# Patient Record
Sex: FEMALE | Race: OTHER | NOT HISPANIC OR LATINO | URBAN - METROPOLITAN AREA
[De-identification: names, ages, dates, MRNs, and addresses within clinical notes are randomized per-mention and may not be internally consistent; named-entity substitution may affect disease eponyms.]

---

## 2021-10-16 ENCOUNTER — EMERGENCY (EMERGENCY)
Facility: HOSPITAL | Age: 46
LOS: 1 days | Discharge: HOME | End: 2021-10-16
Attending: EMERGENCY MEDICINE
Payer: COMMERCIAL

## 2021-10-16 VITALS
DIASTOLIC BLOOD PRESSURE: 71 MMHG | OXYGEN SATURATION: 100 % | HEART RATE: 69 BPM | TEMPERATURE: 98 F | RESPIRATION RATE: 18 BRPM | SYSTOLIC BLOOD PRESSURE: 108 MMHG

## 2021-10-16 VITALS
HEART RATE: 68 BPM | SYSTOLIC BLOOD PRESSURE: 118 MMHG | HEIGHT: 67 IN | OXYGEN SATURATION: 100 % | TEMPERATURE: 98 F | WEIGHT: 145.95 LBS | DIASTOLIC BLOOD PRESSURE: 75 MMHG | RESPIRATION RATE: 16 BRPM

## 2021-10-16 DIAGNOSIS — Z98.89 OTHER SPECIFIED POSTPROCEDURAL STATES: Chronic | ICD-10-CM

## 2021-10-16 LAB
ALBUMIN SERPL ELPH-MCNC: 3.5 G/DL — SIGNIFICANT CHANGE UP (ref 3.5–5)
ALP SERPL-CCNC: 67 U/L — SIGNIFICANT CHANGE UP (ref 40–120)
ALT FLD-CCNC: 23 U/L DA — SIGNIFICANT CHANGE UP (ref 10–60)
ANION GAP SERPL CALC-SCNC: 6 MMOL/L — SIGNIFICANT CHANGE UP (ref 5–17)
AST SERPL-CCNC: 11 U/L — SIGNIFICANT CHANGE UP (ref 10–40)
BASOPHILS # BLD AUTO: 0.03 K/UL — SIGNIFICANT CHANGE UP (ref 0–0.2)
BASOPHILS NFR BLD AUTO: 0.4 % — SIGNIFICANT CHANGE UP (ref 0–2)
BILIRUB SERPL-MCNC: 0.3 MG/DL — SIGNIFICANT CHANGE UP (ref 0.2–1.2)
BUN SERPL-MCNC: 10 MG/DL — SIGNIFICANT CHANGE UP (ref 7–18)
CALCIUM SERPL-MCNC: 8.9 MG/DL — SIGNIFICANT CHANGE UP (ref 8.4–10.5)
CHLORIDE SERPL-SCNC: 108 MMOL/L — SIGNIFICANT CHANGE UP (ref 96–108)
CO2 SERPL-SCNC: 27 MMOL/L — SIGNIFICANT CHANGE UP (ref 22–31)
CREAT SERPL-MCNC: 0.66 MG/DL — SIGNIFICANT CHANGE UP (ref 0.5–1.3)
EOSINOPHIL # BLD AUTO: 0.17 K/UL — SIGNIFICANT CHANGE UP (ref 0–0.5)
EOSINOPHIL NFR BLD AUTO: 2.1 % — SIGNIFICANT CHANGE UP (ref 0–6)
GLUCOSE SERPL-MCNC: 91 MG/DL — SIGNIFICANT CHANGE UP (ref 70–99)
HCG UR QL: NEGATIVE — SIGNIFICANT CHANGE UP
HCT VFR BLD CALC: 37.5 % — SIGNIFICANT CHANGE UP (ref 34.5–45)
HGB BLD-MCNC: 11.6 G/DL — SIGNIFICANT CHANGE UP (ref 11.5–15.5)
IMM GRANULOCYTES NFR BLD AUTO: 0.2 % — SIGNIFICANT CHANGE UP (ref 0–1.5)
LYMPHOCYTES # BLD AUTO: 3.29 K/UL — SIGNIFICANT CHANGE UP (ref 1–3.3)
LYMPHOCYTES # BLD AUTO: 40.4 % — SIGNIFICANT CHANGE UP (ref 13–44)
MCHC RBC-ENTMCNC: 27.5 PG — SIGNIFICANT CHANGE UP (ref 27–34)
MCHC RBC-ENTMCNC: 30.9 GM/DL — LOW (ref 32–36)
MCV RBC AUTO: 88.9 FL — SIGNIFICANT CHANGE UP (ref 80–100)
MONOCYTES # BLD AUTO: 0.52 K/UL — SIGNIFICANT CHANGE UP (ref 0–0.9)
MONOCYTES NFR BLD AUTO: 6.4 % — SIGNIFICANT CHANGE UP (ref 2–14)
NEUTROPHILS # BLD AUTO: 4.11 K/UL — SIGNIFICANT CHANGE UP (ref 1.8–7.4)
NEUTROPHILS NFR BLD AUTO: 50.5 % — SIGNIFICANT CHANGE UP (ref 43–77)
NRBC # BLD: 0 /100 WBCS — SIGNIFICANT CHANGE UP (ref 0–0)
PLATELET # BLD AUTO: 225 K/UL — SIGNIFICANT CHANGE UP (ref 150–400)
POTASSIUM SERPL-MCNC: 4.1 MMOL/L — SIGNIFICANT CHANGE UP (ref 3.5–5.3)
POTASSIUM SERPL-SCNC: 4.1 MMOL/L — SIGNIFICANT CHANGE UP (ref 3.5–5.3)
PROT SERPL-MCNC: 7.2 G/DL — SIGNIFICANT CHANGE UP (ref 6–8.3)
RBC # BLD: 4.22 M/UL — SIGNIFICANT CHANGE UP (ref 3.8–5.2)
RBC # FLD: 12.1 % — SIGNIFICANT CHANGE UP (ref 10.3–14.5)
SODIUM SERPL-SCNC: 141 MMOL/L — SIGNIFICANT CHANGE UP (ref 135–145)
TROPONIN I SERPL-MCNC: <0.015 NG/ML — SIGNIFICANT CHANGE UP (ref 0–0.04)
WBC # BLD: 8.14 K/UL — SIGNIFICANT CHANGE UP (ref 3.8–10.5)
WBC # FLD AUTO: 8.14 K/UL — SIGNIFICANT CHANGE UP (ref 3.8–10.5)

## 2021-10-16 PROCEDURE — 93010 ELECTROCARDIOGRAM REPORT: CPT

## 2021-10-16 PROCEDURE — 36415 COLL VENOUS BLD VENIPUNCTURE: CPT

## 2021-10-16 PROCEDURE — 80053 COMPREHEN METABOLIC PANEL: CPT

## 2021-10-16 PROCEDURE — 99285 EMERGENCY DEPT VISIT HI MDM: CPT

## 2021-10-16 PROCEDURE — 70450 CT HEAD/BRAIN W/O DYE: CPT | Mod: 26,MA

## 2021-10-16 PROCEDURE — 85025 COMPLETE CBC W/AUTO DIFF WBC: CPT

## 2021-10-16 PROCEDURE — 70450 CT HEAD/BRAIN W/O DYE: CPT | Mod: MA

## 2021-10-16 PROCEDURE — 93005 ELECTROCARDIOGRAM TRACING: CPT

## 2021-10-16 PROCEDURE — 84484 ASSAY OF TROPONIN QUANT: CPT

## 2021-10-16 PROCEDURE — 81025 URINE PREGNANCY TEST: CPT

## 2021-10-16 PROCEDURE — 99284 EMERGENCY DEPT VISIT MOD MDM: CPT | Mod: 25

## 2021-10-16 RX ORDER — SODIUM CHLORIDE 9 MG/ML
1000 INJECTION INTRAMUSCULAR; INTRAVENOUS; SUBCUTANEOUS ONCE
Refills: 0 | Status: COMPLETED | OUTPATIENT
Start: 2021-10-16 | End: 2021-10-16

## 2021-10-16 RX ORDER — ACETAMINOPHEN 500 MG
650 TABLET ORAL ONCE
Refills: 0 | Status: COMPLETED | OUTPATIENT
Start: 2021-10-16 | End: 2021-10-16

## 2021-10-16 RX ADMIN — Medication 650 MILLIGRAM(S): at 12:31

## 2021-10-16 RX ADMIN — SODIUM CHLORIDE 1000 MILLILITER(S): 9 INJECTION INTRAMUSCULAR; INTRAVENOUS; SUBCUTANEOUS at 12:30

## 2021-10-16 NOTE — ED PROVIDER NOTE - OBJECTIVE STATEMENT
45 y/o woman, no PMH, c/o intermittent headache x 1 week, then nose bleed this morning.  She also had syncopal episode (denies head injury) last Friday.  Denies seizure/vomiting/fever/dizziness/weakness/numbness.

## 2021-10-16 NOTE — ED PROVIDER NOTE - CARE PLAN
Principal Discharge DX:	Chronic headaches  Secondary Diagnosis:	Epistaxis  Secondary Diagnosis:	Syncope   1

## 2021-10-16 NOTE — ED PROVIDER NOTE - PATIENT PORTAL LINK FT
You can access the FollowMyHealth Patient Portal offered by [facility] by registering at the following website: [email or web address]. By joining [employer]’s FollowMyHealth portal, you will also be able to view your health information using other applications (apps) compatible with our system.

## 2021-10-16 NOTE — ED PROVIDER NOTE - CLINICAL SUMMARY MEDICAL DECISION MAKING FREE TEXT BOX
47 y/o woman, no PMH, c/o intermittent headache x 1 week, then nose bleed this morning.  She also had syncopal episode (denies head injury) last Friday--CT head, labs, IVF, EKG, pregnancy test, symptomatic Tx, reassess.

## 2021-10-16 NOTE — ED PROVIDER NOTE - NSFOLLOWUPINSTRUCTIONS_ED_ALL_ED_FT
SYNCOPE - AfterCare(R) Instructions(ER/ED)           Syncope    WHAT YOU NEED TO KNOW:    Syncope is also called fainting or passing out. Syncope is a sudden, temporary loss of consciousness, followed by a fall from a standing or sitting position. Syncope ranges from not serious to a sign of a more serious condition that needs to be treated. You can control some health conditions that cause syncope. Your healthcare providers can help you create a plan to manage syncope and prevent episodes.    DISCHARGE INSTRUCTIONS:    Seek care immediately if:   •You are bleeding because you hit your head when you fainted.       •You suddenly have double vision, difficulty speaking, numbness, and cannot move your arms or legs.      •You have chest pain and trouble breathing.      •You vomit blood or material that looks like coffee grounds.      •You see blood in your bowel movement.      Contact your healthcare provider if:   •You have new or worsening symptoms.      •You have another syncope episode.      •You have a headache, fast heartbeat, or feel too dizzy to stand up.      •You have questions or concerns about your condition or care.      Medicines:   •Medicines may be needed to help your heart pump strongly and regularly. Your healthcare provider may also make changes to any medicines that are causing syncope.       •Take your medicine as directed. Contact your healthcare provider if you think your medicine is not helping or if you have side effects. Tell him or her if you are allergic to any medicine. Keep a list of the medicines, vitamins, and herbs you take. Include the amounts, and when and why you take them. Bring the list or the pill bottles to follow-up visits. Carry your medicine list with you in case of an emergency.      Follow up with your doctor as directed: Write down your questions so you remember to ask them during your visits.     Manage syncope:   •Keep a record of your syncope episodes. Include your symptoms and your activity before and after the episode. The record can help your healthcare provider find the cause of your syncope and help you manage episodes.      •Sit or lie down when needed. This includes when you feel dizzy, your throat is getting tight, and your vision changes. Raise your legs above the level of your heart.      •Take slow, deep breaths if you start to breathe faster with anxiety or fear. This can help decrease dizziness and the feeling that you might faint.       •Check your blood pressure often. This is important if you take medicine to lower your blood pressure. Check your blood pressure when you are lying down and when you are standing. Ask how often to check during the day. Keep a record of your blood pressure numbers. Your healthcare provider may use the record to help plan your treatment.  How to take a Blood Pressure           Prevent a syncope episode:   •Move slowly and let yourself get used to one position before you move to another position. This is very important when you change from a lying or sitting position to a standing position. Take some deep breaths before you stand up from a lying position. Stand up slowly. Sudden movements may cause a fainting spell. Sit on the side of the bed or couch for a few minutes before you stand up. If you are on bedrest, try to be upright for about 2 hours each day, or as directed. Do not lock your legs if you are standing for a long period of time. Move your legs and bend your knees to keep blood flowing.      •Follow your healthcare provider's recommendations. Your provider may recommend that you drink more liquids to prevent dehydration. You may also need to have more salt to keep your blood pressure from dropping too low and causing syncope. Your provider will tell you how much liquid and sodium to have each day. He or she will also tell you how much physical activity is safe for you. This will depend on what is causing your syncope.      •Watch for signs of low blood sugar. These include hunger, nervousness, sweating, and fast or fluttery heartbeats. Talk with your healthcare provider about ways to keep your blood sugar level steady.      •Do not strain if you are constipated. You may faint if you strain to have a bowel movement. Walking is the best way to get your bowels moving. Eat foods high in fiber to make it easier to have a bowel movement. Good examples are high-fiber cereals, beans, vegetables, and whole-grain breads. Prune juice may help make bowel movements softer.      •Be careful in hot weather. Heat can cause a syncope episode. Limit activity done outside on hot days. Physical activity in hot weather can lead to dehydration. This can cause an episode.         © Copyright [employer] 2021           back to top                          © Copyright [employer] 2021                                                                                                         Nosebleed, Adult      A nosebleed is when blood comes out of the nose. Nosebleeds are common. Usually, they are not a sign of a serious condition.  Nosebleeds can happen if a blood vessel in your nose starts to bleed or if the lining of your nose (mucous membrane) cracks. They are commonly caused by:  •Allergies.      •Colds.      •Picking your nose.      •Blowing your nose too hard.      •An injury from sticking an object into your nose or getting hit in the nose.      •Dry or cold air.    Less common causes of nosebleeds include:  •Toxic fumes.      •Something abnormal in the nose or in the air-filled spaces in the bones of the face (sinuses).      •Growths in the nose, such as polyps.      •Blood thinners or conditions that cause blood to clot slowly.      •Certain illnesses or procedures that irritate or dry out the nasal passages.        Follow these instructions at home:      When you have a nosebleed:      •Sit down and tilt your head slightly forward.      •Use a clean towel or tissue to pinch your nostrils under the bony part of your nose. After 5 minutes, let go of your nose and see if bleeding starts again. Do not release pressure before that time. If there is still bleeding, repeat the pinching and holding for 5 minutes or until the bleeding stops.      • Do not place tissues or gauze in the nose to stop the bleeding.      •Avoid lying down and avoid tilting your head backward. That may make blood collect in the throat and cause gagging or coughing.      •Use a nasal spray decongestant to help with a nosebleed as told by your health care provider.      After a nosebleed:     •Avoid blowing your nose or sniffing for a number of hours.      •Avoid straining, lifting, or bending at the waist for several days. You may go back to other normal activities as you are able.    •If you are taking aspirin or blood thinners and you have nosebleeds, talk to your health care provider. These medicines make bleeding more likely.  •Ask your health care provider if you should stop taking the medicines or if you should adjust the dose.      •Do not stop taking medicines that your health care provider has recommended unless he or she tells you to stop taking them.      •If your nosebleed was caused by dry mucous membranes, use over-the-counter saline nasal spray or gel and a humidifier as told by your health care provider. This will keep the mucous membranes moist and allow them to heal. If you need to use one of these products:  •Choose one that is water-soluble.      •Use only as much as you need and use it only as often as needed.      •Do not lie down right after you use it.      •If you get nosebleeds often, talk with your health care provider about medical treatments. Options may include:  •Nasal cautery. This treatment stops and prevents nosebleeds by using a chemical swab or electrical device to lightly burn tiny blood vessels inside the nose.      •Nasal packing. A gauze or other material is placed in the nose to keep constant pressure on the bleeding area.          Contact a health care provider if you:    •Have a fever.      •Get nosebleeds often or more often than usual.      •Bruise very easily.      •Have a nosebleed from having something stuck in your nose.      •Have bleeding in your mouth.      •Vomit or cough up brown material.      •Have a nosebleed after you start a new medicine.        Get help right away if:    •You have a nosebleed after a fall or a head injury.      •Your nosebleed does not go away after 20 minutes.      •You feel dizzy or weak.      •You have unusual bleeding from other parts of your body.      •You have unusual bruising on other parts of your body.      •You become sweaty.      •You vomit blood.        Summary    •A nosebleed is when blood comes out of the nose. Common causes include allergies, an injury to the nose, or cold or dry air.      •Initial treatment includes applying pressure for 5 minutes.       •Moisturizing the nose with saline nasal spray or gel after a nosebleed may help prevent future bleeding.      •Get help right away if your nosebleed does not go away after 20 minutes.      This information is not intended to replace advice given to you by your health care provider. Make sure you discuss any questions you have with your health care provider.      Document Revised: 10/15/2020 Document Reviewed: 10/15/2020    [employer] Patient Education © 2021 [employer]                            Log Out.      [employer]® CareNotes®     :  qcue  	                       GENERAL HEADACHE - AfterCare(R) Instructions(ER/ED)           General Headache    WHAT YOU NEED TO KNOW:    Headache pain may be mild or severe. Common causes include stress, medicines, and head injuries. Sleep problems, allergies, and hormone changes can also cause a headache. You may have frequent headaches that have no clear cause. Pain may start in another part of your body and move to your head. Headache pain can also move to other parts of your body. A headache can cause other symptoms, such as nausea and vomiting. A severe headache may be a sign of a stroke or other serious problem that needs immediate treatment.    DISCHARGE INSTRUCTIONS:    Have someone call your local emergency number (911 in the [place]) for any of the following:   •You have any of the following signs of a stroke: ?Numbness or drooping on one side of your face       ?Weakness in an arm or leg      ?Confusion or difficulty speaking      ?Dizziness, a severe headache, or vision loss    BE FAST SIGNS OF A STROKE             Return to the emergency department if:   •You have a headache with neck stiffness and a fever.      •You have a constant headache and are vomiting.      •You have severe pain that does not get better after you take pain medicine.      •You have a headache and the pain worsens when you look into light.      •You have a headache and vision changes, such as blurred vision.      •You have a headache and are forgetful or confused.      Call your doctor if:   •You have a headache each day that does not get better, even after treatment.      •You have changes in your headaches, or new symptoms that occur when you have a headache.      •Others you live or work with also have headaches.      •You have questions or concerns about your condition or care.      Medicines: You may need any of the following:   •Medicines may be given to prevent or treat headache pain. Do not wait until the pain is severe to take your medicine. Ask your healthcare provider how to take the medicine safely.      •NSAIDs, such as ibuprofen, help decrease swelling, pain, and fever. This medicine is available with or without a doctor's order. NSAIDs can cause stomach bleeding or kidney problems in certain people. If you take blood thinner medicine, always ask if NSAIDs are safe for you. Always read the medicine label and follow directions. Do not give these medicines to children under 6 months of age without direction from your child's healthcare provider.      •Acetaminophen decreases pain and fever. It is available without a doctor's order. Ask how much to take and how often to take it. Follow directions. Read the labels of all other medicines you are using to see if they also contain acetaminophen, or ask your doctor or pharmacist. Acetaminophen can cause liver damage if not taken correctly. Do not use more than 4 grams (4,000 milligrams) total of acetaminophen in one day.       •Antinausea medicine may be given to calm your stomach and help prevent vomiting.      •Take your medicine as directed. Contact your healthcare provider if you think your medicine is not helping or if you have side effects. Tell him of her if you are allergic to any medicine. Keep a list of the medicines, vitamins, and herbs you take. Include the amounts, and when and why you take them. Bring the list or the pill bottles to follow-up visits. Carry your medicine list with you in case of an emergency.      Manage your symptoms:   •Rest in a dark and quiet room. This may help decrease your pain.      •Apply heat or ice as directed. Heat or ice may help decrease pain or muscle spasms. Apply heat or ice on the area for 20 minutes every 2 hours for as many days as directed. Your healthcare provider may recommend that you alternate heat and ice.      •Relax your muscles to help relieve a headache. Lie down in a comfortable position and close your eyes. Relax your muscles slowly. Start at your toes and work your way up your body. A massage or warm bath may also help relax your muscles.      Keep a headache record: Record the dates and times that you get headaches, and what you were doing before the headache started. Also record what you ate and drank in the 24 hours before the headache started. This might help your healthcare provider find the cause of your headaches and make a treatment plan. The record can also help you avoid headache triggers or manage your symptoms.    Get enough sleep: You should get 8 to 10 hours of sleep each night. Create a sleep schedule. Go to bed and wake up at the same times each day. It may be helpful to do something relaxing before bed. Do not watch television right before bed.    Do not smoke: Nicotine and other chemicals in cigarettes and cigars can trigger a headache or make it worse. Ask your healthcare provider for information if you currently smoke and need help to quit. E-cigarettes or smokeless tobacco still contain nicotine. Talk to your healthcare provider before you use these products.    Drink liquids as directed: You may need to drink more liquid to prevent dehydration. Dehydration can cause a headache. Ask your healthcare provider how much liquid to drink each day and which liquids are best for you.    Limit caffeine and alcohol as directed: Your headaches may be triggered by caffeine or alcohol. You may also develop a headache if you drink caffeine regularly and suddenly stop.    Eat a variety of healthy foods: Do not skip meals. Too little food can trigger a headache. Include fruits, vegetables, whole-grain breads, low-fat dairy products, beans, lean meat, and fish. Do not have trigger foods, such as chocolate and red wine. Foods that contain gluten, nitrates, MSG, or artificial sweeteners may also trigger a headache.    Healthy Foods         Follow up with your doctor as directed: Write down your questions so you remember to ask them during your visits.        © Copyright [employer] 2021           back to top                          © Copyright [employer] 2021

## 2021-10-16 NOTE — ED PROVIDER NOTE - PROGRESS NOTE DETAILS
Pt improved, no epistaxis or syncope here.  ED w/u unremarkable.  No chest pain.  Advised strict return precautions and PMD f/u.

## 2021-10-16 NOTE — ED ADULT NURSE NOTE - PAIN: PRESENCE, MLM
Gastroesophageal Reflux Disease   AMBULATORY CARE:   Gastroesophageal reflux  reflux occurs when acid and food in the stomach back up into the esophagus  Gastroesophageal reflux disease (GERD) is reflux that occurs more than twice a week for a few weeks  It usually causes heartburn and other symptoms  GERD can cause other health problems over time if it is not treated  Common symptoms include:  Heartburn is the most common symptom of GERD  You may feel burning pain in your chest or below the breast bone  This usually occurs after meals and spreads to your neck, jaw, or shoulder  The pain gets better when you change positions  You may also have any of the following:  · Bitter or acid taste in your mouth    · Dry cough    · Trouble swallowing or pain with swallowing    · Hoarseness or sore throat    · Frequent burping or hiccups    · Feeling of fullness soon after you start eating  Seek care immediately if:  · You feel full and cannot burp or vomit  · You have severe chest pain and sudden trouble breathing  · Your bowel movements are black, bloody, or tarry-looking  · Your vomit looks like coffee grounds or has blood in it  Contact your healthcare provider if:   · You vomit large amounts, or you vomit often  · You have trouble breathing after you vomit  · You have trouble swallowing, or pain with swallowing  · You are losing weight without trying  · Your symptoms get worse or do not improve with treatment  · You have questions or concerns about your condition or care  Treatment for GERD:  Your healthcare provider may prescribe medicine to decrease stomach acid  He may also prescribe medicine that help your esophagus and stomach move food and liquid to your intestines  Surgery may be done if other treatments do not work  You may need surgery to wrap the upper part of the stomach around the esophageal sphincter  This will strengthen the sphincter and prevent reflux     Manage GERD: · Do not have foods or drinks that may increase heartburn  These include chocolate, peppermint, fried or fatty foods, drinks that contain caffeine, or carbonated drinks (soda)  Other foods include spicy foods, onions, tomatoes, and tomato-based foods  Do not have foods or drinks that can irritate your esophagus, such as citrus fruits, juices, and alcohol  · Do not eat large meals  When you eat a lot of food at one time, your stomach needs more acid to digest it  Eat 6 small meals each day instead of 3 large ones, and eat slowly  Do not eat meals 2 to 3 hours before bedtime  · Elevate the head of your bed  Place 6-inch blocks under the head of your bed frame  You may also use more than one pillow under your head and shoulders while you sleep  · Maintain a healthy weight  If you are overweight, weight loss may help relieve symptoms of GERD  · Do not smoke  Smoking weakens the lower esophageal sphincter and increases the risk of GERD  Ask your healthcare provider for information if you currently smoke and need help to quit  E-cigarettes or smokeless tobacco still contain nicotine  Talk to your healthcare provider before you use these products  · Do not wear clothing that is tight around your waist   Tight clothing can put pressure on your stomach and cause or worsen GERD symptoms  Follow up with your healthcare provider as directed:  Write down your questions so you remember to ask them during your visits  © 2017 [facility] Information is for End User's use only and may not be sold, redistributed or otherwise used for commercial purposes  All illustrations and images included in CareNotes® are the copyrighted property of [employer]  or [employer] Eduardo Reyes  The above information is an  only  It is not intended as medical advice for individual conditions or treatments   Talk to your doctor, nurse or pharmacist before following any medical regimen to see if it is safe and effective for you  High Fiber Diet   AMBULATORY CARE:   A high-fiber diet  includes foods that have a high amount of fiber  Fiber is the part of fruits, vegetables, and grains that is not broken down by your body  Fiber keeps your bowel movements regular  Fiber can also help lower your cholesterol level, control blood sugar in people with diabetes, and relieve constipation  Fiber can also help you control your weight because it helps you feel full faster  Most adults should eat 25 to 35 grams of fiber each day  Talk to your dietitian or healthcare provider about the amount of fiber you need  Good sources of fiber:   · Foods with at least 4 grams of fiber per serving:      ¨ ? to ½ cup of high-fiber cereal (check the nutrition label on the box)    ¨ ½ cup of blackberries or raspberries    ¨ 4 dried prunes    ¨ 1 cooked artichoke    ¨ ½ cup of cooked legumes, such as lentils, or red, kidney, and murry beans    · Foods with 1 to 3 grams of fiber per serving:      ¨ 1 slice of whole-wheat, pumpernickel, or rye bread    ¨ ½ cup of cooked brown rice    ¨ 4 whole-wheat crackers    ¨ 1 cup of oatmeal    ¨ ½ cup of cereal with 1 to 3 grams of fiber per serving (check the nutrition label on the box)    ¨ 1 small piece of fruit, such as an apple, banana, pear, kiwi, or orange    ¨ 3 dates    ¨ ½ cup of canned apricots, fruit cocktail, peaches, or pears    ¨ ½ cup of raw or cooked vegetables, such as carrots, cauliflower, cabbage, spinach, squash, or corn  Ways that you can increase fiber in your diet:   · Choose brown or wild rice instead of white rice  · Use whole wheat flour in recipes instead of white or all-purpose flour  · Add beans and peas to casseroles or soups  · Choose fresh fruit and vegetables with peels or skins on instead of juices  Other diet guidelines to follow:   · Add fiber to your diet slowly    You may have abdominal discomfort, bloating, and gas if you add fiber to your diet too quickly  · Drink plenty of liquids as you add fiber to your diet  You may have nausea or develop constipation if you do not drink enough water  Ask how much liquid to drink each day and which liquids are best for you  © 2017 [address] Information is for End User's use only and may not be sold, redistributed or otherwise used for commercial purposes  All illustrations and images included in CareNotes® are the copyrighted property of [employer]  or Luis Eduardo Reyes  The above information is an  only  It is not intended as medical advice for individual conditions or treatments  Talk to your doctor, nurse or pharmacist before following any medical regimen to see if it is safe and effective for you  complains of pain/discomfort

## 2021-11-09 ENCOUNTER — EMERGENCY (EMERGENCY)
Facility: HOSPITAL | Age: 46
LOS: 1 days | Discharge: HOME | End: 2021-11-09
Attending: EMERGENCY MEDICINE
Payer: COMMERCIAL

## 2021-11-09 VITALS
HEIGHT: 67 IN | HEART RATE: 75 BPM | RESPIRATION RATE: 20 BRPM | DIASTOLIC BLOOD PRESSURE: 73 MMHG | WEIGHT: 145.95 LBS | SYSTOLIC BLOOD PRESSURE: 111 MMHG | OXYGEN SATURATION: 98 % | TEMPERATURE: 98 F

## 2021-11-09 DIAGNOSIS — Z98.89 OTHER SPECIFIED POSTPROCEDURAL STATES: Chronic | ICD-10-CM

## 2021-11-09 PROCEDURE — 99284 EMERGENCY DEPT VISIT MOD MDM: CPT

## 2021-11-09 PROCEDURE — 99283 EMERGENCY DEPT VISIT LOW MDM: CPT

## 2021-11-09 RX ORDER — LIDOCAINE 4 G/100G
1 CREAM TOPICAL
Qty: 5 | Refills: 0
Start: 2021-11-09 | End: 2021-11-13

## 2021-11-09 RX ORDER — DIAZEPAM 5 MG
5 TABLET ORAL ONCE
Refills: 0 | Status: DISCONTINUED | OUTPATIENT
Start: 2021-11-09 | End: 2021-11-09

## 2021-11-09 RX ORDER — IBUPROFEN 200 MG
600 TABLET ORAL ONCE
Refills: 0 | Status: COMPLETED | OUTPATIENT
Start: 2021-11-09 | End: 2021-11-09

## 2021-11-09 RX ORDER — DIAZEPAM 5 MG
1 TABLET ORAL
Qty: 8 | Refills: 0
Start: 2021-11-09 | End: 2021-11-11

## 2021-11-09 RX ORDER — LIDOCAINE 4 G/100G
1 CREAM TOPICAL ONCE
Refills: 0 | Status: COMPLETED | OUTPATIENT
Start: 2021-11-09 | End: 2021-11-09

## 2021-11-09 RX ORDER — IBUPROFEN 200 MG
1 TABLET ORAL
Qty: 20 | Refills: 0
Start: 2021-11-09 | End: 2021-11-13

## 2021-11-09 RX ADMIN — Medication 600 MILLIGRAM(S): at 20:14

## 2021-11-09 RX ADMIN — Medication 5 MILLIGRAM(S): at 20:14

## 2021-11-09 RX ADMIN — LIDOCAINE 1 PATCH: 4 CREAM TOPICAL at 20:14

## 2021-11-09 NOTE — ED PROVIDER NOTE - CLINICAL SUMMARY MEDICAL DECISION MAKING FREE TEXT BOX
45 y/o F presents w/ left lower back injury today. No focal neuro deficit. Plan: meds, discharge w/ PMD follow up.

## 2021-11-09 NOTE — ED PROVIDER NOTE - ATTENDING CONTRIBUTION TO CARE
47 y/o F presents w/ left lower back injury today. No focal neuro deficit. Plan: meds, discharge w/ PMD follow up.

## 2021-11-09 NOTE — ED PROVIDER NOTE - CHPI ED SYMPTOMS NEG
no saddle anesthesia, no focal weakness/no bladder dysfunction/no bowel dysfunction/no numbness/no tingling

## 2021-11-09 NOTE — ED PROVIDER NOTE - PHYSICAL EXAMINATION
Back is symmetrical, scapulae are symmetric. Neck has full range of motion. L5S1 paraspinal tenderness left side, deformity or step-offs. All limbs equally strong 5+ bilaterally. Strong ankle dorsiflexion and plantar flexion against resistance bilaterally. Strong flexion/extension of big toe bilaterally. Pt is able to walk in straight line with steady gait. No recent weight loss or night sweats. No saddle anesthesia, no bowel/bladder incontinence or retention, no lower extremity weakness.

## 2021-11-09 NOTE — ED PROVIDER NOTE - OBJECTIVE STATEMENT
45 y/o F, w/ history of HLD, presents for evaluation of back pain. While at work, patient was turning a patient and the latter moved briskly. Patient abruptly leaned backwards and had a sudden onset of left lower back pain radiating to left lateral thigh. Pain is sharp, continuous, aggravated w/ certain positions. No alleviating factors. Patient denies any saddle anesthesia, numbness, tingling, focal weakness, urinary or bowel incontinence, or any other complaints. NKDA.

## 2021-11-09 NOTE — ED PROVIDER NOTE - PATIENT PORTAL LINK FT
You can access the FollowMyHealth Patient Portal offered by [facility] by registering at the following website: [email or web address]. By joining [employer]’s FollowMyHealth portal, you will also be able to view your health information using other applications (apps) compatible with our system.

## 2021-11-09 NOTE — ED PROVIDER NOTE - NSFOLLOWUPINSTRUCTIONS_ED_ALL_ED_FT
Follow up with the primary care doctor in 3-5 days.  Warm compress to help with the tension/spasms.  If you experience any new or worsening symptoms or if you are concerned you can always come back to the emergency for a re-evaluation.  If there were any prescriptions given to you during the visit today take them as prescribed. If you have any questions you can ask the pharmacist.

## 2021-11-09 NOTE — ED ADULT NURSE NOTE - OBJECTIVE STATEMENT
pt is a 45 y/o female w/ c/o back pain  . states today  she was turning  her pt and hurt  her back while turning the pt.

## 2022-06-22 NOTE — ED ADULT TRIAGE NOTE - ESI TRIAGE ACUITY LEVEL, MLM
Provider: DR. CAN KELLY  Caller: LAURA  Relationship to Patient: EDMUN[employer]ST[employer]  Phone Number: [phone]  Reason for Call: LAURA CALLED REQUESTING FOR 06/17/22 OFFICE NOTES AND WORK STATUS TO BE FAXED OVER. PLEASE ADVISE.    FAX#: [phone]   3

## 2023-02-22 PROBLEM — E78.5 HYPERLIPIDEMIA, UNSPECIFIED: Chronic | Status: ACTIVE | Noted: 2021-11-09

## 2023-02-26 ENCOUNTER — EMERGENCY (EMERGENCY)
Facility: HOSPITAL | Age: 48
LOS: 1 days | Discharge: HOME | End: 2023-02-26
Attending: STUDENT IN AN ORGANIZED HEALTH CARE EDUCATION/TRAINING PROGRAM
Payer: COMMERCIAL

## 2023-02-26 VITALS
RESPIRATION RATE: 20 BRPM | TEMPERATURE: 98 F | HEIGHT: 67 IN | HEART RATE: 108 BPM | OXYGEN SATURATION: 98 % | DIASTOLIC BLOOD PRESSURE: 80 MMHG | WEIGHT: 149.03 LBS | SYSTOLIC BLOOD PRESSURE: 117 MMHG

## 2023-02-26 DIAGNOSIS — Z98.89 OTHER SPECIFIED POSTPROCEDURAL STATES: Chronic | ICD-10-CM

## 2023-02-26 PROCEDURE — 99284 EMERGENCY DEPT VISIT MOD MDM: CPT

## 2023-02-26 PROCEDURE — 99283 EMERGENCY DEPT VISIT LOW MDM: CPT

## 2023-02-26 PROCEDURE — 96372 THER/PROPH/DIAG INJ SC/IM: CPT

## 2023-02-26 RX ORDER — IBUPROFEN 200 MG
1 TABLET ORAL
Qty: 20 | Refills: 0
Start: 2023-02-26 | End: 2023-03-02

## 2023-02-26 RX ORDER — METHOCARBAMOL 500 MG/1
1500 TABLET, FILM COATED ORAL ONCE
Refills: 0 | Status: COMPLETED | OUTPATIENT
Start: 2023-02-26 | End: 2023-02-26

## 2023-02-26 RX ORDER — METHOCARBAMOL 500 MG/1
2 TABLET, FILM COATED ORAL
Qty: 18 | Refills: 0
Start: 2023-02-26 | End: 2023-02-28

## 2023-02-26 RX ORDER — LIDOCAINE 4 G/100G
1 CREAM TOPICAL ONCE
Refills: 0 | Status: COMPLETED | OUTPATIENT
Start: 2023-02-26 | End: 2023-02-26

## 2023-02-26 RX ORDER — KETOROLAC TROMETHAMINE 30 MG/ML
30 SYRINGE (ML) INJECTION ONCE
Refills: 0 | Status: DISCONTINUED | OUTPATIENT
Start: 2023-02-26 | End: 2023-02-26

## 2023-02-26 RX ORDER — LIDOCAINE 4 G/100G
1 CREAM TOPICAL
Qty: 7 | Refills: 0
Start: 2023-02-26 | End: 2023-03-04

## 2023-02-26 RX ADMIN — Medication 30 MILLIGRAM(S): at 12:24

## 2023-02-26 RX ADMIN — Medication 30 MILLIGRAM(S): at 12:06

## 2023-02-26 NOTE — ED ADULT TRIAGE NOTE - CHIEF COMPLAINT QUOTE
" I have a severe back pain since yesterday " ,pt is c/o left sided back pain radiating to her leg , denies any recent fall or injuries

## 2023-02-26 NOTE — ED PROVIDER NOTE - PATIENT PORTAL LINK FT
You can access the FollowMyHealth Patient Portal offered by [facility] by registering at the following website: [email or web address]. By joining [employer]’s FollowMyHealth portal, you will also be able to view your health information using other applications (apps) compatible with our system.

## 2023-02-26 NOTE — ED PROVIDER NOTE - OBJECTIVE STATEMENT
47 year old female with history of some kind of back problem in 2001 presents to ED complaining of severe back pain since yesterday. She relates that she tried to get up to go to the doctor for a check-up, but could not stand. She complains of sharp, worsening pain that radiates to the legs. Patient states that at first the pain was in her general lower back, but is now on the lower left. She denies abdominal pain, LE numbness, tingling, weakness, saddle anesthesia, fever, IVDU, hx of cancer, bowel or bladder incontinence or any other concerning features. Endorses chills. Denies any heavy lifting. She states that she took ibuprofen 800 mg and a muscle relaxer, with no relief.  Allergies: penicillin (pruritis, rash)

## 2023-02-26 NOTE — ED PROVIDER NOTE - NSFOLLOWUPINSTRUCTIONS_ED_ALL_ED_FT
Follow up with your primary care doctor within 4 days.     Pain medication sent to the pharmacy for your symptoms.     If you experience any new or worsening symptoms or if you are concerned you can always come back to the emergency for a re-evaluation.     Back Pain    Back pain can be scary. But even when the pain is severe, it usually goes away on its own within a few weeks. The cases that require urgent care or surgery are rare. Do not assume the worst. Almost everyone gets back pain at some point.     See your doctor or nurse if you have back pain and you:    -Recently had a fall or an injury to your back    -Have numbness or weakness in your legs    -Have problems with bladder or bowel control    -Have unexplained weight loss    -Have a fever or feel sick in other ways    -Take steroid medicine, such as prednisone, on a regular basis    -Have diabetes or a medical problem that weakens your immune system    -Have a history of cancer or osteoporosis    You should also see a doctor if:    -Your back pain is so severe that you cannot perform simple tasks    -Your back pain does not start to improve within 4 weeks    Many different things can cause low back pain. Most of the time, doctors do not know the exact cause.    Back pain can happen if you strain a muscle. This is often what has happened when a person "throws out" their back. This refers to pain that starts suddenly after physical activity, like lifting something heavy or bending the back.    Back pain can also happen if you have:    -Damaged, bulging, or torn discs    -Arthritis affecting the joints of the spine    -Bony growths on the vertebrae that crowd nearby nerves    -A vertebra out of place    -Narrowing in the spinal canal    -A tumor or infection (but this is very rare)    Most people with an episode of low back pain do not have a serious medical problem, and can try simple treatments such as:    -Staying active – The best thing you can do is to stay as active as possible. People with low back pain recover faster if they stay active. If your pain is severe, you might need to rest for a day or 2. But it's important to get back to walking and moving as soon as possible. While you should avoid heavy lifting and sports while your back hurts, try to keep doing your normal daily activities.    -Heat – Some people find that it helps to use a heating pad or heated wrap. Be careful to avoid high heat settings to prevent skin burns.    -Medicines – First, you can try pain medicines that you can get without a prescription. In many cases, doctors suggest first trying a nonsteroidal antiinflammatory drug, or "NSAID." NSAIDs include ibuprofen (sample brand names: Advil, Motrin) and naproxen (sample brand name: Aleve). These might work better than acetaminophen (Tylenol) for back pain.    -Treatments to help with symptoms – Some treatments might help you feel better for a little while. They include:    -Spinal manipulation – This is when a chiropractor, physical therapist, or other professional moves or "adjusts" the joints of your back. If you want to try this, talk to your doctor or nurse first.    -Acupuncture – This is when someone who knows traditional Chinese medicine inserts tiny needles into your body to block pain signals.    -Massage    While back pain usually goes away within a few weeks, some people do continue to have pain for longer. In this case, additional treatments might include:    -Self care – This involves being aware of your pain. While you should rest when you need to, it's important to stay active as much as you can. Things like applying heat and doing gentle stretches can help you feel better, too.    -Physical therapy – A physical therapist is an exercise expert who can teach you stretches and movements to help strengthen your muscles. The goal is to relieve pain but also help you get back to your normal activities. Exercises you can try include walking, swimming, or using an exercise bike. Some people also find that Bertram Chi or yoga can help with their back pain. Finding activities you enjoy can help you stay active.    -Reducing stress – Some people find that it helps to try something called "mindfulness-based stress reduction." This involves going to a group program to practice relaxation and meditation. If your back pain is making you feel anxious or depressed, talk to your doctor or nurse. There are other treatments that can help with these problems.

## 2023-02-26 NOTE — ED PROVIDER NOTE - PHYSICAL EXAMINATION
Back is symmetrical, scapulae are symmetric. Neck has full range of motion. No spinal tenderness, deformity or step-offs. All limbs equally strong 5+ bilaterally. Strong ankle dorsiflexion and plantar flexion against resistance bilaterally. Strong flexion/extension of big toe bilaterally. Pt is able to walk in straight line with steady gait. No recent weight loss or night sweats. No saddle anesthesia, no bowel/bladder incontinence or retention, no lower extremity weakness.   Positive left lower paraspinal tenderness.

## 2023-02-26 NOTE — ED PROVIDER NOTE - CLINICAL SUMMARY MEDICAL DECISION MAKING FREE TEXT BOX
47 year old female with left lower back pain, likely consistent with musculoskeletal vs sciatica. Will give pain meds and reassess.

## 2023-02-27 PROBLEM — Z00.00 ENCOUNTER FOR PREVENTIVE HEALTH EXAMINATION: Status: ACTIVE | Noted: 2023-02-27

## 2023-02-28 ENCOUNTER — APPOINTMENT (OUTPATIENT)
Dept: ORTHOPEDIC SURGERY | Facility: CLINIC | Age: 48
End: 2023-02-28
Payer: OTHER MISCELLANEOUS

## 2023-02-28 DIAGNOSIS — M54.50 LOW BACK PAIN, UNSPECIFIED: ICD-10-CM

## 2023-02-28 DIAGNOSIS — Z86.39 PERSONAL HISTORY OF OTHER ENDOCRINE, NUTRITIONAL AND METABOLIC DISEASE: ICD-10-CM

## 2023-02-28 DIAGNOSIS — M54.16 RADICULOPATHY, LUMBAR REGION: ICD-10-CM

## 2023-02-28 DIAGNOSIS — Z78.9 OTHER SPECIFIED HEALTH STATUS: ICD-10-CM

## 2023-02-28 PROCEDURE — 99072 ADDL SUPL MATRL&STAF TM PHE: CPT

## 2023-02-28 PROCEDURE — 99205 OFFICE O/P NEW HI 60 MIN: CPT

## 2023-02-28 PROCEDURE — 72100 X-RAY EXAM L-S SPINE 2/3 VWS: CPT

## 2023-02-28 RX ORDER — SIMVASTATIN 80 MG/1
TABLET, FILM COATED ORAL
Refills: 0 | Status: ACTIVE | COMMUNITY

## 2023-03-28 ENCOUNTER — APPOINTMENT (OUTPATIENT)
Dept: ORTHOPEDIC SURGERY | Facility: CLINIC | Age: 48
End: 2023-03-28
Payer: OTHER MISCELLANEOUS

## 2023-03-28 VITALS — WEIGHT: 152 LBS | HEIGHT: 67 IN | BODY MASS INDEX: 23.86 KG/M2

## 2023-03-28 DIAGNOSIS — M48.07 SPINAL STENOSIS, LUMBOSACRAL REGION: ICD-10-CM

## 2023-03-28 PROCEDURE — 99214 OFFICE O/P EST MOD 30 MIN: CPT

## 2023-03-28 NOTE — REASON FOR VISIT
[Initial Visit] : an initial visit for [Workers' Comp: Date of Injury: _______] : This visit is related to worker's compensation. Date of Injury: [unfilled] [Back Pain] : back pain [Other: ____] : [unfilled]

## 2023-05-09 ENCOUNTER — TRANSCRIPTION ENCOUNTER (OUTPATIENT)
Age: 48
End: 2023-05-09

## 2023-05-09 ENCOUNTER — OUTPATIENT (OUTPATIENT)
Dept: OUTPATIENT SERVICES | Facility: HOSPITAL | Age: 48
LOS: 1 days | End: 2023-05-09
Payer: COMMERCIAL

## 2023-05-09 VITALS
OXYGEN SATURATION: 98 % | HEART RATE: 91 BPM | DIASTOLIC BLOOD PRESSURE: 82 MMHG | HEIGHT: 67 IN | SYSTOLIC BLOOD PRESSURE: 124 MMHG | TEMPERATURE: 97 F | WEIGHT: 149.91 LBS | RESPIRATION RATE: 16 BRPM

## 2023-05-09 DIAGNOSIS — Z98.89 OTHER SPECIFIED POSTPROCEDURAL STATES: Chronic | ICD-10-CM

## 2023-05-09 DIAGNOSIS — M51.36 OTHER INTERVERTEBRAL DISC DEGENERATION, LUMBAR REGION: ICD-10-CM

## 2023-05-09 DIAGNOSIS — M48.07 SPINAL STENOSIS, LUMBOSACRAL REGION: ICD-10-CM

## 2023-05-09 DIAGNOSIS — Z01.818 ENCOUNTER FOR OTHER PREPROCEDURAL EXAMINATION: ICD-10-CM

## 2023-05-09 DIAGNOSIS — Z29.9 ENCOUNTER FOR PROPHYLACTIC MEASURES, UNSPECIFIED: ICD-10-CM

## 2023-05-09 DIAGNOSIS — M40.209 UNSPECIFIED KYPHOSIS, SITE UNSPECIFIED: ICD-10-CM

## 2023-05-09 LAB
ANION GAP SERPL CALC-SCNC: 16 MMOL/L — SIGNIFICANT CHANGE UP (ref 5–17)
BLD GP AB SCN SERPL QL: POSITIVE — SIGNIFICANT CHANGE UP
BUN SERPL-MCNC: 14 MG/DL — SIGNIFICANT CHANGE UP (ref 7–23)
CALCIUM SERPL-MCNC: 10.8 MG/DL — HIGH (ref 8.4–10.5)
CHLORIDE SERPL-SCNC: 99 MMOL/L — SIGNIFICANT CHANGE UP (ref 96–108)
CO2 SERPL-SCNC: 25 MMOL/L — SIGNIFICANT CHANGE UP (ref 22–31)
CREAT SERPL-MCNC: 0.63 MG/DL — SIGNIFICANT CHANGE UP (ref 0.5–1.3)
DAT C3-SP REAG RBC QL: POSITIVE — SIGNIFICANT CHANGE UP
EGFR: 109 ML/MIN/1.73M2 — SIGNIFICANT CHANGE UP
ELUATE ANTIBODY 1: SIGNIFICANT CHANGE UP
GLUCOSE SERPL-MCNC: 138 MG/DL — HIGH (ref 70–99)
HCT VFR BLD CALC: 40.9 % — SIGNIFICANT CHANGE UP (ref 34.5–45)
HGB BLD-MCNC: 13.1 G/DL — SIGNIFICANT CHANGE UP (ref 11.5–15.5)
MCHC RBC-ENTMCNC: 27.7 PG — SIGNIFICANT CHANGE UP (ref 27–34)
MCHC RBC-ENTMCNC: 32 GM/DL — SIGNIFICANT CHANGE UP (ref 32–36)
MCV RBC AUTO: 86.5 FL — SIGNIFICANT CHANGE UP (ref 80–100)
NRBC # BLD: 0 /100 WBCS — SIGNIFICANT CHANGE UP (ref 0–0)
PLATELET # BLD AUTO: 284 K/UL — SIGNIFICANT CHANGE UP (ref 150–400)
POTASSIUM SERPL-MCNC: 4.1 MMOL/L — SIGNIFICANT CHANGE UP (ref 3.5–5.3)
POTASSIUM SERPL-SCNC: 4.1 MMOL/L — SIGNIFICANT CHANGE UP (ref 3.5–5.3)
RBC # BLD: 4.73 M/UL — SIGNIFICANT CHANGE UP (ref 3.8–5.2)
RBC # FLD: 12.5 % — SIGNIFICANT CHANGE UP (ref 10.3–14.5)
RH IG SCN BLD-IMP: POSITIVE — SIGNIFICANT CHANGE UP
SODIUM SERPL-SCNC: 140 MMOL/L — SIGNIFICANT CHANGE UP (ref 135–145)
WBC # BLD: 8.87 K/UL — SIGNIFICANT CHANGE UP (ref 3.8–10.5)
WBC # FLD AUTO: 8.87 K/UL — SIGNIFICANT CHANGE UP (ref 3.8–10.5)

## 2023-05-09 PROCEDURE — 86880 COOMBS TEST DIRECT: CPT

## 2023-05-09 PROCEDURE — 86860 RBC ANTIBODY ELUTION: CPT

## 2023-05-09 PROCEDURE — G0463: CPT

## 2023-05-09 PROCEDURE — 86905 BLOOD TYPING RBC ANTIGENS: CPT

## 2023-05-09 PROCEDURE — 86900 BLOOD TYPING SEROLOGIC ABO: CPT

## 2023-05-09 PROCEDURE — 86870 RBC ANTIBODY IDENTIFICATION: CPT

## 2023-05-09 PROCEDURE — 86922 COMPATIBILITY TEST ANTIGLOB: CPT

## 2023-05-09 PROCEDURE — 86850 RBC ANTIBODY SCREEN: CPT

## 2023-05-09 PROCEDURE — 86901 BLOOD TYPING SEROLOGIC RH(D): CPT

## 2023-05-09 RX ORDER — VANCOMYCIN HCL 1 G
1000 VIAL (EA) INTRAVENOUS ONCE
Refills: 0 | Status: DISCONTINUED | OUTPATIENT
Start: 2023-05-10 | End: 2023-05-20

## 2023-05-09 RX ORDER — CHLORHEXIDINE GLUCONATE 213 G/1000ML
1 SOLUTION TOPICAL ONCE
Refills: 0 | Status: DISCONTINUED | OUTPATIENT
Start: 2023-05-10 | End: 2023-05-20

## 2023-05-09 NOTE — H&P PST ADULT - PROBLEM SELECTOR PLAN 1
Pt is scheduled for a Stage I anterior L4-S1 Interbody fusion with Dr. Pena on 5/10/23  CBC, BMP, T/S and MRSA/MSSA ordered and obtained at [facility]  ABO, Urine HCG on admit

## 2023-05-09 NOTE — H&P PST ADULT - ATTENDING COMMENTS
48 yo female with L45, L5S1 DDD with broad based disc herniation with modic endplate changes, failed PT, NSAIDS, and MARY, progressive weakness(3-4/5 TA/EHL/Peroneals L>R), patient is a surgical candidate, discussed surgical intervention with ALIF/PSFL4-S1.     I reviewed all major risks, benefits, and complications associated with surgical intervention including but not limited to bleeding, infection, neurological injury, CSF leak, need for further surgical intervention, implant failure, implant migration, pseudarthrosis,  adjacent segment degeneration, pedicle screw breech, hematoma, chronic pain, worsening of pain, risk associated with BMP, off label use of it, retrograde ejaculation (if male patient), anesthetic risk, chronic pain and disability, medical complications (GI, , DVT, PE, cardiac, pulmonary, etc) and risk of mortality.     I reviewed all major risks, benefits, and complications associated with surgical intervention including but not limited to bleeding, infection, neurological injury, CSF leak, implant failure, implant migration, pedicle screw breech, pseudarthrosis, adjacent segment degeneration, hematoma, anesthetic risk, chronic pain and disability , medical complications (GI, , DVT, PE, cardiac, pulmonary, etc) and risk of mortality.

## 2023-05-09 NOTE — H&P PST ADULT - HISTORY OF PRESENT ILLNESS
47 year old female with PMH Former Smoker (1.5 PPD x 20 years; Quit 4 years ago - currently vaping daily), HLD reports that she recently had a back injury at work in 11/2021. She reports c/o back pain since the injury and had relief of her symptoms through accupuncture and physical therapy and returned back to work. In January 2023, she reports that the pain returned and was worse. She describes the pain as intermittent and sharp with pressure.  She has numbness and tingling that radiates to the LLE. Prolonged sitting or walking makes the pain worse. Laying flat makes the pain better. Her last epidural injection was in 1/2023 with no relief of symptoms. Her last trigger point injection with pain specialist Dr. Leavitt was on 5/1/23 with pain relief for only a few hours. Pt now presents to PST for scheduled stage I L4-S1 interbody fusion with Dr. Pena on 5/10/23. 47 year old female with PMH Former Smoker (1.5 PPD x 20 years; Quit 4 years ago - currently vaping daily) and HLD reports that she recently had a back injury at work in 11/2021. She reports c/o back pain since the injury and had relief of her symptoms through acupuncture and physical therapy and returned back to work. In January 2023, she reports that the pain returned and was worse. She describes the pain as intermittent and sharp with pressure.  At rest, she rates her pain 4/10 and at its worse she rates her pain 9/10. She has numbness and tingling that radiates to the LLE. Prolonged sitting or walking makes the pain worse. Laying flat makes the pain better. Her last epidural injection was in 1/2023 with no relief of symptoms. Her last trigger point injection with pain specialist Dr. Leavitt was on 5/1/23 with pain relief for only a few hours. Pt now presents to PST for scheduled stage I L4-S1 interbody fusion with Dr. Pena on 5/10/23.    **Of note, patient's top left (#9, #10) teeth are loose. Pt was supposed to have an extraction to prepare for partial dentures yesterday but cancelled her appointment. Dr. Duenas aware. Pt encouraged to try and have dental extraction today prior to surgery tomorrow with teach back understanding.**

## 2023-05-09 NOTE — H&P PST ADULT - NSICDXPASTMEDICALHX_GEN_ALL_CORE_FT
PAST MEDICAL HISTORY:  Current smoker     HLD (hyperlipidemia)     Spinal stenosis, lumbosacral region

## 2023-05-09 NOTE — H&P PST ADULT - PRO TOBACCO TYPE
(1.5 PPD x 20 years; Quit 4 years ago - currently vaping daily) 1.5 PPD x 20 years; Quit 4 years ago - currently vaping daily

## 2023-05-09 NOTE — H&P PST ADULT - ASSESSMENT
CAPRINI VTE 2.0 SCORE [CLOT updated 2019]    AGE RELATED RISK FACTORS                                                       MOBILITY RELATED FACTORS  [ ] Age 41-60 years                                            (1 Point)                    [ ] Bed rest                                                        (1 Point)  [ ] Age: 61-74 years                                           (2 Points)                  [ ] Plaster cast                                                   (2 Points)  [ ] Age= 75 years                                              (3 Points)                    [ ] Bed bound for more than 72 hours                 (2 Points)    DISEASE RELATED RISK FACTORS                                               GENDER SPECIFIC FACTORS  [ ] Edema in the lower extremities                       (1 Point)              [ ] Pregnancy                                                     (1 Point)  [ ] Varicose veins                                               (1 Point)                     [ ] Post-partum < 6 weeks                                   (1 Point)             [ ] BMI > 25 Kg/m2                                            (1 Point)                     [ ] Hormonal therapy  or oral contraception          (1 Point)                 [ ] Sepsis (in the previous month)                        (1 Point)               [ ] History of pregnancy complications                 (1 point)  [ ] Pneumonia or serious lung disease                                               [ ] Unexplained or recurrent                     (1 Point)           (in the previous month)                               (1 Point)  [ ] Abnormal pulmonary function test                     (1 Point)                 SURGERY RELATED RISK FACTORS  [ ] Acute myocardial infarction                              (1 Point)               [ ]  Section                                             (1 Point)  [ ] Congestive heart failure (in the previous month)  (1 Point)      [ ] Minor surgery                                                  (1 Point)   [ ] Inflammatory bowel disease                             (1 Point)               [ ] Arthroscopic surgery                                        (2 Points)  [ ] Central venous access                                      (2 Points)                [ ] General surgery lasting more than 45 minutes (2 points)  [ ] Malignancy- Present or previous                   (2 Points)                [ ] Elective arthroplasty                                         (5 points)    [ ] Stroke (in the previous month)                          (5 Points)                                                                                                                                                           HEMATOLOGY RELATED FACTORS                                                 TRAUMA RELATED RISK FACTORS  [ ] Prior episodes of VTE                                     (3 Points)                [ ] Fracture of the hip, pelvis, or leg                       (5 Points)  [ ] Positive family history for VTE                         (3 Points)             [ ] Acute spinal cord injury (in the previous month)  (5 Points)  [ ] Prothrombin 11718 A                                     (3 Points)               [ ] Paralysis  (less than 1 month)                             (5 Points)  [ ] Factor V Leiden                                             (3 Points)                  [ ] Multiple Trauma within 1 month                        (5 Points)  [ ] Lupus anticoagulants                                     (3 Points)                                                           [ ] Anticardiolipin antibodies                               (3 Points)                                                       [ ] High homocysteine in the blood                      (3 Points)                                             [ ] Other congenital or acquired thrombophilia      (3 Points)                                                [ ] Heparin induced thrombocytopenia                  (3 Points)                                     Total Score [          ]    DASI score: 5.72 - restricted due to low back pain  DASI activity: Able to walk 2-3 blocks, ADLs, Grocery Shopping, 1 Flight of Stairs   Loose teeth or denture: lower partial denture, loose tooth top left CAPRINI VTE 2.0 SCORE [CLOT updated 2019]    AGE RELATED RISK FACTORS                                                       MOBILITY RELATED FACTORS  [X ] Age 41-60 years                                            (1 Point)                    [ ] Bed rest                                                        (1 Point)  [ ] Age: 61-74 years                                           (2 Points)                  [ ] Plaster cast                                                   (2 Points)  [ ] Age= 75 years                                              (3 Points)                    [ ] Bed bound for more than 72 hours                 (2 Points)    DISEASE RELATED RISK FACTORS                                               GENDER SPECIFIC FACTORS  [ ] Edema in the lower extremities                       (1 Point)              [ ] Pregnancy                                                     (1 Point)  [ ] Varicose veins                                               (1 Point)                     [ ] Post-partum < 6 weeks                                   (1 Point)             [ ] BMI > 25 Kg/m2                                            (1 Point)                     [ ] Hormonal therapy  or oral contraception          (1 Point)                 [ ] Sepsis (in the previous month)                        (1 Point)               [ ] History of pregnancy complications                 (1 point)  [ ] Pneumonia or serious lung disease                                               [ ] Unexplained or recurrent                     (1 Point)           (in the previous month)                               (1 Point)  [ ] Abnormal pulmonary function test                     (1 Point)                 SURGERY RELATED RISK FACTORS  [ ] Acute myocardial infarction                              (1 Point)               [ ]  Section                                             (1 Point)  [ ] Congestive heart failure (in the previous month)  (1 Point)      [ ] Minor surgery                                                  (1 Point)   [ ] Inflammatory bowel disease                             (1 Point)               [ ] Arthroscopic surgery                                        (2 Points)  [ ] Central venous access                                      (2 Points)                [X ] General surgery lasting more than 45 minutes (2 points)  [ ] Malignancy- Present or previous                   (2 Points)                [ ] Elective arthroplasty                                         (5 points)    [ ] Stroke (in the previous month)                          (5 Points)                                                                                                                                                           HEMATOLOGY RELATED FACTORS                                                 TRAUMA RELATED RISK FACTORS  [ ] Prior episodes of VTE                                     (3 Points)                [ ] Fracture of the hip, pelvis, or leg                       (5 Points)  [ ] Positive family history for VTE                         (3 Points)             [ ] Acute spinal cord injury (in the previous month)  (5 Points)  [ ] Prothrombin 63659 A                                     (3 Points)               [ ] Paralysis  (less than 1 month)                             (5 Points)  [ ] Factor V Leiden                                             (3 Points)                  [ ] Multiple Trauma within 1 month                        (5 Points)  [ ] Lupus anticoagulants                                     (3 Points)                                                           [ ] Anticardiolipin antibodies                               (3 Points)                                                       [ ] High homocysteine in the blood                      (3 Points)                                             [ ] Other congenital or acquired thrombophilia      (3 Points)                                                [ ] Heparin induced thrombocytopenia                  (3 Points)                                     Total Score [      3    ]    DASI score: 5.72 - restricted due to low back pain  DASI activity: Able to walk 2-3 blocks, ADLs, Grocery Shopping, 1 Flight of Stairs   Loose teeth or denture: lower partial denture, loose teeth top left (#9, #10) - Dr. Duenas aware - Pt is going to try and have a dental extraction today

## 2023-05-10 ENCOUNTER — TRANSCRIPTION ENCOUNTER (OUTPATIENT)
Age: 48
End: 2023-05-10

## 2023-05-10 ENCOUNTER — APPOINTMENT (OUTPATIENT)
Dept: ORTHOPEDIC SURGERY | Facility: HOSPITAL | Age: 48
End: 2023-05-10

## 2023-05-10 ENCOUNTER — RESULT REVIEW (OUTPATIENT)
Age: 48
End: 2023-05-10

## 2023-05-10 ENCOUNTER — INPATIENT (INPATIENT)
Facility: HOSPITAL | Age: 48
LOS: 9 days | Discharge: HOME | DRG: 455 | End: 2023-05-20
Attending: ORTHOPAEDIC SURGERY | Admitting: ORTHOPAEDIC SURGERY
Payer: COMMERCIAL

## 2023-05-10 VITALS
TEMPERATURE: 98 F | RESPIRATION RATE: 17 BRPM | OXYGEN SATURATION: 100 % | HEIGHT: 67 IN | DIASTOLIC BLOOD PRESSURE: 80 MMHG | HEART RATE: 99 BPM | SYSTOLIC BLOOD PRESSURE: 128 MMHG | WEIGHT: 149.91 LBS

## 2023-05-10 DIAGNOSIS — M40.209 UNSPECIFIED KYPHOSIS, SITE UNSPECIFIED: ICD-10-CM

## 2023-05-10 DIAGNOSIS — M51.36 OTHER INTERVERTEBRAL DISC DEGENERATION, LUMBAR REGION: ICD-10-CM

## 2023-05-10 DIAGNOSIS — Z98.89 OTHER SPECIFIED POSTPROCEDURAL STATES: Chronic | ICD-10-CM

## 2023-05-10 LAB
ANION GAP SERPL CALC-SCNC: 16 MMOL/L — SIGNIFICANT CHANGE UP (ref 5–17)
BASOPHILS # BLD AUTO: 0.03 K/UL — SIGNIFICANT CHANGE UP (ref 0–0.2)
BASOPHILS NFR BLD AUTO: 0.2 % — SIGNIFICANT CHANGE UP (ref 0–2)
BUN SERPL-MCNC: 11 MG/DL — SIGNIFICANT CHANGE UP (ref 7–23)
CALCIUM SERPL-MCNC: 9.2 MG/DL — SIGNIFICANT CHANGE UP (ref 8.4–10.5)
CHLORIDE SERPL-SCNC: 106 MMOL/L — SIGNIFICANT CHANGE UP (ref 96–108)
CO2 SERPL-SCNC: 21 MMOL/L — LOW (ref 22–31)
CREAT SERPL-MCNC: 0.58 MG/DL — SIGNIFICANT CHANGE UP (ref 0.5–1.3)
EGFR: 112 ML/MIN/1.73M2 — SIGNIFICANT CHANGE UP
EOSINOPHIL # BLD AUTO: 0 K/UL — SIGNIFICANT CHANGE UP (ref 0–0.5)
EOSINOPHIL NFR BLD AUTO: 0 % — SIGNIFICANT CHANGE UP (ref 0–6)
GAS PNL BLDA: SIGNIFICANT CHANGE UP
GLUCOSE SERPL-MCNC: 152 MG/DL — HIGH (ref 70–99)
HCG UR QL: NEGATIVE — SIGNIFICANT CHANGE UP
HCT VFR BLD CALC: 36 % — SIGNIFICANT CHANGE UP (ref 34.5–45)
HGB BLD-MCNC: 11.6 G/DL — SIGNIFICANT CHANGE UP (ref 11.5–15.5)
IMM GRANULOCYTES NFR BLD AUTO: 1 % — HIGH (ref 0–0.9)
LYMPHOCYTES # BLD AUTO: 1.42 K/UL — SIGNIFICANT CHANGE UP (ref 1–3.3)
LYMPHOCYTES # BLD AUTO: 8.7 % — LOW (ref 13–44)
MCHC RBC-ENTMCNC: 28 PG — SIGNIFICANT CHANGE UP (ref 27–34)
MCHC RBC-ENTMCNC: 32.2 GM/DL — SIGNIFICANT CHANGE UP (ref 32–36)
MCV RBC AUTO: 87 FL — SIGNIFICANT CHANGE UP (ref 80–100)
MONOCYTES # BLD AUTO: 0.6 K/UL — SIGNIFICANT CHANGE UP (ref 0–0.9)
MONOCYTES NFR BLD AUTO: 3.7 % — SIGNIFICANT CHANGE UP (ref 2–14)
MRSA PCR RESULT.: SIGNIFICANT CHANGE UP
NEUTROPHILS # BLD AUTO: 14.01 K/UL — HIGH (ref 1.8–7.4)
NEUTROPHILS NFR BLD AUTO: 86.4 % — HIGH (ref 43–77)
NRBC # BLD: 0 /100 WBCS — SIGNIFICANT CHANGE UP (ref 0–0)
PLATELET # BLD AUTO: 258 K/UL — SIGNIFICANT CHANGE UP (ref 150–400)
POTASSIUM SERPL-MCNC: 4.3 MMOL/L — SIGNIFICANT CHANGE UP (ref 3.5–5.3)
POTASSIUM SERPL-SCNC: 4.3 MMOL/L — SIGNIFICANT CHANGE UP (ref 3.5–5.3)
RBC # BLD: 4.14 M/UL — SIGNIFICANT CHANGE UP (ref 3.8–5.2)
RBC # FLD: 12.6 % — SIGNIFICANT CHANGE UP (ref 10.3–14.5)
S AUREUS DNA NOSE QL NAA+PROBE: DETECTED
SODIUM SERPL-SCNC: 143 MMOL/L — SIGNIFICANT CHANGE UP (ref 135–145)
WBC # BLD: 16.23 K/UL — HIGH (ref 3.8–10.5)
WBC # FLD AUTO: 16.23 K/UL — HIGH (ref 3.8–10.5)

## 2023-05-10 PROCEDURE — 22558 ARTHRD ANT NTRBD MIN DSC LUM: CPT | Mod: GC,62

## 2023-05-10 PROCEDURE — 86077 PHYS BLOOD BANK SERV XMATCH: CPT

## 2023-05-10 PROCEDURE — 22853 INSJ BIOMECHANICAL DEVICE: CPT

## 2023-05-10 PROCEDURE — 22845 INSERT SPINE FIXATION DEVICE: CPT | Mod: 59

## 2023-05-10 PROCEDURE — 22585 ARTHRD ANT NTRBD MIN DSC EA: CPT | Mod: 62

## 2023-05-10 PROCEDURE — 88304 TISSUE EXAM BY PATHOLOGIST: CPT | Mod: 26

## 2023-05-10 PROCEDURE — 74018 RADEX ABDOMEN 1 VIEW: CPT | Mod: 26

## 2023-05-10 PROCEDURE — 22585 ARTHRD ANT NTRBD MIN DSC EA: CPT | Mod: GC,62

## 2023-05-10 PROCEDURE — 74177 CT ABD & PELVIS W/CONTRAST: CPT | Mod: 26

## 2023-05-10 PROCEDURE — 22558 ARTHRD ANT NTRBD MIN DSC LUM: CPT | Mod: 62

## 2023-05-10 DEVICE — IMPLANTABLE DEVICE: Type: IMPLANTABLE DEVICE | Site: ABDOMINAL | Status: FUNCTIONAL

## 2023-05-10 DEVICE — CLIP APPLIER COVIDIEN ENDOCLIP III 5MM: Type: IMPLANTABLE DEVICE | Site: ABDOMINAL | Status: FUNCTIONAL

## 2023-05-10 DEVICE — GRAFT BONE INFUSE KIT LG II: Type: IMPLANTABLE DEVICE | Site: ABDOMINAL | Status: FUNCTIONAL

## 2023-05-10 DEVICE — LIGATING CLIPS WECK HORIZON MEDIUM (BLUE) 24: Type: IMPLANTABLE DEVICE | Site: ABDOMINAL | Status: FUNCTIONAL

## 2023-05-10 DEVICE — SCREW ANCHOR L 5X25MM: Type: IMPLANTABLE DEVICE | Site: ABDOMINAL | Status: FUNCTIONAL

## 2023-05-10 DEVICE — KIT A-LINE 1LUM 20G X 12CM SAFE KIT: Type: IMPLANTABLE DEVICE | Site: ABDOMINAL | Status: FUNCTIONAL

## 2023-05-10 DEVICE — GRAFT VITOSIS BIMODAL 10CC: Type: IMPLANTABLE DEVICE | Site: ABDOMINAL | Status: FUNCTIONAL

## 2023-05-10 DEVICE — SURGIFLO MATRIX WITH THROMBIN KIT: Type: IMPLANTABLE DEVICE | Site: ABDOMINAL | Status: FUNCTIONAL

## 2023-05-10 DEVICE — PLATE ANCHOR L LOKG: Type: IMPLANTABLE DEVICE | Site: ABDOMINAL | Status: FUNCTIONAL

## 2023-05-10 DEVICE — CLIP APPLIER COVIDIEN ENDOCLIP II 10MM MED/LG: Type: IMPLANTABLE DEVICE | Site: ABDOMINAL | Status: FUNCTIONAL

## 2023-05-10 RX ORDER — SENNA PLUS 8.6 MG/1
2 TABLET ORAL AT BEDTIME
Refills: 0 | Status: DISCONTINUED | OUTPATIENT
Start: 2023-05-10 | End: 2023-05-16

## 2023-05-10 RX ORDER — HYDROMORPHONE HYDROCHLORIDE 2 MG/ML
0.5 INJECTION INTRAMUSCULAR; INTRAVENOUS; SUBCUTANEOUS
Refills: 0 | Status: DISCONTINUED | OUTPATIENT
Start: 2023-05-10 | End: 2023-05-11

## 2023-05-10 RX ORDER — HYDROMORPHONE HYDROCHLORIDE 2 MG/ML
30 INJECTION INTRAMUSCULAR; INTRAVENOUS; SUBCUTANEOUS
Refills: 0 | Status: DISCONTINUED | OUTPATIENT
Start: 2023-05-10 | End: 2023-05-12

## 2023-05-10 RX ORDER — SIMVASTATIN 20 MG/1
1 TABLET, FILM COATED ORAL
Refills: 0 | DISCHARGE

## 2023-05-10 RX ORDER — POLYETHYLENE GLYCOL 3350 17 G/17G
17 POWDER, FOR SOLUTION ORAL AT BEDTIME
Refills: 0 | Status: DISCONTINUED | OUTPATIENT
Start: 2023-05-10 | End: 2023-05-15

## 2023-05-10 RX ORDER — SODIUM CHLORIDE 9 MG/ML
3 INJECTION INTRAMUSCULAR; INTRAVENOUS; SUBCUTANEOUS EVERY 8 HOURS
Refills: 0 | Status: DISCONTINUED | OUTPATIENT
Start: 2023-05-10 | End: 2023-05-10

## 2023-05-10 RX ORDER — ONDANSETRON 8 MG/1
4 TABLET, FILM COATED ORAL EVERY 6 HOURS
Refills: 0 | Status: DISCONTINUED | OUTPATIENT
Start: 2023-05-10 | End: 2023-05-16

## 2023-05-10 RX ORDER — DEXAMETHASONE 0.5 MG/5ML
1 ELIXIR ORAL EVERY 6 HOURS
Refills: 0 | Status: DISCONTINUED | OUTPATIENT
Start: 2023-05-12 | End: 2023-05-11

## 2023-05-10 RX ORDER — LIDOCAINE HCL 20 MG/ML
0.2 VIAL (ML) INJECTION ONCE
Refills: 0 | Status: DISCONTINUED | OUTPATIENT
Start: 2023-05-10 | End: 2023-05-10

## 2023-05-10 RX ORDER — ONDANSETRON 8 MG/1
4 TABLET, FILM COATED ORAL ONCE
Refills: 0 | Status: DISCONTINUED | OUTPATIENT
Start: 2023-05-10 | End: 2023-05-11

## 2023-05-10 RX ORDER — ASPIRIN/CALCIUM CARB/MAGNESIUM 324 MG
81 TABLET ORAL DAILY
Refills: 0 | Status: DISCONTINUED | OUTPATIENT
Start: 2023-05-10 | End: 2023-05-16

## 2023-05-10 RX ORDER — CEFAZOLIN SODIUM 1 G
2000 VIAL (EA) INJECTION EVERY 8 HOURS
Refills: 0 | Status: COMPLETED | OUTPATIENT
Start: 2023-05-10 | End: 2023-05-11

## 2023-05-10 RX ORDER — NALOXONE HYDROCHLORIDE 4 MG/.1ML
0.1 SPRAY NASAL
Refills: 0 | Status: DISCONTINUED | OUTPATIENT
Start: 2023-05-10 | End: 2023-05-12

## 2023-05-10 RX ORDER — DEXAMETHASONE 0.5 MG/5ML
5 ELIXIR ORAL EVERY 6 HOURS
Refills: 0 | Status: COMPLETED | OUTPATIENT
Start: 2023-05-10 | End: 2023-05-11

## 2023-05-10 RX ORDER — MAGNESIUM HYDROXIDE 400 MG/1
30 TABLET, CHEWABLE ORAL EVERY 12 HOURS
Refills: 0 | Status: DISCONTINUED | OUTPATIENT
Start: 2023-05-10 | End: 2023-05-16

## 2023-05-10 RX ORDER — HYDROMORPHONE HYDROCHLORIDE 2 MG/ML
0.5 INJECTION INTRAMUSCULAR; INTRAVENOUS; SUBCUTANEOUS
Refills: 0 | Status: DISCONTINUED | OUTPATIENT
Start: 2023-05-10 | End: 2023-05-12

## 2023-05-10 RX ORDER — ONDANSETRON 8 MG/1
4 TABLET, FILM COATED ORAL EVERY 6 HOURS
Refills: 0 | Status: DISCONTINUED | OUTPATIENT
Start: 2023-05-10 | End: 2023-05-10

## 2023-05-10 RX ORDER — DEXAMETHASONE 0.5 MG/5ML
3 ELIXIR ORAL EVERY 6 HOURS
Refills: 0 | Status: DISCONTINUED | OUTPATIENT
Start: 2023-05-11 | End: 2023-05-11

## 2023-05-10 RX ORDER — APREPITANT 80 MG/1
40 CAPSULE ORAL ONCE
Refills: 0 | Status: COMPLETED | OUTPATIENT
Start: 2023-05-10 | End: 2023-05-10

## 2023-05-10 RX ORDER — DEXAMETHASONE 0.5 MG/5ML
5 ELIXIR ORAL EVERY 6 HOURS
Refills: 0 | Status: DISCONTINUED | OUTPATIENT
Start: 2023-05-10 | End: 2023-05-10

## 2023-05-10 RX ORDER — SIMVASTATIN 20 MG/1
20 TABLET, FILM COATED ORAL AT BEDTIME
Refills: 0 | Status: DISCONTINUED | OUTPATIENT
Start: 2023-05-10 | End: 2023-05-16

## 2023-05-10 RX ADMIN — SIMVASTATIN 20 MILLIGRAM(S): 20 TABLET, FILM COATED ORAL at 23:01

## 2023-05-10 RX ADMIN — APREPITANT 40 MILLIGRAM(S): 80 CAPSULE ORAL at 11:27

## 2023-05-10 RX ADMIN — HYDROMORPHONE HYDROCHLORIDE 30 MILLILITER(S): 2 INJECTION INTRAMUSCULAR; INTRAVENOUS; SUBCUTANEOUS at 21:10

## 2023-05-10 RX ADMIN — HYDROMORPHONE HYDROCHLORIDE 30 MILLILITER(S): 2 INJECTION INTRAMUSCULAR; INTRAVENOUS; SUBCUTANEOUS at 23:32

## 2023-05-10 NOTE — PATIENT PROFILE ADULT - FUNCTIONAL ASSESSMENT - BASIC MOBILITY 3.
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 Has mild nausea    Allergies: ALLERGIES:  Ceftriaxone     Inpatient Medications:    Medications Discontinued During This Encounter   Medication Reason   • polyethylene glycol (MIRALAX) 17 g packet Stopped Prior to Admission   • prochlorperazine (COMPAZINE) 10 MG tablet Stopped Prior to Admission   • traMADol (ULTRAM) 50 MG tablet Stopped Prior to Admission   • lidocaine (XYLOCAINE) 5 % ointment Stopped Prior to Admission   • enoxaparin (LOVENOX) injection 40 mg      • dextrose 5 % / sodium chloride 0.45% infusion 42 mL/hr at 03/02/21 1112      polyethylene glycol, sodium chloride, sodium chloride, heparin, sodium chloride, sodium chloride, EPINEPHrine, diphenhydrAMINE, famotidine, methylPREDNISolone, albuterol, albuterol, ondansetron, zolpidem, ketorolac (TORADOL) injection       Review of Systems:  Constitutional: Negative for fever, chills, change in appetite or fatigue.  Skin: Negative for rash or wounds.  HEENT: Negative for eye drainage, rhinorrhea, ear pain, sore throat or neck pain.  Respiratory: Negative cough, wheezing or shortness of breath.  Cardiovascular: Negative for chest pain, chest pressure, palpitations or diaphoresis.  Gastrointestinal: Negative for nausea, vomiting, diarrhea, abdominal pain, black or tarry stools.  Genitourinary: Negative for dysuria, urgency, frequency, hematuria or flank pain.  Extremities:  Negative for joint swelling or joint pain.  Neurologic:  Negative for change in sensory or motor function.  Negative for headache, change in gait, vertigo, vision or speech.  Endocrine: Negative for heat or cold intolerance, weight loss or gain.  Hematological: Negative for bleeding, bruising or  lymphadenopathy.  Psychiatric: Negative for change in affect, anxiety, depression, mentation or sleep disturbance.     Objective:   VITALS:  Vitals with min/max:  Vital Last Value 24 Hour Range   Temperature 98.5 °F (36.9 °C) (03/04/21 1011) Temp  Min: 97.5 °F (36.4 °C)  Max: 98.8 °F (37.1 °C)   Pulse 78 (03/04/21 1011) Pulse  Min: 75  Max: 88   Respiratory 18 (03/04/21 1011) Resp  Min: 16  Max: 18   Non-Invasive  Blood Pressure (!) 132/91 (03/04/21 1011) BP  Min: 125/79  Max: 165/95   Pulse Oximetry 100 % (03/04/21 1011) SpO2  Min: 100 %  Max: 100 %   Arterial   Blood Pressure   No data recorded        Intake/Output Summary (Last 24 hours) at 3/4/2021 1015  Last data filed at 3/4/2021 0700  Gross per 24 hour   Intake 1424 ml   Output 1300 ml   Net 124 ml        Performance Status:Physical Exam:  Physical Exam   Constitutional:       Appearance: Normal appearance.   HENT:      Head: Normocephalic and atraumatic.      Nose: Nose normal.   Eyes:      Pupils: Pupils are equal, round, and reactive to light.   Neck:      Musculoskeletal: Neck supple.   Cardiovascular:      Rate and Rhythm: Normal rate and regular rhythm.      Pulses: Normal pulses.      Heart sounds: Normal heart sounds. No murmur. No gallop.    Pulmonary:      Effort: Pulmonary effort is normal. No respiratory distress.      Breath sounds: Normal breath sounds. No stridor. No wheezing or rales.   Abdominal:  General: Bowel sounds are normal. There is no distension.      Palpations: Abdomen is soft. There is no mass.      Tenderness: There is no abdominal tenderness. There is no guarding or rebound.   Skin:     General: Skin is warm.   Neurological:      General: No focal deficit present.      Mental Status: He is alert and oriented to person, place, and time.   Data Review:    Labs: No results found for this or any previous visit (from the past 24 hour(s)).    Imaging: No results found.   FINDINGS:  Head and Neck:  Multiple bilateral hypermetabolic  cervical lymphadenopathy with an index right cervical level II demonstrates SUV max of 6.8 (image 37).     Chest:  Multiple bilateral hypermetabolic axillary lymph nodes with an index right axillary lymph node demonstrating SUV max of 3.3 (image 98).     No abnormal focal FDG uptake in pulmonary parenchyma.  No suspicious pulmonary nodule.  No pleural or pericardial effusion.  No pneumothorax.     Abdomen and Pelvis:  Multiple hypermetabolic bilateral external iliac and inguinal lymphadenopathy with an index right external iliac lymph node demonstrates SUV max of 5.8 (image 222).     The liver and bilateral adrenal glands do not demonstrate any abnormal focal FDG uptake.  Physiological heterogenous tracer uptake in the GI and  tract.     Skeletal:  No abnormal focal FDG uptake in the axial or proximal visualized appendicular skeleton.     Reference SUV Values:  Mediastinal SUV Mean:  1.5  Liver SUV Mean: 2.4     IMPRESSION:     1.  The hypermetabolic lymphadenopathy involving the bilateral neck, axillae and pelvis as described above is consistent with the given history of lymphoma.       2.  No other abnormal focal FDG uptake noted anywhere else.           Radiation Dosimetry:  The radiopharmaceutical used for this exam delivers approximately 0.7 mSv/mCi (70 mRem/mCi)  Source:  ICRP Publication 106     .   Fs-anal ulcer, biopsy:  -Invasive keratinizing squamous cell carcinoma.     B.   Anal ulcer, biopsy:  -Invasive keratinizing squamous cell carcinoma, moderately differentiated (estimated extent is 0.6 cm), see comment.   Electronically signed by Sharmila Vázquez MD on 12/24/2020 at 1105   Comment     B. Immunostain for p16 diffusely highlights the tumor cells.     Case subjected to intradepartmental  review.    Clinical Information     Order / Surgery Diagnosis:  Anal lesion [K62.9]   The immunohistochemical studies are performed (CD30, CD20, CD15, MUM1, CD3 and CD79a)  with appropriate controls.  These markers show variable positive immunostaining; supporting the above diagnosis       Assessment/Plan:   Reccurent Mixed cellularity Hodgkins lymphoma ---Hold chemo tiil  Anal cancer is taken care of    Newly diagnosed Squamous cell cancer anal canal ---Willcontinue  Chemo and Rt    HIV        Patient Active Problem List   Diagnosis   • Anal warts   • Dermatitis, eczematoid   • Fatigue   • HIV (human immunodeficiency virus infection) (CMS/HCC)   • H/O syphilis   • Vitamin D deficiency   • Annual physical exam   • Enlarged tonsils   • Snoring   • Hodgkin lymphoma (CMS/HCC)   • Anal cancer (CMS/HCC)       Code Status:    Code Status Information     Code Status    Full Resuscitation           Thank you for allowing me to participate in the care of this patient.  If you have any questions or concerns, please do not hesitate to call    I have personally reviewed all pertinent patient data.   Discussed with pt, Family, PCP, RN and staff    By:  Kristi Wilkins MD 3/4/2021, 10:15 AM    Attending Provider:  Omar Fisher MD                                  Primary Care Physician:  Rk Kramer DO      4 = No assist / stand by assistance

## 2023-05-10 NOTE — PRE-ANESTHESIA EVALUATION ADULT - NSANTHPMHFT_GEN_ALL_CORE
former smoker, current vaping, L>R paresthesias in LE, back pain refractory to conservative measures.

## 2023-05-11 ENCOUNTER — TRANSCRIPTION ENCOUNTER (OUTPATIENT)
Age: 48
End: 2023-05-11

## 2023-05-11 LAB
ANION GAP SERPL CALC-SCNC: 15 MMOL/L — SIGNIFICANT CHANGE UP (ref 5–17)
BASOPHILS # BLD AUTO: 0.01 K/UL — SIGNIFICANT CHANGE UP (ref 0–0.2)
BASOPHILS NFR BLD AUTO: 0.1 % — SIGNIFICANT CHANGE UP (ref 0–2)
BUN SERPL-MCNC: 9 MG/DL — SIGNIFICANT CHANGE UP (ref 7–23)
CALCIUM SERPL-MCNC: 8.9 MG/DL — SIGNIFICANT CHANGE UP (ref 8.4–10.5)
CHLORIDE SERPL-SCNC: 101 MMOL/L — SIGNIFICANT CHANGE UP (ref 96–108)
CO2 SERPL-SCNC: 25 MMOL/L — SIGNIFICANT CHANGE UP (ref 22–31)
CREAT SERPL-MCNC: 0.58 MG/DL — SIGNIFICANT CHANGE UP (ref 0.5–1.3)
EGFR: 112 ML/MIN/1.73M2 — SIGNIFICANT CHANGE UP
EOSINOPHIL # BLD AUTO: 0.01 K/UL — SIGNIFICANT CHANGE UP (ref 0–0.5)
EOSINOPHIL NFR BLD AUTO: 0.1 % — SIGNIFICANT CHANGE UP (ref 0–6)
GLUCOSE SERPL-MCNC: 135 MG/DL — HIGH (ref 70–99)
HCT VFR BLD CALC: 35.2 % — SIGNIFICANT CHANGE UP (ref 34.5–45)
HGB BLD-MCNC: 11 G/DL — LOW (ref 11.5–15.5)
IMM GRANULOCYTES NFR BLD AUTO: 0.6 % — SIGNIFICANT CHANGE UP (ref 0–0.9)
LYMPHOCYTES # BLD AUTO: 1.53 K/UL — SIGNIFICANT CHANGE UP (ref 1–3.3)
LYMPHOCYTES # BLD AUTO: 10.9 % — LOW (ref 13–44)
MCHC RBC-ENTMCNC: 27.7 PG — SIGNIFICANT CHANGE UP (ref 27–34)
MCHC RBC-ENTMCNC: 31.3 GM/DL — LOW (ref 32–36)
MCV RBC AUTO: 88.7 FL — SIGNIFICANT CHANGE UP (ref 80–100)
MONOCYTES # BLD AUTO: 1.04 K/UL — HIGH (ref 0–0.9)
MONOCYTES NFR BLD AUTO: 7.4 % — SIGNIFICANT CHANGE UP (ref 2–14)
NEUTROPHILS # BLD AUTO: 11.3 K/UL — HIGH (ref 1.8–7.4)
NEUTROPHILS NFR BLD AUTO: 80.9 % — HIGH (ref 43–77)
NRBC # BLD: 0 /100 WBCS — SIGNIFICANT CHANGE UP (ref 0–0)
PLATELET # BLD AUTO: 248 K/UL — SIGNIFICANT CHANGE UP (ref 150–400)
POTASSIUM SERPL-MCNC: 4.1 MMOL/L — SIGNIFICANT CHANGE UP (ref 3.5–5.3)
POTASSIUM SERPL-SCNC: 4.1 MMOL/L — SIGNIFICANT CHANGE UP (ref 3.5–5.3)
RBC # BLD: 3.97 M/UL — SIGNIFICANT CHANGE UP (ref 3.8–5.2)
RBC # FLD: 12.6 % — SIGNIFICANT CHANGE UP (ref 10.3–14.5)
SODIUM SERPL-SCNC: 141 MMOL/L — SIGNIFICANT CHANGE UP (ref 135–145)
WBC # BLD: 13.98 K/UL — HIGH (ref 3.8–10.5)
WBC # FLD AUTO: 13.98 K/UL — HIGH (ref 3.8–10.5)

## 2023-05-11 PROCEDURE — 72131 CT LUMBAR SPINE W/O DYE: CPT | Mod: 26

## 2023-05-11 RX ORDER — SIMETHICONE 80 MG/1
80 TABLET, CHEWABLE ORAL DAILY
Refills: 0 | Status: DISCONTINUED | OUTPATIENT
Start: 2023-05-11 | End: 2023-05-16

## 2023-05-11 RX ORDER — PANTOPRAZOLE SODIUM 20 MG/1
40 TABLET, DELAYED RELEASE ORAL
Refills: 0 | Status: DISCONTINUED | OUTPATIENT
Start: 2023-05-11 | End: 2023-05-16

## 2023-05-11 RX ORDER — CYCLOBENZAPRINE HYDROCHLORIDE 10 MG/1
10 TABLET, FILM COATED ORAL THREE TIMES A DAY
Refills: 0 | Status: DISCONTINUED | OUTPATIENT
Start: 2023-05-11 | End: 2023-05-16

## 2023-05-11 RX ADMIN — POLYETHYLENE GLYCOL 3350 17 GRAM(S): 17 POWDER, FOR SOLUTION ORAL at 21:59

## 2023-05-11 RX ADMIN — Medication 81 MILLIGRAM(S): at 12:24

## 2023-05-11 RX ADMIN — Medication 5 MILLIGRAM(S): at 01:22

## 2023-05-11 RX ADMIN — Medication 100 MILLIGRAM(S): at 01:20

## 2023-05-11 RX ADMIN — HYDROMORPHONE HYDROCHLORIDE 30 MILLILITER(S): 2 INJECTION INTRAMUSCULAR; INTRAVENOUS; SUBCUTANEOUS at 07:22

## 2023-05-11 RX ADMIN — Medication 100 MILLIGRAM(S): at 09:44

## 2023-05-11 RX ADMIN — HYDROMORPHONE HYDROCHLORIDE 30 MILLILITER(S): 2 INJECTION INTRAMUSCULAR; INTRAVENOUS; SUBCUTANEOUS at 19:11

## 2023-05-11 RX ADMIN — SIMETHICONE 80 MILLIGRAM(S): 80 TABLET, CHEWABLE ORAL at 20:32

## 2023-05-11 RX ADMIN — Medication 5 MILLIGRAM(S): at 17:20

## 2023-05-11 RX ADMIN — SENNA PLUS 2 TABLET(S): 8.6 TABLET ORAL at 21:59

## 2023-05-11 RX ADMIN — HYDROMORPHONE HYDROCHLORIDE 30 MILLILITER(S): 2 INJECTION INTRAMUSCULAR; INTRAVENOUS; SUBCUTANEOUS at 00:01

## 2023-05-11 RX ADMIN — SIMVASTATIN 20 MILLIGRAM(S): 20 TABLET, FILM COATED ORAL at 21:59

## 2023-05-11 RX ADMIN — Medication 5 MILLIGRAM(S): at 12:24

## 2023-05-11 RX ADMIN — Medication 5 MILLIGRAM(S): at 06:42

## 2023-05-11 NOTE — DISCHARGE NOTE PROVIDER - CARE PROVIDER_API CALL
Jacob Pena)  Orthopaedic Surgery  [address], [address]  [place] [address]  Phone: [phone]  Fax: [phone]  Established Patient  Follow Up Time: 1 week

## 2023-05-11 NOTE — DISCHARGE NOTE PROVIDER - NSDCMRMEDTOKEN_GEN_ALL_CORE_FT
aspirin 81 mg oral capsule: 1 orally  simvastatin 20 mg oral tablet: 1 tab(s) orally once a day (at bedtime)   aspirin 81 mg oral capsule: 1 orally  cyclobenzaprine 10 mg oral tablet: 1 tab(s) orally once a day  simvastatin 20 mg oral tablet: 1 tab(s) orally once a day (at bedtime)   acetaminophen 325 mg oral tablet: 3 tab(s) orally every 8 hours  cyclobenzaprine 10 mg oral tablet: 1 tab(s) orally once a day  gabapentin 100 mg oral capsule: 1 cap(s) orally 3 times a day  Multiple Vitamins oral tablet: 1 tab(s) orally once a day  oxyCODONE 5 mg oral tablet: 1 tab(s) orally every 4 hours As needed Moderate Pain (4 - 6)  pantoprazole 40 mg oral delayed release tablet: 1 tab(s) orally once a day (before a meal)  senna leaf extract oral tablet: 2 tab(s) orally once a day (at bedtime)  simvastatin 20 mg oral tablet: 1 tab(s) orally once a day (at bedtime)  traMADol 50 mg oral tablet: 1 tab(s) orally every 6 hours As needed Mild Pain (1 - 3)   acetaminophen 325 mg oral tablet: 3 tab(s) orally every 8 hours  cyclobenzaprine 10 mg oral tablet: 1 tab(s) orally once a day  gabapentin 100 mg oral capsule: 1 cap(s) orally 3 times a day  Multiple Vitamins oral tablet: 1 tab(s) orally once a day  oxyCODONE 5 mg oral tablet: 1 tab(s) orally every 4 hours As needed Moderate Pain (4 - 6)  pantoprazole 40 mg oral delayed release tablet: 1 tab(s) orally once a day (before a meal)  rolling walker: s/p L4-S1 PSF/ALIF. ZANE = 99m  senna leaf extract oral tablet: 2 tab(s) orally once a day (at bedtime)  simvastatin 20 mg oral tablet: 1 tab(s) orally once a day (at bedtime)  traMADol 50 mg oral tablet: 1 tab(s) orally every 6 hours As needed Mild Pain (1 - 3)   acetaminophen 325 mg oral tablet: 3 tab(s) orally every 8 hours x 1 week, then as needed for mild pain  cyclobenzaprine 5 mg oral tablet: 1 tab(s) orally 3 times a day as needed for Muscle Spasm MDD: 003  gabapentin 100 mg oral capsule: 1 cap(s) orally 3 times a day  gabapentin 100 mg oral capsule: 1 cap(s) orally 3 times a day  Multiple Vitamins oral tablet: 1 tab(s) orally once a day  Narcan 4 mg/0.1 mL nasal spray: 4 milligram(s) intranasally every 2 to 3 minutes alternating nostrils as needed for overdose  oxyCODONE 5 mg oral tablet: 1 tab(s) orally every 4 hours as needed for  severe pain MDD: 006  oxyCODONE 5 mg oral tablet: 1 tab(s) orally every 4 hours As needed Moderate Pain (4 - 6)  pantoprazole 40 mg oral delayed release tablet: 1 tab(s) orally once a day (before a meal)  predniSONE 10 mg oral tablet: 1 tab(s) orally once a day Take taper as follows:  4 tabs orally once daily on 5/20  3 tabs orally once daily on 5/21; 5/22; 5/23  2 tabs orally once daily on 5/24, 5/25, 5/26  1 tab orally once daily on 5/27; 5/28; 5/29. Then stop  senna leaf extract oral tablet: 2 tab(s) orally once a day (at bedtime) as needed for  constipation  simvastatin 20 mg oral tablet: 1 tab(s) orally once a day (at bedtime)  traMADol 50 mg oral tablet: 1 tab(s) orally every 6 hours As needed Mild Pain (1 - 3)  traMADol 50 mg oral tablet: 1 tab(s) orally every 6 hours as needed for  moderate pain MDD: 004   4mm pen needles: to be used with insulin pen as directed  acetaminophen 325 mg oral tablet: 3 tab(s) orally every 8 hours x 1 week, then as needed for mild pain  Alcohol Swabs with Benzocaine topical pad: Apply topically to affected area once a day  cyclobenzaprine 5 mg oral tablet: 1 tab(s) orally 3 times a day as needed for Muscle Spasm MDD: 003  gabapentin 100 mg oral capsule: 1 cap(s) orally 3 times a day  glimepiride 1 mg oral tablet: 1 tab(s) orally once a day 3 tabs orally once daily (5/21-23); 2 tabs orally once daily (5/24-26); 1 tab orally once daily (5/27-30), then stop. Take with prednisone dosing  glucometer: use as directed  lancets: use as directed for insulin management  Lantus Solostar Pen 100 units/mL subcutaneous solution: 10 unit(s) subcutaneous once a day (at bedtime) (5/21-23); 8 units subcutaneous once a day at bedtime (5/24-26); 6 units subcutaneous once daily at bedtime (5/27-5/30). Then stop.  metFORMIN 1000 mg oral tablet: 1 tab(s) orally 2 times a day start this dosing once completed with prednisone taper (on 5/30/23)  metFORMIN 500 mg oral tablet: 1 tab(s) orally 2 times a day while on prednisone - last day 5/29/23  Multiple Vitamins oral tablet: 1 tab(s) orally once a day  Narcan 4 mg/0.1 mL nasal spray: 4 milligram(s) intranasally every 2 to 3 minutes alternating nostrils as needed for overdose  oxyCODONE 5 mg oral tablet: 1 tab(s) orally every 4 hours as needed for  severe pain MDD: 006  pantoprazole 40 mg oral delayed release tablet: 1 tab(s) orally once a day (before a meal)  predniSONE 10 mg oral tablet: 1 tab(s) orally once a day Take taper as follows:  4 tabs orally once daily on 5/20  3 tabs orally once daily on 5/21; 5/22; 5/23  2 tabs orally once daily on 5/24, 5/25, 5/26  1 tab orally once daily on 5/27; 5/28; 5/29. Then stop  senna leaf extract oral tablet: 2 tab(s) orally once a day (at bedtime) as needed for  constipation  simvastatin 20 mg oral tablet: 1 tab(s) orally once a day (at bedtime)  test strips: use as directed with gluometer  traMADol 50 mg oral tablet: 1 tab(s) orally every 6 hours as needed for  moderate pain MDD: 004   4mm pen needles: to be used with insulin pen as directed  acetaminophen 325 mg oral tablet: 3 tab(s) orally every 8 hours x 1 week, then as needed for mild pain  Alcohol Swabs with Benzocaine topical pad: Apply topically to affected area once a day  cyclobenzaprine 5 mg oral tablet: 1 tab(s) orally 3 times a day as needed for Muscle Spasm MDD: 003  gabapentin 100 mg oral capsule: 1 cap(s) orally 3 times a day  glimepiride 1 mg oral tablet: 1 tab(s) orally once a day 3 tabs orally once daily (5/21-23); 2 tabs orally once daily (5/24-26); 1 tab orally once daily (5/27-30), then stop. Take with prednisone dosing  glucometer: use as directed  lancets: use as directed for insulin management  Lantus Solostar Pen 100 units/mL subcutaneous solution: 12 unit(s) subcutaneous once a day (at bedtime) 5/20; 10 units subcutaneous once a day at bedtime (5/21-23); 8 units subcutaneous once a day at bedtime (5/24-26); 6 units subcutaneous once daily at bedtime (5/27-5/30). Then stop.  metFORMIN 1000 mg oral tablet: 1 tab(s) orally 2 times a day start this dosing once completed with prednisone taper (on 5/30/23)  metFORMIN 500 mg oral tablet: 1 tab(s) orally 2 times a day while on prednisone - last day 5/29/23  Multiple Vitamins oral tablet: 1 tab(s) orally once a day  Narcan 4 mg/0.1 mL nasal spray: 4 milligram(s) intranasally every 2 to 3 minutes alternating nostrils as needed for overdose  oxyCODONE 5 mg oral tablet: 1 tab(s) orally every 4 hours as needed for  severe pain MDD: 006  pantoprazole 40 mg oral delayed release tablet: 1 tab(s) orally once a day (before a meal)  predniSONE 10 mg oral tablet: 1 tab(s) orally once a day Take taper as follows:  4 tabs orally once daily on 5/20  3 tabs orally once daily on 5/21; 5/22; 5/23  2 tabs orally once daily on 5/24, 5/25, 5/26  1 tab orally once daily on 5/27; 5/28; 5/29. Then stop  senna leaf extract oral tablet: 2 tab(s) orally once a day (at bedtime) as needed for  constipation  simvastatin 20 mg oral tablet: 1 tab(s) orally once a day (at bedtime)  test strips: use as directed with gluometer  traMADol 50 mg oral tablet: 1 tab(s) orally every 6 hours as needed for  moderate pain MDD: 004

## 2023-05-11 NOTE — PROGRESS NOTE ADULT - ASSESSMENT
47y F s/p L4-S1 ALIF, recovering uneventfully    Plan  -Aquacell down POD 5 (5/15)  -PT/OT   -Pain Control: PCA   -DVT ppx: SCDs  -Incentive Spirometry  -Care per ortho    Vascular Surgery   [id number]. 47y F s/p L4-S1 ALIF, recovering uneventfully    Plan  -Aquacell down POD 5 (5/15)  -OK for clears, advance as tolerated  -PT/OT   -Pain Control: PCA   -DVT ppx: SCDs  -Incentive Spirometry  -Care per ortho    Vascular Surgery   [id number].

## 2023-05-11 NOTE — DISCHARGE NOTE PROVIDER - NSDCCPTREATMENT_GEN_ALL_CORE_FT
PRINCIPAL PROCEDURE  Procedure: Fusion, spine, lumbar, ALIF, 2 or more levels  Findings and Treatment: L4-S1

## 2023-05-11 NOTE — DISCHARGE NOTE NURSING/CASE MANAGEMENT/SOCIAL WORK - PATIENT PORTAL LINK FT
You can access the FollowMyHealth Patient Portal offered by [facility] by registering at the following website: [email or web address]. By joining [facility]’s FollowMyHealth portal, you will also be able to view your health information using other applications (apps) compatible with our system.

## 2023-05-11 NOTE — DISCHARGE NOTE NURSING/CASE MANAGEMENT/SOCIAL WORK - NSDCPEFALRISK_GEN_ALL_CORE
For information on Fall & Injury Prevention, visit: [email or web address] OR  [email or web address] OR  https://www.cdc.gov/steadi/patient.html

## 2023-05-11 NOTE — DISCHARGE NOTE PROVIDER - NSDCFUSCHEDAPPT_GEN_ALL_CORE_FT
Jacob Pena  [facility] Physician [facility]  ORTHOSURG [address]  Scheduled Appointment: 05/16/2023

## 2023-05-11 NOTE — DISCHARGE NOTE PROVIDER - HOSPITAL COURSE
History of Present Illness:   47 year old female with PMH Former Smoker (1.5 PPD x 20 years; Quit 4 years ago - currently vaping daily) and HLD reports that she recently had a back injury at work in 2021. She reports c/o back pain since the injury and had relief of her symptoms through acupuncture and physical therapy and returned back to work. In 2023, she reports that the pain returned and was worse. She describes the pain as intermittent and sharp with pressure.  At rest, she rates her pain 4/10 and at its worse she rates her pain 9/10. She has numbness and tingling that radiates to the LLE. Prolonged sitting or walking makes the pain worse. Laying flat makes the pain better. Her last epidural injection was in 2023 with no relief of symptoms. Her last trigger point injection with pain specialist Dr. Leavitt was on 23 with pain relief for only a few hours. Pt now presents for scheduled stage I L4-S1 interbody fusion with Dr. Pena on 5/10/23.     Goals of Care: "to get better"    PAST MEDICAL HISTORY:  Current smoker   HLD (hyperlipidemia)   Spinal stenosis, lumbosacral region.     PAST SURGICAL HISTORY:  S/P .    Hospital Course:  After admission on 5/10 and receiving pre-operative parenteral prophylactic antibiotics, the patient underwent a stage I L4-L5 ALIF on 5/10 with Dr. Pena and Dr. Bojorquez for Vascular exposure. Patient was then taken on  for Stage II L5-S1 Laminectomy/Posterior Spinal Fusion. Patient tolerated the procedure well and was transferred to the recovery room in stable condition, with a stable neuro/vascular exam of the operated extremity.    Patient was placed on a Prednisone taper and Protonix 40 mg for GI protection.   Patient was made weight bearing as tolerated to the bilateral lower extremities.      Typical Physical & occupational therapy modalities post surgery were performed by physical and occupational therapies, including ambulation training, range of motion, ADL's, abd transfers.  After progression of mobility guided by the PT/ OT staff,  the patient was felt to benefit from further rehabilitative care for restoration to level of function. This was felt to best be accomplished at home with outpatient physical therapy.   Discharge and Orthopedic Care instructions were delineated in the Discharge Plan and reviewed with the patient. All medications were delineated in the medication reconciliation tool and key points were reviewed with the patient. They were deemed stable from an Orthopedic & medical standpoint for discharge ***   History of Present Illness:   47 year old female with PMH Former Smoker (1.5 PPD x 20 years; Quit 4 years ago - currently vaping daily) and HLD reports that she recently had a back injury at work in 2021. She reports c/o back pain since the injury and had relief of her symptoms through acupuncture and physical therapy and returned back to work. In 2023, she reports that the pain returned and was worse. She describes the pain as intermittent and sharp with pressure.  At rest, she rates her pain 4/10 and at its worse she rates her pain 9/10. She has numbness and tingling that radiates to the LLE. Prolonged sitting or walking makes the pain worse. Laying flat makes the pain better. Her last epidural injection was in 2023 with no relief of symptoms. Her last trigger point injection with pain specialist Dr. Leavitt was on 23 with pain relief for only a few hours. Pt now presents for scheduled stage I L4-S1 interbody fusion with Dr. Pena on 5/10/23.     Goals of Care: "to get better"    PAST MEDICAL HISTORY:  Current smoker   HLD (hyperlipidemia)   Spinal stenosis, lumbosacral region.     PAST SURGICAL HISTORY:  S/P .    Hospital Course:  After admission on 5/10 and receiving pre-operative parenteral prophylactic antibiotics, the patient underwent a stage I L4-L5 ALIF on 5/10 with Dr. Pena and Dr. Bojorquez for Vascular exposure.   CT and MRI of the L/Spine performed post operatively for surgical planning:  CT revealed: Status post L4-5 and L5-S1 anterior lumbar interbody fusion postoperative changes are identified. Hardware is in good position.  MRI revealed: XXXX  Patient was then taken on XX/XX for Stage II L5-S1 Laminectomy/Posterior Spinal Fusion. Patient tolerated the procedure well and was transferred to the recovery room in stable condition, with a stable neuro/vascular exam of the operated extremity.    Patient was placed on a Prednisone taper and Protonix 40 mg for GI protection.   Patient was made weight bearing as tolerated to the bilateral lower extremities.      Typical Physical & occupational therapy modalities post surgery were performed by physical and occupational therapies, including ambulation training, range of motion, ADL's, abd transfers.  After progression of mobility guided by the PT/ OT staff,  the patient was felt to benefit from further rehabilitative care for restoration to level of function. This was felt to best be accomplished at home with outpatient physical therapy.   Discharge and Orthopedic Care instructions were delineated in the Discharge Plan and reviewed with the patient. All medications were delineated in the medication reconciliation tool and key points were reviewed with the patient. They were deemed stable from an Orthopedic & medical standpoint for discharge ***   History of Present Illness:   47 year old female with PMH Former Smoker (1.5 PPD x 20 years; Quit 4 years ago - currently vaping daily) and HLD reports that she recently had a back injury at work in 2021. She reports c/o back pain since the injury and had relief of her symptoms through acupuncture and physical therapy and returned back to work. In 2023, she reports that the pain returned and was worse. She describes the pain as intermittent and sharp with pressure.  At rest, she rates her pain 4/10 and at its worse she rates her pain 9/10. She has numbness and tingling that radiates to the LLE. Prolonged sitting or walking makes the pain worse. Laying flat makes the pain better. Her last epidural injection was in 2023 with no relief of symptoms. Her last trigger point injection with pain specialist Dr. Leavitt was on 23 with pain relief for only a few hours. Pt now presents for scheduled stage I L4-S1 interbody fusion with Dr. Pena on 5/10/23.     Goals of Care: "to get better"    PAST MEDICAL HISTORY:  Current smoker   HLD (hyperlipidemia)   Spinal stenosis, lumbosacral region.     PAST SURGICAL HISTORY:  S/P .    Hospital Course:  After admission on 5/10 and receiving pre-operative parenteral prophylactic antibiotics, the patient underwent a stage I L4-L5 ALIF on 5/10 with Dr. Pena and Dr. Bojorquez for Vascular exposure.   CT and MRI of the L/Spine performed post operatively for surgical planning:  CT revealed: Status post L4-5 and L5-S1 anterior lumbar interbody fusion postoperative changes are identified. Hardware is in good position.  MRI revealed: Status post L4-L5 and L5-S1 anterior lumbar interbody fusion. Prevertebral and presacral soft tissue edema including incomplete   characterization of a peripherally enhancing postoperative presacral collection.  Degenerative disc disease at L4-L5 and L5-S1, as described above.    Patient was then taken on 23 for Stage II L5-S1 Laminectomy/Posterior Spinal Fusion. Patient tolerated the procedure well and was transferred to the recovery room in stable condition, with a stable neuro/vascular exam.     Patient was placed on a Prednisone taper and Protonix 40 mg for GI protection.   Patient was made weight bearing as tolerated to the bilateral lower extremities.      Typical Physical & occupational therapy modalities post surgery were performed by physical and occupational therapies, including ambulation training, range of motion, ADL's, abd transfers.  After progression of mobility guided by the PT/ OT staff,  the patient was felt to benefit from further rehabilitative care for restoration to level of function. This was felt to best be accomplished at home with outpatient physical therapy.   Discharge and Orthopedic Care instructions were delineated in the Discharge Plan and reviewed with the patient. All medications were delineated in the medication reconciliation tool and key points were reviewed with the patient. They were deemed stable from an Orthopedic & medical standpoint for discharge.    History of Present Illness:   47 year old female with PMH Former Smoker (1.5 PPD x 20 years; Quit 4 years ago - currently vaping daily) and HLD reports that she recently had a back injury at work in 2021. She reports c/o back pain since the injury and had relief of her symptoms through acupuncture and physical therapy and returned back to work. In 2023, she reports that the pain returned and was worse. She describes the pain as intermittent and sharp with pressure.  At rest, she rates her pain 4/10 and at its worse she rates her pain 9/10. She has numbness and tingling that radiates to the LLE. Prolonged sitting or walking makes the pain worse. Laying flat makes the pain better. Her last epidural injection was in 2023 with no relief of symptoms. Her last trigger point injection with pain specialist Dr. Leavitt was on 23 with pain relief for only a few hours. Pt now presents for scheduled stage I L4-S1 interbody fusion with Dr. Pena on 5/10/23.     Goals of Care: "to get better"    PAST MEDICAL HISTORY:  Current smoker   HLD (hyperlipidemia)   Spinal stenosis, lumbosacral region.     PAST SURGICAL HISTORY:  S/P .    Hospital Course:  After admission on 5/10 and receiving pre-operative parenteral prophylactic antibiotics, the patient underwent a stage I L4-L5 ALIF on 5/10 with Dr. Pena and Dr. Bojorquez for Vascular exposure.   CT and MRI of the L/Spine performed post operatively for surgical planning:  CT revealed: Status post L4-5 and L5-S1 anterior lumbar interbody fusion postoperative changes are identified. Hardware is in good position.  MRI revealed: Status post L4-L5 and L5-S1 anterior lumbar interbody fusion. Prevertebral and presacral soft tissue edema including incomplete   characterization of a peripherally enhancing postoperative presacral collection.  Degenerative disc disease at L4-L5 and L5-S1, as described above.    Patient was then taken on 23 for Stage II L5-S1 Laminectomy/Posterior Spinal Fusion. Patient tolerated the procedure well and was transferred to the recovery room in stable condition, with a stable neuro/vascular exam.     Patient was placed on a Prednisone taper and Protonix 40 mg for GI protection.   Patient was made weight bearing as tolerated to the bilateral lower extremities.      Typical Physical & occupational therapy modalities post surgery were performed by physical and occupational therapies, including ambulation training, range of motion, ADL's, abd transfers.  After progression of mobility guided by the PT/ OT staff,  the patient was felt to benefit from further rehabilitative care for restoration to level of function. This was felt to best be accomplished at home with outpatient physical therapy.     Discharge and Orthopedic Care instructions were delineated in the Discharge Plan and reviewed with the patient. All medications were delineated in the medication reconciliation tool and key points were reviewed with the patient. They were deemed stable from an Orthopedic & medical standpoint for discharge.    History of Present Illness:   47 year old female with PMH Former Smoker (1.5 PPD x 20 years; Quit 4 years ago - currently vaping daily) and HLD reports that she recently had a back injury at work in 2021. She reports c/o back pain since the injury and had relief of her symptoms through acupuncture and physical therapy and returned back to work. In 2023, she reports that the pain returned and was worse. She describes the pain as intermittent and sharp with pressure.  At rest, she rates her pain 4/10 and at its worse she rates her pain 9/10. She has numbness and tingling that radiates to the LLE. Prolonged sitting or walking makes the pain worse. Laying flat makes the pain better. Her last epidural injection was in 2023 with no relief of symptoms. Her last trigger point injection with pain specialist Dr. Leavitt was on 23 with pain relief for only a few hours. Pt now presents for scheduled stage I L4-S1 interbody fusion with Dr. Pena on 5/10/23.     Goals of Care: "to get better"    PAST MEDICAL HISTORY:  Current smoker   HLD (hyperlipidemia)   Spinal stenosis, lumbosacral region.     PAST SURGICAL HISTORY:  S/P .    Hospital Course:  After admission on 5/10 and receiving pre-operative parenteral prophylactic antibiotics, the patient underwent a stage I L4-L5 ALIF on 5/10 with Dr. Pena and Dr. Bojorquez for Vascular exposure.   CT and MRI of the L/Spine performed post operatively for surgical planning:  CT revealed: Status post L4-5 and L5-S1 anterior lumbar interbody fusion postoperative changes are identified. Hardware is in good position.  MRI revealed: Status post L4-L5 and L5-S1 anterior lumbar interbody fusion. Prevertebral and presacral soft tissue edema including incomplete   characterization of a peripherally enhancing postoperative presacral collection.  Degenerative disc disease at L4-L5 and L5-S1, as described above.    Patient was then taken on 23 for Stage II L5-S1 Laminectomy/Posterior Spinal Fusion. Patient tolerated the procedure well and was transferred to the recovery room in stable condition, with a stable neuro/vascular exam.     Patient was placed on a Prednisone taper and Protonix 40 mg for GI protection.   Patient was made weight bearing as tolerated to the bilateral lower extremities.      Typical Physical & occupational therapy modalities post surgery were performed by physical and occupational therapies, including ambulation training, range of motion, ADL's, abd transfers.  After progression of mobility guided by the PT/ OT staff,  the patient was felt to benefit from further rehabilitative care for restoration to level of function. This was felt to best be accomplished at home with outpatient physical therapy.     Endocrinology team consulted on  due to steroid induced hyperglycemia. Recommendations followed.   Final recommendations: XXXXXX    Discharge and Orthopedic Care instructions were delineated in the Discharge Plan and reviewed with the patient. All medications were delineated in the medication reconciliation tool and key points were reviewed with the patient. They were deemed stable from an Orthopedic & medical standpoint for discharge.    History of Present Illness:   47 year old female with PMH Former Smoker (1.5 PPD x 20 years; Quit 4 years ago - currently vaping daily) and HLD reports that she recently had a back injury at work in 2021. She reports c/o back pain since the injury and had relief of her symptoms through acupuncture and physical therapy and returned back to work. In 2023, she reports that the pain returned and was worse. She describes the pain as intermittent and sharp with pressure.  At rest, she rates her pain 4/10 and at its worse she rates her pain 9/10. She has numbness and tingling that radiates to the LLE. Prolonged sitting or walking makes the pain worse. Laying flat makes the pain better. Her last epidural injection was in 2023 with no relief of symptoms. Her last trigger point injection with pain specialist Dr. Leavitt was on 23 with pain relief for only a few hours. Pt now presents for scheduled stage I L4-S1 interbody fusion with Dr. Pena on 5/10/23.     Goals of Care: "to get better"    PAST MEDICAL HISTORY:  Current smoker   HLD (hyperlipidemia)   Spinal stenosis, lumbosacral region.     PAST SURGICAL HISTORY:  S/P .    Hospital Course:  After admission on 5/10 and receiving pre-operative parenteral prophylactic antibiotics, the patient underwent a stage I L4-L5 ALIF on 5/10 with Dr. Pena and Dr. Bojorquez for Vascular exposure.   CT and MRI of the L/Spine performed post operatively for surgical planning:  CT revealed: Status post L4-5 and L5-S1 anterior lumbar interbody fusion postoperative changes are identified. Hardware is in good position.  MRI revealed: Status post L4-L5 and L5-S1 anterior lumbar interbody fusion. Prevertebral and presacral soft tissue edema including incomplete   characterization of a peripherally enhancing postoperative presacral collection.  Degenerative disc disease at L4-L5 and L5-S1, as described above.    Patient was then taken on 23 for Stage II L5-S1 Laminectomy/Posterior Spinal Fusion. Patient tolerated the procedure well and was transferred to the recovery room in stable condition, with a stable neuro/vascular exam.     Patient was placed on a Prednisone taper and Protonix 40 mg for GI protection.   Patient was made weight bearing as tolerated to the bilateral lower extremities.      Typical Physical & occupational therapy modalities post surgery were performed by physical and occupational therapies, including ambulation training, range of motion, ADL's, abd transfers.  After progression of mobility guided by the PT/ OT staff,  the patient was felt to benefit from further rehabilitative care for restoration to level of function. This was felt to best be accomplished at home with outpatient physical therapy.     Endocrinology team consulted on  due to steroid induced hyperglycemia. Recommendations followed.     Final recommendations:   Today is the last day of prednisone 40mg: While in-patient Monitor on Lantus 12 units qhs and Admelog 5 units with meals. For discharge, Lantus 12 units qhs, metformin 500mg BID to be titrated as tolerated to 1000mg BID and glimepiride 4mg daily with morning prednisone dose    Prednisone 30mg: Lantus 10 units qhs, metformin titrating to 1000mg BID and glimepiride 3mg daily with morning prednisone dose.    Prednisone 20mg: Lantus 8 units qhs, metformin titrating to 1000mg BID and glimepiride 2mg daily with morning prednisone dose.    Prednisone 10mg: Lantus 6 units qhs, metformin 1000mg BID and glimepiride 1mg daily with morning prednisone dose    Once off prednisone: Metformin 1000mg BID in addition to dietary and lifestyle modifications.    All appropriate supplies sent to patient pharmacy.     Discharge and Orthopedic Care instructions were delineated in the Discharge Plan and reviewed with the patient. All medications were delineated in the medication reconciliation tool and key points were reviewed with the patient. They were deemed stable from an Orthopedic & medical standpoint for discharge.    History of Present Illness:   47 year old female with PMH Former Smoker (1.5 PPD x 20 years; Quit 4 years ago - currently vaping daily) and HLD reports that she recently had a back injury at work in 2021. She reports c/o back pain since the injury and had relief of her symptoms through acupuncture and physical therapy and returned back to work. In 2023, she reports that the pain returned and was worse. She describes the pain as intermittent and sharp with pressure.  At rest, she rates her pain 4/10 and at its worse she rates her pain 9/10. She has numbness and tingling that radiates to the LLE. Prolonged sitting or walking makes the pain worse. Laying flat makes the pain better. Her last epidural injection was in 2023 with no relief of symptoms. Her last trigger point injection with pain specialist Dr. Leavitt was on 23 with pain relief for only a few hours. Pt now presents for scheduled stage I L4-S1 interbody fusion with Dr. Pena on 5/10/23.     Goals of Care: "to get better"    PAST MEDICAL HISTORY:  Current smoker   HLD (hyperlipidemia)   Spinal stenosis, lumbosacral region.     PAST SURGICAL HISTORY:  S/P .    Hospital Course:  After admission on 5/10 and receiving pre-operative parenteral prophylactic antibiotics, the patient underwent a stage I L4-L5 ALIF on 5/10 with Dr. Pena and Dr. Bojorquez for Vascular exposure.   CT and MRI of the L/Spine performed post operatively for surgical planning:  CT revealed: Status post L4-5 and L5-S1 anterior lumbar interbody fusion postoperative changes are identified. Hardware is in good position.  MRI revealed: Status post L4-L5 and L5-S1 anterior lumbar interbody fusion. Prevertebral and presacral soft tissue edema including incomplete   characterization of a peripherally enhancing postoperative presacral collection.  Degenerative disc disease at L4-L5 and L5-S1, as described above.    Patient was then taken on 23 for Stage II L5-S1 Laminectomy/Posterior Spinal Fusion. Patient tolerated the procedure well and was transferred to the recovery room in stable condition, with a stable neuro/vascular exam.     Patient was placed on a Prednisone taper and Protonix 40 mg for GI protection.   Patient was made weight bearing as tolerated to the bilateral lower extremities.      Typical Physical & occupational therapy modalities post surgery were performed by physical and occupational therapies, including ambulation training, range of motion, ADL's, abd transfers.  After progression of mobility guided by the PT/ OT staff,  the patient was felt to benefit from further rehabilitative care for restoration to level of function. This was felt to best be accomplished at home with outpatient physical therapy.     Endocrinology team consulted on  due to steroid induced hyperglycemia. Recommendations followed.     Final recommendations:   Today is the last day of prednisone 40mg: While in-patient Monitor on Lantus 12 units qhs and Admelog 5 units with meals.   For discharge, Lantus 12 units qhs, metformin 500mg BID to be titrated as tolerated to 1000mg BID and glimepiride 4mg daily with morning prednisone dose    Prednisone 30mg: Lantus 10 units qhs, metformin titrating to 1000mg BID and glimepiride 3mg daily with morning prednisone dose.    Prednisone 20mg: Lantus 8 units qhs, metformin titrating to 1000mg BID and glimepiride 2mg daily with morning prednisone dose.    Prednisone 10mg: Lantus 6 units qhs, metformin 1000mg BID and glimepiride 1mg daily with morning prednisone dose    Once off prednisone: Metformin 1000mg BID in addition to dietary and lifestyle modifications.    All appropriate supplies sent to patient pharmacy.     Discharge and Orthopedic Care instructions were delineated in the Discharge Plan and reviewed with the patient. All medications were delineated in the medication reconciliation tool and key points were reviewed with the patient. They were deemed stable from an Orthopedic & medical standpoint for discharge.

## 2023-05-11 NOTE — DISCHARGE NOTE PROVIDER - PROVIDER TOKENS
A high-fiber diet certainly is a reasonable suggestion.  May be helpful for bowel function   PROVIDER:[TOKEN:[[id number]],FOLLOWUP:[1 week],ESTABLISHEDPATIENT:[[id number]]]

## 2023-05-11 NOTE — PROGRESS NOTE ADULT - ASSESSMENT
A/P: 46 y/o F POD#1 s/p Stage 1 L4-L5 ALIF    DVT ppx- ambulation as tolerated, IPC while in bed  WBAT w LSO   Taper  PCA for pain management  Alonzo to gravity  PT  OT  Gi ppx  D/W attending    HECTOR Munoz  Orthopedic Surgery  [id number]

## 2023-05-11 NOTE — DISCHARGE NOTE PROVIDER - NSDCFUADDINST_GEN_ALL_CORE_FT
Bathing and incision care:   1. do not apply creams/ointments or lotion to the incision site.   2. do not shower or get incision wet. (sponge bathe until 1st post operative appointment)  3. Dressing changes: every other day with a dry dressing (Gauze and medical tape)  Driving:   -do not drive until cleared by surgeon.   Activity:   -no bending, lifting and twisting.   Medications/Pain control:  1. ice packs to the surgical site in 20 min intervals, 3 x day.   2. do not take NSAIDs(antiinflammatory medications) or Blood thinners until cleared by your surgeon to begin.   3. Pain medications as prescribed to the pharmacy.    Bathing and incision care:   1. do not apply creams/ointments or lotion to the incision site.   2. do not shower or get incision wet. (sponge bathe until 1st post operative appointment)  3. Dressing changes: every other day with a dry dressing (Gauze and medical tape)  Driving:   -do not drive until cleared by surgeon.   Activity:   -no bending, lifting and twisting.   Medications/Pain control:  1. ice packs to the surgical site in 20 min intervals, 3 x day.   2. do not take NSAIDs(antiinflammatory medications) or Blood thinners until cleared by your surgeon to begin.   3. Pain medications as prescribed to the pharmacy.   ENDOCRINOLOGY RECOMMENDATIONS:   Prednisone 30mg: Lantus 10 units qhs, metformin titrating to 1000mg BID and glimepiride 3mg daily with morning prednisone dose.  Prednisone 20mg: Lantus 8 units qhs, metformin titrating to 1000mg BID and glimepiride 2mg daily with morning prednisone dose.  Prednisone 10mg: Lantus 6 units qhs, metformin 1000mg BID and glimepiride 1mg daily with morning prednisone dose  Once off prednisone: Metformin 1000mg BID in addition to dietary and lifestyle modifications.   Bathing and incision care:   1. do not apply creams/ointments or lotion to the incision site.   2. do not shower or get incision wet. (sponge bathe until 1st post operative appointment)  3. Dressing changes: every other day with a dry dressing (Gauze and medical tape)  Driving:   -do not drive until cleared by surgeon.   Activity:   -no bending, lifting and twisting.   Medications/Pain control:  1. ice packs to the surgical site in 20 min intervals, 3 x day.   2. do not take NSAIDs(antiinflammatory medications) or Blood thinners until cleared by your surgeon to begin.   3. Pain medications as prescribed to the pharmacy.   ENDOCRINOLOGY RECOMMENDATIONS:   While on Prednisone 40mg (5/20), Lantus 12 units at bedtime.   Prednisone 30mg: Lantus 10 units qhs, metformin titrating to 1000mg BID and glimepiride 3mg daily with morning prednisone dose.  Prednisone 20mg: Lantus 8 units qhs, metformin titrating to 1000mg BID and glimepiride 2mg daily with morning prednisone dose.  Prednisone 10mg: Lantus 6 units qhs, metformin 1000mg BID and glimepiride 1mg daily with morning prednisone dose  Once off prednisone: Metformin 1000mg BID in addition to dietary and lifestyle modifications.

## 2023-05-11 NOTE — DISCHARGE NOTE PROVIDER - CARE PROVIDERS DIRECT ADDRESSES
[email or web address]

## 2023-05-11 NOTE — DISCHARGE NOTE PROVIDER - NSDCCPCAREPLAN_GEN_ALL_CORE_FT
PRINCIPAL DISCHARGE DIAGNOSIS  Diagnosis: Lumbosacral spinal stenosis  Assessment and Plan of Treatment:

## 2023-05-11 NOTE — DISCHARGE NOTE PROVIDER - NSDCFUADDAPPT_GEN_ALL_CORE_FT
Follow up in the office with Dr. Pena within 7-10 days post operatively. (Call office at [phone] to confirm appointment)

## 2023-05-12 LAB
ANION GAP SERPL CALC-SCNC: 12 MMOL/L — SIGNIFICANT CHANGE UP (ref 5–17)
BASOPHILS # BLD AUTO: 0.02 K/UL — SIGNIFICANT CHANGE UP (ref 0–0.2)
BASOPHILS NFR BLD AUTO: 0.1 % — SIGNIFICANT CHANGE UP (ref 0–2)
BUN SERPL-MCNC: 18 MG/DL — SIGNIFICANT CHANGE UP (ref 7–23)
CALCIUM SERPL-MCNC: 9.6 MG/DL — SIGNIFICANT CHANGE UP (ref 8.4–10.5)
CHLORIDE SERPL-SCNC: 101 MMOL/L — SIGNIFICANT CHANGE UP (ref 96–108)
CO2 SERPL-SCNC: 27 MMOL/L — SIGNIFICANT CHANGE UP (ref 22–31)
CREAT SERPL-MCNC: 0.61 MG/DL — SIGNIFICANT CHANGE UP (ref 0.5–1.3)
EGFR: 110 ML/MIN/1.73M2 — SIGNIFICANT CHANGE UP
EOSINOPHIL # BLD AUTO: 0 K/UL — SIGNIFICANT CHANGE UP (ref 0–0.5)
EOSINOPHIL NFR BLD AUTO: 0 % — SIGNIFICANT CHANGE UP (ref 0–6)
GLUCOSE SERPL-MCNC: 163 MG/DL — HIGH (ref 70–99)
HCT VFR BLD CALC: 34.3 % — LOW (ref 34.5–45)
HGB BLD-MCNC: 10.6 G/DL — LOW (ref 11.5–15.5)
IMM GRANULOCYTES NFR BLD AUTO: 1.6 % — HIGH (ref 0–0.9)
LYMPHOCYTES # BLD AUTO: 15.3 % — SIGNIFICANT CHANGE UP (ref 13–44)
LYMPHOCYTES # BLD AUTO: 2.48 K/UL — SIGNIFICANT CHANGE UP (ref 1–3.3)
MCHC RBC-ENTMCNC: 27.3 PG — SIGNIFICANT CHANGE UP (ref 27–34)
MCHC RBC-ENTMCNC: 30.9 GM/DL — LOW (ref 32–36)
MCV RBC AUTO: 88.4 FL — SIGNIFICANT CHANGE UP (ref 80–100)
MONOCYTES # BLD AUTO: 1.28 K/UL — HIGH (ref 0–0.9)
MONOCYTES NFR BLD AUTO: 7.9 % — SIGNIFICANT CHANGE UP (ref 2–14)
NEUTROPHILS # BLD AUTO: 12.19 K/UL — HIGH (ref 1.8–7.4)
NEUTROPHILS NFR BLD AUTO: 75.1 % — SIGNIFICANT CHANGE UP (ref 43–77)
NRBC # BLD: 0 /100 WBCS — SIGNIFICANT CHANGE UP (ref 0–0)
PLATELET # BLD AUTO: 265 K/UL — SIGNIFICANT CHANGE UP (ref 150–400)
POTASSIUM SERPL-MCNC: 4.1 MMOL/L — SIGNIFICANT CHANGE UP (ref 3.5–5.3)
POTASSIUM SERPL-SCNC: 4.1 MMOL/L — SIGNIFICANT CHANGE UP (ref 3.5–5.3)
RBC # BLD: 3.88 M/UL — SIGNIFICANT CHANGE UP (ref 3.8–5.2)
RBC # FLD: 12.9 % — SIGNIFICANT CHANGE UP (ref 10.3–14.5)
SODIUM SERPL-SCNC: 140 MMOL/L — SIGNIFICANT CHANGE UP (ref 135–145)
WBC # BLD: 16.23 K/UL — HIGH (ref 3.8–10.5)
WBC # FLD AUTO: 16.23 K/UL — HIGH (ref 3.8–10.5)

## 2023-05-12 PROCEDURE — 71275 CT ANGIOGRAPHY CHEST: CPT | Mod: 26

## 2023-05-12 RX ORDER — OXYCODONE HYDROCHLORIDE 5 MG/1
5 TABLET ORAL
Refills: 0 | Status: DISCONTINUED | OUTPATIENT
Start: 2023-05-12 | End: 2023-05-16

## 2023-05-12 RX ADMIN — OXYCODONE HYDROCHLORIDE 5 MILLIGRAM(S): 5 TABLET ORAL at 15:50

## 2023-05-12 RX ADMIN — HYDROMORPHONE HYDROCHLORIDE 30 MILLILITER(S): 2 INJECTION INTRAMUSCULAR; INTRAVENOUS; SUBCUTANEOUS at 07:12

## 2023-05-12 RX ADMIN — SENNA PLUS 2 TABLET(S): 8.6 TABLET ORAL at 22:15

## 2023-05-12 RX ADMIN — OXYCODONE HYDROCHLORIDE 5 MILLIGRAM(S): 5 TABLET ORAL at 14:40

## 2023-05-12 RX ADMIN — Medication 40 MILLIGRAM(S): at 05:28

## 2023-05-12 RX ADMIN — Medication 81 MILLIGRAM(S): at 13:20

## 2023-05-12 RX ADMIN — CYCLOBENZAPRINE HYDROCHLORIDE 10 MILLIGRAM(S): 10 TABLET, FILM COATED ORAL at 21:02

## 2023-05-12 RX ADMIN — Medication 5 MILLIGRAM(S): at 22:32

## 2023-05-12 RX ADMIN — SIMVASTATIN 20 MILLIGRAM(S): 20 TABLET, FILM COATED ORAL at 20:22

## 2023-05-12 RX ADMIN — OXYCODONE HYDROCHLORIDE 5 MILLIGRAM(S): 5 TABLET ORAL at 17:46

## 2023-05-12 RX ADMIN — OXYCODONE HYDROCHLORIDE 5 MILLIGRAM(S): 5 TABLET ORAL at 21:58

## 2023-05-12 RX ADMIN — CYCLOBENZAPRINE HYDROCHLORIDE 10 MILLIGRAM(S): 10 TABLET, FILM COATED ORAL at 16:08

## 2023-05-12 RX ADMIN — OXYCODONE HYDROCHLORIDE 5 MILLIGRAM(S): 5 TABLET ORAL at 22:58

## 2023-05-12 RX ADMIN — POLYETHYLENE GLYCOL 3350 17 GRAM(S): 17 POWDER, FOR SOLUTION ORAL at 22:15

## 2023-05-12 RX ADMIN — PANTOPRAZOLE SODIUM 40 MILLIGRAM(S): 20 TABLET, DELAYED RELEASE ORAL at 05:28

## 2023-05-12 RX ADMIN — Medication 30 MILLILITER(S): at 22:31

## 2023-05-12 RX ADMIN — SIMETHICONE 80 MILLIGRAM(S): 80 TABLET, CHEWABLE ORAL at 17:59

## 2023-05-12 NOTE — PROGRESS NOTE ADULT - ASSESSMENT
47y F s/p L4-S1 ALIF on 5/10, recovering uneventfully    Plan  -Aquacell down POD 5 (5/15)  -OK for clears, advance as tolerated  -PT/OT   -Pain Control: PCA   -DVT ppx: SCDs  -Incentive Spirometry  -Care per ortho    Vascular Surgery   [id number].

## 2023-05-12 NOTE — CONSULT NOTE ADULT - ASSESSMENT
A 43 yo female with S/p stage I L4-S1 interbody fusion.    Ortho spine f/up noted.  PO Prednisone  Pain control with Oxy    SOB:  CTPA: No PE  O2 2L PRN

## 2023-05-13 LAB
ANION GAP SERPL CALC-SCNC: 11 MMOL/L — SIGNIFICANT CHANGE UP (ref 5–17)
BASOPHILS # BLD AUTO: 0 K/UL — SIGNIFICANT CHANGE UP (ref 0–0.2)
BASOPHILS NFR BLD AUTO: 0 % — SIGNIFICANT CHANGE UP (ref 0–2)
BUN SERPL-MCNC: 20 MG/DL — SIGNIFICANT CHANGE UP (ref 7–23)
CALCIUM SERPL-MCNC: 9.6 MG/DL — SIGNIFICANT CHANGE UP (ref 8.4–10.5)
CHLORIDE SERPL-SCNC: 99 MMOL/L — SIGNIFICANT CHANGE UP (ref 96–108)
CO2 SERPL-SCNC: 31 MMOL/L — SIGNIFICANT CHANGE UP (ref 22–31)
CREAT SERPL-MCNC: 0.78 MG/DL — SIGNIFICANT CHANGE UP (ref 0.5–1.3)
EGFR: 94 ML/MIN/1.73M2 — SIGNIFICANT CHANGE UP
EOSINOPHIL # BLD AUTO: 0 K/UL — SIGNIFICANT CHANGE UP (ref 0–0.5)
EOSINOPHIL NFR BLD AUTO: 0 % — SIGNIFICANT CHANGE UP (ref 0–6)
GLUCOSE SERPL-MCNC: 120 MG/DL — HIGH (ref 70–99)
HCT VFR BLD CALC: 33.8 % — LOW (ref 34.5–45)
HGB BLD-MCNC: 10.4 G/DL — LOW (ref 11.5–15.5)
LYMPHOCYTES # BLD AUTO: 31.6 % — SIGNIFICANT CHANGE UP (ref 13–44)
LYMPHOCYTES # BLD AUTO: 5.08 K/UL — HIGH (ref 1–3.3)
MCHC RBC-ENTMCNC: 27.8 PG — SIGNIFICANT CHANGE UP (ref 27–34)
MCHC RBC-ENTMCNC: 30.8 GM/DL — LOW (ref 32–36)
MCV RBC AUTO: 90.4 FL — SIGNIFICANT CHANGE UP (ref 80–100)
MONOCYTES # BLD AUTO: 0.85 K/UL — SIGNIFICANT CHANGE UP (ref 0–0.9)
MONOCYTES NFR BLD AUTO: 5.3 % — SIGNIFICANT CHANGE UP (ref 2–14)
NEUTROPHILS # BLD AUTO: 9.87 K/UL — HIGH (ref 1.8–7.4)
NEUTROPHILS NFR BLD AUTO: 61.4 % — SIGNIFICANT CHANGE UP (ref 43–77)
PLATELET # BLD AUTO: 263 K/UL — SIGNIFICANT CHANGE UP (ref 150–400)
POTASSIUM SERPL-MCNC: 3.9 MMOL/L — SIGNIFICANT CHANGE UP (ref 3.5–5.3)
POTASSIUM SERPL-SCNC: 3.9 MMOL/L — SIGNIFICANT CHANGE UP (ref 3.5–5.3)
RBC # BLD: 3.74 M/UL — LOW (ref 3.8–5.2)
RBC # FLD: 13 % — SIGNIFICANT CHANGE UP (ref 10.3–14.5)
SODIUM SERPL-SCNC: 141 MMOL/L — SIGNIFICANT CHANGE UP (ref 135–145)
WBC # BLD: 16.08 K/UL — HIGH (ref 3.8–10.5)
WBC # FLD AUTO: 16.08 K/UL — HIGH (ref 3.8–10.5)

## 2023-05-13 PROCEDURE — 72158 MRI LUMBAR SPINE W/O & W/DYE: CPT | Mod: 26

## 2023-05-13 RX ADMIN — OXYCODONE HYDROCHLORIDE 5 MILLIGRAM(S): 5 TABLET ORAL at 13:13

## 2023-05-13 RX ADMIN — PANTOPRAZOLE SODIUM 40 MILLIGRAM(S): 20 TABLET, DELAYED RELEASE ORAL at 04:59

## 2023-05-13 RX ADMIN — CYCLOBENZAPRINE HYDROCHLORIDE 10 MILLIGRAM(S): 10 TABLET, FILM COATED ORAL at 20:45

## 2023-05-13 RX ADMIN — MAGNESIUM HYDROXIDE 30 MILLILITER(S): 400 TABLET, CHEWABLE ORAL at 04:59

## 2023-05-13 RX ADMIN — SENNA PLUS 2 TABLET(S): 8.6 TABLET ORAL at 22:14

## 2023-05-13 RX ADMIN — OXYCODONE HYDROCHLORIDE 5 MILLIGRAM(S): 5 TABLET ORAL at 16:15

## 2023-05-13 RX ADMIN — OXYCODONE HYDROCHLORIDE 5 MILLIGRAM(S): 5 TABLET ORAL at 05:59

## 2023-05-13 RX ADMIN — OXYCODONE HYDROCHLORIDE 5 MILLIGRAM(S): 5 TABLET ORAL at 17:10

## 2023-05-13 RX ADMIN — OXYCODONE HYDROCHLORIDE 5 MILLIGRAM(S): 5 TABLET ORAL at 14:00

## 2023-05-13 RX ADMIN — Medication 40 MILLIGRAM(S): at 04:59

## 2023-05-13 RX ADMIN — OXYCODONE HYDROCHLORIDE 5 MILLIGRAM(S): 5 TABLET ORAL at 22:43

## 2023-05-13 RX ADMIN — POLYETHYLENE GLYCOL 3350 17 GRAM(S): 17 POWDER, FOR SOLUTION ORAL at 22:14

## 2023-05-13 RX ADMIN — OXYCODONE HYDROCHLORIDE 5 MILLIGRAM(S): 5 TABLET ORAL at 22:13

## 2023-05-13 RX ADMIN — SIMVASTATIN 20 MILLIGRAM(S): 20 TABLET, FILM COATED ORAL at 22:14

## 2023-05-13 RX ADMIN — Medication 81 MILLIGRAM(S): at 11:10

## 2023-05-13 RX ADMIN — OXYCODONE HYDROCHLORIDE 5 MILLIGRAM(S): 5 TABLET ORAL at 09:37

## 2023-05-13 RX ADMIN — OXYCODONE HYDROCHLORIDE 5 MILLIGRAM(S): 5 TABLET ORAL at 04:59

## 2023-05-13 RX ADMIN — OXYCODONE HYDROCHLORIDE 5 MILLIGRAM(S): 5 TABLET ORAL at 10:37

## 2023-05-13 NOTE — PROGRESS NOTE ADULT - ASSESSMENT
47y F s/p L4-S1 ALIF on 5/10, recovering uneventfully    Plan  -Aquacell down POD 5 (5/15)  -Advance as tolerated  -PT/OT   -Pain Control: PCA   -DVT ppx: SCDs  -Incentive Spirometry  -Care per ortho    Vascular Surgery   [id number].

## 2023-05-13 NOTE — PROGRESS NOTE ADULT - ASSESSMENT
A/P :  48 y/o F POD#3 s/p Stage 1 L4-L5 ALIF. VSS. NAD.   -    Pain control  -    IS bedside  -    Taper  -    DVT ppx: SCDs       -    GI ppx: Protonix 40 mg QD  -    Physical Therapy  -    Weight bearing status: WBAT  -    pend MRI L spine (ord)  -    Dispo: pt recommended for Home, pend return to OR on 5/16 for stage 2 L5-S1 Lami/Fusion.     Tyrese Bates PA-C  Orthopedic Surgery   Team Pager: [phone]

## 2023-05-14 LAB
ANION GAP SERPL CALC-SCNC: 13 MMOL/L — SIGNIFICANT CHANGE UP (ref 5–17)
BASOPHILS # BLD AUTO: 0.04 K/UL — SIGNIFICANT CHANGE UP (ref 0–0.2)
BASOPHILS NFR BLD AUTO: 0.3 % — SIGNIFICANT CHANGE UP (ref 0–2)
BUN SERPL-MCNC: 22 MG/DL — SIGNIFICANT CHANGE UP (ref 7–23)
CALCIUM SERPL-MCNC: 9.8 MG/DL — SIGNIFICANT CHANGE UP (ref 8.4–10.5)
CHLORIDE SERPL-SCNC: 96 MMOL/L — SIGNIFICANT CHANGE UP (ref 96–108)
CO2 SERPL-SCNC: 30 MMOL/L — SIGNIFICANT CHANGE UP (ref 22–31)
CREAT SERPL-MCNC: 0.68 MG/DL — SIGNIFICANT CHANGE UP (ref 0.5–1.3)
EGFR: 107 ML/MIN/1.73M2 — SIGNIFICANT CHANGE UP
EOSINOPHIL # BLD AUTO: 0.13 K/UL — SIGNIFICANT CHANGE UP (ref 0–0.5)
EOSINOPHIL NFR BLD AUTO: 0.9 % — SIGNIFICANT CHANGE UP (ref 0–6)
GLUCOSE SERPL-MCNC: 132 MG/DL — HIGH (ref 70–99)
HCT VFR BLD CALC: 35.7 % — SIGNIFICANT CHANGE UP (ref 34.5–45)
HGB BLD-MCNC: 11.5 G/DL — SIGNIFICANT CHANGE UP (ref 11.5–15.5)
IMM GRANULOCYTES NFR BLD AUTO: 1.5 % — HIGH (ref 0–0.9)
LYMPHOCYTES # BLD AUTO: 41.1 % — SIGNIFICANT CHANGE UP (ref 13–44)
LYMPHOCYTES # BLD AUTO: 6.04 K/UL — HIGH (ref 1–3.3)
MCHC RBC-ENTMCNC: 28 PG — SIGNIFICANT CHANGE UP (ref 27–34)
MCHC RBC-ENTMCNC: 32.2 GM/DL — SIGNIFICANT CHANGE UP (ref 32–36)
MCV RBC AUTO: 87.1 FL — SIGNIFICANT CHANGE UP (ref 80–100)
MONOCYTES # BLD AUTO: 0.9 K/UL — SIGNIFICANT CHANGE UP (ref 0–0.9)
MONOCYTES NFR BLD AUTO: 6.1 % — SIGNIFICANT CHANGE UP (ref 2–14)
NEUTROPHILS # BLD AUTO: 7.38 K/UL — SIGNIFICANT CHANGE UP (ref 1.8–7.4)
NEUTROPHILS NFR BLD AUTO: 50.1 % — SIGNIFICANT CHANGE UP (ref 43–77)
NRBC # BLD: 0 /100 WBCS — SIGNIFICANT CHANGE UP (ref 0–0)
PLATELET # BLD AUTO: 283 K/UL — SIGNIFICANT CHANGE UP (ref 150–400)
POTASSIUM SERPL-MCNC: 3.8 MMOL/L — SIGNIFICANT CHANGE UP (ref 3.5–5.3)
POTASSIUM SERPL-SCNC: 3.8 MMOL/L — SIGNIFICANT CHANGE UP (ref 3.5–5.3)
RBC # BLD: 4.1 M/UL — SIGNIFICANT CHANGE UP (ref 3.8–5.2)
RBC # FLD: 12.7 % — SIGNIFICANT CHANGE UP (ref 10.3–14.5)
SODIUM SERPL-SCNC: 139 MMOL/L — SIGNIFICANT CHANGE UP (ref 135–145)
WBC # BLD: 14.71 K/UL — HIGH (ref 3.8–10.5)
WBC # FLD AUTO: 14.71 K/UL — HIGH (ref 3.8–10.5)

## 2023-05-14 RX ADMIN — Medication 40 MILLIGRAM(S): at 06:03

## 2023-05-14 RX ADMIN — OXYCODONE HYDROCHLORIDE 5 MILLIGRAM(S): 5 TABLET ORAL at 01:57

## 2023-05-14 RX ADMIN — OXYCODONE HYDROCHLORIDE 5 MILLIGRAM(S): 5 TABLET ORAL at 11:20

## 2023-05-14 RX ADMIN — Medication 10 MILLIGRAM(S): at 12:16

## 2023-05-14 RX ADMIN — OXYCODONE HYDROCHLORIDE 5 MILLIGRAM(S): 5 TABLET ORAL at 19:22

## 2023-05-14 RX ADMIN — Medication 81 MILLIGRAM(S): at 12:14

## 2023-05-14 RX ADMIN — OXYCODONE HYDROCHLORIDE 5 MILLIGRAM(S): 5 TABLET ORAL at 06:34

## 2023-05-14 RX ADMIN — OXYCODONE HYDROCHLORIDE 5 MILLIGRAM(S): 5 TABLET ORAL at 02:27

## 2023-05-14 RX ADMIN — SIMVASTATIN 20 MILLIGRAM(S): 20 TABLET, FILM COATED ORAL at 20:39

## 2023-05-14 RX ADMIN — OXYCODONE HYDROCHLORIDE 5 MILLIGRAM(S): 5 TABLET ORAL at 06:04

## 2023-05-14 RX ADMIN — OXYCODONE HYDROCHLORIDE 5 MILLIGRAM(S): 5 TABLET ORAL at 10:22

## 2023-05-14 RX ADMIN — CYCLOBENZAPRINE HYDROCHLORIDE 10 MILLIGRAM(S): 10 TABLET, FILM COATED ORAL at 12:14

## 2023-05-14 RX ADMIN — POLYETHYLENE GLYCOL 3350 17 GRAM(S): 17 POWDER, FOR SOLUTION ORAL at 20:39

## 2023-05-14 RX ADMIN — OXYCODONE HYDROCHLORIDE 5 MILLIGRAM(S): 5 TABLET ORAL at 18:57

## 2023-05-14 RX ADMIN — PANTOPRAZOLE SODIUM 40 MILLIGRAM(S): 20 TABLET, DELAYED RELEASE ORAL at 06:04

## 2023-05-14 RX ADMIN — CYCLOBENZAPRINE HYDROCHLORIDE 10 MILLIGRAM(S): 10 TABLET, FILM COATED ORAL at 20:16

## 2023-05-14 RX ADMIN — SENNA PLUS 2 TABLET(S): 8.6 TABLET ORAL at 20:39

## 2023-05-14 NOTE — PROGRESS NOTE ADULT - ASSESSMENT
A/P :  47y Female POD#4 s/p Stage 1 L4-L5 ALIF; VSS, NAD   -    Plan to take back to OR on 5/16/23 with Dr. Pena for Stage 2  -    Appreciate Vascular Sx and Medicine team following  -    FU medical clearance   -    Pain control  -    Taper  -    DVT ppx: SCDs       -    Physical Therapy  -    Weight bearing status: WBAT with LSO brace  -    Dispo plan recommendation for home    Mannie Shirley PA-C  Orthopedic Surgery Team  Team Pager: [phone]

## 2023-05-15 ENCOUNTER — TRANSCRIPTION ENCOUNTER (OUTPATIENT)
Age: 48
End: 2023-05-15

## 2023-05-15 LAB
ANION GAP SERPL CALC-SCNC: 14 MMOL/L — SIGNIFICANT CHANGE UP (ref 5–17)
BASOPHILS # BLD AUTO: 0 K/UL — SIGNIFICANT CHANGE UP (ref 0–0.2)
BASOPHILS NFR BLD AUTO: 0 % — SIGNIFICANT CHANGE UP (ref 0–2)
BUN SERPL-MCNC: 28 MG/DL — HIGH (ref 7–23)
CALCIUM SERPL-MCNC: 9.8 MG/DL — SIGNIFICANT CHANGE UP (ref 8.4–10.5)
CHLORIDE SERPL-SCNC: 96 MMOL/L — SIGNIFICANT CHANGE UP (ref 96–108)
CO2 SERPL-SCNC: 29 MMOL/L — SIGNIFICANT CHANGE UP (ref 22–31)
CREAT SERPL-MCNC: 0.74 MG/DL — SIGNIFICANT CHANGE UP (ref 0.5–1.3)
EGFR: 100 ML/MIN/1.73M2 — SIGNIFICANT CHANGE UP
EOSINOPHIL # BLD AUTO: 0 K/UL — SIGNIFICANT CHANGE UP (ref 0–0.5)
EOSINOPHIL NFR BLD AUTO: 0 % — SIGNIFICANT CHANGE UP (ref 0–6)
GLUCOSE SERPL-MCNC: 147 MG/DL — HIGH (ref 70–99)
HCT VFR BLD CALC: 35.6 % — SIGNIFICANT CHANGE UP (ref 34.5–45)
HGB BLD-MCNC: 11.1 G/DL — LOW (ref 11.5–15.5)
LYMPHOCYTES # BLD AUTO: 33.3 % — SIGNIFICANT CHANGE UP (ref 13–44)
LYMPHOCYTES # BLD AUTO: 5.05 K/UL — HIGH (ref 1–3.3)
MCHC RBC-ENTMCNC: 27.5 PG — SIGNIFICANT CHANGE UP (ref 27–34)
MCHC RBC-ENTMCNC: 31.2 GM/DL — LOW (ref 32–36)
MCV RBC AUTO: 88.3 FL — SIGNIFICANT CHANGE UP (ref 80–100)
MONOCYTES # BLD AUTO: 0.93 K/UL — HIGH (ref 0–0.9)
MONOCYTES NFR BLD AUTO: 6.1 % — SIGNIFICANT CHANGE UP (ref 2–14)
NEUTROPHILS # BLD AUTO: 9.06 K/UL — HIGH (ref 1.8–7.4)
NEUTROPHILS NFR BLD AUTO: 59.7 % — SIGNIFICANT CHANGE UP (ref 43–77)
PLATELET # BLD AUTO: 321 K/UL — SIGNIFICANT CHANGE UP (ref 150–400)
POTASSIUM SERPL-MCNC: 3.5 MMOL/L — SIGNIFICANT CHANGE UP (ref 3.5–5.3)
POTASSIUM SERPL-SCNC: 3.5 MMOL/L — SIGNIFICANT CHANGE UP (ref 3.5–5.3)
RBC # BLD: 4.03 M/UL — SIGNIFICANT CHANGE UP (ref 3.8–5.2)
RBC # FLD: 12.5 % — SIGNIFICANT CHANGE UP (ref 10.3–14.5)
SODIUM SERPL-SCNC: 139 MMOL/L — SIGNIFICANT CHANGE UP (ref 135–145)
WBC # BLD: 15.17 K/UL — HIGH (ref 3.8–10.5)
WBC # FLD AUTO: 15.17 K/UL — HIGH (ref 3.8–10.5)

## 2023-05-15 PROCEDURE — 71045 X-RAY EXAM CHEST 1 VIEW: CPT | Mod: 26

## 2023-05-15 RX ORDER — POLYETHYLENE GLYCOL 3350 17 G/17G
17 POWDER, FOR SOLUTION ORAL
Refills: 0 | Status: DISCONTINUED | OUTPATIENT
Start: 2023-05-15 | End: 2023-05-16

## 2023-05-15 RX ORDER — HYDROMORPHONE HYDROCHLORIDE 2 MG/ML
0.5 INJECTION INTRAMUSCULAR; INTRAVENOUS; SUBCUTANEOUS ONCE
Refills: 0 | Status: DISCONTINUED | OUTPATIENT
Start: 2023-05-15 | End: 2023-05-15

## 2023-05-15 RX ORDER — SODIUM CHLORIDE 9 MG/ML
1000 INJECTION, SOLUTION INTRAVENOUS
Refills: 0 | Status: DISCONTINUED | OUTPATIENT
Start: 2023-05-16 | End: 2023-05-16

## 2023-05-15 RX ADMIN — OXYCODONE HYDROCHLORIDE 5 MILLIGRAM(S): 5 TABLET ORAL at 18:10

## 2023-05-15 RX ADMIN — OXYCODONE HYDROCHLORIDE 5 MILLIGRAM(S): 5 TABLET ORAL at 05:35

## 2023-05-15 RX ADMIN — Medication 10 MILLIGRAM(S): at 12:05

## 2023-05-15 RX ADMIN — OXYCODONE HYDROCHLORIDE 5 MILLIGRAM(S): 5 TABLET ORAL at 00:46

## 2023-05-15 RX ADMIN — OXYCODONE HYDROCHLORIDE 5 MILLIGRAM(S): 5 TABLET ORAL at 21:55

## 2023-05-15 RX ADMIN — Medication 30 MILLIGRAM(S): at 05:07

## 2023-05-15 RX ADMIN — OXYCODONE HYDROCHLORIDE 5 MILLIGRAM(S): 5 TABLET ORAL at 17:07

## 2023-05-15 RX ADMIN — OXYCODONE HYDROCHLORIDE 5 MILLIGRAM(S): 5 TABLET ORAL at 00:16

## 2023-05-15 RX ADMIN — CYCLOBENZAPRINE HYDROCHLORIDE 10 MILLIGRAM(S): 10 TABLET, FILM COATED ORAL at 21:55

## 2023-05-15 RX ADMIN — PANTOPRAZOLE SODIUM 40 MILLIGRAM(S): 20 TABLET, DELAYED RELEASE ORAL at 05:53

## 2023-05-15 RX ADMIN — HYDROMORPHONE HYDROCHLORIDE 0.5 MILLIGRAM(S): 2 INJECTION INTRAMUSCULAR; INTRAVENOUS; SUBCUTANEOUS at 07:04

## 2023-05-15 RX ADMIN — OXYCODONE HYDROCHLORIDE 5 MILLIGRAM(S): 5 TABLET ORAL at 17:10

## 2023-05-15 RX ADMIN — OXYCODONE HYDROCHLORIDE 5 MILLIGRAM(S): 5 TABLET ORAL at 22:25

## 2023-05-15 RX ADMIN — HYDROMORPHONE HYDROCHLORIDE 0.5 MILLIGRAM(S): 2 INJECTION INTRAMUSCULAR; INTRAVENOUS; SUBCUTANEOUS at 07:20

## 2023-05-15 RX ADMIN — SIMVASTATIN 20 MILLIGRAM(S): 20 TABLET, FILM COATED ORAL at 21:55

## 2023-05-15 RX ADMIN — OXYCODONE HYDROCHLORIDE 5 MILLIGRAM(S): 5 TABLET ORAL at 05:05

## 2023-05-15 RX ADMIN — Medication 81 MILLIGRAM(S): at 12:03

## 2023-05-15 NOTE — PROGRESS NOTE ADULT - ASSESSMENT
A/P :  47y Female POD#4 s/p Stage 1 L4-L5 ALIF; VSS, NAD   -    Plan to take back to OR on 5/16/23 with Dr. Pena for Stage 2  -    Appreciate Vascular Sx and Medicine team following  -    FU medical clearance   -    Pain control  -    Taper  -    DVT ppx: SCDs       -    Physical Therapy  -    Weight bearing status: WBAT with LSO brace  -    Dispo plan recommendation for home    Mannie Shirley PA-C  Orthopedic Surgery Team  Team Pager: [phone] A/P :  47y Female POD#4 s/p Stage 1 L4-L5 ALIF; VSS, NAD   -    Plan to take back to OR on 5/16/23 with Dr. Pena for Stage 2  -    Appreciate Vascular Sx and Medicine team following  -    FU medical clearance   -    Pain control  -    Taper  -    DVT ppx: SCDs       -    Physical Therapy  -    Weight bearing status: WBAT with LSO brace  -    Dispo plan recommendation for home

## 2023-05-15 NOTE — PROGRESS NOTE ADULT - ASSESSMENT
47y F s/p L4-S1 ALIF on 5/10, recovering uneventfully. Plan for Stage 2 5/16    Plan  -Aquacell removed  -Advance as tolerated  -PT/OT   -PO pain control  -DVT ppx: SCDs  -Incentive Spirometry  -Care per ortho    Vascular Surgery   [id number]

## 2023-05-15 NOTE — PROGRESS NOTE ADULT - ASSESSMENT
A 43 yo female with S/p stage I L4-S1 interbody fusion.    Ortho spine f/up noted.  PO Prednisone  Pain control with Oxy    HLD:    Cw Simvastatin    Considering pt's medical condition and nature of sx, pt is at intermediate risk for the surgery.

## 2023-05-16 ENCOUNTER — APPOINTMENT (OUTPATIENT)
Dept: ORTHOPEDIC SURGERY | Facility: HOSPITAL | Age: 48
End: 2023-05-16

## 2023-05-16 LAB
ANION GAP SERPL CALC-SCNC: 12 MMOL/L — SIGNIFICANT CHANGE UP (ref 5–17)
ANION GAP SERPL CALC-SCNC: 13 MMOL/L — SIGNIFICANT CHANGE UP (ref 5–17)
ANION GAP SERPL CALC-SCNC: 15 MMOL/L — SIGNIFICANT CHANGE UP (ref 5–17)
APTT BLD: 29.4 SEC — SIGNIFICANT CHANGE UP (ref 27.5–35.5)
BASOPHILS # BLD AUTO: 0 K/UL — SIGNIFICANT CHANGE UP (ref 0–0.2)
BASOPHILS # BLD AUTO: 0.03 K/UL — SIGNIFICANT CHANGE UP (ref 0–0.2)
BASOPHILS NFR BLD AUTO: 0 % — SIGNIFICANT CHANGE UP (ref 0–2)
BASOPHILS NFR BLD AUTO: 0.2 % — SIGNIFICANT CHANGE UP (ref 0–2)
BUN SERPL-MCNC: 17 MG/DL — SIGNIFICANT CHANGE UP (ref 7–23)
BUN SERPL-MCNC: 19 MG/DL — SIGNIFICANT CHANGE UP (ref 7–23)
BUN SERPL-MCNC: 19 MG/DL — SIGNIFICANT CHANGE UP (ref 7–23)
CALCIUM SERPL-MCNC: 9 MG/DL — SIGNIFICANT CHANGE UP (ref 8.4–10.5)
CALCIUM SERPL-MCNC: 9.2 MG/DL — SIGNIFICANT CHANGE UP (ref 8.4–10.5)
CALCIUM SERPL-MCNC: 9.5 MG/DL — SIGNIFICANT CHANGE UP (ref 8.4–10.5)
CHLORIDE SERPL-SCNC: 98 MMOL/L — SIGNIFICANT CHANGE UP (ref 96–108)
CHLORIDE SERPL-SCNC: 98 MMOL/L — SIGNIFICANT CHANGE UP (ref 96–108)
CHLORIDE SERPL-SCNC: 99 MMOL/L — SIGNIFICANT CHANGE UP (ref 96–108)
CO2 SERPL-SCNC: 23 MMOL/L — SIGNIFICANT CHANGE UP (ref 22–31)
CO2 SERPL-SCNC: 28 MMOL/L — SIGNIFICANT CHANGE UP (ref 22–31)
CO2 SERPL-SCNC: 28 MMOL/L — SIGNIFICANT CHANGE UP (ref 22–31)
CREAT SERPL-MCNC: 0.54 MG/DL — SIGNIFICANT CHANGE UP (ref 0.5–1.3)
CREAT SERPL-MCNC: 0.62 MG/DL — SIGNIFICANT CHANGE UP (ref 0.5–1.3)
CREAT SERPL-MCNC: 0.64 MG/DL — SIGNIFICANT CHANGE UP (ref 0.5–1.3)
EGFR: 109 ML/MIN/1.73M2 — SIGNIFICANT CHANGE UP
EGFR: 110 ML/MIN/1.73M2 — SIGNIFICANT CHANGE UP
EGFR: 114 ML/MIN/1.73M2 — SIGNIFICANT CHANGE UP
EOSINOPHIL # BLD AUTO: 0 K/UL — SIGNIFICANT CHANGE UP (ref 0–0.5)
EOSINOPHIL # BLD AUTO: 0.23 K/UL — SIGNIFICANT CHANGE UP (ref 0–0.5)
EOSINOPHIL NFR BLD AUTO: 0 % — SIGNIFICANT CHANGE UP (ref 0–6)
EOSINOPHIL NFR BLD AUTO: 1.5 % — SIGNIFICANT CHANGE UP (ref 0–6)
GLUCOSE SERPL-MCNC: 132 MG/DL — HIGH (ref 70–99)
GLUCOSE SERPL-MCNC: 135 MG/DL — HIGH (ref 70–99)
GLUCOSE SERPL-MCNC: 190 MG/DL — HIGH (ref 70–99)
HCG SERPL-ACNC: <2 MIU/ML — SIGNIFICANT CHANGE UP
HCG UR QL: NEGATIVE — SIGNIFICANT CHANGE UP
HCT VFR BLD CALC: 30.9 % — LOW (ref 34.5–45)
HCT VFR BLD CALC: 33 % — LOW (ref 34.5–45)
HCT VFR BLD CALC: 33.7 % — LOW (ref 34.5–45)
HGB BLD-MCNC: 10.5 G/DL — LOW (ref 11.5–15.5)
HGB BLD-MCNC: 10.6 G/DL — LOW (ref 11.5–15.5)
HGB BLD-MCNC: 9.8 G/DL — LOW (ref 11.5–15.5)
IMM GRANULOCYTES NFR BLD AUTO: 1.4 % — HIGH (ref 0–0.9)
INR BLD: 1 RATIO — SIGNIFICANT CHANGE UP (ref 0.88–1.16)
LYMPHOCYTES # BLD AUTO: 0.84 K/UL — LOW (ref 1–3.3)
LYMPHOCYTES # BLD AUTO: 36.3 % — SIGNIFICANT CHANGE UP (ref 13–44)
LYMPHOCYTES # BLD AUTO: 4.4 % — LOW (ref 13–44)
LYMPHOCYTES # BLD AUTO: 5.52 K/UL — HIGH (ref 1–3.3)
MANUAL SMEAR VERIFICATION: SIGNIFICANT CHANGE UP
MCHC RBC-ENTMCNC: 26.9 PG — LOW (ref 27–34)
MCHC RBC-ENTMCNC: 27.4 PG — SIGNIFICANT CHANGE UP (ref 27–34)
MCHC RBC-ENTMCNC: 27.7 PG — SIGNIFICANT CHANGE UP (ref 27–34)
MCHC RBC-ENTMCNC: 31.2 GM/DL — LOW (ref 32–36)
MCHC RBC-ENTMCNC: 31.7 GM/DL — LOW (ref 32–36)
MCHC RBC-ENTMCNC: 32.1 GM/DL — SIGNIFICANT CHANGE UP (ref 32–36)
MCV RBC AUTO: 86.2 FL — SIGNIFICANT CHANGE UP (ref 80–100)
MCV RBC AUTO: 86.3 FL — SIGNIFICANT CHANGE UP (ref 80–100)
MCV RBC AUTO: 86.4 FL — SIGNIFICANT CHANGE UP (ref 80–100)
METAMYELOCYTES # FLD: 0.9 % — HIGH (ref 0–0)
MONOCYTES # BLD AUTO: 0.84 K/UL — SIGNIFICANT CHANGE UP (ref 0–0.9)
MONOCYTES # BLD AUTO: 1.05 K/UL — HIGH (ref 0–0.9)
MONOCYTES NFR BLD AUTO: 4.4 % — SIGNIFICANT CHANGE UP (ref 2–14)
MONOCYTES NFR BLD AUTO: 6.9 % — SIGNIFICANT CHANGE UP (ref 2–14)
NEUTROPHILS # BLD AUTO: 17.14 K/UL — HIGH (ref 1.8–7.4)
NEUTROPHILS # BLD AUTO: 8.15 K/UL — HIGH (ref 1.8–7.4)
NEUTROPHILS NFR BLD AUTO: 53.7 % — SIGNIFICANT CHANGE UP (ref 43–77)
NEUTROPHILS NFR BLD AUTO: 89.4 % — HIGH (ref 43–77)
NEUTS BAND # BLD: 0.9 % — SIGNIFICANT CHANGE UP (ref 0–8)
NRBC # BLD: 0 /100 WBCS — SIGNIFICANT CHANGE UP (ref 0–0)
NRBC # BLD: 0 /100 WBCS — SIGNIFICANT CHANGE UP (ref 0–0)
PLAT MORPH BLD: NORMAL — SIGNIFICANT CHANGE UP
PLATELET # BLD AUTO: 301 K/UL — SIGNIFICANT CHANGE UP (ref 150–400)
PLATELET # BLD AUTO: 310 K/UL — SIGNIFICANT CHANGE UP (ref 150–400)
PLATELET # BLD AUTO: 311 K/UL — SIGNIFICANT CHANGE UP (ref 150–400)
POTASSIUM SERPL-MCNC: 3.9 MMOL/L — SIGNIFICANT CHANGE UP (ref 3.5–5.3)
POTASSIUM SERPL-MCNC: 4 MMOL/L — SIGNIFICANT CHANGE UP (ref 3.5–5.3)
POTASSIUM SERPL-MCNC: 4.4 MMOL/L — SIGNIFICANT CHANGE UP (ref 3.5–5.3)
POTASSIUM SERPL-SCNC: 3.9 MMOL/L — SIGNIFICANT CHANGE UP (ref 3.5–5.3)
POTASSIUM SERPL-SCNC: 4 MMOL/L — SIGNIFICANT CHANGE UP (ref 3.5–5.3)
POTASSIUM SERPL-SCNC: 4.4 MMOL/L — SIGNIFICANT CHANGE UP (ref 3.5–5.3)
PROTHROM AB SERPL-ACNC: 11.6 SEC — SIGNIFICANT CHANGE UP (ref 10.5–13.4)
RBC # BLD: 3.58 M/UL — LOW (ref 3.8–5.2)
RBC # BLD: 3.83 M/UL — SIGNIFICANT CHANGE UP (ref 3.8–5.2)
RBC # BLD: 3.9 M/UL — SIGNIFICANT CHANGE UP (ref 3.8–5.2)
RBC # FLD: 12.4 % — SIGNIFICANT CHANGE UP (ref 10.3–14.5)
RBC # FLD: 12.5 % — SIGNIFICANT CHANGE UP (ref 10.3–14.5)
RBC # FLD: 12.5 % — SIGNIFICANT CHANGE UP (ref 10.3–14.5)
RBC BLD AUTO: SIGNIFICANT CHANGE UP
SODIUM SERPL-SCNC: 136 MMOL/L — SIGNIFICANT CHANGE UP (ref 135–145)
SODIUM SERPL-SCNC: 139 MMOL/L — SIGNIFICANT CHANGE UP (ref 135–145)
SODIUM SERPL-SCNC: 139 MMOL/L — SIGNIFICANT CHANGE UP (ref 135–145)
WBC # BLD: 14.94 K/UL — HIGH (ref 3.8–10.5)
WBC # BLD: 15.2 K/UL — HIGH (ref 3.8–10.5)
WBC # BLD: 18.98 K/UL — HIGH (ref 3.8–10.5)
WBC # FLD AUTO: 14.94 K/UL — HIGH (ref 3.8–10.5)
WBC # FLD AUTO: 15.2 K/UL — HIGH (ref 3.8–10.5)
WBC # FLD AUTO: 18.98 K/UL — HIGH (ref 3.8–10.5)

## 2023-05-16 PROCEDURE — 22842 INSERT SPINE FIXATION DEVICE: CPT | Mod: 58

## 2023-05-16 PROCEDURE — 22216 INCIS ADDL SPINE SEGMENT: CPT | Mod: 58

## 2023-05-16 PROCEDURE — 22614 ARTHRD PST TQ 1NTRSPC EA ADD: CPT | Mod: 58

## 2023-05-16 PROCEDURE — 22214 INCIS 1 VERTEBRAL SEG LUMBAR: CPT | Mod: 58

## 2023-05-16 PROCEDURE — 61783 SCAN PROC SPINAL: CPT | Mod: 58

## 2023-05-16 PROCEDURE — 20937 SP BONE AGRFT MORSEL ADD-ON: CPT | Mod: 58

## 2023-05-16 PROCEDURE — 22612 ARTHRD PST TQ 1NTRSPC LUMBAR: CPT | Mod: 58

## 2023-05-16 DEVICE — SCREW VOYAGER MULTIAXIAL 5.5X40MM: Type: IMPLANTABLE DEVICE | Status: FUNCTIONAL

## 2023-05-16 DEVICE — SCREW VOYAGER MULTIAXIAL 6.5X50MM: Type: IMPLANTABLE DEVICE | Status: FUNCTIONAL

## 2023-05-16 DEVICE — PIN FIX PERQ 150MM: Type: IMPLANTABLE DEVICE | Status: FUNCTIONAL

## 2023-05-16 DEVICE — SURGIFOAM PAD 8CM X 12.5CM X 10MM (100): Type: IMPLANTABLE DEVICE | Status: FUNCTIONAL

## 2023-05-16 DEVICE — SET SCREW SOLERA VOYAGER 5.5/6MM: Type: IMPLANTABLE DEVICE | Status: FUNCTIONAL

## 2023-05-16 RX ORDER — TRAMADOL HYDROCHLORIDE 50 MG/1
50 TABLET ORAL EVERY 6 HOURS
Refills: 0 | Status: DISCONTINUED | OUTPATIENT
Start: 2023-05-16 | End: 2023-05-20

## 2023-05-16 RX ORDER — SODIUM CHLORIDE 9 MG/ML
500 INJECTION, SOLUTION INTRAVENOUS ONCE
Refills: 0 | Status: COMPLETED | OUTPATIENT
Start: 2023-05-17 | End: 2023-05-17

## 2023-05-16 RX ORDER — CEFAZOLIN SODIUM 1 G
2000 VIAL (EA) INJECTION EVERY 8 HOURS
Refills: 0 | Status: COMPLETED | OUTPATIENT
Start: 2023-05-17 | End: 2023-05-17

## 2023-05-16 RX ORDER — HYDROMORPHONE HYDROCHLORIDE 2 MG/ML
0.5 INJECTION INTRAMUSCULAR; INTRAVENOUS; SUBCUTANEOUS
Refills: 0 | Status: DISCONTINUED | OUTPATIENT
Start: 2023-05-16 | End: 2023-05-17

## 2023-05-16 RX ORDER — OXYCODONE HYDROCHLORIDE 5 MG/1
10 TABLET ORAL EVERY 4 HOURS
Refills: 0 | Status: DISCONTINUED | OUTPATIENT
Start: 2023-05-16 | End: 2023-05-20

## 2023-05-16 RX ORDER — HYDROMORPHONE HYDROCHLORIDE 2 MG/ML
0.5 INJECTION INTRAMUSCULAR; INTRAVENOUS; SUBCUTANEOUS ONCE
Refills: 0 | Status: DISCONTINUED | OUTPATIENT
Start: 2023-05-16 | End: 2023-05-17

## 2023-05-16 RX ORDER — CYCLOBENZAPRINE HYDROCHLORIDE 10 MG/1
5 TABLET, FILM COATED ORAL THREE TIMES A DAY
Refills: 0 | Status: DISCONTINUED | OUTPATIENT
Start: 2023-05-16 | End: 2023-05-20

## 2023-05-16 RX ORDER — DEXAMETHASONE 0.5 MG/5ML
5 ELIXIR ORAL EVERY 6 HOURS
Refills: 0 | Status: COMPLETED | OUTPATIENT
Start: 2023-05-16 | End: 2023-05-17

## 2023-05-16 RX ORDER — OXYCODONE HYDROCHLORIDE 5 MG/1
5 TABLET ORAL EVERY 4 HOURS
Refills: 0 | Status: DISCONTINUED | OUTPATIENT
Start: 2023-05-16 | End: 2023-05-20

## 2023-05-16 RX ORDER — POLYETHYLENE GLYCOL 3350 17 G/17G
17 POWDER, FOR SOLUTION ORAL DAILY
Refills: 0 | Status: DISCONTINUED | OUTPATIENT
Start: 2023-05-16 | End: 2023-05-20

## 2023-05-16 RX ORDER — GABAPENTIN 400 MG/1
100 CAPSULE ORAL THREE TIMES A DAY
Refills: 0 | Status: DISCONTINUED | OUTPATIENT
Start: 2023-05-17 | End: 2023-05-20

## 2023-05-16 RX ORDER — PANTOPRAZOLE SODIUM 20 MG/1
40 TABLET, DELAYED RELEASE ORAL
Refills: 0 | Status: DISCONTINUED | OUTPATIENT
Start: 2023-05-17 | End: 2023-05-20

## 2023-05-16 RX ORDER — TRAMADOL HYDROCHLORIDE 50 MG/1
50 TABLET ORAL EVERY 8 HOURS
Refills: 0 | Status: DISCONTINUED | OUTPATIENT
Start: 2023-05-16 | End: 2023-05-16

## 2023-05-16 RX ORDER — SENNA PLUS 8.6 MG/1
2 TABLET ORAL AT BEDTIME
Refills: 0 | Status: DISCONTINUED | OUTPATIENT
Start: 2023-05-16 | End: 2023-05-20

## 2023-05-16 RX ORDER — ONDANSETRON 8 MG/1
4 TABLET, FILM COATED ORAL EVERY 6 HOURS
Refills: 0 | Status: DISCONTINUED | OUTPATIENT
Start: 2023-05-16 | End: 2023-05-20

## 2023-05-16 RX ORDER — SODIUM CHLORIDE 9 MG/ML
500 INJECTION, SOLUTION INTRAVENOUS ONCE
Refills: 0 | Status: COMPLETED | OUTPATIENT
Start: 2023-05-17 | End: 2023-05-16

## 2023-05-16 RX ORDER — ACETAMINOPHEN 500 MG
975 TABLET ORAL EVERY 8 HOURS
Refills: 0 | Status: DISCONTINUED | OUTPATIENT
Start: 2023-05-16 | End: 2023-05-20

## 2023-05-16 RX ORDER — ONDANSETRON 8 MG/1
4 TABLET, FILM COATED ORAL ONCE
Refills: 0 | Status: DISCONTINUED | OUTPATIENT
Start: 2023-05-16 | End: 2023-05-17

## 2023-05-16 RX ORDER — SODIUM CHLORIDE 9 MG/ML
1000 INJECTION, SOLUTION INTRAVENOUS
Refills: 0 | Status: DISCONTINUED | OUTPATIENT
Start: 2023-05-16 | End: 2023-05-20

## 2023-05-16 RX ADMIN — Medication 30 MILLIGRAM(S): at 05:16

## 2023-05-16 RX ADMIN — SODIUM CHLORIDE 500 MILLILITER(S): 9 INJECTION, SOLUTION INTRAVENOUS at 21:23

## 2023-05-16 RX ADMIN — Medication 5 MILLIGRAM(S): at 21:42

## 2023-05-16 RX ADMIN — HYDROMORPHONE HYDROCHLORIDE 0.5 MILLIGRAM(S): 2 INJECTION INTRAMUSCULAR; INTRAVENOUS; SUBCUTANEOUS at 21:15

## 2023-05-16 RX ADMIN — HYDROMORPHONE HYDROCHLORIDE 0.5 MILLIGRAM(S): 2 INJECTION INTRAMUSCULAR; INTRAVENOUS; SUBCUTANEOUS at 21:38

## 2023-05-16 RX ADMIN — PANTOPRAZOLE SODIUM 40 MILLIGRAM(S): 20 TABLET, DELAYED RELEASE ORAL at 09:48

## 2023-05-16 RX ADMIN — POLYETHYLENE GLYCOL 3350 17 GRAM(S): 17 POWDER, FOR SOLUTION ORAL at 05:15

## 2023-05-16 RX ADMIN — SODIUM CHLORIDE 75 MILLILITER(S): 9 INJECTION, SOLUTION INTRAVENOUS at 21:23

## 2023-05-16 RX ADMIN — Medication 81 MILLIGRAM(S): at 12:15

## 2023-05-16 NOTE — PROGRESS NOTE ADULT - ASSESSMENT
47y Female s/p Stage 1 L4-L5 ALIF on 5/10.     -    Plan to take back to OR on 5/16/23 with Dr. Pena for Stage 2  -    Appreciate Vascular Sx and Medicine team following  -    Pain control  -    Taper  -    DVT ppx: SCDs       -    Physical Therapy  -    Weight bearing status: WBAT with LSO brace  -    Dispo plan recommendation for home    Orthopaedic Surgery  [facility] [id number]  [facility]        [id number]  [facility]  [phone]/ [phone]

## 2023-05-16 NOTE — BRIEF OPERATIVE NOTE - OPERATION/FINDINGS
L4-S1 canal and neuroforaminal stenosis
L4-S1 exposure, iliolumbar vein and middle sacral vessels divided.
Stage 2 unilateral L4-S1  posterior spinal fusion with [employer] robotic system
family/patient

## 2023-05-16 NOTE — BRIEF OPERATIVE NOTE - NSICDXBRIEFPROCEDURE_GEN_ALL_CORE_FT
PROCEDURES:  Fusion, spine, lumbar, ALIF, 2 or more levels 10-May-2023 14:18:36 L4-S1 Ricki Pearson  
PROCEDURES:  Fusion of 6 or less spinal segments by posterior approach 16-May-2023 20:52:14  Ravi Sy  
PROCEDURES:  Fusion, spine, lumbar, ALIF, 2 or more levels 10-May-2023 14:18:36 L4-S1 Ricki Pearson

## 2023-05-16 NOTE — BRIEF OPERATIVE NOTE - NSICDXBRIEFPOSTOP_GEN_ALL_CORE_FT
POST-OP DIAGNOSIS:  Lumbar radiculopathy 10-May-2023 14:18:56  Ricki Pearson  

## 2023-05-16 NOTE — BRIEF OPERATIVE NOTE - ASSISTANT(S)
Dulce, fellow  Sofie, PGY1
Dulce (Gen Surg Fellow), Sofie (Gen Surg PGY1), Lili (Ortho PGY1)
Yossi (PGY1)

## 2023-05-16 NOTE — PROGRESS NOTE ADULT - ATTENDING COMMENTS
I reviewed all major risks, benefits, and complications associated with surgical intervention including but not limited to bleeding, infection, neurological injury, CSF leak, need for further surgical intervention, implant failure, implant migration, pseudarthrosis,  adjacent segment degeneration, pedicle screw breech, hematoma, chronic pain, worsening of pain, risk associated with BMP, off label use of it, retrograde ejaculation (if male patient), anesthetic risk, chronic pain and disability, medical complications (GI, , DVT, PE, cardiac, pulmonary, etc) and risk of mortality.

## 2023-05-16 NOTE — BRIEF OPERATIVE NOTE - NSICDXBRIEFPREOP_GEN_ALL_CORE_FT
PRE-OP DIAGNOSIS:  Lumbar radiculopathy 10-May-2023 14:18:52  Ricki Pearson  

## 2023-05-16 NOTE — PRE-OP CHECKLIST - ALLERGY BAND ON
Bed: 10  Expected date:   Expected time:   Means of arrival: Self  Comments:  
Per Ashleigh boles, patient seen on camera walking out of ED lobby. Writer had not yet been at bedside to assess patient due to being with another patient. Per Billy HDZ, he had not yet assessed patient either. Melissa ED Charge RN aware.  
Pt seen walking out of department through lobby doors on camera.   RN and MD aware.  
done
done

## 2023-05-17 LAB
ANION GAP SERPL CALC-SCNC: 14 MMOL/L — SIGNIFICANT CHANGE UP (ref 5–17)
ANION GAP SERPL CALC-SCNC: 18 MMOL/L — HIGH (ref 5–17)
BASOPHILS # BLD AUTO: 0 K/UL — SIGNIFICANT CHANGE UP (ref 0–0.2)
BASOPHILS NFR BLD AUTO: 0 % — SIGNIFICANT CHANGE UP (ref 0–2)
BUN SERPL-MCNC: 10 MG/DL — SIGNIFICANT CHANGE UP (ref 7–23)
BUN SERPL-MCNC: 15 MG/DL — SIGNIFICANT CHANGE UP (ref 7–23)
CALCIUM SERPL-MCNC: 7.2 MG/DL — LOW (ref 8.4–10.5)
CALCIUM SERPL-MCNC: 8.9 MG/DL — SIGNIFICANT CHANGE UP (ref 8.4–10.5)
CHLORIDE SERPL-SCNC: 97 MMOL/L — SIGNIFICANT CHANGE UP (ref 96–108)
CHLORIDE SERPL-SCNC: 99 MMOL/L — SIGNIFICANT CHANGE UP (ref 96–108)
CO2 SERPL-SCNC: 16 MMOL/L — LOW (ref 22–31)
CO2 SERPL-SCNC: 26 MMOL/L — SIGNIFICANT CHANGE UP (ref 22–31)
CREAT SERPL-MCNC: 0.34 MG/DL — LOW (ref 0.5–1.3)
CREAT SERPL-MCNC: 0.63 MG/DL — SIGNIFICANT CHANGE UP (ref 0.5–1.3)
EGFR: 109 ML/MIN/1.73M2 — SIGNIFICANT CHANGE UP
EGFR: 127 ML/MIN/1.73M2 — SIGNIFICANT CHANGE UP
EOSINOPHIL # BLD AUTO: 0.01 K/UL — SIGNIFICANT CHANGE UP (ref 0–0.5)
EOSINOPHIL NFR BLD AUTO: 0.1 % — SIGNIFICANT CHANGE UP (ref 0–6)
GLUCOSE SERPL-MCNC: 128 MG/DL — HIGH (ref 70–99)
GLUCOSE SERPL-MCNC: 298 MG/DL — HIGH (ref 70–99)
HCT VFR BLD CALC: 19 % — CRITICAL LOW (ref 34.5–45)
HCT VFR BLD CALC: 28.4 % — LOW (ref 34.5–45)
HGB BLD-MCNC: 6 G/DL — CRITICAL LOW (ref 11.5–15.5)
HGB BLD-MCNC: 9 G/DL — LOW (ref 11.5–15.5)
IMM GRANULOCYTES NFR BLD AUTO: 2.3 % — HIGH (ref 0–0.9)
LYMPHOCYTES # BLD AUTO: 1.79 K/UL — SIGNIFICANT CHANGE UP (ref 1–3.3)
LYMPHOCYTES # BLD AUTO: 17.7 % — SIGNIFICANT CHANGE UP (ref 13–44)
MCHC RBC-ENTMCNC: 27.3 PG — SIGNIFICANT CHANGE UP (ref 27–34)
MCHC RBC-ENTMCNC: 28.3 PG — SIGNIFICANT CHANGE UP (ref 27–34)
MCHC RBC-ENTMCNC: 31.6 GM/DL — LOW (ref 32–36)
MCHC RBC-ENTMCNC: 31.7 GM/DL — LOW (ref 32–36)
MCV RBC AUTO: 86.1 FL — SIGNIFICANT CHANGE UP (ref 80–100)
MCV RBC AUTO: 89.6 FL — SIGNIFICANT CHANGE UP (ref 80–100)
MONOCYTES # BLD AUTO: 0.83 K/UL — SIGNIFICANT CHANGE UP (ref 0–0.9)
MONOCYTES NFR BLD AUTO: 8.2 % — SIGNIFICANT CHANGE UP (ref 2–14)
NEUTROPHILS # BLD AUTO: 7.25 K/UL — SIGNIFICANT CHANGE UP (ref 1.8–7.4)
NEUTROPHILS NFR BLD AUTO: 71.7 % — SIGNIFICANT CHANGE UP (ref 43–77)
NRBC # BLD: 0 /100 WBCS — SIGNIFICANT CHANGE UP (ref 0–0)
NRBC # BLD: 0 /100 WBCS — SIGNIFICANT CHANGE UP (ref 0–0)
PLATELET # BLD AUTO: 214 K/UL — SIGNIFICANT CHANGE UP (ref 150–400)
PLATELET # BLD AUTO: 339 K/UL — SIGNIFICANT CHANGE UP (ref 150–400)
POTASSIUM SERPL-MCNC: 3.8 MMOL/L — SIGNIFICANT CHANGE UP (ref 3.5–5.3)
POTASSIUM SERPL-MCNC: 4.1 MMOL/L — SIGNIFICANT CHANGE UP (ref 3.5–5.3)
POTASSIUM SERPL-SCNC: 3.8 MMOL/L — SIGNIFICANT CHANGE UP (ref 3.5–5.3)
POTASSIUM SERPL-SCNC: 4.1 MMOL/L — SIGNIFICANT CHANGE UP (ref 3.5–5.3)
RBC # BLD: 2.12 M/UL — LOW (ref 3.8–5.2)
RBC # BLD: 3.3 M/UL — LOW (ref 3.8–5.2)
RBC # FLD: 12.5 % — SIGNIFICANT CHANGE UP (ref 10.3–14.5)
RBC # FLD: 12.5 % — SIGNIFICANT CHANGE UP (ref 10.3–14.5)
SODIUM SERPL-SCNC: 133 MMOL/L — LOW (ref 135–145)
SODIUM SERPL-SCNC: 137 MMOL/L — SIGNIFICANT CHANGE UP (ref 135–145)
WBC # BLD: 10.11 K/UL — SIGNIFICANT CHANGE UP (ref 3.8–10.5)
WBC # BLD: 14.42 K/UL — HIGH (ref 3.8–10.5)
WBC # FLD AUTO: 10.11 K/UL — SIGNIFICANT CHANGE UP (ref 3.8–10.5)
WBC # FLD AUTO: 14.42 K/UL — HIGH (ref 3.8–10.5)

## 2023-05-17 RX ADMIN — Medication 975 MILLIGRAM(S): at 14:03

## 2023-05-17 RX ADMIN — Medication 5 MILLIGRAM(S): at 11:04

## 2023-05-17 RX ADMIN — OXYCODONE HYDROCHLORIDE 10 MILLIGRAM(S): 5 TABLET ORAL at 15:03

## 2023-05-17 RX ADMIN — CYCLOBENZAPRINE HYDROCHLORIDE 5 MILLIGRAM(S): 10 TABLET, FILM COATED ORAL at 22:01

## 2023-05-17 RX ADMIN — GABAPENTIN 100 MILLIGRAM(S): 400 CAPSULE ORAL at 05:38

## 2023-05-17 RX ADMIN — HYDROMORPHONE HYDROCHLORIDE 0.5 MILLIGRAM(S): 2 INJECTION INTRAMUSCULAR; INTRAVENOUS; SUBCUTANEOUS at 01:17

## 2023-05-17 RX ADMIN — Medication 975 MILLIGRAM(S): at 05:38

## 2023-05-17 RX ADMIN — Medication 5 MILLIGRAM(S): at 17:34

## 2023-05-17 RX ADMIN — OXYCODONE HYDROCHLORIDE 10 MILLIGRAM(S): 5 TABLET ORAL at 14:03

## 2023-05-17 RX ADMIN — GABAPENTIN 100 MILLIGRAM(S): 400 CAPSULE ORAL at 14:05

## 2023-05-17 RX ADMIN — HYDROMORPHONE HYDROCHLORIDE 0.5 MILLIGRAM(S): 2 INJECTION INTRAMUSCULAR; INTRAVENOUS; SUBCUTANEOUS at 00:47

## 2023-05-17 RX ADMIN — SODIUM CHLORIDE 500 MILLILITER(S): 9 INJECTION, SOLUTION INTRAVENOUS at 05:37

## 2023-05-17 RX ADMIN — OXYCODONE HYDROCHLORIDE 10 MILLIGRAM(S): 5 TABLET ORAL at 22:02

## 2023-05-17 RX ADMIN — OXYCODONE HYDROCHLORIDE 10 MILLIGRAM(S): 5 TABLET ORAL at 06:08

## 2023-05-17 RX ADMIN — Medication 100 MILLIGRAM(S): at 09:24

## 2023-05-17 RX ADMIN — SENNA PLUS 2 TABLET(S): 8.6 TABLET ORAL at 22:00

## 2023-05-17 RX ADMIN — Medication 975 MILLIGRAM(S): at 06:08

## 2023-05-17 RX ADMIN — Medication 975 MILLIGRAM(S): at 22:01

## 2023-05-17 RX ADMIN — Medication 975 MILLIGRAM(S): at 23:01

## 2023-05-17 RX ADMIN — Medication 1 TABLET(S): at 11:05

## 2023-05-17 RX ADMIN — OXYCODONE HYDROCHLORIDE 10 MILLIGRAM(S): 5 TABLET ORAL at 05:38

## 2023-05-17 RX ADMIN — Medication 100 MILLIGRAM(S): at 00:47

## 2023-05-17 RX ADMIN — OXYCODONE HYDROCHLORIDE 10 MILLIGRAM(S): 5 TABLET ORAL at 23:02

## 2023-05-17 RX ADMIN — Medication 975 MILLIGRAM(S): at 15:03

## 2023-05-17 RX ADMIN — Medication 5 MILLIGRAM(S): at 05:37

## 2023-05-17 RX ADMIN — GABAPENTIN 100 MILLIGRAM(S): 400 CAPSULE ORAL at 22:01

## 2023-05-17 NOTE — PROGRESS NOTE ADULT - ASSESSMENT
A/P :  47y Female s/p Stage 1 L4-S1 ALIF (5/10), Stage 2 L4-S1 unilateral PSF w/ [employer] robotic system (5/16), POD1. VSS. NAD.     -    Pain control  -    IS bedside  -    Taper  -    DVT ppx: SCDs       -    GI ppx: Protonix 40 mg QD  -    Physical Therapy  -    Weight bearing status: WBAT, LSO brace for comfort   -    f/u AM labs   -    Dispo planning

## 2023-05-17 NOTE — PHYSICAL THERAPY INITIAL EVALUATION ADULT - TRANSFER TRAINING, PT EVAL
GOAL: Pt will perform ALL transfers independently, w/use of appropriate assistive device as needed, in 4 weeks.
GOAL: Pt will perform sit<>stand transfer independently in 2 weeks

## 2023-05-17 NOTE — PHYSICAL THERAPY INITIAL EVALUATION ADULT - DIAGNOSIS, PT EVAL
decreased functional mobility
Pt p/w post-op pain and discomfort; impaired strength and balance resulting in mobility deficits.

## 2023-05-17 NOTE — PHYSICAL THERAPY INITIAL EVALUATION ADULT - ACTIVE RANGE OF MOTION EXAMINATION, REHAB EVAL
bilateral upper extremity Active ROM was WFL (within functional limits)/bilateral  lower extremity Active ROM was WFL (within functional limits)
bilateral lower extremity Active ROM was WNL (within normal limits)/bilateral upper extremity Active ROM was WFL (within functional limits)

## 2023-05-17 NOTE — PHYSICAL THERAPY INITIAL EVALUATION ADULT - ADDITIONAL COMMENTS
Pt reports she lives with spouse and children in private home with 4 stairs to entrance with bilateral HR, then flight of stairs inside with bilateral HR however will stay on first floor. Prior to admission pt independent with all functional mobility including ambulation without AD, however  assists as needed.
Pt reports she lives with spouse and children in private home with 4 stairs to entrance with bilateral HR, then flight of stairs inside with bilateral HR however will stay on first floor. Prior to admission pt independent with all functional mobility including ambulation without AD, however  assists as needed.

## 2023-05-17 NOTE — PHYSICAL THERAPY INITIAL EVALUATION ADULT - GAIT TRAINING, PT EVAL
GOAL: Pt will ambulate 200' /c RW independently in 2 weeks
GOAL: Pt will ambulate 300 feet w/independently, w/use of appropriate assistive device in 4 weeks.

## 2023-05-17 NOTE — PHYSICAL THERAPY INITIAL EVALUATION ADULT - BALANCE TRAINING, PT EVAL
GOAL: Pt will improve balance scores by 1/2 grade to facilitate safety during ambulation
GOAL: Pt will demonstrate improved static/dynamic standing balance by 1/2 balance grade, in order to improve stability, decrease fall risk and increase independence with transfers and ambulation within 4 weeks.

## 2023-05-17 NOTE — PHYSICAL THERAPY INITIAL EVALUATION ADULT - CRITERIA FOR SKILLED THERAPEUTIC INTERVENTIONS
impairments found/functional limitations in following categories/rehab potential/anticipated equipment needs at discharge/anticipated discharge recommendation
impairments found

## 2023-05-17 NOTE — PHYSICAL THERAPY INITIAL EVALUATION ADULT - BED MOBILITY TRAINING, PT EVAL
GOAL: Pt will perform supine>sit via log roll independently in 2 weeks
GOAL: Pt will perform all bed mobility independently, in 4 weeks.

## 2023-05-17 NOTE — PHYSICAL THERAPY INITIAL EVALUATION ADULT - GENERAL OBSERVATIONS, REHAB EVAL
Pt received seated in chair, A&Ox4, +IVPCA, +FC
Pt was received supine in bed, NAD, +IV. LSO at bedside. Pt c/o changes in bladder habits, HECTOR [place] made aware and present at bedside. Hgb 9.0.

## 2023-05-17 NOTE — PHYSICAL THERAPY INITIAL EVALUATION ADULT - GAIT DEVIATIONS NOTED, PT EVAL
decreased page/decreased step length/decreased stride length
decreased page/decreased stride length/decreased weight-shifting ability

## 2023-05-17 NOTE — PHYSICAL THERAPY INITIAL EVALUATION ADULT - PERTINENT HX OF CURRENT PROBLEM, REHAB EVAL
47 year old female with PMHx Former Smoker (1.5 PPD x 20 years; Quit 4 years ago - currently vaping daily) and HLD reports that she had a back injury at work in 11/2021. Pt c/o back pain since the injury and had relief of her symptoms through acupuncture and physical therapy and returned back to work. In January 2023, she reports that the pain returned and was worse. She describes the pain as intermittent and sharp with pressure.  At rest, she rates her pain 4/10 and at its worse she rates her pain 9/10. She has numbness and tingling that radiates to the LLE. Prolonged sitting or walking makes the pain worse. Laying flat makes the pain better. Her last epidural injection was in 1/2023 with no relief of symptoms. Pt's last trigger point injection with pain specialist Dr. Leavitt was on 5/1/23 with pain relief for only a few hours. Pt now presents for scheduled stage I L4-S1 interbody fusion with Dr. Pena on 5/10/23.
47 year old female with PMHx Former Smoker (1.5 PPD x 20 years; Quit 4 years ago - currently vaping daily) and HLD reports that she had a back injury at work in 11/2021. Pt c/o back pain since the injury and had relief of her symptoms through acupuncture and physical therapy and returned back to work. In January 2023, she reports that the pain returned and was worse. She describes the pain as intermittent and sharp with pressure.  At rest, she rates her pain 4/10 and at its worse she rates her pain 9/10. She has numbness and tingling that radiates to the LLE. Prolonged sitting or walking makes the pain worse. Laying flat makes the pain better. Her last epidural injection was in 1/2023 with no relief of symptoms. Pt's last trigger point injection with pain specialist Dr. Leavitt was on 5/1/23 with pain relief for only a few hours. Pt now presents for scheduled stage I L4-S1 interbody fusion with Dr. Pena on 5/10/23. s/p Stage 2 unilateral L4-S1  posterior spinal fusion with [employer] robotic system (5/16).

## 2023-05-17 NOTE — PHYSICAL THERAPY INITIAL EVALUATION ADULT - TRANSFER SAFETY CONCERNS NOTED: SIT/STAND, REHAB EVAL
decreased balance during turns/decreased step length/decreased weight-shifting ability
decreased step length/decreased weight-shifting ability

## 2023-05-17 NOTE — PHYSICAL THERAPY INITIAL EVALUATION ADULT - MANUAL MUSCLE TESTING RESULTS, REHAB EVAL
BUE & BLE 3+/5/grossly assessed due to
BUE/BLE grossly at least 4/5 throughout/grossly assessed due to

## 2023-05-17 NOTE — PHYSICAL THERAPY INITIAL EVALUATION ADULT - PLANNED THERAPY INTERVENTIONS, PT EVAL
balance training/bed mobility training/gait training/strengthening/transfer training
Goal: Patient will negotiate up and down 13 steps with unilateral rail independently, within 2 weeks./balance training/bed mobility training/gait training/transfer training

## 2023-05-18 DIAGNOSIS — R73.9 HYPERGLYCEMIA, UNSPECIFIED: ICD-10-CM

## 2023-05-18 DIAGNOSIS — E11.65 TYPE 2 DIABETES MELLITUS WITH HYPERGLYCEMIA: ICD-10-CM

## 2023-05-18 DIAGNOSIS — E78.5 HYPERLIPIDEMIA, UNSPECIFIED: ICD-10-CM

## 2023-05-18 LAB
A1C WITH ESTIMATED AVERAGE GLUCOSE RESULT: 6.8 % — HIGH (ref 4–5.6)
ANION GAP SERPL CALC-SCNC: 13 MMOL/L — SIGNIFICANT CHANGE UP (ref 5–17)
BUN SERPL-MCNC: 20 MG/DL — SIGNIFICANT CHANGE UP (ref 7–23)
CALCIUM SERPL-MCNC: 9.2 MG/DL — SIGNIFICANT CHANGE UP (ref 8.4–10.5)
CHLORIDE SERPL-SCNC: 96 MMOL/L — SIGNIFICANT CHANGE UP (ref 96–108)
CO2 SERPL-SCNC: 25 MMOL/L — SIGNIFICANT CHANGE UP (ref 22–31)
CREAT SERPL-MCNC: 0.66 MG/DL — SIGNIFICANT CHANGE UP (ref 0.5–1.3)
EGFR: 108 ML/MIN/1.73M2 — SIGNIFICANT CHANGE UP
ESTIMATED AVERAGE GLUCOSE: 148 MG/DL — HIGH (ref 68–114)
GLUCOSE BLDC GLUCOMTR-MCNC: 245 MG/DL — HIGH (ref 70–99)
GLUCOSE BLDC GLUCOMTR-MCNC: 262 MG/DL — HIGH (ref 70–99)
GLUCOSE BLDC GLUCOMTR-MCNC: 432 MG/DL — HIGH (ref 70–99)
GLUCOSE SERPL-MCNC: 417 MG/DL — HIGH (ref 70–99)
HCT VFR BLD CALC: 30 % — LOW (ref 34.5–45)
HGB BLD-MCNC: 9.2 G/DL — LOW (ref 11.5–15.5)
MCHC RBC-ENTMCNC: 27.2 PG — SIGNIFICANT CHANGE UP (ref 27–34)
MCHC RBC-ENTMCNC: 30.7 GM/DL — LOW (ref 32–36)
MCV RBC AUTO: 88.8 FL — SIGNIFICANT CHANGE UP (ref 80–100)
NRBC # BLD: 0 /100 WBCS — SIGNIFICANT CHANGE UP (ref 0–0)
PLATELET # BLD AUTO: 371 K/UL — SIGNIFICANT CHANGE UP (ref 150–400)
POTASSIUM SERPL-MCNC: 4.3 MMOL/L — SIGNIFICANT CHANGE UP (ref 3.5–5.3)
POTASSIUM SERPL-SCNC: 4.3 MMOL/L — SIGNIFICANT CHANGE UP (ref 3.5–5.3)
RBC # BLD: 3.38 M/UL — LOW (ref 3.8–5.2)
RBC # FLD: 12.7 % — SIGNIFICANT CHANGE UP (ref 10.3–14.5)
SODIUM SERPL-SCNC: 134 MMOL/L — LOW (ref 135–145)
WBC # BLD: 14.37 K/UL — HIGH (ref 3.8–10.5)
WBC # FLD AUTO: 14.37 K/UL — HIGH (ref 3.8–10.5)

## 2023-05-18 PROCEDURE — 99223 1ST HOSP IP/OBS HIGH 75: CPT | Mod: GC

## 2023-05-18 RX ORDER — OXYCODONE HYDROCHLORIDE 5 MG/1
1 TABLET ORAL
Qty: 0 | Refills: 0 | DISCHARGE
Start: 2023-05-18

## 2023-05-18 RX ORDER — INSULIN LISPRO 100/ML
VIAL (ML) SUBCUTANEOUS AT BEDTIME
Refills: 0 | Status: DISCONTINUED | OUTPATIENT
Start: 2023-05-18 | End: 2023-05-19

## 2023-05-18 RX ORDER — INSULIN LISPRO 100/ML
VIAL (ML) SUBCUTANEOUS
Refills: 0 | Status: DISCONTINUED | OUTPATIENT
Start: 2023-05-18 | End: 2023-05-19

## 2023-05-18 RX ORDER — DEXTROSE 50 % IN WATER 50 %
15 SYRINGE (ML) INTRAVENOUS ONCE
Refills: 0 | Status: DISCONTINUED | OUTPATIENT
Start: 2023-05-18 | End: 2023-05-20

## 2023-05-18 RX ORDER — GLUCAGON INJECTION, SOLUTION 0.5 MG/.1ML
1 INJECTION, SOLUTION SUBCUTANEOUS ONCE
Refills: 0 | Status: DISCONTINUED | OUTPATIENT
Start: 2023-05-18 | End: 2023-05-20

## 2023-05-18 RX ORDER — TRAMADOL HYDROCHLORIDE 50 MG/1
1 TABLET ORAL
Qty: 0 | Refills: 0 | DISCHARGE
Start: 2023-05-18

## 2023-05-18 RX ORDER — SENNA PLUS 8.6 MG/1
2 TABLET ORAL
Qty: 0 | Refills: 0 | DISCHARGE
Start: 2023-05-18

## 2023-05-18 RX ORDER — ASPIRIN/CALCIUM CARB/MAGNESIUM 324 MG
1 TABLET ORAL
Refills: 0 | DISCHARGE

## 2023-05-18 RX ORDER — PANTOPRAZOLE SODIUM 20 MG/1
1 TABLET, DELAYED RELEASE ORAL
Qty: 0 | Refills: 0 | DISCHARGE
Start: 2023-05-18

## 2023-05-18 RX ORDER — DEXTROSE 50 % IN WATER 50 %
12.5 SYRINGE (ML) INTRAVENOUS ONCE
Refills: 0 | Status: DISCONTINUED | OUTPATIENT
Start: 2023-05-18 | End: 2023-05-20

## 2023-05-18 RX ORDER — DEXTROSE 50 % IN WATER 50 %
25 SYRINGE (ML) INTRAVENOUS ONCE
Refills: 0 | Status: DISCONTINUED | OUTPATIENT
Start: 2023-05-18 | End: 2023-05-20

## 2023-05-18 RX ORDER — ACETAMINOPHEN 500 MG
3 TABLET ORAL
Qty: 0 | Refills: 0 | DISCHARGE
Start: 2023-05-18

## 2023-05-18 RX ORDER — GABAPENTIN 400 MG/1
1 CAPSULE ORAL
Qty: 0 | Refills: 0 | DISCHARGE
Start: 2023-05-18

## 2023-05-18 RX ADMIN — Medication 1 TABLET(S): at 11:23

## 2023-05-18 RX ADMIN — Medication 975 MILLIGRAM(S): at 11:23

## 2023-05-18 RX ADMIN — POLYETHYLENE GLYCOL 3350 17 GRAM(S): 17 POWDER, FOR SOLUTION ORAL at 11:24

## 2023-05-18 RX ADMIN — Medication 975 MILLIGRAM(S): at 19:47

## 2023-05-18 RX ADMIN — GABAPENTIN 100 MILLIGRAM(S): 400 CAPSULE ORAL at 21:30

## 2023-05-18 RX ADMIN — GABAPENTIN 100 MILLIGRAM(S): 400 CAPSULE ORAL at 05:09

## 2023-05-18 RX ADMIN — Medication 975 MILLIGRAM(S): at 05:54

## 2023-05-18 RX ADMIN — PANTOPRAZOLE SODIUM 40 MILLIGRAM(S): 20 TABLET, DELAYED RELEASE ORAL at 05:08

## 2023-05-18 RX ADMIN — Medication 975 MILLIGRAM(S): at 04:54

## 2023-05-18 RX ADMIN — OXYCODONE HYDROCHLORIDE 10 MILLIGRAM(S): 5 TABLET ORAL at 20:48

## 2023-05-18 RX ADMIN — Medication 40 MILLIGRAM(S): at 05:08

## 2023-05-18 RX ADMIN — Medication 6: at 18:45

## 2023-05-18 RX ADMIN — Medication 975 MILLIGRAM(S): at 12:23

## 2023-05-18 RX ADMIN — Medication 975 MILLIGRAM(S): at 20:47

## 2023-05-18 RX ADMIN — OXYCODONE HYDROCHLORIDE 10 MILLIGRAM(S): 5 TABLET ORAL at 19:48

## 2023-05-18 RX ADMIN — GABAPENTIN 100 MILLIGRAM(S): 400 CAPSULE ORAL at 14:09

## 2023-05-18 NOTE — CONSULT NOTE ADULT - SUBJECTIVE AND OBJECTIVE BOX
HPI:  47 year old female with PMH Former Smoker (1.5 PPD x 20 years; Quit 4 years ago - currently vaping daily) and HLD reports that she recently had a back injury at work in 2021. She reports c/o back pain since the injury and had relief of her symptoms through acupuncture and physical therapy and returned back to work. In 2023, she reports that the pain returned and was worse. She describes the pain as intermittent and sharp with pressure.  At rest, she rates her pain 4/10 and at its worse she rates her pain 9/10. She has numbness and tingling that radiates to the LLE. Prolonged sitting or walking makes the pain worse. Laying flat makes the pain better. Her last epidural injection was in 2023 with no relief of symptoms. Her last trigger point injection with pain specialist Dr. Leavitt was on 23 with pain relief for only a few hours. Pt now S/P scheduled stage I L4-S1 interbody fusion with Dr. Pena.    Endocrine consulted for steroid-induced hyperglycemia.    Patient never before given the diagnosis of DM, though she reports her recent A1c 2 months ago was 5.7%, which is in the pre-diabetes range. Not on DM meds. Never saw an Endocrinologist. Not engaging in SMBG, though was checking BG regularly in the past when getting intraarticular steroid injections. States BG was normal then. Not having symptoms of hyperglycemia. At home tries to eat well and have balanced meals. Cooks for herself, does not eat out much. Finishing only 60% of each meal in the hospital, not eating much outside food.     On ROS reports back pain and numbness in hands.    Was on dexamethasone 5mg q6h until last night. Now is on prednisone taper starting with 40mg daily today.      PAST MEDICAL & SURGICAL HISTORY:  HLD (hyperlipidemia)      Spinal stenosis, lumbosacral region      Current smoker      S/P           FAMILY HISTORY: Mom with DM.      Social History: Former smoker. No etoh use.    Outpatient Medications: Home Medications:  acetaminophen 325 mg oral tablet: 3 tab(s) orally every 8 hours (18 May 2023 11:48)  cyclobenzaprine 10 mg oral tablet: 1 tab(s) orally once a day (11 May 2023 22:56)  gabapentin 100 mg oral capsule: 1 cap(s) orally 3 times a day (18 May 2023 11:48)  Multiple Vitamins oral tablet: 1 tab(s) orally once a day (18 May 2023 11:48)  oxyCODONE 5 mg oral tablet: 1 tab(s) orally every 4 hours As needed Moderate Pain (4 - 6) (18 May 2023 11:48)  pantoprazole 40 mg oral delayed release tablet: 1 tab(s) orally once a day (before a meal) (18 May 2023 11:48)  senna leaf extract oral tablet: 2 tab(s) orally once a day (at bedtime) (18 May 2023 11:48)  simvastatin 20 mg oral tablet: 1 tab(s) orally once a day (at bedtime) (10 May 2023 09:29)  traMADol 50 mg oral tablet: 1 tab(s) orally every 6 hours As needed Mild Pain (1 - 3) (18 May 2023 11:48)      MEDICATIONS  (STANDING):  acetaminophen     Tablet .. 975 milliGRAM(s) Oral every 8 hours  chlorhexidine 2% Cloths 1 Application(s) Topical once  gabapentin 100 milliGRAM(s) Oral three times a day  lactated ringers. 1000 milliLiter(s) (75 mL/Hr) IV Continuous <Continuous>  multivitamin 1 Tablet(s) Oral daily  pantoprazole    Tablet 40 milliGRAM(s) Oral before breakfast  polyethylene glycol 3350 17 Gram(s) Oral daily  predniSONE   Tablet   Oral   predniSONE   Tablet 40 milliGRAM(s) Oral daily  senna 2 Tablet(s) Oral at bedtime  vancomycin  IVPB 1000 milliGRAM(s) IV Intermittent once    MEDICATIONS  (PRN):  cyclobenzaprine 5 milliGRAM(s) Oral three times a day PRN Muscle Spasm  ondansetron Injectable 4 milliGRAM(s) IV Push every 6 hours PRN Nausea and/or Vomiting  oxyCODONE    IR 5 milliGRAM(s) Oral every 4 hours PRN Moderate Pain (4 - 6)  oxyCODONE    IR 10 milliGRAM(s) Oral every 4 hours PRN Severe Pain (7 - 10)  traMADol 50 milliGRAM(s) Oral every 6 hours PRN Mild Pain (1 - 3)      Allergies    penicillin (Pruritus; Rash)    Intolerances      Review of Systems:  Constitutional:  No fever, No chills.  Eye:  No eye pain, No blurring.   Ear/Nose/Mouth/Throat:  No nasal congestion, No sore throat.   Respiratory:  No shortness of breath, No cough.   Cardiovascular:  No chest pain, No palpitations.   Gastrointestinal:  No nausea, No vomiting, No diarrhea.   Genitourinary:  No dysuria, No hematuria.   Endocrine:  No excessive thirst, No polyuria.   Musculoskeletal:  + back pain.   Integumentary:  No rash, No skin lesion.   Neurologic:  Alert and oriented X4, No confusion, + numbness.   Additional ROS reviewed and negative except as indicated in HPI.        PHYSICAL EXAM:  VITALS: T(C): 37.1 (23 @ 08:36)  T(F): 98.7 (23 @ 08:36), Max: 98.7 (23 @ 08:36)  HR: 98 (23 @ 08:36) (80 - 98)  BP: 112/75 (23 @ 08:36) (106/69 - 129/82)  RR:  (18 - 18)  SpO2:  (96% - 99%)  Wt(kg): --  General: Well-developed female, No acute distress, Speaking full sentences.   Eye:  Extraocular movements are intact, No proptosis or lid lag.   HENT:  Normocephalic.   Neck:  Supple, Non-tender.   Respiratory:  Respirations are non-labored, Symmetric chest wall expansion, Breath sounds are equal.   Cardiovascular:  Normal rate, Regular rhythm, No edema.  Gastrointestinal:  Soft, Non-tender, Non-distended.    Integumentary:  Warm, dry.  Mental Status Exam:  Orientedx4, Speech clear and coherent.   Neurologic:  Alert, Orientedx4, Normal motor function, No focal deficits, Cranial Nerves II-XII are grossly intact bilaterally.   Psychiatric:  Cooperative, Appropriate mood & affect.    POCT Blood Glucose.: 432 mg/dL (23 @ 12:14)                            9.2    14.37 )-----------( 371      ( 18 May 2023 11:22 )             30.0           134<L>  |  96  |  20  ----------------------------<  417<H>  4.3   |  25  |  0.66    eGFR: 108    Ca    9.2              Thyroid Function Tests:              Radiology:             
Patient is a 47y old  Female who presents with a chief complaint of multi-stage lumbar surgery  (11 May 2023 22:35)      HPI:  47 year old female with PMH Former Smoker (1.5 PPD x 20 years; Quit 4 years ago - currently vaping daily) and HLD reports that she recently had a back injury at work in 2021. She reports c/o back pain since the injury and had relief of her symptoms through acupuncture and physical therapy and returned back to work. In 2023, she reports that the pain returned and was worse. She describes the pain as intermittent and sharp with pressure.  At rest, she rates her pain 4/10 and at its worse she rates her pain 9/10. She has numbness and tingling that radiates to the LLE. Prolonged sitting or walking makes the pain worse. Laying flat makes the pain better. Her last epidural injection was in 2023 with no relief of symptoms. Her last trigger point injection with pain specialist Dr. Leavitt was on 23 with pain relief for only a few hours. S/p stage I L4-S1 interbody fusion with Dr. Pena on 5/10/23.    SOB this morning.      PAST MEDICAL & SURGICAL HISTORY:  HLD (hyperlipidemia)      Spinal stenosis, lumbosacral region      Current smoker      S/P           Review of Systems:   CONSTITUTIONAL: No fever,  or fatigue  NECK: No pain or stiffness  RESPIRATORY: No cough,  No shortness of breath  CARDIOVASCULAR: No chest pain, palpitations, dizziness, or leg swelling  GASTROINTESTINAL: No abdominal  pain. No nausea, vomiting.  NEUROLOGICAL: No headaches,           Allergies    penicillin (Pruritus; Rash)    Intolerances        Social History:     FAMILY HISTORY:      MEDICATIONS  (STANDING):  aspirin enteric coated 81 milliGRAM(s) Oral daily  chlorhexidine 2% Cloths 1 Application(s) Topical once  pantoprazole    Tablet 40 milliGRAM(s) Oral before breakfast  polyethylene glycol 3350 17 Gram(s) Oral at bedtime  predniSONE   Tablet 40 milliGRAM(s) Oral daily  predniSONE   Tablet   Oral   senna 2 Tablet(s) Oral at bedtime  simvastatin 20 milliGRAM(s) Oral at bedtime  vancomycin  IVPB 1000 milliGRAM(s) IV Intermittent once    MEDICATIONS  (PRN):  aluminum hydroxide/magnesium hydroxide/simethicone Suspension 30 milliLiter(s) Oral every 12 hours PRN Indigestion  bisacodyl 5 milliGRAM(s) Oral every 12 hours PRN Constipation  cyclobenzaprine 10 milliGRAM(s) Oral three times a day PRN Muscle Spasm  magnesium hydroxide Suspension 30 milliLiter(s) Oral every 12 hours PRN Constipation  ondansetron Injectable 4 milliGRAM(s) IV Push every 6 hours PRN Nausea and/or Vomiting  oxyCODONE    IR 5 milliGRAM(s) Oral every 3 hours PRN Moderate Pain (4 - 6)  simethicone 80 milliGRAM(s) Chew daily PRN Gas      CAPILLARY BLOOD GLUCOSE        I&O's Summary    11 May 2023 07:01  -  12 May 2023 07:00  --------------------------------------------------------  IN: 710 mL / OUT: 1750 mL / NET: -1040 mL        T(C): 36.7 (23 @ 17:43), Max: 36.9 (23 @ 20:33)  HR: 95 (23 @ 17:43) (69 - 95)  BP: 115/79 (23 @ 17:43) (100/65 - 115/79)  RR: 18 (23 @ 17:43) (18 - 18)  SpO2: 98% (23 @ 17:43) (96% - 99%)    PHYSICAL EXAM:    GENERAL: NAD  NECK: Supple, No JVD  CHEST/LUNG: Clear to auscultation bilaterally; No wheezing.  HEART: Regular rate and rhythm; No murmurs, rubs, or gallops  ABDOMEN: Soft, Nontender, Nondistended; Bowel sounds present  EXTREMITIES:  2+ Peripheral Pulses, No edema  NEUROLOGY: AAOx 3      LABS:                        10.6   16.23 )-----------( 265      ( 12 May 2023 06:59 )             34.3         140  |  101  |  18  ----------------------------<  163<H>  4.1   |  27  |  0.61    Ca    9.6      12 May 2023 06:58              CAPILLARY BLOOD GLUCOSE            RADIOLOGY & ADDITIONAL TESTS:    Imaging Personally Reviewed:    Consultant(s) Notes Reviewed:      Care Discussed with Consultants/Other Providers:    Thanks for consult. Will follow.

## 2023-05-18 NOTE — PROGRESS NOTE ADULT - ASSESSMENT
47y Female s/p Stage 1 L4-S1 ALIF (5/10), Stage 2 L4-S1 unilateral PSF w/ Mazor robotic system (5/16),    Ortho spine f/up noted.  Po Prednisone  PT f/up   Pain control with Oxy/Tramadol    Steroid induced hyperglycemia:    FSSS  Endo eval appreciated

## 2023-05-18 NOTE — CONSULT NOTE ADULT - ATTENDING COMMENTS
Agree with assessment and plan as above by Dr. Guzman. Reviewed all pertinent labs, glucose values, and imaging studies. Modifications made as indicated above. Pt. with severe hyperglycemia in the setting of newly diagnosed Type 2 DM and steroid induced hyperglycemia s/p IV decadron now on prednisone. Monitor insulin requirements on moderate correction scale with likely need for standing prandial insulin at least. Will assess need for basal insulin based on fasting glucose tomorrow as glucose fasting this morning was at goal. Plan to d/c on metformin vs. dietary and lifestyle modifications.    Clifford Coats D.O  [phone]

## 2023-05-18 NOTE — DISCUSSION/SUMMARY
[de-identified] : 46 yo female with L45, L5S1 DDD with broad based disc herniation with modic endplate changes, failed PT, NSAIDS, and MARY, progressive weakness, patient is a surgical candidate,discussed surgical intervention with ALIF/PSFL4-S1. \par \par I reviewed all major risks, benefits, and complications associated with surgical intervention including but not limited to bleeding, infection, neurological injury, CSF leak, need for further surgical intervention, implant failure, implant migration, pseudarthrosis,  adjacent segment degeneration, pedicle screw breech, hematoma, chronic pain, worsening of pain, risk associated with BMP, off label use of it, retrograde ejaculation (if male patient), anesthetic risk, chronic pain and disability, medical complications (GI, , DVT, PE, cardiac, pulmonary, etc) and risk of mortality. \par \par \par Anterior Lumbar Interbody Fusion L4-S1\par Pena/Kissen\par ([id number], [id number])\par [id number]\par Anand Flattop\par [employer]\par [place]\par \par \par I reviewed all major risks, benefits, and complications associated with surgical intervention including but not limited to bleeding, infection, neurological injury, CSF leak, implant failure, implant migration, pedicle screw breech, pseudarthrosis, adjacent segment degeneration, hematoma, anesthetic risk, chronic pain and disability , medical complications (GI, , DVT, PE, cardiac, pulmonary, etc) and risk of mortality.\par \par Posterior Lumbar Laminectomy and instrumented Spinal Fusion L5S1\par ([id number], [id number], [id number], [id number])\par Mazor\par [employer]\par [employer]\par [employer]\par [id number]\par [employer]\par [employer], [employer]\par \par \par \par \par Diagnosis, prognosis, natural history and treatment was discussed with patient. Patient was advised if the following symptoms develop: chills, fever, \par loss of bladder control, bowel incontinence or urinary retention, numbness/tingling or weakness is present in upper or lower extremities, to go to the nearest emergency room. This may be a new clinical condition not present at the time of the patient visit  that may lead to paralysis and/or death, Patient advised if the above symptoms developed to also call the office immediately to inform us and to go to the nearest emergency room.\par

## 2023-05-18 NOTE — PROGRESS NOTE ADULT - ASSESSMENT
47y Female s/p Stage 1 L4-S1 ALIF (5/10), Stage 2 L4-S1 unilateral PSF w/ Mazor robotic system (5/16). Recovering well    -    Pain control  -    IS bedside  -    Taper  -    DVT ppx: SCDs       -    GI ppx: Protonix 40 mg QD  -    Physical Therapy  -    Weight bearing status: WBAT, LSO brace for comfort   -    f/u AM labs   -    Dispo planning    Orthopaedic Surgery  [facility] [place] [id number]  [facility]        [id number]  [facility]  [id number]/ [phone]

## 2023-05-18 NOTE — CONSULT NOTE ADULT - ASSESSMENT
47 year old female with PMH Former Smoker (1.5 PPD x 20 years; Quit 4 years ago - currently vaping daily) and HLD reports that she recently had a back injury at work in 11/2021. Pt now S/P scheduled stage I L4-S1 interbody fusion with Dr. Pena. Endocrine consulted for steroid-induced hyperglycemia.    Steroid-induced hyperglycemia  Possible pre-DM  Patient never before given the diagnosis of DM, though she reports her recent A1c 2 months ago was 5.7%, which is in the pre-diabetes range. Not on DM meds. Never saw an Endocrinologist. Not engaging in SMBG, though was checking BG regularly in the past when getting intraarticular steroid injections. States BG was normal then. Was on dexamethasone 5mg q6h until last night. Now is on prednisone taper starting with 40mg daily today.  Poorly controlled T2DM with hyperglycemia  -A1c pending  -Use moderate dose Admelog correction scale pre-meal  -Use moderate dose Admelog correction scale at bedtime  -Fingerstick BG before meals and bedtime  -Goal -180  -May add basal insulin with NPH or Lantus depending on BG trend  Discharge plan:  -May need short-term insulin or oral treatment for hyperglycemia while on steroid taper. Final regimen pending clinical course.  -Recommend routine outpatient PCP f/u with surveillance/repeat testing as outpatient    HLD  -On simvastatin 20mg daily. Continue this if no contraindications.    Gordo Guzman DO, Endocrinology Fellow  For follow-up questions, discharge recommendations, or new consults please call answering service at [phone] (weekdays), [phone] (nights/weekends). For nonurgent matters, please email [email or web address] or [email or web address]. 47 year old female with PMH Former Smoker (1.5 PPD x 20 years; Quit 4 years ago - currently vaping daily) and HLD reports that she recently had a back injury at work in 11/2021. Pt now S/P scheduled stage I L4-S1 interbody fusion with Dr. Pena. Endocrine consulted for newly diagnosed diabetes [id number] of 6.8% and steroid-induced hyperglycemia (high risk patient with severe hyperglycemia with glucose values > 400 at high risk of CAD and CVA with high level decision-making).     Steroid-induced hyperglycemia  Possible pre-DM  Patient never before given the diagnosis of DM, though she reports her recent A1c 2 months ago was 5.7%, which is in the pre-diabetes range. Not on DM meds. Never saw an Endocrinologist. Not engaging in SMBG, though was checking BG regularly in the past when getting intraarticular steroid injections. States BG was normal then. Was on dexamethasone 5mg q6h until last night. Now is on prednisone taper starting with 40mg daily today.  Poorly controlled T2DM with hyperglycemia  -A1c pending  -Use moderate dose Admelog correction scale pre-meal  -Use moderate dose Admelog correction scale at bedtime  -Fingerstick BG before meals and bedtime  -Goal -180  -May add basal insulin with NPH or Lantus depending on BG trend  Discharge plan:  -May need short-term insulin or oral treatment for hyperglycemia while on steroid taper. Final regimen pending clinical course.  -Recommend routine outpatient PCP f/u with surveillance/repeat testing as outpatient    HLD  -On simvastatin 20mg daily. Continue this if no contraindications.    Gordo Guzman DO, Endocrinology Fellow  For follow-up questions, discharge recommendations, or new consults please call answering service at [phone] (weekdays), [phone] (nights/weekends). For nonurgent matters, please email [email or web address] or [email or web address].

## 2023-05-18 NOTE — PHYSICAL EXAM
[Antalgic] : antalgic [UE Motor Strength NL] : Motor strength of the bilateral upper extremities is normal [0] : left ankle jerk 0 [ALL] : dorsalis pedis, posterior tibial, femoral, popliteal, and radial 2+ and symmetric bilaterally [Normal] : Oriented to person, place, and time, insight and judgement were intact and the affect was normal [] : Sensory: [L5-LT] : L5 [Motor Strength Lower Extremities] : left (5/5) [Argueta's Sign] : negative Argueta's sign [Pronator Drift] : negative pronator drift [SLR] : negative straight leg raise [de-identified] : Lumbar ROM: limited, painful\par TTP L spine and left paraspinals L region. \par Skin intact L spine. No rashes, ulcers, blisters. \par No lymphedema. \par Rapid alternating movements- Intact. \par Full and non-painful ROM RUE, LUE, Limited due ot pain RLE, and LLE. Skin intact RUE, LUE, RLE, and LLE. No rashes, blisters, ulcers. NTTP RUE, LUE, RLE, and LLE. No evidence of dislocation or subluxation B/L.\par  [de-identified] : MRI lumbar spine [facility] radiology 3/20/2023\par \par EXAM:  MRI LUMBAR SPINE WITHOUT CONTRAST\par \par HISTORY: Work-related injury. Low back pain.\par \par TECHNIQUE: Multiplanar, multi-sequential MRI of the lumbar spine was obtained on a 1.5T scanner using a standard protocol.\par \par COMPARISON:  Prior MRI lumbar spine 11/18/2021.\par \par FINDINGS:\par \par For purposes of this dictation, the last well-formed disc space will be labeled L5-S1.\par \par OSSEOUS STRUCTURES: No compression fractures. No marrow edema or destructive marrow process. \par \par ALIGNMENT: Interval development of slight, 2 mm, grade 1 retrolisthesis L4-5 (sagittal T2 FSE image 8, series 102). No spondylolysis.\par Possible mild grade 1 retrolisthesis L5-S1 and/or posterior osteophytic spurring without significant change.\par \par SPINAL CORD AND CONUS MEDULLARIS: Conus medullaris is at T12-L1. Distal thoracic spinal cord and conus medullaris are unremarkable. No intraspinal masses.\par \par PARASPINAL AND INTRA-ABDOMINAL SOFT TISSUES: Unremarkable.\par \par INCLUDED THORACIC SPINE AND SACRUM: Unremarkable.\par \par DISCS: Moderate severe L5-S1 spondylosis with disc space narrowing, disc desiccation, osteophytic spurring and mild degenerative endplate marrow change. Stable.\par Mild L4-5 disc desiccation without significant loss of disc height. Stable finding.\par \par The following axial levels are imaged and detailed below:\par \par L1-L2: No disc bulging or herniation. No spinal canal or foraminal stenosis. Stable.\par \par L2-L3: No disc bulging or herniation. No spinal canal or foraminal stenosis. Stable.\par \par L3-L4: Minimal disc bulging with left far lateral annulus fissure (axial T2 FSE image 11, series 106). No disc herniation, spinal stenosis or foraminal narrowing. Stable findings.\par \par L4-L5: Uncovering of the disc by spondylolisthesis. Circumferential disc bulging with superimposed broad-based central disc herniation, interval resolved, superimposed annulus fissure, mild inferior migration, lateral recess spinal stenosis and impingement on both descending left greater than right L5 nerve root sheaths. Mild bilateral foraminal narrowing.\par \par L5-S1: Prominent posterior disc osteophyte, lateral recess spinal stenosis abutting both descending S1 nerve root sheaths without posterior displacement. No central spinal stenosis. Moderate severe left and moderate right-sided foraminal narrowing redemonstrated.\par \par IMPRESSION:\par 1. Interval development of slight 2 mm, degenerative grade 1 retrolisthesis L4-5.\par 2. Stable moderate to severe L5-S1 spondylosis, prominent posterior L5-S1 disc osteophyte, lateral recess spinal stenosis abutting both descending S1 nerve root sheaths, moderate to severe left and moderate right-sided foraminal narrowing.\par 3. Circumferential L4-5 disc bulge, superimposed broad-based central disc herniation, interval resolved superimposed annulus fissure, lateral recess spinal stenosis impinging upon the descending left greater than right L5 nerve root sheaths. Stable findings.\par \par \par MRI lumbar spine [facility] radiology 11/18/21\par EXAM:  MRI LUMBAR SPINE WITHOUT CONTRAST\par \par HISTORY:  Status post work injury 11/9/2021\par \par TECHNIQUE:  Multiplanar MRI was performed through the lumbar spine without the use of intravenous contrast.\par \par COMPARISON:  X-rays 11/12/2021.\par \par FINDINGS:\par \par There is a slight left convex lumbar scoliosis.\par \par The lumbar vertebral bodies are essentially normal in size and shape without evidence of fracture or suspicious intrinsic lesion. \par \par There is no significant spondylolisthesis.\par \par There is moderate decreased height of the L5-S1 disc with endplate changes and spondylosis. There is mild disc desiccation at the L4-5 level.\par \par There is a posterior disc bulge at the L5-S1 level. This is encroaching upon the ventral aspect of the thecal sac with bilateral lateral recess stenosis and encroachment upon the neural foramina bilaterally with the left side greater than the right. There is encroachment upon the left L5 nerve root in the neural foramen and some encroachment upon the S1 nerve roots by their exits from the thecal sac bilaterally with the left side greater than the right.\par \par There is a central posterior annular tear and broad-based disc herniation at the L4-5 level superimposed upon a posterior disc bulge. There is effacement of the ventral aspect of the thecal sac with encroachment upon the lateral recesses and inferior aspects of the neural foramina bilaterally.\par \par There are posterior disc bulges at the L2-3 and L3-4 levels each encroaching upon the ventral aspect of the thecal sac and to some extent the lateral recesses and inferior aspects of the neural foramina bilaterally.\par \par There is a posterior disc bulge at the L1-2 level encroaching upon the ventral aspect of the thecal sac and to some extent the lateral recesses bilaterally.\par \par No other epidural impression upon the thecal sac is observed throughout the lumbar spine. There is no significant central spinal stenosis.\par \par The conus medullaris has a normal configuration.\par \par IMPRESSION: \par \par 1. Slight left convex lumbar scoliosis.\par 2. Posterior disc bulge at the L5-S1 level with bilateral lateral recess stenosis and encroachment upon the left L5 nerve root in the neural foramen and upon the S1 nerve roots with the left side greater than the right.\par 3. Central posterior annular tear and broad-based disc herniation at the L4-5 level superimposed upon a posterior disc bulge.\par 4. Posterior disc bulges at the L1-2 through L3-4 levels.

## 2023-05-19 LAB
GLUCOSE BLDC GLUCOMTR-MCNC: 174 MG/DL — HIGH (ref 70–99)
GLUCOSE BLDC GLUCOMTR-MCNC: 181 MG/DL — HIGH (ref 70–99)
GLUCOSE BLDC GLUCOMTR-MCNC: 239 MG/DL — HIGH (ref 70–99)
GLUCOSE BLDC GLUCOMTR-MCNC: 298 MG/DL — HIGH (ref 70–99)
GLUCOSE BLDC GLUCOMTR-MCNC: 306 MG/DL — HIGH (ref 70–99)

## 2023-05-19 PROCEDURE — 99232 SBSQ HOSP IP/OBS MODERATE 35: CPT

## 2023-05-19 RX ORDER — NALOXONE HYDROCHLORIDE 4 MG/.1ML
4 SPRAY NASAL
Qty: 1 | Refills: 0
Start: 2023-05-19

## 2023-05-19 RX ORDER — SENNA PLUS 8.6 MG/1
2 TABLET ORAL
Qty: 14 | Refills: 0
Start: 2023-05-19 | End: 2023-05-26

## 2023-05-19 RX ORDER — OXYCODONE HYDROCHLORIDE 5 MG/1
1 TABLET ORAL
Qty: 42 | Refills: 0
Start: 2023-05-19 | End: 2023-05-26

## 2023-05-19 RX ORDER — INSULIN GLARGINE 100 [IU]/ML
12 INJECTION, SOLUTION SUBCUTANEOUS AT BEDTIME
Refills: 0 | Status: DISCONTINUED | OUTPATIENT
Start: 2023-05-19 | End: 2023-05-20

## 2023-05-19 RX ORDER — OXYCODONE HYDROCHLORIDE 5 MG/1
1 TABLET ORAL
Qty: 42 | Refills: 0
Start: 2023-05-19 | End: 2023-05-25

## 2023-05-19 RX ORDER — INSULIN LISPRO 100/ML
VIAL (ML) SUBCUTANEOUS AT BEDTIME
Refills: 0 | Status: DISCONTINUED | OUTPATIENT
Start: 2023-05-19 | End: 2023-05-20

## 2023-05-19 RX ORDER — CYCLOBENZAPRINE HYDROCHLORIDE 10 MG/1
1 TABLET, FILM COATED ORAL
Qty: 21 | Refills: 0
Start: 2023-05-19 | End: 2023-05-26

## 2023-05-19 RX ORDER — PANTOPRAZOLE SODIUM 20 MG/1
1 TABLET, DELAYED RELEASE ORAL
Qty: 30 | Refills: 0
Start: 2023-05-19 | End: 2023-06-18

## 2023-05-19 RX ORDER — ACETAMINOPHEN 500 MG
3 TABLET ORAL
Qty: 126 | Refills: 0
Start: 2023-05-19 | End: 2023-06-01

## 2023-05-19 RX ORDER — INSULIN LISPRO 100/ML
5 VIAL (ML) SUBCUTANEOUS
Refills: 0 | Status: DISCONTINUED | OUTPATIENT
Start: 2023-05-19 | End: 2023-05-20

## 2023-05-19 RX ORDER — TRAMADOL HYDROCHLORIDE 50 MG/1
1 TABLET ORAL
Qty: 28 | Refills: 0
Start: 2023-05-19 | End: 2023-05-26

## 2023-05-19 RX ORDER — CYCLOBENZAPRINE HYDROCHLORIDE 10 MG/1
1 TABLET, FILM COATED ORAL
Qty: 21 | Refills: 0
Start: 2023-05-19 | End: 2023-05-25

## 2023-05-19 RX ORDER — PANTOPRAZOLE SODIUM 20 MG/1
1 TABLET, DELAYED RELEASE ORAL
Qty: 30 | Refills: 0
Start: 2023-05-19 | End: 2023-06-17

## 2023-05-19 RX ORDER — GABAPENTIN 400 MG/1
1 CAPSULE ORAL
Qty: 42 | Refills: 0
Start: 2023-05-19 | End: 2023-06-02

## 2023-05-19 RX ORDER — INSULIN LISPRO 100/ML
VIAL (ML) SUBCUTANEOUS
Refills: 0 | Status: DISCONTINUED | OUTPATIENT
Start: 2023-05-19 | End: 2023-05-20

## 2023-05-19 RX ORDER — ACETAMINOPHEN 500 MG
3 TABLET ORAL
Qty: 126 | Refills: 0
Start: 2023-05-19 | End: 2023-06-02

## 2023-05-19 RX ORDER — CYCLOBENZAPRINE HYDROCHLORIDE 10 MG/1
1 TABLET, FILM COATED ORAL
Refills: 0 | DISCHARGE

## 2023-05-19 RX ORDER — TRAMADOL HYDROCHLORIDE 50 MG/1
1 TABLET ORAL
Qty: 28 | Refills: 0
Start: 2023-05-19 | End: 2023-05-25

## 2023-05-19 RX ORDER — GABAPENTIN 400 MG/1
1 CAPSULE ORAL
Qty: 42 | Refills: 0
Start: 2023-05-19 | End: 2023-06-01

## 2023-05-19 RX ORDER — SENNA PLUS 8.6 MG/1
2 TABLET ORAL
Qty: 14 | Refills: 0
Start: 2023-05-19 | End: 2023-05-25

## 2023-05-19 RX ADMIN — OXYCODONE HYDROCHLORIDE 10 MILLIGRAM(S): 5 TABLET ORAL at 20:05

## 2023-05-19 RX ADMIN — Medication 975 MILLIGRAM(S): at 11:53

## 2023-05-19 RX ADMIN — GABAPENTIN 100 MILLIGRAM(S): 400 CAPSULE ORAL at 05:37

## 2023-05-19 RX ADMIN — Medication 4: at 13:56

## 2023-05-19 RX ADMIN — Medication 1 TABLET(S): at 11:53

## 2023-05-19 RX ADMIN — OXYCODONE HYDROCHLORIDE 10 MILLIGRAM(S): 5 TABLET ORAL at 21:00

## 2023-05-19 RX ADMIN — Medication 4: at 08:51

## 2023-05-19 RX ADMIN — OXYCODONE HYDROCHLORIDE 10 MILLIGRAM(S): 5 TABLET ORAL at 06:56

## 2023-05-19 RX ADMIN — Medication 5 UNIT(S): at 13:55

## 2023-05-19 RX ADMIN — Medication 975 MILLIGRAM(S): at 12:35

## 2023-05-19 RX ADMIN — Medication 40 MILLIGRAM(S): at 05:38

## 2023-05-19 RX ADMIN — OXYCODONE HYDROCHLORIDE 10 MILLIGRAM(S): 5 TABLET ORAL at 07:45

## 2023-05-19 RX ADMIN — Medication 5 UNIT(S): at 16:49

## 2023-05-19 RX ADMIN — Medication 1: at 16:49

## 2023-05-19 RX ADMIN — GABAPENTIN 100 MILLIGRAM(S): 400 CAPSULE ORAL at 13:55

## 2023-05-19 RX ADMIN — GABAPENTIN 100 MILLIGRAM(S): 400 CAPSULE ORAL at 21:51

## 2023-05-19 RX ADMIN — PANTOPRAZOLE SODIUM 40 MILLIGRAM(S): 20 TABLET, DELAYED RELEASE ORAL at 06:57

## 2023-05-19 RX ADMIN — Medication 975 MILLIGRAM(S): at 20:29

## 2023-05-19 RX ADMIN — Medication 975 MILLIGRAM(S): at 21:15

## 2023-05-19 NOTE — PROGRESS NOTE ADULT - ASSESSMENT
47y Female s/p Stage 1 L4-S1 ALIF (5/10), Stage 2 L4-S1 unilateral PSF w/ Mazor robotic system (5/16). Recovering well    -    Pain control  -    IS bedside  -    Taper  -    DVT ppx: SCDs       -    GI ppx: Protonix 40 mg QD  -    Physical Therapy  -    Weight bearing status: WBAT, LSO brace for comfort   -    f/u AM labs   -    Dispo planning, home today    Orthopaedic Surgery  [facility] [id number]  [facility]        [id number]  [facility]  [id number]/ [phone]

## 2023-05-19 NOTE — PROGRESS NOTE ADULT - ASSESSMENT
47 year old female with PMH Former Smoker (1.5 PPD x 20 years; Quit 4 years ago - currently vaping daily) and HLD reports that she recently had a back injury at work in 11/2021. Pt now S/P scheduled stage I L4-S1 interbody fusion with Dr. Pena. Endocrine consulted for newly diagnosed diabetes [id number] of 6.8% and steroid-induced hyperglycemia    Steroid-induced hyperglycemia    Patient never before given the diagnosis of DM, though she reports her recent A1c 2 months ago was 5.7%, which is in the pre-diabetes range. Not on DM meds. Never saw an Endocrinologist. Not engaging in SMBG, though was checking BG regularly in the past when getting intraarticular steroid injections. States BG was normal then. Was on dexamethasone 5mg q6h until last night. Now is on prednisone taper starting with 40mg daily today.  Poorly controlled T2DM with hyperglycemia  -Start Lantus 12 units qhs and Admelog 5 units with meals  -Use moderate dose Admelog correction scale pre-meal  -Use moderate dose Admelog correction scale at bedtime  -Fingerstick BG before meals and bedtime  -Goal -180  -May add basal insulin with NPH or Lantus depending on BG trend  Discharge plan:  -May need short-term insulin or oral treatment for hyperglycemia while on steroid taper. Final regimen pending clinical course.  -Recommend routine outpatient PCP f/u with surveillance/repeat testing as outpatient    HLD  -On simvastatin 20mg daily. Continue this if no contraindications.          Clifford Coats D.O  [phone]

## 2023-05-19 NOTE — PROGRESS NOTE ADULT - ASSESSMENT
47y Female s/p Stage 1 L4-S1 ALIF (5/10), Stage 2 L4-S1 unilateral PSF w/ Mazor robotic system (5/16),    Ortho spine f/up noted.  Po Prednisone  PT f/up   Pain control with Oxy/Tramadol    Steroid induced hyperglycemia:    FSSS  Endo eval appreciated           47y Female s/p Stage 1 L4-S1 ALIF (5/10), Stage 2 L4-S1 unilateral PSF w/ Mazor robotic system (5/16),    Ortho spine f/up noted.  Po Prednisone  PT f/up   Pain control with Oxy/Tramadol    Steroid induced hyperglycemia:    FSSS  Endo eval appreciated  On Lantus

## 2023-05-19 NOTE — PROVIDER CONTACT NOTE (OTHER) - ASSESSMENT
No chest pain, no SOB. Soon after pt stated she felt "faint", she felt better. BS taken 298. Vitals taken /84, HR 74, Sat 96, RR 18.

## 2023-05-20 VITALS
HEART RATE: 91 BPM | OXYGEN SATURATION: 98 % | RESPIRATION RATE: 18 BRPM | SYSTOLIC BLOOD PRESSURE: 121 MMHG | DIASTOLIC BLOOD PRESSURE: 72 MMHG | TEMPERATURE: 98 F

## 2023-05-20 LAB
ANION GAP SERPL CALC-SCNC: 10 MMOL/L — SIGNIFICANT CHANGE UP (ref 5–17)
BUN SERPL-MCNC: 20 MG/DL — SIGNIFICANT CHANGE UP (ref 7–23)
CALCIUM SERPL-MCNC: 9.6 MG/DL — SIGNIFICANT CHANGE UP (ref 8.4–10.5)
CHLORIDE SERPL-SCNC: 96 MMOL/L — SIGNIFICANT CHANGE UP (ref 96–108)
CO2 SERPL-SCNC: 31 MMOL/L — SIGNIFICANT CHANGE UP (ref 22–31)
CREAT SERPL-MCNC: 0.52 MG/DL — SIGNIFICANT CHANGE UP (ref 0.5–1.3)
EGFR: 115 ML/MIN/1.73M2 — SIGNIFICANT CHANGE UP
GLUCOSE BLDC GLUCOMTR-MCNC: 141 MG/DL — HIGH (ref 70–99)
GLUCOSE BLDC GLUCOMTR-MCNC: 217 MG/DL — HIGH (ref 70–99)
GLUCOSE BLDC GLUCOMTR-MCNC: 226 MG/DL — HIGH (ref 70–99)
GLUCOSE SERPL-MCNC: 133 MG/DL — HIGH (ref 70–99)
POTASSIUM SERPL-MCNC: 3.7 MMOL/L — SIGNIFICANT CHANGE UP (ref 3.5–5.3)
POTASSIUM SERPL-SCNC: 3.7 MMOL/L — SIGNIFICANT CHANGE UP (ref 3.5–5.3)
SODIUM SERPL-SCNC: 137 MMOL/L — SIGNIFICANT CHANGE UP (ref 135–145)

## 2023-05-20 RX ORDER — GABAPENTIN 400 MG/1
1 CAPSULE ORAL
Qty: 42 | Refills: 0
Start: 2023-05-20 | End: 2023-06-02

## 2023-05-20 RX ORDER — METFORMIN HYDROCHLORIDE 850 MG/1
1 TABLET ORAL
Qty: 60 | Refills: 0
Start: 2023-05-20 | End: 2023-06-18

## 2023-05-20 RX ORDER — INSULIN GLARGINE 100 [IU]/ML
10 INJECTION, SOLUTION SUBCUTANEOUS
Qty: 1 | Refills: 0
Start: 2023-05-20 | End: 2023-05-28

## 2023-05-20 RX ORDER — GLIMEPIRIDE 1 MG
1 TABLET ORAL
Qty: 18 | Refills: 0
Start: 2023-05-20

## 2023-05-20 RX ORDER — INSULIN GLARGINE 100 [IU]/ML
12 INJECTION, SOLUTION SUBCUTANEOUS
Qty: 1 | Refills: 0
Start: 2023-05-20 | End: 2023-05-29

## 2023-05-20 RX ORDER — ISOPROPYL ALCOHOL, BENZOCAINE .7; .06 ML/ML; ML/ML
1 SWAB TOPICAL
Qty: 30 | Refills: 0
Start: 2023-05-20 | End: 2023-06-18

## 2023-05-20 RX ORDER — TRAMADOL HYDROCHLORIDE 50 MG/1
1 TABLET ORAL
Qty: 28 | Refills: 0
Start: 2023-05-20 | End: 2023-05-26

## 2023-05-20 RX ORDER — METFORMIN HYDROCHLORIDE 850 MG/1
1 TABLET ORAL
Qty: 18 | Refills: 0
Start: 2023-05-20 | End: 2023-05-28

## 2023-05-20 RX ORDER — ACETAMINOPHEN 500 MG
3 TABLET ORAL
Qty: 126 | Refills: 0
Start: 2023-05-20 | End: 2023-06-02

## 2023-05-20 RX ORDER — NALOXONE HYDROCHLORIDE 4 MG/.1ML
4 SPRAY NASAL
Qty: 1 | Refills: 0
Start: 2023-05-20

## 2023-05-20 RX ORDER — OXYCODONE HYDROCHLORIDE 5 MG/1
1 TABLET ORAL
Qty: 42 | Refills: 0
Start: 2023-05-20 | End: 2023-05-26

## 2023-05-20 RX ORDER — ISOPROPYL ALCOHOL, BENZOCAINE .7; .06 ML/ML; ML/ML
1 SWAB TOPICAL
Qty: 1 | Refills: 0
Start: 2023-05-20 | End: 2023-06-18

## 2023-05-20 RX ORDER — PANTOPRAZOLE SODIUM 20 MG/1
1 TABLET, DELAYED RELEASE ORAL
Qty: 30 | Refills: 0
Start: 2023-05-20 | End: 2023-06-18

## 2023-05-20 RX ORDER — SENNA PLUS 8.6 MG/1
2 TABLET ORAL
Qty: 14 | Refills: 0
Start: 2023-05-20 | End: 2023-05-26

## 2023-05-20 RX ORDER — GLIMEPIRIDE 1 MG
1 TABLET ORAL
Qty: 9 | Refills: 0
Start: 2023-05-20 | End: 2023-05-28

## 2023-05-20 RX ADMIN — GABAPENTIN 100 MILLIGRAM(S): 400 CAPSULE ORAL at 14:06

## 2023-05-20 RX ADMIN — Medication 975 MILLIGRAM(S): at 06:30

## 2023-05-20 RX ADMIN — INSULIN GLARGINE 12 UNIT(S): 100 INJECTION, SOLUTION SUBCUTANEOUS at 00:11

## 2023-05-20 RX ADMIN — Medication 1 TABLET(S): at 11:25

## 2023-05-20 RX ADMIN — Medication 5 UNIT(S): at 09:00

## 2023-05-20 RX ADMIN — GABAPENTIN 100 MILLIGRAM(S): 400 CAPSULE ORAL at 05:49

## 2023-05-20 RX ADMIN — Medication 40 MILLIGRAM(S): at 05:50

## 2023-05-20 RX ADMIN — Medication 5 UNIT(S): at 14:00

## 2023-05-20 RX ADMIN — Medication 975 MILLIGRAM(S): at 05:49

## 2023-05-20 RX ADMIN — OXYCODONE HYDROCHLORIDE 10 MILLIGRAM(S): 5 TABLET ORAL at 14:30

## 2023-05-20 RX ADMIN — Medication 2: at 09:00

## 2023-05-20 RX ADMIN — Medication 2: at 14:00

## 2023-05-20 RX ADMIN — PANTOPRAZOLE SODIUM 40 MILLIGRAM(S): 20 TABLET, DELAYED RELEASE ORAL at 05:50

## 2023-05-20 RX ADMIN — OXYCODONE HYDROCHLORIDE 10 MILLIGRAM(S): 5 TABLET ORAL at 13:43

## 2023-05-20 NOTE — PROGRESS NOTE ADULT - PROVIDER SPECIALTY LIST ADULT
Anesthesia
Internal Medicine
Orthopedics
Vascular Surgery
Vascular Surgery
Internal Medicine
Internal Medicine
Orthopedics
Vascular Surgery
Internal Medicine
Orthopedics
Vascular Surgery
Orthopedics
Endocrinology

## 2023-05-20 NOTE — PROGRESS NOTE ADULT - REASON FOR ADMISSION
Lumbar radiculopathy
S/P Stage 1 L4-L5 ALIF
s/p ALIF/PSF L4-S1
Stage 1 L4-L5 ALIF
S/P Stage 1 L4-L5 ALIF
multi-stage lumbar surgery
S/P Stage 1 L4-L5 ALIF

## 2023-05-20 NOTE — CHART NOTE - NSCHARTNOTESELECT_GEN_ALL_CORE
Endocrinology/Event Note
Event Note
Incontinence/Event Note
Caprini Score/Event Note
Post Operative Check/Event Note
Postoperative Check/Event Note

## 2023-05-20 NOTE — PROGRESS NOTE ADULT - SUBJECTIVE AND OBJECTIVE BOX
Patient comfortable. Pain controlled with PCA currently on.  No complaints.    T(C): 36.7 (05-11-23 @ 05:26), Max: 36.9 (05-10-23 @ 09:20)  HR: 72 (05-11-23 @ 05:26) (67 - 100)  BP: 107/69 (05-11-23 @ 05:26) (99/62 - 141/123)  RR: 18 (05-11-23 @ 05:26) (14 - 18)  SpO2: 96% (05-11-23 @ 05:26) (96% - 100%)      PHYSICAL EXAM:  NAD, Alert and oriented X3  Abd: LLQ Aquacel Dressing C/D/I, soft, mild incisional tenderness  Ext: Dull sensation grossly intact to light touch; (+) Distal Pulses; No Calf tenderness B/L, PAS        [x] Upper extremity                    Bi          Tri        Delt                                                    R         5/5        5/5        5/5                                               L          5/5        5/5        5/5             [x] Lower extremity                    PF          DF         EHL       FHL                                                                                            R        5/5        5/5        5/5       5/5                                                        L         5/5        5/5        5/5       5/5      : +Alonzo to gravity    LABS:                        11.6   16.23 )-----------( 258      ( 10 May 2023 19:59 )             36.0     05-10    143  |  106  |  11  ----------------------------<  152<H>  4.3   |  21<L>  |  0.58    Ca    9.2      10 May 2023 19:59              
Patient comfortable. Pain controlled.  No complaints.    Vital Signs Last 24 Hrs  T(C): 36.8 (12 May 2023 04:48), Max: 36.9 (11 May 2023 20:33)  T(F): 98.2 (12 May 2023 04:48), Max: 98.4 (11 May 2023 20:33)  HR: 69 (12 May 2023 04:48) (68 - 82)  BP: 105/65 (12 May 2023 04:48) (100/65 - 132/79)  BP(mean): --  RR: 18 (12 May 2023 04:48) (18 - 18)  SpO2: 97% (12 May 2023 04:48) (96% - 98%)    Parameters below as of 12 May 2023 04:48  Patient On (Oxygen Delivery Method): room air          PHYSICAL EXAM:  NAD, Alert and oriented X3  Abd: LLQ Aquacel Dressing C/D/I, soft, mild incisional tenderness  Ext: Dull sensation grossly intact to light touch; (+) Distal Pulses; No Calf tenderness B/L, PAS        [x] Upper extremity                    Bi          Tri        Delt                                                    R         5/5        5/5        5/5                                               L          5/5        5/5        5/5             [x] Lower extremity                    PF          DF         EHL       FHL                                                                                            R        5/5        5/5        5/5       5/5                                                        L         5/5        5/5        5/5       5/5        A/P: 48 y/o F POD#2 s/p Stage 1 L4-L5 ALIF      plan for stage 2 early next week  please document medical optimization   FU preop MRI L spine  FU bowel function   bowel regimen   DVT ppx- ambulation as tolerated, IPC while in bed  WBAT w LSO   Taper  PCA for pain management  Alonzo dcd  PT  OT  Gi ppx  D/W attending    
Patient evaluated  measured and custom fitted to patient  model rigid LSO spinal orthosis for post op use contoured to  lumbar sacral spine to protect lumbar sacral spine  in all planes reduce load on L4-S1 level for OBB use  to reduce pain aid in healing and recovery post surgery  ordered by Orthopedics delivered by [facility]   [phone]
Patient is a 47y old  Female who presents with a chief complaint of Lumbar fusion (18 May 2023 16:25)      SUBJECTIVE / OVERNIGHT EVENTS:    Events noted.  CONSTITUTIONAL: No fever,  or fatigue  RESPIRATORY: No cough, wheezing,  No shortness of breath  CARDIOVASCULAR: No chest pain, palpitations, dizziness, or leg swelling  GASTROINTESTINAL: No abdominal or epigastric pain.      MEDICATIONS  (STANDING):  acetaminophen     Tablet .. 975 milliGRAM(s) Oral every 8 hours  chlorhexidine 2% Cloths 1 Application(s) Topical once  dextrose 50% Injectable 25 Gram(s) IV Push once  dextrose 50% Injectable 12.5 Gram(s) IV Push once  dextrose 50% Injectable 25 Gram(s) IV Push once  dextrose Oral Gel 15 Gram(s) Oral once  gabapentin 100 milliGRAM(s) Oral three times a day  glucagon  Injectable 1 milliGRAM(s) IntraMuscular once  insulin lispro (ADMELOG) corrective regimen sliding scale   SubCutaneous at bedtime  insulin lispro (ADMELOG) corrective regimen sliding scale   SubCutaneous three times a day before meals  lactated ringers. 1000 milliLiter(s) (75 mL/Hr) IV Continuous <Continuous>  multivitamin 1 Tablet(s) Oral daily  pantoprazole    Tablet 40 milliGRAM(s) Oral before breakfast  polyethylene glycol 3350 17 Gram(s) Oral daily  predniSONE   Tablet   Oral   predniSONE   Tablet 40 milliGRAM(s) Oral daily  senna 2 Tablet(s) Oral at bedtime  vancomycin  IVPB 1000 milliGRAM(s) IV Intermittent once    MEDICATIONS  (PRN):  cyclobenzaprine 5 milliGRAM(s) Oral three times a day PRN Muscle Spasm  ondansetron Injectable 4 milliGRAM(s) IV Push every 6 hours PRN Nausea and/or Vomiting  oxyCODONE    IR 10 milliGRAM(s) Oral every 4 hours PRN Severe Pain (7 - 10)  oxyCODONE    IR 5 milliGRAM(s) Oral every 4 hours PRN Moderate Pain (4 - 6)  traMADol 50 milliGRAM(s) Oral every 6 hours PRN Mild Pain (1 - 3)        CAPILLARY BLOOD GLUCOSE      POCT Blood Glucose.: 245 mg/dL (18 May 2023 21:22)  POCT Blood Glucose.: 262 mg/dL (18 May 2023 18:40)  POCT Blood Glucose.: 432 mg/dL (18 May 2023 12:14)    I&O's Summary    17 May 2023 07:01  -  18 May 2023 07:00  --------------------------------------------------------  IN: 480 mL / OUT: 0 mL / NET: 480 mL    18 May 2023 07:01  -  18 May 2023 23:17  --------------------------------------------------------  IN: 240 mL / OUT: 550 mL / NET: -310 mL        T(C): 36.8 (05-18-23 @ 21:15), Max: 37.1 (05-18-23 @ 08:36)  HR: 83 (05-18-23 @ 21:15) (80 - 98)  BP: 108/67 (05-18-23 @ 21:15) (106/69 - 125/83)  RR: 18 (05-18-23 @ 21:15) (18 - 18)  SpO2: 100% (05-18-23 @ 21:15) (96% - 100%)    PHYSICAL EXAM:  GENERAL: NAD  NECK: Supple, No JVD  CHEST/LUNG: Clear to auscultation bilaterally; No wheezing.  HEART: Regular rate and rhythm; No murmurs, rubs, or gallops  ABDOMEN: Soft, Nontender, Nondistended; Bowel sounds present  EXTREMITIES:   No edema  NEUROLOGY: AAO X 3      LABS:                        9.2    14.37 )-----------( 371      ( 18 May 2023 11:22 )             30.0     05-18    134<L>  |  96  |  20  ----------------------------<  417<H>  4.3   |  25  |  0.66    Ca    9.2      18 May 2023 11:22              CAPILLARY BLOOD GLUCOSE      POCT Blood Glucose.: 245 mg/dL (18 May 2023 21:22)  POCT Blood Glucose.: 262 mg/dL (18 May 2023 18:40)  POCT Blood Glucose.: 432 mg/dL (18 May 2023 12:14)        RADIOLOGY & ADDITIONAL TESTS:    Imaging Personally Reviewed:    Consultant(s) Notes Reviewed:      Care Discussed with Consultants/Other Providers:    Kenan Vincent MD, CMD, FACP    [address] [place] [address]  Office Tel: [phone]  Cell: [phone]  
Day 1 of Anesthesia Pain Management Service    SUBJECTIVE: I'm doing great    Pain Scale Score:	[X] Refer to charted pain scores    THERAPY:    [ ] IV PCA Morphine		[ ] 5 mg/mL	[ ] 1 mg/mL  [X] IV PCA Hydromorphone	[ ] 5 mg/mL	[X] 1 mg/mL  [ ] IV PCA Fentanyl		[ ] 50 micrograms/mL    Demand dose: 0.2 mg     Lockout: 6 minutes   Continuous Rate: 0 mg/hr  4 Hour Limit: 4 mg    MEDICATIONS  (STANDING):  aspirin enteric coated 81 milliGRAM(s) Oral daily  ceFAZolin   IVPB 2000 milliGRAM(s) IV Intermittent every 8 hours  chlorhexidine 2% Cloths 1 Application(s) Topical once  dexAMETHasone  Injectable 5 milliGRAM(s) IV Push every 6 hours  dexAMETHasone  Injectable 3 milliGRAM(s) IV Push every 6 hours  dexAMETHasone  Injectable 1 milliGRAM(s) IV Push every 6 hours  HYDROmorphone PCA (1 mG/mL) 30 milliLiter(s) PCA Continuous PCA Continuous  polyethylene glycol 3350 17 Gram(s) Oral at bedtime  senna 2 Tablet(s) Oral at bedtime  simvastatin 20 milliGRAM(s) Oral at bedtime  vancomycin  IVPB 1000 milliGRAM(s) IV Intermittent once    MEDICATIONS  (PRN):  aluminum hydroxide/magnesium hydroxide/simethicone Suspension 30 milliLiter(s) Oral every 12 hours PRN Indigestion  bisacodyl 5 milliGRAM(s) Oral every 12 hours PRN Constipation  HYDROmorphone PCA (1 mG/mL) Rescue Clinician Bolus 0.5 milliGRAM(s) IV Push every 15 minutes PRN for Pain Scale GREATER THAN 6  magnesium hydroxide Suspension 30 milliLiter(s) Oral every 12 hours PRN Constipation  naloxone Injectable 0.1 milliGRAM(s) IV Push every 3 minutes PRN For ANY of the following changes in patient status:  A. RR LESS THAN 10 breaths per minute, B. Oxygen saturation LESS THAN 90%, C. Sedation score of 6  ondansetron Injectable 4 milliGRAM(s) IV Push every 6 hours PRN Nausea and/or Vomiting      OBJECTIVE:    Sedation Score:	[ X] Alert 	[ ] Drowsy 	[ ] Arousable	[ ] Asleep	[ ] Unresponsive    Side Effects:	[X ] None	[ ] Nausea	[ ] Vomiting	[ ] Pruritus  		[ ] Other:    Vital Signs Last 24 Hrs  T(C): 36.7 (11 May 2023 08:14), Max: 36.9 (10 May 2023 09:20)  T(F): 98 (11 May 2023 08:14), Max: 98.5 (11 May 2023 00:45)  HR: 80 (11 May 2023 08:14) (67 - 100)  BP: 108/65 (11 May 2023 08:14) (99/62 - 141/123)  BP(mean): 81 (10 May 2023 23:00) (81 - 129)  RR: 18 (11 May 2023 08:14) (14 - 18)  SpO2: 96% (11 May 2023 08:14) (96% - 100%)    Parameters below as of 11 May 2023 08:14  Patient On (Oxygen Delivery Method): room air        ASSESSMENT/ PLAN    Therapy to  be:               [X] Continued   [ ] Discontinued   [ ] Changed to PRN Analgesics    Documentation and Verification of current medications:   [X] Done	[ ] Not done, not eligible    Comments: Feels good, endorsing good analgesia with PCA. Total PCA use 4.4mg / 10.5hours.  Stage 2 planned for next week.  Transition to po analgesia later today or tomorrow.
Day 2 of Anesthesia Pain Management Service    SUBJECTIVE: Doing well    Pain Scale Score:	[X] Refer to charted pain scores    THERAPY:    [ ] IV PCA Morphine		        [ ] 5 mg/mL	[ ] 1 mg/mL  [X] IV PCA Hydromorphone	[ ] 5 mg/mL	[X] 1 mg/mL  [ ] IV PCA Fentanyl		        [ ] 50 micrograms/mL    Demand dose: 0.2 mg     Lockout: 6 minutes   Continuous Rate: 0 mg/hr  4 Hour Limit: 4 mg    MEDICATIONS  (STANDING):  aspirin enteric coated 81 milliGRAM(s) Oral daily  chlorhexidine 2% Cloths 1 Application(s) Topical once  HYDROmorphone PCA (1 mG/mL) 30 milliLiter(s) PCA Continuous PCA Continuous  pantoprazole    Tablet 40 milliGRAM(s) Oral before breakfast  polyethylene glycol 3350 17 Gram(s) Oral at bedtime  predniSONE   Tablet 40 milliGRAM(s) Oral daily  predniSONE   Tablet   Oral   senna 2 Tablet(s) Oral at bedtime  simvastatin 20 milliGRAM(s) Oral at bedtime  vancomycin  IVPB 1000 milliGRAM(s) IV Intermittent once    MEDICATIONS  (PRN):  aluminum hydroxide/magnesium hydroxide/simethicone Suspension 30 milliLiter(s) Oral every 12 hours PRN Indigestion  bisacodyl 5 milliGRAM(s) Oral every 12 hours PRN Constipation  cyclobenzaprine 10 milliGRAM(s) Oral three times a day PRN Muscle Spasm  HYDROmorphone PCA (1 mG/mL) Rescue Clinician Bolus 0.5 milliGRAM(s) IV Push every 15 minutes PRN for Pain Scale GREATER THAN 6  magnesium hydroxide Suspension 30 milliLiter(s) Oral every 12 hours PRN Constipation  naloxone Injectable 0.1 milliGRAM(s) IV Push every 3 minutes PRN For ANY of the following changes in patient status:  A. RR LESS THAN 10 breaths per minute, B. Oxygen saturation LESS THAN 90%, C. Sedation score of 6  ondansetron Injectable 4 milliGRAM(s) IV Push every 6 hours PRN Nausea and/or Vomiting  simethicone 80 milliGRAM(s) Chew daily PRN Gas      OBJECTIVE:    Sedation Score:	[ X] Alert	 [ ] Drowsy 	[ ] Arousable	[ ] Asleep	[ ] Unresponsive    Side Effects:	[X ] None	[ ] Nausea	[ ] Vomiting	[ ] Pruritus  		[ ] Other:    Vital Signs Last 24 Hrs  T(C): 36.9 (12 May 2023 08:32), Max: 36.9 (11 May 2023 20:33)  T(F): 98.5 (12 May 2023 08:32), Max: 98.5 (12 May 2023 08:32)  HR: 71 (12 May 2023 08:32) (68 - 82)  BP: 107/70 (12 May 2023 08:32) (100/65 - 132/79)  BP(mean): --  RR: 18 (12 May 2023 08:32) (18 - 18)  SpO2: 99% (12 May 2023 08:32) (96% - 99%)    Parameters below as of 12 May 2023 08:32  Patient On (Oxygen Delivery Method): room air        ASSESSMENT/ PLAN    Therapy to  be:               [  ] Continued   [X ] Discontinued   [ X] Changed to PRN Analgesics    Documentation and Verification of current medications:   [X] Done	[ ] Not done, not eligible    Comments: OOB in chair. D\C PCA and transition to prn analgesics. Planning for Stage 2 next week
Orthopaedic Surgery Progress Note     Subjective     Patient seen and examined at bedside. Patient resting comfortably on morning rounds. Pt doing generally well.    Pain well controlled with medication.       Gen: NAD  Resp: Non labored breathing  Spine:  Dressing clean/dry/intact  negative Argueta, babinski, clonus      Motor:              C5(Del/Bi)         C6(EF/WE)           C7 (EE/WF)           C8 (FDS)           T1 (FAbd)  R            5/5                    5/5                        5/5                           5/5                   5/5  L             5/5                    5/5                        5/5                            5/5                  5/5                L2 (IP)            L3 (Quad)            L4 (TA)               L5 (EHL)            S1(Gas)        S2(FHL)  R           5/5                       5/5                 5/5                       5/5                    5/5                5/5  L           5/5                       5/5                 5/5                       5/5                    5/5                5/5    Sensory:            C5         C6         C7      C8       T1        (0=absent, 1=impaired, 2=normal, NT=not testable)  R         2            2           2        2         2  L          2            2           2        2         2               L2          L3         L4      L5       S1         (0=absent, 1=impaired, 2=normal, NT=not testable)  R         2            2            2        2        2  L          2            2           2        2         2    Extremities warm and well perfused bilaterally           
Orthopaedic Surgery Progress Note     Subjective     Patient seen and examined at bedside. Patient resting comfortably on morning rounds. Pt doing generally well.    Pain well controlled with medication. Happy that her left leg is stronger since the first surgery.       Physical Exam:  Vital Signs Last 24 Hrs  T(C): 36.8 (18 May 2023 04:52), Max: 36.8 (17 May 2023 08:40)  T(F): 98.2 (18 May 2023 04:52), Max: 98.3 (17 May 2023 08:40)  HR: 80 (18 May 2023 04:52) (80 - 94)  BP: 106/69 (18 May 2023 04:52) (106/69 - 129/82)  BP(mean): --  RR: 18 (18 May 2023 04:52) (18 - 18)  SpO2: 99% (18 May 2023 04:52) (95% - 99%)    Parameters below as of 18 May 2023 04:52  Patient On (Oxygen Delivery Method): room air        Gen: NAD  Resp: Non labored breathing  Spine:  Dressing clean/dry/intact      Motor:              C5(Del/Bi)         C6(EF/WE)           C7 (EE/WF)           C8 (FDS)           T1 (FAbd)  R            5/5                    5/5                        5/5                           5/5                   5/5  L             5/5                    5/5                        5/5                            5/5                  5/5                L2 (IP)            L3 (Quad)            L4 (TA)               L5 (EHL)            S1(Gas)        S2(FHL)  R           5/5                       5/5                 5/5                       5/5                    5/5                5/5  L           5/5                       5/5                 5/5                       5/5                    5/5                5/5    Sensory:            C5         C6         C7      C8       T1        (0=absent, 1=impaired, 2=normal, NT=not testable)  R         2            2           2        2         2  L          2            2           2        2         2               L2          L3         L4      L5       S1         (0=absent, 1=impaired, 2=normal, NT=not testable)  R         2            2            2        2        2  L          2            2           2        2         2    Extremities warm and well perfused bilaterally     LABS:                        9.0    14.42 )-----------( 339      ( 17 May 2023 10:27 )             28.4     05-17    137  |  97  |  15  ----------------------------<  298<H>  3.8   |  26  |  0.63    Ca    8.9      17 May 2023 10:27              
POST OPERATIVE DAY #:  [4]     Patient Resting without complaints /events overnight.  T(C): 37 (05-14-23 @ 04:22), Max: 37.1 (05-13-23 @ 20:55)  HR: 87 (05-14-23 @ 04:22) (80 - 87)  BP: 108/73 (05-14-23 @ 04:22) (102/68 - 122/79)  RR: 18 (05-14-23 @ 04:22) (18 - 18)  SpO2: 97% (05-14-23 @ 04:22) (94% - 99%)    Exam:   Alert/Oriented, No Acute Distress             Dressing: Aquacel on Abdomen c/d/i                   Sensation: intact to light touch to BLE          Motor exam:                            Lower extremity           PF          DF         EHL       FHL                                                                               R        5/5        5/5        5/5       5/5                                           L         5/5        5/5        5/5       5/5                                                                  Calves Soft/Non-tender bilaterally          +DP pulses             LABS:                        11.5   14.71 )-----------( 283      ( 14 May 2023 06:24 )             35.7     05-14    139  |  96  |  22  ----------------------------<  132<H>  3.8   |  30  |  0.68    Ca    9.8      14 May 2023 06:23        
POST OPERATIVE DAY #:  [4]     Patient complaining of some abdominal pain. States she is eating/drinking fairly. Had a small BM yesterday.   VSurg resident at bedside    States sensation has improved since surgery. Some intermittent numbness and left anterior thigh      Vital Signs Last 24 Hrs  T(C): 37 (15 May 2023 05:06), Max: 37.1 (14 May 2023 16:17)  T(F): 98.6 (15 May 2023 05:06), Max: 98.7 (14 May 2023 16:17)  HR: 99 (15 May 2023 05:06) (82 - 99)  BP: 97/64 (15 May 2023 05:06) (97/64 - 113/76)  BP(mean): --  RR: 18 (15 May 2023 05:06) (18 - 18)  SpO2: 97% (15 May 2023 05:06) (96% - 98%)    Parameters below as of 15 May 2023 05:06  Patient On (Oxygen Delivery Method): room air        Exam:   Alert/Oriented, No Acute Distress    Abdomen:         Tender, soft, non distended         Dressing: Aquacel on Abdomen c/d/i                  Sensation: intact to light touch to BLE          Motor exam:                            Lower extremity           PF          DF         EHL       FHL                                                                               R        5/5        5/5        5/5       5/5                                           L         5/5        5/5        5/5       5/5                                                                  Calves Soft/Non-tender bilaterally          +DP pulses             LABS:                        11.5   14.71 )-----------( 283      ( 14 May 2023 06:24 )             35.7     05-14    139  |  96  |  22  ----------------------------<  132<H>  3.8   |  30  |  0.68    Ca    9.8      14 May 2023 06:23        
Pain Management Attending Addendum    SUBJECTIVE: Patient doing well with IV PCA    Therapy:    [X] IV PCA         [ ] PRN Analgesics    OBJECTIVE:   [X] Pain appropriately controlled    [ ] Other:    Side Effects:  [X] None	             [ ] Nausea              [ ] Pruritis                	[ ] Other:    ASSESSMENT/PLAN: Continue current therapy    Comments:
Patient is a 47y old  Female who presents with a chief complaint of S/P Stage 1 L4-L5 ALIF (15 May 2023 06:33)      SUBJECTIVE / OVERNIGHT EVENTS:    Events noted.  CONSTITUTIONAL: No fever,  or fatigue  RESPIRATORY: No cough, wheezing,  No shortness of breath  CARDIOVASCULAR: No chest pain, palpitations, dizziness, or leg swelling  GASTROINTESTINAL: No abdominal or epigastric pain. No nausea, vomiting.  NEUROLOGICAL: No headache    MEDICATIONS  (STANDING):  aspirin enteric coated 81 milliGRAM(s) Oral daily  chlorhexidine 2% Cloths 1 Application(s) Topical once  pantoprazole    Tablet 40 milliGRAM(s) Oral before breakfast  polyethylene glycol 3350 17 Gram(s) Oral two times a day  predniSONE   Tablet 30 milliGRAM(s) Oral daily  predniSONE   Tablet   Oral   senna 2 Tablet(s) Oral at bedtime  simvastatin 20 milliGRAM(s) Oral at bedtime  vancomycin  IVPB 1000 milliGRAM(s) IV Intermittent once    MEDICATIONS  (PRN):  bisacodyl Suppository 10 milliGRAM(s) Rectal at bedtime PRN Constipation  cyclobenzaprine 10 milliGRAM(s) Oral three times a day PRN Muscle Spasm  magnesium hydroxide Suspension 30 milliLiter(s) Oral every 12 hours PRN Constipation  ondansetron Injectable 4 milliGRAM(s) IV Push every 6 hours PRN Nausea and/or Vomiting  oxyCODONE    IR 5 milliGRAM(s) Oral every 3 hours PRN Moderate Pain (4 - 6)  simethicone 80 milliGRAM(s) Chew daily PRN Gas        CAPILLARY BLOOD GLUCOSE        I&O's Summary    14 May 2023 07:01  -  15 May 2023 07:00  --------------------------------------------------------  IN: 980 mL / OUT: 0 mL / NET: 980 mL        T(C): 36.4 (05-15-23 @ 08:02), Max: 37.1 (05-14-23 @ 16:17)  HR: 83 (05-15-23 @ 08:02) (83 - 99)  BP: 107/71 (05-15-23 @ 08:02) (97/64 - 113/76)  RR: 18 (05-15-23 @ 08:02) (18 - 18)  SpO2: 97% (05-15-23 @ 08:02) (97% - 98%)    PHYSICAL EXAM:  GENERAL: NAD  NECK: Supple, No JVD  CHEST/LUNG: Clear to auscultation bilaterally; No wheezing.  HEART: Regular rate and rhythm; No murmurs, rubs, or gallops  ABDOMEN: Soft, Nontender, Nondistended; Bowel sounds present  EXTREMITIES:   No edema  NEUROLOGY: AAO X 3      LABS:                        11.1   15.17 )-----------( 321      ( 15 May 2023 07:11 )             35.6     05-15    139  |  96  |  28<H>  ----------------------------<  147<H>  3.5   |  29  |  0.74    Ca    9.8      15 May 2023 07:10              CAPILLARY BLOOD GLUCOSE            RADIOLOGY & ADDITIONAL TESTS:    Imaging Personally Reviewed:    Consultant(s) Notes Reviewed:      Care Discussed with Consultants/Other Providers:    Kenan Vincent MD, CMD, FACP    [address] [place] [address]  Office Tel: [phone]  Cell: [phone]  
Patient is a 47y old  Female who presents with a chief complaint of S/P Stage 1 L4-L5 ALIF (16 May 2023 06:28)      SUBJECTIVE / OVERNIGHT EVENTS:    Events noted.  CONSTITUTIONAL: No fever,  or fatigue  RESPIRATORY: No cough, wheezing,  No shortness of breath  CARDIOVASCULAR: No chest pain, palpitations, dizziness, or leg swelling  GASTROINTESTINAL: No abdominal or epigastric pain. No nausea, vomiting.      MEDICATIONS  (STANDING):  acetaminophen     Tablet .. 975 milliGRAM(s) Oral every 8 hours  chlorhexidine 2% Cloths 1 Application(s) Topical once  gabapentin 100 milliGRAM(s) Oral three times a day  lactated ringers. 1000 milliLiter(s) (75 mL/Hr) IV Continuous <Continuous>  multivitamin 1 Tablet(s) Oral daily  pantoprazole    Tablet 40 milliGRAM(s) Oral before breakfast  polyethylene glycol 3350 17 Gram(s) Oral daily  senna 2 Tablet(s) Oral at bedtime  vancomycin  IVPB 1000 milliGRAM(s) IV Intermittent once    MEDICATIONS  (PRN):  cyclobenzaprine 5 milliGRAM(s) Oral three times a day PRN Muscle Spasm  ondansetron Injectable 4 milliGRAM(s) IV Push every 6 hours PRN Nausea and/or Vomiting  oxyCODONE    IR 10 milliGRAM(s) Oral every 4 hours PRN Severe Pain (7 - 10)  oxyCODONE    IR 5 milliGRAM(s) Oral every 4 hours PRN Moderate Pain (4 - 6)  traMADol 50 milliGRAM(s) Oral every 6 hours PRN Mild Pain (1 - 3)        CAPILLARY BLOOD GLUCOSE        I&O's Summary    16 May 2023 07:01  -  17 May 2023 07:00  --------------------------------------------------------  IN: 1040 mL / OUT: 400 mL / NET: 640 mL    17 May 2023 07:01  -  17 May 2023 22:50  --------------------------------------------------------  IN: 480 mL / OUT: 0 mL / NET: 480 mL        T(C): 36.7 (05-17-23 @ 20:54), Max: 36.8 (05-17-23 @ 08:40)  HR: 94 (05-17-23 @ 20:54) (75 - 98)  BP: 129/82 (05-17-23 @ 20:54) (107/68 - 142/80)  RR: 18 (05-17-23 @ 20:54) (16 - 18)  SpO2: 97% (05-17-23 @ 20:54) (95% - 99%)    PHYSICAL EXAM:    NECK: Supple, No JVD  CHEST/LUNG: Clear to auscultation bilaterally; No wheezing.  HEART: Regular rate and rhythm; No murmurs, rubs, or gallops  ABDOMEN: Soft, Nontender, Nondistended; Bowel sounds present  EXTREMITIES:   No edema  NEUROLOGY: AAO X 3      LABS:                        9.0    14.42 )-----------( 339      ( 17 May 2023 10:27 )             28.4     05-17    137  |  97  |  15  ----------------------------<  298<H>  3.8   |  26  |  0.63    Ca    8.9      17 May 2023 10:27      PT/INR - ( 16 May 2023 05:21 )   PT: 11.6 sec;   INR: 1.00 ratio         PTT - ( 16 May 2023 05:21 )  PTT:29.4 sec        CAPILLARY BLOOD GLUCOSE            RADIOLOGY & ADDITIONAL TESTS:    Imaging Personally Reviewed:    Consultant(s) Notes Reviewed:      Care Discussed with Consultants/Other Providers:    Kenan Vincent MD, CMD, FACP    [address] [place] [address]  Office Tel: [phone]  Cell: [phone]  
TEAM VASCULAR Surgery Daily Progress Note  =====================================================    SUBJECTIVE: Patient seen and examined at bedside on AM rounds. Patient reports not feeling great and having a lot of surgical site pain and chills. +/-    --------------------------------------------------------------------------------------  OBJECTIVE:    VITAL SIGNS:  Vital Signs Last 24 Hrs  T(C): 36.4 (13 May 2023 04:46), Max: 37 (13 May 2023 01:07)  T(F): 97.5 (13 May 2023 04:46), Max: 98.6 (13 May 2023 01:07)  HR: 83 (13 May 2023 04:46) (71 - 95)  BP: 109/75 (13 May 2023 04:46) (101/66 - 115/79)  BP(mean): --  RR: 18 (13 May 2023 04:46) (18 - 18)  SpO2: 96% (13 May 2023 04:46) (95% - 99%)    Parameters below as of 13 May 2023 04:46  Patient On (Oxygen Delivery Method): room air      --------------------------------------------------------------------------------------    EXAM:  Gen: NAD  Resp: nonlabored on RA  Abd: surgical site hemostatic with aquacel, no strikethrough, abdomen soft, mildly distended and tender only around incision  Neuro: A&Ox3  Vascular: Palp pulses b/l    --------------------------------------------------------------------------------------    LABS:                        10.6   16.23 )-----------( 265      ( 12 May 2023 06:59 )             34.3     05-12    140  |  101  |  18  ----------------------------<  163<H>  4.1   |  27  |  0.61    Ca    9.6      12 May 2023 06:58      --------------------------------------------------------------------------------------    INS AND OUTS:    05-12-23 @ 07:01  -  05-13-23 @ 07:00  --------------------------------------------------------  IN: 220 mL / OUT: 0 mL / NET: 220 mL      --------------------------------------------------------------------------------------    MEDICATIONS:  MEDICATIONS  (STANDING):  aspirin enteric coated 81 milliGRAM(s) Oral daily  chlorhexidine 2% Cloths 1 Application(s) Topical once  pantoprazole    Tablet 40 milliGRAM(s) Oral before breakfast  polyethylene glycol 3350 17 Gram(s) Oral at bedtime  predniSONE   Tablet   Oral   predniSONE   Tablet 40 milliGRAM(s) Oral daily  senna 2 Tablet(s) Oral at bedtime  simvastatin 20 milliGRAM(s) Oral at bedtime  vancomycin  IVPB 1000 milliGRAM(s) IV Intermittent once    MEDICATIONS  (PRN):  aluminum hydroxide/magnesium hydroxide/simethicone Suspension 30 milliLiter(s) Oral every 12 hours PRN Indigestion  bisacodyl 5 milliGRAM(s) Oral every 12 hours PRN Constipation  cyclobenzaprine 10 milliGRAM(s) Oral three times a day PRN Muscle Spasm  magnesium hydroxide Suspension 30 milliLiter(s) Oral every 12 hours PRN Constipation  ondansetron Injectable 4 milliGRAM(s) IV Push every 6 hours PRN Nausea and/or Vomiting  oxyCODONE    IR 5 milliGRAM(s) Oral every 3 hours PRN Moderate Pain (4 - 6)  simethicone 80 milliGRAM(s) Chew daily PRN Gas    --------------------------------------------------------------------------------------  
ORTHO SPINE PROGRESS NOTE    Patient Resting without complaints /events overnight.  Patient is status post ALIF 5/10, PSF 5/16 L4-S1  Patient remains in house due to stress hyperglycemia, being managed by endocrinology. BS improving on current regimen.     T(C): 36.8 (05-20-23 @ 03:23), Max: 37.7 (05-19-23 @ 09:08)  HR: 91 (05-20-23 @ 03:23) (89 - 99)  BP: 108/72 (05-20-23 @ 03:23) (106/70 - 113/74)  RR: 18 (05-20-23 @ 03:23) (18 - 18)  SpO2: 99% (05-20-23 @ 03:23) (98% - 99%)  Wt(kg): --  Exam:   Alert/Oriented, No Acute Distress           Dressing: clean/dry/intact          Drains: n/a         Sensation:  intact to light touch           Motor exam:          Upper extremity                          Bi          Tri        Delt                                                    R         5/5        5/5        5/5                                               L          5/5        5/5        5/5                 Lower extremity                          PF          DF         EHL       FHL                                                                                            R        5/5        5/5        5/5       5/5                                                        L         5/5        5/5        5/5       5/5                                                                  Calves Soft/Non-tender bilaterally          +DP pulses             LABS:                        9.2    14.37 )-----------( 371      ( 18 May 2023 11:22 )             30.0     05-20    137  |  96  |  20  ----------------------------<  133<H>  3.7   |  31  |  0.52    Ca    9.6      20 May 2023 06:17        A/P :  47y Female s/p ALIF/PSF L4-S1  -    Pain control  -    Taper  -    DVT ppx: SCDs       -    Physical Therapy  -    Weight bearing status: WBAT  -    Continue monitoring BS - fu endocrinology for final dispo recs.   -    Dispo to home pending endo clearance    Roxana Mandujano PA-C  Orthopedic Surgery  Team Pager: #[phone]
ORTHOPAEDICS DAILY PROGRESS NOTE:       SUBJECTIVE/ROS: POD 1. Seen and examined. Pain well controlled. Needs to work w PT and get OOB. Denies CP/SOB/N/V. Denies numbness         MEDICATIONS  (STANDING):  acetaminophen     Tablet .. 975 milliGRAM(s) Oral every 8 hours  ceFAZolin   IVPB 2000 milliGRAM(s) IV Intermittent every 8 hours  chlorhexidine 2% Cloths 1 Application(s) Topical once  dexAMETHasone  Injectable 5 milliGRAM(s) IV Push every 6 hours  gabapentin 100 milliGRAM(s) Oral three times a day  lactated ringers. 1000 milliLiter(s) (75 mL/Hr) IV Continuous <Continuous>  multivitamin 1 Tablet(s) Oral daily  pantoprazole    Tablet 40 milliGRAM(s) Oral before breakfast  polyethylene glycol 3350 17 Gram(s) Oral daily  senna 2 Tablet(s) Oral at bedtime  vancomycin  IVPB 1000 milliGRAM(s) IV Intermittent once    MEDICATIONS  (PRN):  cyclobenzaprine 5 milliGRAM(s) Oral three times a day PRN Muscle Spasm  ondansetron Injectable 4 milliGRAM(s) IV Push every 6 hours PRN Nausea and/or Vomiting  oxyCODONE    IR 10 milliGRAM(s) Oral every 4 hours PRN Severe Pain (7 - 10)  oxyCODONE    IR 5 milliGRAM(s) Oral every 4 hours PRN Moderate Pain (4 - 6)  traMADol 50 milliGRAM(s) Oral every 6 hours PRN Mild Pain (1 - 3)      OBJECTIVE:    Vital Signs Last 24 Hrs  T(C): 36.7 (17 May 2023 04:49), Max: 36.9 (16 May 2023 09:37)  T(F): 98 (17 May 2023 04:49), Max: 98.5 (16 May 2023 09:37)  HR: 88 (17 May 2023 04:49) (75 - 100)  BP: 110/74 (17 May 2023 04:49) (102/67 - 149/85)  BP(mean): 102 (16 May 2023 23:00) (96 - 108)  RR: 18 (17 May 2023 04:49) (16 - 18)  SpO2: 99% (17 May 2023 04:49) (96% - 100%)    Parameters below as of 17 May 2023 04:49  Patient On (Oxygen Delivery Method): room air        I&O's Detail    15 May 2023 07:01  -  16 May 2023 07:00  --------------------------------------------------------  IN:    Oral Fluid: 640 mL  Total IN: 640 mL    OUT:  Total OUT: 0 mL    Total NET: 640 mL      16 May 2023 07:01  -  17 May 2023 06:27  --------------------------------------------------------  IN:    Lactated Ringers: 300 mL    Lactated Ringers Bolus: 500 mL    Oral Fluid: 240 mL  Total IN: 1040 mL    OUT:    Voided (mL): 400 mL  Total OUT: 400 mL    Total NET: 640 mL          Daily Height in cm: 170.2 (16 May 2023 14:28)    Daily     LABS:                        9.8    18.98 )-----------( 311      ( 16 May 2023 21:34 )             30.9     05-16    136  |  98  |  17  ----------------------------<  190<H>  4.4   |  23  |  0.54    Ca    9.0      16 May 2023 21:34      PT/INR - ( 16 May 2023 05:21 )   PT: 11.6 sec;   INR: 1.00 ratio         PTT - ( 16 May 2023 05:21 )  PTT:29.4 sec              PHYSICAL EXAM:  nad  nonlabored resp  Spine PE:    Motor:                   C5                C6              C7               C8           T1   R            5/5                5/5            5/5             5/5          5/5  L             5/5               5/5             5/5             5/5          5/5                L2             L3             L4               L5            S1  R         5/5           5/5          5/5             5/5           5/5  L          5/5          5/5           5/5             5/5           5/5    Sensory:            C5         C6         C7      C8       T1        (0=absent, 1=impaired, 2=normal, NT=not testable)  R         2            2           2        2         2  L          2            2           2        2         2               L2          L3         L4      L5       S1         (0=absent, 1=impaired, 2=normal, NT=not testable)  R         2            2            2        2        2  L          2            2           2        2         2  
Patient is a 47y old  Female who presents with a chief complaint of Lumbar fusion (18 May 2023 16:25)      SUBJECTIVE / OVERNIGHT EVENTS:    Events noted.  CONSTITUTIONAL: No fever,  or fatigue  RESPIRATORY: No cough, wheezing,  No shortness of breath  CARDIOVASCULAR: No chest pain, palpitations, dizziness, or leg swelling  GASTROINTESTINAL: No abdominal or epigastric pain.      MEDICATIONS  (STANDING):  acetaminophen     Tablet .. 975 milliGRAM(s) Oral every 8 hours  chlorhexidine 2% Cloths 1 Application(s) Topical once  dextrose 50% Injectable 25 Gram(s) IV Push once  dextrose 50% Injectable 12.5 Gram(s) IV Push once  dextrose 50% Injectable 25 Gram(s) IV Push once  dextrose Oral Gel 15 Gram(s) Oral once  gabapentin 100 milliGRAM(s) Oral three times a day  glucagon  Injectable 1 milliGRAM(s) IntraMuscular once  insulin lispro (ADMELOG) corrective regimen sliding scale   SubCutaneous at bedtime  insulin lispro (ADMELOG) corrective regimen sliding scale   SubCutaneous three times a day before meals  lactated ringers. 1000 milliLiter(s) (75 mL/Hr) IV Continuous <Continuous>  multivitamin 1 Tablet(s) Oral daily  pantoprazole    Tablet 40 milliGRAM(s) Oral before breakfast  polyethylene glycol 3350 17 Gram(s) Oral daily  predniSONE   Tablet   Oral   predniSONE   Tablet 40 milliGRAM(s) Oral daily  senna 2 Tablet(s) Oral at bedtime  vancomycin  IVPB 1000 milliGRAM(s) IV Intermittent once    MEDICATIONS  (PRN):  cyclobenzaprine 5 milliGRAM(s) Oral three times a day PRN Muscle Spasm  ondansetron Injectable 4 milliGRAM(s) IV Push every 6 hours PRN Nausea and/or Vomiting  oxyCODONE    IR 10 milliGRAM(s) Oral every 4 hours PRN Severe Pain (7 - 10)  oxyCODONE    IR 5 milliGRAM(s) Oral every 4 hours PRN Moderate Pain (4 - 6)  traMADol 50 milliGRAM(s) Oral every 6 hours PRN Mild Pain (1 - 3)        CAPILLARY BLOOD GLUCOSE      POCT Blood Glucose.: 245 mg/dL (18 May 2023 21:22)  POCT Blood Glucose.: 262 mg/dL (18 May 2023 18:40)  POCT Blood Glucose.: 432 mg/dL (18 May 2023 12:14)    I&O's Summary    17 May 2023 07:01  -  18 May 2023 07:00  --------------------------------------------------------  IN: 480 mL / OUT: 0 mL / NET: 480 mL    18 May 2023 07:01  -  18 May 2023 23:17  --------------------------------------------------------  IN: 240 mL / OUT: 550 mL / NET: -310 mL        T(C): 36.8 (05-18-23 @ 21:15), Max: 37.1 (05-18-23 @ 08:36)  HR: 83 (05-18-23 @ 21:15) (80 - 98)  BP: 108/67 (05-18-23 @ 21:15) (106/69 - 125/83)  RR: 18 (05-18-23 @ 21:15) (18 - 18)  SpO2: 100% (05-18-23 @ 21:15) (96% - 100%)    PHYSICAL EXAM:  GENERAL: NAD  NECK: Supple, No JVD  CHEST/LUNG: Clear to auscultation bilaterally; No wheezing.  HEART: Regular rate and rhythm; No murmurs, rubs, or gallops  ABDOMEN: Soft, Nontender, Nondistended; Bowel sounds present  EXTREMITIES:   No edema  NEUROLOGY: AAO X 3      LABS:                        9.2    14.37 )-----------( 371      ( 18 May 2023 11:22 )             30.0     05-18    134<L>  |  96  |  20  ----------------------------<  417<H>  4.3   |  25  |  0.66    Ca    9.2      18 May 2023 11:22              CAPILLARY BLOOD GLUCOSE      POCT Blood Glucose.: 245 mg/dL (18 May 2023 21:22)  POCT Blood Glucose.: 262 mg/dL (18 May 2023 18:40)  POCT Blood Glucose.: 432 mg/dL (18 May 2023 12:14)        RADIOLOGY & ADDITIONAL TESTS:    Imaging Personally Reviewed:    Consultant(s) Notes Reviewed:      Care Discussed with Consultants/Other Providers:    Kenan Vincent MD, CMD, FACP    [address] [place] [address]  Office Tel: [phone]  Cell: [phone]  
Postop Check    Patient tolerated the procedure well. Patient seen and examined at bedside. Endorses left antecubital fossa pain. No other acute complaints at this time. Denies new weakness, numbness or tingling. Denies chest pain, shortness of breath, nausea or vomiting.     PE:  General: NAD, resting comforatbly in bed  Dressing C/D/I  2+ DP Pulses  Negative babinski, clonus, briseno    Motor:                   C5                C6              C7               C8           T1   R             5/5                5/5            5/5              5/5          5/5  L             5/5                5/5            5/5              5/5          5/5                    L2                  L3             L4              L5            S1  R            4/5*                5/5             5/5            5/5          5/5  L             4/5*                5/5            5/5            5/5          5/5    *likely 2/2 postop pain     Sensory:            C5         C6         C7      C8       T1        (0=absent, 1=impaired, 2=normal, NT=not testable)  R         2            2           2        2         2  L          2            2           2        2         2               L2          L3         L4      L5       S1         (0=absent, 1=impaired, 2=normal, NT=not testable)  R         2            2            2        2        2  L          2            1           1        1         2                A/P:  47y F s/p L4-S1 ALIF POD 0  -PT/OT   -WBAT  -LSO for comfort  -Pain Control: PCA followed by oxy+flexeril once DC'd  -decadron taper  -remove Alonzo on POD 1 after ambulating with PT  -DVT ppx with SCDs given recent spine surgery  -Continue perioperative abx x 24 hours  -FU AM Labs  -Incentive Spirometry  -Medical comanagement appreciated, Dr. Vincent consulted  -dispo pending PT eval
TEAM VASCULAR Surgery Daily Progress Note  =====================================================    SUBJECTIVE: Patient seen and examined at bedside on AM rounds. NAD. Pain well controlled with PCA    --------------------------------------------------------------------------------------  OBJECTIVE:    VITAL SIGNS:  Vital Signs Last 24 Hrs  T(C): 36.7 (11 May 2023 05:26), Max: 36.9 (10 May 2023 09:20)  T(F): 98.1 (11 May 2023 05:26), Max: 98.5 (11 May 2023 00:45)  HR: 72 (11 May 2023 05:26) (67 - 100)  BP: 107/69 (11 May 2023 05:26) (99/62 - 141/123)  BP(mean): 81 (10 May 2023 23:00) (81 - 129)  RR: 18 (11 May 2023 05:26) (14 - 18)  SpO2: 96% (11 May 2023 05:26) (96% - 100%)    Parameters below as of 11 May 2023 05:26  Patient On (Oxygen Delivery Method): room air      --------------------------------------------------------------------------------------    EXAM:  Gen: NAD  Resp: nonlabored on RA  Abd: surgical site hemostatic with aquacel, no strikethrough, abdomen soft, mildly distended and tender only around incision  Neuro: A&Ox3    --------------------------------------------------------------------------------------    LABS:                        11.0   13.98 )-----------( 248      ( 11 May 2023 07:14 )             35.2     05-11    141  |  101  |  9   ----------------------------<  135<H>  4.1   |  25  |  0.58    Ca    8.9      11 May 2023 07:15      --------------------------------------------------------------------------------------    INS AND OUTS:    05-10-23 @ 07:01  -  05-11-23 @ 07:00  --------------------------------------------------------  IN: 560 mL / OUT: 1500 mL / NET: -940 mL      --------------------------------------------------------------------------------------    MEDICATIONS:  MEDICATIONS  (STANDING):  aspirin enteric coated 81 milliGRAM(s) Oral daily  ceFAZolin   IVPB 2000 milliGRAM(s) IV Intermittent every 8 hours  chlorhexidine 2% Cloths 1 Application(s) Topical once  dexAMETHasone  Injectable 5 milliGRAM(s) IV Push every 6 hours  dexAMETHasone  Injectable 3 milliGRAM(s) IV Push every 6 hours  dexAMETHasone  Injectable 1 milliGRAM(s) IV Push every 6 hours  HYDROmorphone PCA (1 mG/mL) 30 milliLiter(s) PCA Continuous PCA Continuous  polyethylene glycol 3350 17 Gram(s) Oral at bedtime  senna 2 Tablet(s) Oral at bedtime  simvastatin 20 milliGRAM(s) Oral at bedtime  vancomycin  IVPB 1000 milliGRAM(s) IV Intermittent once    MEDICATIONS  (PRN):  aluminum hydroxide/magnesium hydroxide/simethicone Suspension 30 milliLiter(s) Oral every 12 hours PRN Indigestion  bisacodyl 5 milliGRAM(s) Oral every 12 hours PRN Constipation  HYDROmorphone PCA (1 mG/mL) Rescue Clinician Bolus 0.5 milliGRAM(s) IV Push every 15 minutes PRN for Pain Scale GREATER THAN 6  magnesium hydroxide Suspension 30 milliLiter(s) Oral every 12 hours PRN Constipation  naloxone Injectable 0.1 milliGRAM(s) IV Push every 3 minutes PRN For ANY of the following changes in patient status:  A. RR LESS THAN 10 breaths per minute, B. Oxygen saturation LESS THAN 90%, C. Sedation score of 6  ondansetron Injectable 4 milliGRAM(s) IV Push every 6 hours PRN Nausea and/or Vomiting    --------------------------------------------------------------------------------------  
TEAM VASCULAR Surgery Daily Progress Note  =====================================================    SUBJECTIVE: Patient seen and examined at bedside on AM rounds. Patient nauseous this AM. +/-    --------------------------------------------------------------------------------------  OBJECTIVE:    VITAL SIGNS:  Vital Signs Last 24 Hrs  T(C): 37 (15 May 2023 05:06), Max: 37.1 (14 May 2023 16:17)  T(F): 98.6 (15 May 2023 05:06), Max: 98.7 (14 May 2023 16:17)  HR: 94 (15 May 2023 07:00) (82 - 99)  BP: 110/75 (15 May 2023 07:00) (97/64 - 113/76)  BP(mean): --  RR: 18 (15 May 2023 07:00) (18 - 18)  SpO2: 97% (15 May 2023 07:00) (96% - 98%)    Parameters below as of 15 May 2023 07:00  Patient On (Oxygen Delivery Method): room air      --------------------------------------------------------------------------------------    EXAM:  Gen: NAD  Resp: nonlabored on RA  Abd: surgical site hemostatic, Aquacell replaced with gauze and tape, abdomen soft, mildly distended and tender around incision  Neuro: A&Ox3  Vascular: Palp pulses b/l    --------------------------------------------------------------------------------------    LABS:                        11.1   15.17 )-----------( 321      ( 15 May 2023 07:11 )             35.6     05-14    139  |  96  |  22  ----------------------------<  132<H>  3.8   |  30  |  0.68    Ca    9.8      14 May 2023 06:23      --------------------------------------------------------------------------------------    INS AND OUTS:    05-14-23 @ 07:01  -  05-15-23 @ 07:00  --------------------------------------------------------  IN: 980 mL / OUT: 0 mL / NET: 980 mL      --------------------------------------------------------------------------------------    MEDICATIONS:  MEDICATIONS  (STANDING):  aspirin enteric coated 81 milliGRAM(s) Oral daily  chlorhexidine 2% Cloths 1 Application(s) Topical once  pantoprazole    Tablet 40 milliGRAM(s) Oral before breakfast  polyethylene glycol 3350 17 Gram(s) Oral two times a day  predniSONE   Tablet   Oral   predniSONE   Tablet 30 milliGRAM(s) Oral daily  senna 2 Tablet(s) Oral at bedtime  simvastatin 20 milliGRAM(s) Oral at bedtime  vancomycin  IVPB 1000 milliGRAM(s) IV Intermittent once    MEDICATIONS  (PRN):  bisacodyl Suppository 10 milliGRAM(s) Rectal at bedtime PRN Constipation  cyclobenzaprine 10 milliGRAM(s) Oral three times a day PRN Muscle Spasm  magnesium hydroxide Suspension 30 milliLiter(s) Oral every 12 hours PRN Constipation  ondansetron Injectable 4 milliGRAM(s) IV Push every 6 hours PRN Nausea and/or Vomiting  oxyCODONE    IR 5 milliGRAM(s) Oral every 3 hours PRN Moderate Pain (4 - 6)  simethicone 80 milliGRAM(s) Chew daily PRN Gas    --------------------------------------------------------------------------------------  
VASCULAR SURGERY DAILY PROGRESS NOTE:    Interval/Subjective  No acute events overnight. Patient seen and examined. Passing flatus, no bowel movement yet. Pain well controlled.     Vital Signs Last 24 Hrs  T(C): 36.9 (12 May 2023 08:32), Max: 36.9 (11 May 2023 20:33)  T(F): 98.5 (12 May 2023 08:32), Max: 98.5 (12 May 2023 08:32)  HR: 71 (12 May 2023 08:32) (68 - 82)  BP: 107/70 (12 May 2023 08:32) (100/65 - 132/79)  BP(mean): --  RR: 18 (12 May 2023 08:32) (18 - 18)  SpO2: 99% (12 May 2023 08:32) (96% - 99%)    Parameters below as of 12 May 2023 08:32  Patient On (Oxygen Delivery Method): room air        EXAM:  Gen: NAD  Resp: nonlabored on RA  Abd: surgical site hemostatic with aquacel, no strikethrough, abdomen soft, mildly distended and tender only around incision  Neuro: A&Ox3    I&O's Detail    11 May 2023 07:01  -  12 May 2023 07:00  --------------------------------------------------------  IN:    Oral Fluid: 710 mL  Total IN: 710 mL    OUT:    Indwelling Catheter - Urethral (mL): 1450 mL    Voided (mL): 300 mL  Total OUT: 1750 mL    Total NET: -1040 mL          Daily     Daily     MEDICATIONS  (STANDING):  aspirin enteric coated 81 milliGRAM(s) Oral daily  chlorhexidine 2% Cloths 1 Application(s) Topical once  pantoprazole    Tablet 40 milliGRAM(s) Oral before breakfast  polyethylene glycol 3350 17 Gram(s) Oral at bedtime  predniSONE   Tablet 40 milliGRAM(s) Oral daily  predniSONE   Tablet   Oral   senna 2 Tablet(s) Oral at bedtime  simvastatin 20 milliGRAM(s) Oral at bedtime  vancomycin  IVPB 1000 milliGRAM(s) IV Intermittent once    MEDICATIONS  (PRN):  aluminum hydroxide/magnesium hydroxide/simethicone Suspension 30 milliLiter(s) Oral every 12 hours PRN Indigestion  bisacodyl 5 milliGRAM(s) Oral every 12 hours PRN Constipation  cyclobenzaprine 10 milliGRAM(s) Oral three times a day PRN Muscle Spasm  magnesium hydroxide Suspension 30 milliLiter(s) Oral every 12 hours PRN Constipation  ondansetron Injectable 4 milliGRAM(s) IV Push every 6 hours PRN Nausea and/or Vomiting  oxyCODONE    IR 5 milliGRAM(s) Oral every 3 hours PRN Moderate Pain (4 - 6)  simethicone 80 milliGRAM(s) Chew daily PRN Gas      LABS:                        10.6   16.23 )-----------( 265      ( 12 May 2023 06:59 )             34.3     05-12    140  |  101  |  18  ----------------------------<  163<H>  4.1   |  27  |  0.61    Ca    9.6      12 May 2023 06:58              
Orthopaedic Surgery Progress Note    States that her abdominal pain has improved. Had 1 bowel movement yesterday. Overall still uncomfortable but doing better today.       Vital Signs Last 24 Hrs  T(C): 36.8 (16 May 2023 06:07), Max: 37.1 (15 May 2023 20:36)  T(F): 98.3 (16 May 2023 06:07), Max: 98.7 (15 May 2023 20:36)  HR: 89 (16 May 2023 06:07) (83 - 100)  BP: 109/68 (16 May 2023 06:07) (105/66 - 117/78)  BP(mean): --  RR: 18 (16 May 2023 06:07) (18 - 18)  SpO2: 99% (16 May 2023 06:07) (97% - 99%)    Parameters below as of 16 May 2023 06:07  Patient On (Oxygen Delivery Method): room air            Exam:   Alert/Oriented, No Acute Distress    Abdomen:         Tender, soft, non distended         Dressing: gauze + tape                  Sensation: intact to light touch to BLE          Motor exam:                            Lower extremity           PF          DF         EHL       FHL                                                                               R        5/5        5/5        5/5       5/5                                           L         5/5        5/5        5/5       5/5                                                                  Calves Soft/Non-tender bilaterally          +DP pulses             LABS:                                      10.6   15.20 )-----------( 301      ( 16 May 2023 05:21 )             33.0     05-16    139  |  99  |  19  ----------------------------<  132<H>  4.0   |  28  |  0.62    Ca    9.2      16 May 2023 05:21          
POST OPERATIVE DAY #:  3    Patient seen and evaluated at bedside. No acute events overnight. Patient c/o "shivering" since last night without improvement with medications. Patient also mentioning not having bowel movement since surgery. No CP/SOB. No N/V/D/HA.     T(C): 36.6 (05-13-23 @ 08:46), Max: 37 (05-13-23 @ 01:07)  HR: 83 (05-13-23 @ 08:46) (80 - 95)  BP: 102/68 (05-13-23 @ 08:46) (101/66 - 115/79)  RR: 18 (05-13-23 @ 08:46) (18 - 18)  SpO2: 94% (05-13-23 @ 08:46) (94% - 98%)  Wt(kg): --  Exam:   Alert/Oriented, No Acute Distress  Resp: no respiratory distress, CTAB   Spine:         Dressing: anterior abdomen LLQ Aquacel clean/dry/intact            Drains: no drains          Sensation: intact to light touch           Motor exam:          [ ] Lower extremity                      PF          DF         EHL       FHL                                                                                            R        5/5        5/5        5/5       5/5                                                        L         5/5        5/5        5/5       5/5                                                  Calves Soft/Non-tender bilaterally          +DP pulses          BLE warm and well-perfused, cap refill < 3 sec.            LABS:                        10.6   16.23 )-----------( 265      ( 12 May 2023 06:59 )             34.3     05-13    141  |  99  |  20  ----------------------------<  120<H>  3.9   |  31  |  0.78    Ca    9.6      13 May 2023 07:55        
    Chief Complaint: Evaluating this 46 y/o F for uncontrolled Type 2 DM w/ hyperglycemia exacerbated by steroids.       Interval History: Remains on prednisone 40mg daily with plans to taper. Glucose remains above goal. + po intake.     MEDICATIONS  (STANDING):  acetaminophen     Tablet .. 975 milliGRAM(s) Oral every 8 hours  chlorhexidine 2% Cloths 1 Application(s) Topical once  dextrose 50% Injectable 25 Gram(s) IV Push once  dextrose 50% Injectable 12.5 Gram(s) IV Push once  dextrose 50% Injectable 25 Gram(s) IV Push once  dextrose Oral Gel 15 Gram(s) Oral once  gabapentin 100 milliGRAM(s) Oral three times a day  glucagon  Injectable 1 milliGRAM(s) IntraMuscular once  insulin glargine Injectable (LANTUS) 12 Unit(s) SubCutaneous at bedtime  insulin lispro (ADMELOG) corrective regimen sliding scale   SubCutaneous three times a day before meals  insulin lispro (ADMELOG) corrective regimen sliding scale   SubCutaneous at bedtime  insulin lispro Injectable (ADMELOG) 5 Unit(s) SubCutaneous three times a day before meals  lactated ringers. 1000 milliLiter(s) (75 mL/Hr) IV Continuous <Continuous>  multivitamin 1 Tablet(s) Oral daily  pantoprazole    Tablet 40 milliGRAM(s) Oral before breakfast  polyethylene glycol 3350 17 Gram(s) Oral daily  predniSONE   Tablet   Oral   predniSONE   Tablet 40 milliGRAM(s) Oral daily  senna 2 Tablet(s) Oral at bedtime  vancomycin  IVPB 1000 milliGRAM(s) IV Intermittent once    MEDICATIONS  (PRN):  cyclobenzaprine 5 milliGRAM(s) Oral three times a day PRN Muscle Spasm  ondansetron Injectable 4 milliGRAM(s) IV Push every 6 hours PRN Nausea and/or Vomiting  oxyCODONE    IR 5 milliGRAM(s) Oral every 4 hours PRN Moderate Pain (4 - 6)  oxyCODONE    IR 10 milliGRAM(s) Oral every 4 hours PRN Severe Pain (7 - 10)  traMADol 50 milliGRAM(s) Oral every 6 hours PRN Mild Pain (1 - 3)      Allergies    penicillin (Pruritus; Rash)    Intolerances      Review of Systems:  Constitutional: No fever  Eyes: No blurry vision  Cardiovascular: No chest pain  Respiratory: No SOB  GI: No abdominal pain, No nausea, No vomiting  Endocrine: as noted in HPI    All other negative      PHYSICAL EXAM:  VITALS: T(C): 36.7 (05-19-23 @ 13:57)  T(F): 98.1 (05-19-23 @ 13:57), Max: 99.9 (05-19-23 @ 09:08)  HR: 96 (05-19-23 @ 13:57) (74 - 99)  BP: 113/74 (05-19-23 @ 13:57) (108/67 - 114/84)  RR:  (18 - 18)  SpO2:  (96% - 100%)  Wt(kg): --  GENERAL: NAD at this time  EYES: EOMI, No proptosis  HEENT:  Atraumatic, Normocephalic,   RESPIRATORY: Clear to auscultation bilaterally, full excursion, non labored  CARDIOVASCULAR: Regular rhythm; normal S1/S2, no peripheral edema  GI: Soft, nontender, non distended, normal bowel sounds  SKIN: Warm and dry  PSYCH: normal affect, normal mood      POCT Blood Glucose.: 306 mg/dL (05-19-23 @ 13:39)  POCT Blood Glucose.: 239 mg/dL (05-19-23 @ 08:42)  POCT Blood Glucose.: 298 mg/dL (05-18-23 @ 23:57)  POCT Blood Glucose.: 245 mg/dL (05-18-23 @ 21:22)  POCT Blood Glucose.: 262 mg/dL (05-18-23 @ 18:40)  POCT Blood Glucose.: 432 mg/dL (05-18-23 @ 12:14)        05-18    134<L>  |  96  |  20  ----------------------------<  417<H>  4.3   |  25  |  0.66    eGFR: 108    Ca    9.2      05-18          Thyroid Function Tests:

## 2023-05-20 NOTE — CHART NOTE - NSCHARTNOTEFT_GEN_A_CORE
CAPRINI VTE 2.0 SCORE [CLOT updated 2019]    AGE RELATED RISK FACTORS                                                       MOBILITY RELATED FACTORS  [X ] Age 41-60 years                                            (1 Point)                    [ ] Bed rest                                                        (1 Point)  [ ] Age: 61-74 years                                           (2 Points)                  [ ] Plaster cast                                                   (2 Points)  [ ] Age= 75 years                                              (3 Points)                    [ ] Bed bound for more than 72 hours                 (2 Points)    DISEASE RELATED RISK FACTORS                                               GENDER SPECIFIC FACTORS  [ ] Edema in the lower extremities                       (1 Point)              [ ] Pregnancy                                                     (1 Point)  [ ] Varicose veins                                               (1 Point)                     [ ] Post-partum < 6 weeks                                   (1 Point)             [ ] BMI > 25 Kg/m2                                            (1 Point)                     [ ] Hormonal therapy  or oral contraception          (1 Point)                 [ ] Sepsis (in the previous month)                        (1 Point)               [ ] History of pregnancy complications                 (1 point)  [ ] Pneumonia or serious lung disease                                               [ ] Unexplained or recurrent                     (1 Point)           (in the previous month)                               (1 Point)  [ ] Abnormal pulmonary function test                     (1 Point)                 SURGERY RELATED RISK FACTORS  [ ] Acute myocardial infarction                              (1 Point)               [ ]  Section                                             (1 Point)  [ ] Congestive heart failure (in the previous month)  (1 Point)      [ ] Minor surgery                                                  (1 Point)   [ ] Inflammatory bowel disease                             (1 Point)               [ ] Arthroscopic surgery                                        (2 Points)  [ ] Central venous access                                      (2 Points)                [X ] General surgery lasting more than 45 minutes (2 points)  [ ] Malignancy- Present or previous                   (2 Points)                [ ] Elective arthroplasty                                         (5 points)    [ ] Stroke (in the previous month)                          (5 Points)                                                                                                                                                           HEMATOLOGY RELATED FACTORS                                                 TRAUMA RELATED RISK FACTORS  [ ] Prior episodes of VTE                                     (3 Points)                [ ] Fracture of the hip, pelvis, or leg                       (5 Points)  [ ] Positive family history for VTE                         (3 Points)             [ ] Acute spinal cord injury (in the previous month)  (5 Points)  [ ] Prothrombin 43895 A                                     (3 Points)               [ ] Paralysis  (less than 1 month)                             (5 Points)  [ ] Factor V Leiden                                             (3 Points)                  [ ] Multiple Trauma within 1 month                        (5 Points)  [ ] Lupus anticoagulants                                     (3 Points)                                                           [ ] Anticardiolipin antibodies                               (3 Points)                                                       [ ] High homocysteine in the blood                      (3 Points)                                             [ ] Other congenital or acquired thrombophilia      (3 Points)                                                [ ] Heparin induced thrombocytopenia                  (3 Points)                                     Total Score [      3    ]    Tyrese Bates PA-C  Orthopedic Surgery  Pager #[phone]
Patient seen and evaluated in the recovery unit. Patient Resting without complaints.  No Chest Pain, SOB, N/V.  Pre op s/sx : L spine radiculopathy ; Post op, patient reports: improvement of symptoms, 5/5 motor SILT.     T(C): 36.2 (05-16-23 @ 20:42), Max: 36.9 (05-16-23 @ 09:37)  HR: 88 (05-16-23 @ 22:00) (80 - 100)  BP: 129/78 (05-16-23 @ 21:30) (102/67 - 136/81)  RR: 16 (05-16-23 @ 22:00) (16 - 18)  SpO2: 100% (05-16-23 @ 22:00) (96% - 100%)  Wt(kg): --    Exam:   Alert/Oriented, No Acute Distress  Cards: +S1/S2, RRR  Pulm: CTAB  Spine: Lumbar spine          Dressings: Aquacel (x2), Guaze and tegaderm (x1) clean/dry/intact          Drains: no drains          Sensation: intact to light touch          Motor exam:          [ ] Lower extremity                     PF          DF         EHL       FHL                                                                                            R        5/5        5/5        5/5       5/5                                                        L         5/5        5/5        5/5       5/5                                       Calves Soft/Non-tender bilaterally         warm well perfused; capillary refill <3 seconds            LABS:                        9.8    18.98 )-----------( 311      ( 16 May 2023 21:34 )             30.9     05-16    136  |  98  |  17  ----------------------------<  190<H>  4.4   |  23  |  0.54    Ca    9.0      16 May 2023 21:34    A/P :  47y Female s/p Stage 1 L4-S1 ALIF (5/10), Stage 2 L4-S1 unilateral PSF w/ Mazor robotic system (5/16), POD0. VSS. NAD.   -    Pain control  -    IS bedside  -    Taper  -    DVT ppx: SCDs       -    GI ppx: Protonix 40 mg QD  -    Physical Therapy  -    Weight bearing status: WBAT, LSO brace for comfort   -    f/u AM labs   -    Cheri removed post-op, DTV @1330.  - Dispo: PACU to floor, pending further PT/OT eval, currently recommended for Home.     Tyrese Bates PA-C  Orthopedic Surgery  Pager # [phone]
Vascular Surgery POC    HPI / Interval Events: Patient seen and examined several hours s/p ALIF. RUBY since OR. AVSS. Pain controlled. Tolerating diet without N/V. Denies chest pain, shortness of breath    Vital Signs Last 24 Hrs  T(C): 36.3 (10 May 2023 23:45), Max: 36.9 (10 May 2023 09:20)  T(F): 97.4 (10 May 2023 23:45), Max: 98.4 (10 May 2023 09:20)  HR: 78 (10 May 2023 23:45) (71 - 100)  BP: 107/70 (10 May 2023 23:45) (107/70 - 141/123)  BP(mean): 81 (10 May 2023 23:00) (81 - 129)  RR: 18 (10 May 2023 23:45) (14 - 18)  SpO2: 96% (10 May 2023 23:45) (96% - 100%)    Parameters below as of 10 May 2023 23:45  Patient On (Oxygen Delivery Method): room air    PE  Gen: NAD  Resp: nonlabored on RA  Abd: surgical site hemostatic with aquacel, no strikethrough, abdomen soft, nondistended and appropriately tender  Ext: WWP  Neuro: A&Ox3                          11.6   16.23 )-----------( 258      ( 10 May 2023 19:59 )             36.0     05-10    143  |  106  |  11  ----------------------------<  152<H>  4.3   |  21<L>  |  0.58    Ca    9.2      10 May 2023 19:59    Assessment  47y F s/p L4-S1 ALIF, recovering uneventfully    Plan  -PT/OT   -Pain Control: PCA   -DVT ppx: SCDs  -FU AM Labs  -Incentive Spirometry  -Care per ortho    Vascular Surgery   [id number]
Endocrine following for newly diagnosed Type 2 DM w/ steroid induced hyperglycemia. Pt. to be seen later today if still in-patient. Discharge recs as of now based on insulin requirements and steroid taper plan:    Today is the last day of prednisone 40mg: While in-patient Monitor on Lantus 12 units qhs and Admelog 5 units with meals. For discharge, Lantus 12 units qhs, metformin 500mg BID to be titrated as tolerated to 1000mg BID and glimepiride 4mg daily with morning prednisone dose    Prednisone 30mg: Lantus 10 units qhs, metformin titrating to 1000mg BID and glimepiride 3mg daily with morning prednisone dose.    Prednisone 20mg: Lantus 8 units qhs, metformin titrating to 1000mg BID and glimepiride 2mg daily with morning prednisone dose.    Prednisone 10mg: Lantus 6 units qhs, metformin 1000mg BID and glimepiride 1mg daily with morning prednisone dose    Once off prednisone: Metformin 1000mg BID in addition to dietary and lifestyle modifications.    While waiting for discharge possibly later today would initiate insulin pen teaching, glucometer teaching/review and send all prescriptions to her pharmacy including basal insulin pen, 4mm pen needles, alcohol swabs, glucometer, test strips, lancets, metformin, and glimepiride.        Clifford Coats D.O  [phone]
Ortho PA Note    Informed by nurse that while ambulating with PT, patient had urinary incontinence episode.  Patient reports "since surgery" has no control of urination and has had several episodes of   urinary incontinence.  Dr. Pena informed of above and instructed to obtain bladder scan and place carmona now.      HECTOR Munoz  Orthopedic Surgery  [phone]
Pt seen and examined at bedside with Dr Pena. Pt expresses difficulty and hesitancy starting and stopping her stream of urine, but states she can feel the urge to urinate and has not lost control of her bladder or bowels. Reassured this is likely post anesthesia or medication related.

## 2023-05-22 ENCOUNTER — NON-APPOINTMENT (OUTPATIENT)
Age: 48
End: 2023-05-22

## 2023-05-22 ENCOUNTER — APPOINTMENT (OUTPATIENT)
Dept: ORTHOPEDIC SURGERY | Facility: CLINIC | Age: 48
End: 2023-05-22
Payer: OTHER MISCELLANEOUS

## 2023-05-22 PROBLEM — M48.07 SPINAL STENOSIS, LUMBOSACRAL REGION: Chronic | Status: ACTIVE | Noted: 2023-05-09

## 2023-05-22 PROBLEM — F17.200 NICOTINE DEPENDENCE, UNSPECIFIED, UNCOMPLICATED: Chronic | Status: ACTIVE | Noted: 2023-05-09

## 2023-05-22 PROCEDURE — 99024 POSTOP FOLLOW-UP VISIT: CPT

## 2023-05-26 ENCOUNTER — APPOINTMENT (OUTPATIENT)
Dept: ORTHOPEDIC SURGERY | Facility: CLINIC | Age: 48
End: 2023-05-26
Payer: OTHER MISCELLANEOUS

## 2023-05-26 VITALS — HEIGHT: 67 IN | BODY MASS INDEX: 23.86 KG/M2 | WEIGHT: 152 LBS

## 2023-05-26 PROCEDURE — 99024 POSTOP FOLLOW-UP VISIT: CPT

## 2023-05-26 PROCEDURE — 72100 X-RAY EXAM L-S SPINE 2/3 VWS: CPT

## 2023-05-26 RX ORDER — BLOOD-GLUCOSE METER
EACH MISCELLANEOUS
Qty: 100 | Refills: 0 | Status: ACTIVE | COMMUNITY
Start: 2023-05-20

## 2023-05-26 RX ORDER — METFORMIN HYDROCHLORIDE 1000 MG/1
1000 TABLET, COATED ORAL
Qty: 60 | Refills: 0 | Status: ACTIVE | COMMUNITY
Start: 2023-05-20

## 2023-05-26 RX ORDER — BLOOD-GLUCOSE METER
32G X 4 MM EACH MISCELLANEOUS
Qty: 100 | Refills: 0 | Status: ACTIVE | COMMUNITY
Start: 2023-05-20

## 2023-05-26 RX ORDER — INSULIN GLARGINE 100 [IU]/ML
100 INJECTION, SOLUTION SUBCUTANEOUS
Qty: 3 | Refills: 0 | Status: ACTIVE | COMMUNITY
Start: 2023-05-20

## 2023-05-26 RX ORDER — GLIMEPIRIDE 1 MG/1
1 TABLET ORAL
Qty: 18 | Refills: 0 | Status: ACTIVE | COMMUNITY
Start: 2023-05-20

## 2023-05-26 RX ORDER — METFORMIN HYDROCHLORIDE 500 MG/1
500 TABLET, COATED ORAL
Qty: 18 | Refills: 0 | Status: ACTIVE | COMMUNITY
Start: 2023-05-20

## 2023-05-26 RX ORDER — INSULIN GLARGINE-YFGN 100 [IU]/ML
100 INJECTION, SOLUTION SUBCUTANEOUS
Qty: 3 | Refills: 0 | Status: ACTIVE | COMMUNITY
Start: 2023-05-20

## 2023-05-26 RX ORDER — BLOOD-GLUCOSE METER
W/DEVICE KIT MISCELLANEOUS
Qty: 1 | Refills: 0 | Status: ACTIVE | COMMUNITY
Start: 2023-05-20

## 2023-05-26 RX ORDER — SENNOSIDES 8.6 MG TABLETS 8.6 MG/1
8.6 TABLET ORAL
Qty: 14 | Refills: 0 | Status: ACTIVE | COMMUNITY
Start: 2023-05-20

## 2023-05-26 RX ORDER — GABAPENTIN 100 MG/1
100 CAPSULE ORAL
Qty: 42 | Refills: 0 | Status: ACTIVE | COMMUNITY
Start: 2023-05-20

## 2023-05-26 RX ORDER — PREDNISONE 10 MG/1
10 TABLET ORAL
Qty: 22 | Refills: 0 | Status: ACTIVE | COMMUNITY
Start: 2023-05-20

## 2023-05-26 RX ORDER — PANTOPRAZOLE 40 MG/1
40 TABLET, DELAYED RELEASE ORAL
Qty: 30 | Refills: 0 | Status: ACTIVE | COMMUNITY
Start: 2023-05-20

## 2023-05-26 RX ORDER — BLOOD SUGAR DIAGNOSTIC
STRIP MISCELLANEOUS
Qty: 100 | Refills: 0 | Status: ACTIVE | COMMUNITY
Start: 2023-05-20

## 2023-05-26 RX ORDER — NALOXONE HYDROCHLORIDE 4 MG/.1ML
4 SPRAY NASAL
Qty: 2 | Refills: 0 | Status: ACTIVE | COMMUNITY
Start: 2023-05-20

## 2023-05-26 RX ORDER — ISOPROPYL ALCOHOL 0.75 G/1
SWAB TOPICAL
Qty: 100 | Refills: 0 | Status: ACTIVE | COMMUNITY
Start: 2023-05-20

## 2023-05-31 LAB — SURGICAL PATHOLOGY STUDY: SIGNIFICANT CHANGE UP

## 2023-06-01 ENCOUNTER — NON-APPOINTMENT (OUTPATIENT)
Age: 48
End: 2023-06-01

## 2023-06-04 NOTE — H&P PST ADULT - MUSCULOSKELETAL
on the discharge service for the patient. I have reviewed and made amendments to the documentation where necessary. low back pain/decreased ROM due to pain details…

## 2023-06-10 ENCOUNTER — EMERGENCY (EMERGENCY)
Facility: HOSPITAL | Age: 48
LOS: 1 days | Discharge: HOME | End: 2023-06-10
Attending: STUDENT IN AN ORGANIZED HEALTH CARE EDUCATION/TRAINING PROGRAM
Payer: COMMERCIAL

## 2023-06-10 VITALS
TEMPERATURE: 98 F | OXYGEN SATURATION: 100 % | SYSTOLIC BLOOD PRESSURE: 105 MMHG | HEIGHT: 67 IN | DIASTOLIC BLOOD PRESSURE: 73 MMHG | WEIGHT: 151.9 LBS | RESPIRATION RATE: 16 BRPM | HEART RATE: 106 BPM

## 2023-06-10 DIAGNOSIS — Z98.89 OTHER SPECIFIED POSTPROCEDURAL STATES: Chronic | ICD-10-CM

## 2023-06-10 LAB
ALBUMIN SERPL ELPH-MCNC: 4.4 G/DL — SIGNIFICANT CHANGE UP (ref 3.3–5)
ALP SERPL-CCNC: 108 U/L — SIGNIFICANT CHANGE UP (ref 40–120)
ALT FLD-CCNC: 28 U/L — SIGNIFICANT CHANGE UP (ref 10–45)
ANION GAP SERPL CALC-SCNC: 16 MMOL/L — SIGNIFICANT CHANGE UP (ref 5–17)
AST SERPL-CCNC: 18 U/L — SIGNIFICANT CHANGE UP (ref 10–40)
BASOPHILS # BLD AUTO: 0.04 K/UL — SIGNIFICANT CHANGE UP (ref 0–0.2)
BASOPHILS NFR BLD AUTO: 0.5 % — SIGNIFICANT CHANGE UP (ref 0–2)
BILIRUB SERPL-MCNC: 0.2 MG/DL — SIGNIFICANT CHANGE UP (ref 0.2–1.2)
BUN SERPL-MCNC: 12 MG/DL — SIGNIFICANT CHANGE UP (ref 7–23)
CALCIUM SERPL-MCNC: 9.6 MG/DL — SIGNIFICANT CHANGE UP (ref 8.4–10.5)
CHLORIDE SERPL-SCNC: 102 MMOL/L — SIGNIFICANT CHANGE UP (ref 96–108)
CO2 SERPL-SCNC: 25 MMOL/L — SIGNIFICANT CHANGE UP (ref 22–31)
CREAT SERPL-MCNC: 0.67 MG/DL — SIGNIFICANT CHANGE UP (ref 0.5–1.3)
EGFR: 108 ML/MIN/1.73M2 — SIGNIFICANT CHANGE UP
EOSINOPHIL # BLD AUTO: 0.15 K/UL — SIGNIFICANT CHANGE UP (ref 0–0.5)
EOSINOPHIL NFR BLD AUTO: 2 % — SIGNIFICANT CHANGE UP (ref 0–6)
GLUCOSE SERPL-MCNC: 154 MG/DL — HIGH (ref 70–99)
HCG SERPL-ACNC: <2 MIU/ML — SIGNIFICANT CHANGE UP
HCT VFR BLD CALC: 34.4 % — LOW (ref 34.5–45)
HGB BLD-MCNC: 10.4 G/DL — LOW (ref 11.5–15.5)
IMM GRANULOCYTES NFR BLD AUTO: 0.5 % — SIGNIFICANT CHANGE UP (ref 0–0.9)
LYMPHOCYTES # BLD AUTO: 2.67 K/UL — SIGNIFICANT CHANGE UP (ref 1–3.3)
LYMPHOCYTES # BLD AUTO: 36.4 % — SIGNIFICANT CHANGE UP (ref 13–44)
MAGNESIUM SERPL-MCNC: 2 MG/DL — SIGNIFICANT CHANGE UP (ref 1.6–2.6)
MCHC RBC-ENTMCNC: 26.7 PG — LOW (ref 27–34)
MCHC RBC-ENTMCNC: 30.2 GM/DL — LOW (ref 32–36)
MCV RBC AUTO: 88.2 FL — SIGNIFICANT CHANGE UP (ref 80–100)
MONOCYTES # BLD AUTO: 0.44 K/UL — SIGNIFICANT CHANGE UP (ref 0–0.9)
MONOCYTES NFR BLD AUTO: 6 % — SIGNIFICANT CHANGE UP (ref 2–14)
NEUTROPHILS # BLD AUTO: 3.99 K/UL — SIGNIFICANT CHANGE UP (ref 1.8–7.4)
NEUTROPHILS NFR BLD AUTO: 54.6 % — SIGNIFICANT CHANGE UP (ref 43–77)
NRBC # BLD: 0 /100 WBCS — SIGNIFICANT CHANGE UP (ref 0–0)
PLATELET # BLD AUTO: 282 K/UL — SIGNIFICANT CHANGE UP (ref 150–400)
POTASSIUM SERPL-MCNC: 3.7 MMOL/L — SIGNIFICANT CHANGE UP (ref 3.5–5.3)
POTASSIUM SERPL-SCNC: 3.7 MMOL/L — SIGNIFICANT CHANGE UP (ref 3.5–5.3)
PROT SERPL-MCNC: 7.3 G/DL — SIGNIFICANT CHANGE UP (ref 6–8.3)
RBC # BLD: 3.9 M/UL — SIGNIFICANT CHANGE UP (ref 3.8–5.2)
RBC # FLD: 13.3 % — SIGNIFICANT CHANGE UP (ref 10.3–14.5)
SODIUM SERPL-SCNC: 143 MMOL/L — SIGNIFICANT CHANGE UP (ref 135–145)
WBC # BLD: 7.33 K/UL — SIGNIFICANT CHANGE UP (ref 3.8–10.5)
WBC # FLD AUTO: 7.33 K/UL — SIGNIFICANT CHANGE UP (ref 3.8–10.5)

## 2023-06-10 PROCEDURE — 99285 EMERGENCY DEPT VISIT HI MDM: CPT

## 2023-06-10 PROCEDURE — 72132 CT LUMBAR SPINE W/DYE: CPT | Mod: 26,MA

## 2023-06-10 RX ORDER — OXYCODONE HYDROCHLORIDE 5 MG/1
5 TABLET ORAL ONCE
Refills: 0 | Status: DISCONTINUED | OUTPATIENT
Start: 2023-06-10 | End: 2023-06-10

## 2023-06-10 RX ADMIN — OXYCODONE HYDROCHLORIDE 5 MILLIGRAM(S): 5 TABLET ORAL at 19:45

## 2023-06-10 RX ADMIN — OXYCODONE HYDROCHLORIDE 5 MILLIGRAM(S): 5 TABLET ORAL at 15:53

## 2023-06-10 NOTE — ED PROVIDER NOTE - NSFOLLOWUPINSTRUCTIONS_ED_ALL_ED_FT
1) Please follow-up with your Spine Doctor in 2-3 days and Primary Medical Doctor in 2-3 days. If you do not have a primary doctor, please call the Physician Referral Service. If you have trouble making an appointment inform the office that you were recently seen in the Emergency Department and it is an urgent matter. Bring a copy of your results with you to your appointment.  2) Return to the Emergency Department for persistent, worsening, or new symptoms, or if you experience fever, chills, chest pain, shortness of breath, dizziness, fainting, abdominal pain, nausea or vomiting, inability to eat or drink, difficulty with urination, numbness, weakness, or inability to walk safely.   3) To alleviate the pain, please rest and take Tylenol 650 mg once every 6 hours as needed. Please take the prescribed medication Robaxin 1000 mg every 8 hours as needed for back pain.     1) Please follow-up with your Spine Doctor in 2-3 days and Primary Medical Doctor in 2-3 days. If you do not have a primary doctor, please call the Physician Referral Service. If you have trouble making an appointment inform the office that you were recently seen in the Emergency Department and it is an urgent matter. Bring a copy of your results with you to your appointment.  2) Return to the Emergency Department for persistent, worsening, or new symptoms, or if you experience fever, chills, chest pain, shortness of breath, dizziness, fainting, abdominal pain, nausea or vomiting, inability to eat or drink, difficulty with urination, numbness, weakness, or inability to walk safely.   3) To alleviate the pain, please rest and take Tylenol 650 mg once every 6 hours as needed.

## 2023-06-10 NOTE — ED ADULT TRIAGE NOTE - TEMPERATURE IN CELSIUS (DEGREES C)
Call placed to member for 6mo telephone assessment.  Phone was answered by a man who states member is not home.  Took CM information for member to call back.   Batool Mai RN, BSN, N  [facility]  [phone]  Fax: [phone]     36.8

## 2023-06-10 NOTE — ED PROVIDER NOTE - PATIENT PORTAL LINK FT
You can access the FollowMyHealth Patient Portal offered by [facility] by registering at the following website: [email or web address]. By joining [facility]’s FollowMyHealth portal, you will also be able to view your health information using other applications (apps) compatible with our system.

## 2023-06-10 NOTE — ED PROVIDER NOTE - ATTENDING CONTRIBUTION TO CARE
Attending (Olman Jj D.O.):  I have personally seen and examined this patient. I have performed a substantive portion of the visit including all aspects of the medical decision making. Resident, fellow, student, and/or ACP note reviewed. I agree on the plan of care except where noted.    47F hx of spinal stenosis s/p L4-S1 spinal fusion on 5/10/23 and 5/16/23 with Dr. Pena here after waking up with left lower back pain radiating down left leg, with numbness in thigh and bilateral toe numbness only..  Patient states had improvement in pain after surgery with oxycodone.  Has not taken pain medication today.  Denies any fecal incontinence, urinary retention, fevers, and bilateral lower extremities. Here, hemodynam stable, NAD. No sensation to bilateral all 5 toes, full rom bilateral LE though, no midline spinal ttp, + slight paralumbar spinal ttp, mostly left. No spinal stepoffs, no fluctuance, no drainage. Given patient with reported trauma, will need to eval for hardware malfunction, new radiculopathy. At this time, suspicion for cauda equina remains low. Plan for labs, CT lumbar spine with iv contrast., analgesia. Spine surgery was paced.

## 2023-06-10 NOTE — ED ADULT NURSE NOTE - NSFALLUNIVINTERV_ED_ALL_ED
Bed/Stretcher in lowest position, wheels locked, appropriate side rails in place/Call bell, personal items and telephone in reach/Instruct patient to call for assistance before getting out of bed/chair/stretcher/Non-slip footwear applied when patient is off stretcher/Young to call system/Physically safe environment - no spills, clutter or unnecessary equipment/Purposeful proactive rounding/Room/bathroom lighting operational, light cord in reach

## 2023-06-10 NOTE — ED PROVIDER NOTE - PHYSICAL EXAMINATION
GEN: Patient awake alert NAD.   HEENT: normocephalic, atraumatic, EOMI, no scleral icterus, moist MM  CARDIAC: RRR, S1, S2, no murmur.   PULM: CTA B/L no wheeze, rhonchi, rales.   ABD: soft NT, ND, no rebound no guarding, no CVA tenderness.   MSK: surgical scar c/d/i on b/l lower back, one suture noted on left lower back, moving all extremities,     NEURO: A&Ox3, gait normal, no sensation on dorsal or plantar surface of b/l 1st-5th metatarsal, CN 2-12 grossly intact  SKIN: warm, dry, no rash.

## 2023-06-10 NOTE — ED PROVIDER NOTE - OBJECTIVE STATEMENT
47-year-old female history of spinal stenosis s/p L4-S1 spinal fusion on 5/10/23 and 5/16/23 with Dr. Pena presents after waking up with left lower back pain radiating down left leg, with numbness and bilateral toes.  Patient states had improvement in pain after surgery with oxycodone.  Has not taken pain medication today.  Denies any fecal incontinence, urinary retention, fevers, and bilateral lower extremities.

## 2023-06-10 NOTE — ED PROVIDER NOTE - PROGRESS NOTE DETAILS
Attending (Olman Jj D.O.):  -> patient signed out pending remainder of w/u, close reassessments, discussion of results, dispo. Discussed with ortho, will evaluate patient, CT pending.  -Ramsey Arndt PGY-1 Informed ortho regarding CT read, pending evaluation by ortho.  -Ramsey Arndt PGY-1 Jaret Antunez MD: Patient was signed out pending orthopedics consultation and CT.  CT results were reviewed with orthopedics.  Rediscussed with orthopedics to expedite consultation.  Signed out pending orthopedic consultation and close reassessments for further treatment and disposition decisions. Amanda PGY2: patient cleared by ortho. Patient will be discharged.

## 2023-06-10 NOTE — ED ADULT NURSE NOTE - OBJECTIVE STATEMENT
46 y/o female A&OX4,came to the Ed with complaints of lower back pains, radiation down her left leg, numbness and tingling in her bilateral feet. Recent spinal fusion surgery, no complications. Has back brace for support when she ambulates. Able to ambulate independently but with difficulty. Pains started around 5 am. Denies CP, SOB, Calf pain, decreased sensation, weakness, dizziness, N, V, abdominal pains.

## 2023-06-10 NOTE — ED PROVIDER NOTE - CLINICAL SUMMARY MEDICAL DECISION MAKING FREE TEXT BOX
47-year-old female history of spinal stenosis s/p L4-S1 spinal fusion on 5/10/23 and 5/16/23 with Dr. Pena presents after waking up with left lower back pain radiating down left leg, with numbness and bilateral toes. Denies any fecal incontinence, urinary retention, fevers, and bilateral lower extremities. Possible radiculopathy, hardware misalignment. Low concern for cord compression at this time. Will obtain imaging, pain, control, discuss with ortho.

## 2023-06-11 VITALS
TEMPERATURE: 98 F | HEART RATE: 78 BPM | SYSTOLIC BLOOD PRESSURE: 109 MMHG | OXYGEN SATURATION: 99 % | RESPIRATION RATE: 18 BRPM | DIASTOLIC BLOOD PRESSURE: 67 MMHG

## 2023-06-11 PROCEDURE — 72132 CT LUMBAR SPINE W/DYE: CPT | Mod: MA

## 2023-06-11 PROCEDURE — 84702 CHORIONIC GONADOTROPIN TEST: CPT

## 2023-06-11 PROCEDURE — 99284 EMERGENCY DEPT VISIT MOD MDM: CPT | Mod: 25

## 2023-06-11 PROCEDURE — 83735 ASSAY OF MAGNESIUM: CPT

## 2023-06-11 PROCEDURE — 85025 COMPLETE CBC W/AUTO DIFF WBC: CPT

## 2023-06-11 PROCEDURE — 80053 COMPREHEN METABOLIC PANEL: CPT

## 2023-06-11 RX ORDER — METHOCARBAMOL 500 MG/1
2 TABLET, FILM COATED ORAL
Qty: 42 | Refills: 0
Start: 2023-06-11 | End: 2023-06-17

## 2023-06-11 RX ORDER — OXYCODONE HYDROCHLORIDE 5 MG/1
5 TABLET ORAL ONCE
Refills: 0 | Status: DISCONTINUED | OUTPATIENT
Start: 2023-06-11 | End: 2023-06-11

## 2023-06-11 RX ADMIN — OXYCODONE HYDROCHLORIDE 5 MILLIGRAM(S): 5 TABLET ORAL at 00:36

## 2023-06-11 NOTE — ED ADULT NURSE REASSESSMENT NOTE - NS ED NURSE REASSESS COMMENT FT1
report received from RN GABE, pt A&OX4, ambulatory, VSS, pt resting comfortably and no acute distress noted, ortho md at bedside, pending dispo

## 2023-06-11 NOTE — CONSULT NOTE ADULT - SUBJECTIVE AND OBJECTIVE BOX
Orthopaedic Surgery - Spine Service    Patient is a 47F community-ambulator without assistive devices at [facility] who presents to [facility] ED with a chief complaint of Left lower extremity radicular pain. Of note, patient is status post Stage I and Stage II L4-S1 ALIF and L4-S1 PSF spine surgeries on 5/10 and  respectively. Patient states that she had been pain free in the post-operative period, but developed symptoms consistent with her pre-operative symptomatology yesterday (Left lower extremity radicular pain) without a clear inciting factor. Patient also states that she is concerned due to new-onset numbness restricted solely to the toes on the Left foot. Patient posits that increased pain my be attributable in part to her cessation of narcotic medications which she states she had been prescribed post-operatively but stopped taking due to concerns over being "groggy" and becoming addicted to pain medications. Denies recent or frequent falls, head-strike, loss of consciousness, or any additional traumas. Localizing pain to the Left lower extremity as above, denies radiation of pain elsewhere. Endorses pain-impaired to ambulate. Denies pain, weakness, numbness, or tingling in the bilateral upper  Right lower extremities. Denies having any other pain elsewhere. Denies fevers, chills, headaches, chest pain, shortness of breath, nausea, vomiting urinary or fecal incontinence, saddle anesthesia, or any additional orthopaedic concern or complaints at this time.    PAST MEDICAL & SURGICAL HISTORY:  HLD (hyperlipidemia)      Spinal stenosis, lumbosacral region      Current smoker      S/P           penicillin (Pruritus; Rash)        VITAL SIGNS:  T(C): 36.7 (23 @ 03:15), Max: 36.8 (06-10-23 @ 14:01)  HR: 78 (23 @ 03:15) (78 - 106)  BP: 109/67 (23 @ 03:15) (105/73 - 115/77)  RR: 18 (23 @ 03:15) (16 - 18)  SpO2: 99% (23 @ 03:15) (98% - 100%)      PHYSICAL EXAM:    PHYSICAL EXAM  GEN: NAD, AAOx3    SPINE:  Skin intact; Well-healing surgical incisions noted.   Non-TTP over the bony prominences or paraspinal musculature of the cervical, thoracic, or lumbar spine.   No bony step-offs.   Grossly moving all extremities.   SILT throughout all extremities.   + Radial pulses bilaterally.   + DP/PT pulses bilaterally.   No saddle anesthesia.       MOTOR EXAM:                          Elbow Flex (C5)     Wrist Ext (C6)     Elbow Ext (C7)      Finger Flex (C8)    Finger Abduction (T1)  RIGHT                 5/5                         5/5                         5/5                          5/5                              5/5  LEFT                    5/5                         5/5                         5/5                          5/5                              5/5                           Hip Flex (L2)      Knee Ext (L3)      Ank Dorsiflex (L4)     Hallux Ext (L5)     Ank PlantarFlex (S1)  RIGHT               5/5                      5/5                          5/5                            5/5                           5/5  LEFT                  4/5                      4/5                          5/5                            5/5                           5/5      SENSORY EXAM:                        C5      C6      C7      C8       T1          RIGHT          2         2        2         2         2          (0=absent, 1=impaired, 2=normal, NT=not testable)  LEFT             2         2        2         2         2          (0=absent, 1=impaired, 2=normal, NT=not testable)                        L2        L3       L4      L5       S1          RIGHT        2          2         2        2        2           (0=absent, 1=impaired, 2=normal, NT=not testable)  LEFT           2          2         2        2        2           (0=absent, 1=impaired, 2=normal, NT=not testable)    Negative Argueta's sign bilaterally.   Negative Babinski bilaterally.   Negative Myoclonus bilaterally.     SECONDARY SURVEY:  RLE/LLE/RUE/LUE: No TTP over bony prominences, SILT, palpable pulses, full/painless range of motion, compartments soft       IMAGING:  < from: CT Lumbar Spine w/ IV Cont (06.10.23 @ 18:22) >  Anterior lumbar fusion at L4-L5and L5-S1 and posterior lumbar fusion   hardware from L5 through S1 which is new compared with prior exam from   2023. Intervertebral disc spacers at L4-L5 and L5-S1. Fusion   hardware is intact. Evaluation of intraspinal contents at these levels is   difficult due to streak artifact from fusion hardware. Post surgical   change in the posterior paraspinal soft tissues. No paraspinal fluid   collection or evidence of an epidural collection within the confines of   this exam. I  < end of copied text >      ASSESSMENT:  Patient is a 47F status-post Staged L4-S1 ALIF/PSF on 5/10 and , respectively, with recurrent radicular pain postoperatively.     PLAN:  Clinical findings and radiographs were reviewed at length with the patient. We discussed the risk/benefits of conservative management versus surgical fixation of _____. Explained to the patient that given the unstable fracture pattern, surgical fixation of _____is warranted.     - Patient counseled on nature and role of her prescribed analgesic medications.   - Patient without new neurologic deficit as Left proximal group weakness more likely attributable to pain.   - Pain control.   - DVT Ppx.   - PT/OT.   - Continue with Dr. Pena's post-operative directives as detailed after surgery.   - No acute orthopaedic surgical intervention indicated at this time.   - Orthopaedically stable for discharge; Will continue to monitor if/while admitted.   - Recommend follow up with Dr. Pena in the outpatient setting. Call office to schedule appointment.  - Discussed with on-call spine surgeon; Will discuss with Dr. Pena           Orthopaedic Surgery - Spine Service    Patient is a 47F community-ambulator without assistive devices at [facility] who presents to [facility] ED with a chief complaint of Left lower extremity radicular pain. Of note, patient is status post Stage I and Stage II L4-S1 ALIF and L4-S1 PSF spine surgeries on 5/10 and  respectively. Patient states that she had been pain free in the post-operative period, but developed symptoms consistent with her pre-operative symptomatology yesterday (Left lower extremity radicular pain) without a clear inciting factor. Patient also states that she is concerned due to new-onset numbness restricted solely to the toes on the Left foot. Patient posits that increased pain my be attributable in part to her cessation of narcotic medications which she states she had been prescribed post-operatively but stopped taking due to concerns over being "groggy" and becoming addicted to pain medications. Denies recent or frequent falls, head-strike, loss of consciousness, or any additional traumas. Localizing pain to the Left lower extremity as above, denies radiation of pain elsewhere. Endorses pain-impaired to ambulate. Denies pain, weakness, numbness, or tingling in the bilateral upper  Right lower extremities. Denies having any other pain elsewhere. Denies fevers, chills, headaches, chest pain, shortness of breath, nausea, vomiting urinary or fecal incontinence, saddle anesthesia, or any additional orthopaedic concern or complaints at this time.    PAST MEDICAL & SURGICAL HISTORY:  HLD (hyperlipidemia)      Spinal stenosis, lumbosacral region      Current smoker      S/P           penicillin (Pruritus; Rash)        VITAL SIGNS:  T(C): 36.7 (23 @ 03:15), Max: 36.8 (06-10-23 @ 14:01)  HR: 78 (23 @ 03:15) (78 - 106)  BP: 109/67 (23 @ 03:15) (105/73 - 115/77)  RR: 18 (23 @ 03:15) (16 - 18)  SpO2: 99% (23 @ 03:15) (98% - 100%)      PHYSICAL EXAM:    PHYSICAL EXAM  GEN: NAD, AAOx3    SPINE:  Skin intact; Well-healing surgical incisions noted.   Non-TTP over the bony prominences or paraspinal musculature of the cervical, thoracic, or lumbar spine.   No bony step-offs.   Grossly moving all extremities.   SILT throughout all extremities.   + Radial pulses bilaterally.   + DP/PT pulses bilaterally.   No saddle anesthesia.       MOTOR EXAM:                          Elbow Flex (C5)     Wrist Ext (C6)     Elbow Ext (C7)      Finger Flex (C8)    Finger Abduction (T1)  RIGHT                 5/5                         5/5                         5/5                          5/5                              5/5  LEFT                    5/5                         5/5                         5/5                          5/5                              5/5                           Hip Flex (L2)      Knee Ext (L3)      Ank Dorsiflex (L4)     Hallux Ext (L5)     Ank PlantarFlex (S1)  RIGHT               5/5                      5/5                          5/5                            5/5                           5/5  LEFT                  4/5                      4/5                          5/5                            5/5                           5/5      SENSORY EXAM:                        C5      C6      C7      C8       T1          RIGHT          2         2        2         2         2          (0=absent, 1=impaired, 2=normal, NT=not testable)  LEFT             2         2        2         2         2          (0=absent, 1=impaired, 2=normal, NT=not testable)                        L2        L3       L4      L5       S1          RIGHT        2          2         2        2        2           (0=absent, 1=impaired, 2=normal, NT=not testable)  LEFT           2          2         2        2        2           (0=absent, 1=impaired, 2=normal, NT=not testable)    Negative Argueta's sign bilaterally.   Negative Babinski bilaterally.   Negative Myoclonus bilaterally.     SECONDARY SURVEY:  RLE/LLE/RUE/LUE: No TTP over bony prominences, SILT, palpable pulses, full/painless range of motion, compartments soft       IMAGING:  < from: CT Lumbar Spine w/ IV Cont (06.10.23 @ 18:22) >  Anterior lumbar fusion at L4-L5and L5-S1 and posterior lumbar fusion   hardware from L5 through S1 which is new compared with prior exam from   2023. Intervertebral disc spacers at L4-L5 and L5-S1. Fusion   hardware is intact. Evaluation of intraspinal contents at these levels is   difficult due to streak artifact from fusion hardware. Post surgical   change in the posterior paraspinal soft tissues. No paraspinal fluid   collection or evidence of an epidural collection within the confines of   this exam. I  < end of copied text >      ASSESSMENT:  Patient is a 47F status-post Staged L4-S1 ALIF/PSF on 5/10 and , respectively, with recurrent radicular pain postoperatively.     PLAN:  - Patient counseled on nature and role of her prescribed analgesic medications.   - Patient without new neurologic deficit as Left proximal group weakness more likely attributable to pain.   - Pain control.   - DVT Ppx.   - PT/OT.   - Continue with Dr. Pena's post-operative directives as detailed after surgery.   - No acute orthopaedic surgical intervention indicated at this time.   - Orthopaedically stable for discharge; Will continue to monitor if/while admitted.   - Recommend follow up with Dr. Pena in the outpatient setting. Call office to schedule appointment.  - Discussed with on-call spine surgeon; Will discuss with Dr. Pena

## 2023-06-13 ENCOUNTER — APPOINTMENT (OUTPATIENT)
Dept: ORTHOPEDIC SURGERY | Facility: CLINIC | Age: 48
End: 2023-06-13
Payer: OTHER MISCELLANEOUS

## 2023-06-13 VITALS
HEART RATE: 97 BPM | DIASTOLIC BLOOD PRESSURE: 77 MMHG | WEIGHT: 152 LBS | BODY MASS INDEX: 23.86 KG/M2 | SYSTOLIC BLOOD PRESSURE: 121 MMHG | HEIGHT: 67 IN

## 2023-06-13 PROCEDURE — 72100 X-RAY EXAM L-S SPINE 2/3 VWS: CPT

## 2023-06-13 PROCEDURE — 99024 POSTOP FOLLOW-UP VISIT: CPT

## 2023-07-21 ENCOUNTER — APPOINTMENT (OUTPATIENT)
Dept: ORTHOPEDIC SURGERY | Facility: CLINIC | Age: 48
End: 2023-07-21
Payer: OTHER MISCELLANEOUS

## 2023-07-21 VITALS — WEIGHT: 152 LBS | HEIGHT: 67 IN | BODY MASS INDEX: 23.86 KG/M2

## 2023-07-21 DIAGNOSIS — M40.209 UNSPECIFIED KYPHOSIS, SITE UNSPECIFIED: ICD-10-CM

## 2023-07-21 PROCEDURE — 72100 X-RAY EXAM L-S SPINE 2/3 VWS: CPT

## 2023-07-21 PROCEDURE — 99024 POSTOP FOLLOW-UP VISIT: CPT

## 2023-07-21 RX ORDER — OXYCODONE 5 MG/1
5 TABLET ORAL
Qty: 60 | Refills: 0 | Status: ACTIVE | COMMUNITY
Start: 2023-07-21 | End: 1900-01-01

## 2023-08-29 ENCOUNTER — APPOINTMENT (OUTPATIENT)
Dept: ORTHOPEDIC SURGERY | Facility: CLINIC | Age: 48
End: 2023-08-29
Payer: OTHER MISCELLANEOUS

## 2023-08-29 VITALS — BODY MASS INDEX: 23.86 KG/M2 | HEIGHT: 67 IN | WEIGHT: 152 LBS

## 2023-08-29 PROCEDURE — 99214 OFFICE O/P EST MOD 30 MIN: CPT

## 2023-08-29 PROCEDURE — 72100 X-RAY EXAM L-S SPINE 2/3 VWS: CPT

## 2023-09-08 ENCOUNTER — APPOINTMENT (OUTPATIENT)
Dept: VASCULAR SURGERY | Facility: CLINIC | Age: 48
End: 2023-09-08
Payer: OTHER MISCELLANEOUS

## 2023-09-08 ENCOUNTER — APPOINTMENT (OUTPATIENT)
Dept: VASCULAR SURGERY | Facility: CLINIC | Age: 48
End: 2023-09-08

## 2023-09-08 VITALS
BODY MASS INDEX: 23.86 KG/M2 | SYSTOLIC BLOOD PRESSURE: 118 MMHG | HEART RATE: 78 BPM | HEIGHT: 67 IN | TEMPERATURE: 98.5 F | WEIGHT: 152 LBS | DIASTOLIC BLOOD PRESSURE: 80 MMHG

## 2023-09-08 VITALS — SYSTOLIC BLOOD PRESSURE: 127 MMHG | DIASTOLIC BLOOD PRESSURE: 84 MMHG | HEART RATE: 73 BPM

## 2023-09-08 PROCEDURE — 99213 OFFICE O/P EST LOW 20 MIN: CPT

## 2023-09-08 PROCEDURE — 93970 EXTREMITY STUDY: CPT

## 2023-10-08 PROCEDURE — 82962 GLUCOSE BLOOD TEST: CPT

## 2023-10-08 PROCEDURE — 74177 CT ABD & PELVIS W/CONTRAST: CPT

## 2023-10-08 PROCEDURE — 85025 COMPLETE CBC W/AUTO DIFF WBC: CPT

## 2023-10-08 PROCEDURE — 71275 CT ANGIOGRAPHY CHEST: CPT

## 2023-10-08 PROCEDURE — C1769: CPT

## 2023-10-08 PROCEDURE — 86870 RBC ANTIBODY IDENTIFICATION: CPT

## 2023-10-08 PROCEDURE — 80048 BASIC METABOLIC PNL TOTAL CA: CPT

## 2023-10-08 PROCEDURE — C1713: CPT

## 2023-10-08 PROCEDURE — S2900: CPT

## 2023-10-08 PROCEDURE — 97164 PT RE-EVAL EST PLAN CARE: CPT

## 2023-10-08 PROCEDURE — 97162 PT EVAL MOD COMPLEX 30 MIN: CPT

## 2023-10-08 PROCEDURE — 74018 RADEX ABDOMEN 1 VIEW: CPT

## 2023-10-08 PROCEDURE — 82565 ASSAY OF CREATININE: CPT

## 2023-10-08 PROCEDURE — 86922 COMPATIBILITY TEST ANTIGLOB: CPT

## 2023-10-08 PROCEDURE — 85014 HEMATOCRIT: CPT

## 2023-10-08 PROCEDURE — 76000 FLUOROSCOPY <1 HR PHYS/QHP: CPT

## 2023-10-08 PROCEDURE — 85018 HEMOGLOBIN: CPT

## 2023-10-08 PROCEDURE — 97530 THERAPEUTIC ACTIVITIES: CPT

## 2023-10-08 PROCEDURE — 72158 MRI LUMBAR SPINE W/O & W/DYE: CPT

## 2023-10-08 PROCEDURE — 85730 THROMBOPLASTIN TIME PARTIAL: CPT

## 2023-10-08 PROCEDURE — 83605 ASSAY OF LACTIC ACID: CPT

## 2023-10-08 PROCEDURE — 72131 CT LUMBAR SPINE W/O DYE: CPT

## 2023-10-08 PROCEDURE — 82330 ASSAY OF CALCIUM: CPT

## 2023-10-08 PROCEDURE — 85610 PROTHROMBIN TIME: CPT

## 2023-10-08 PROCEDURE — 86850 RBC ANTIBODY SCREEN: CPT

## 2023-10-08 PROCEDURE — 97116 GAIT TRAINING THERAPY: CPT

## 2023-10-08 PROCEDURE — 85027 COMPLETE CBC AUTOMATED: CPT

## 2023-10-08 PROCEDURE — 71045 X-RAY EXAM CHEST 1 VIEW: CPT

## 2023-10-08 PROCEDURE — 84295 ASSAY OF SERUM SODIUM: CPT

## 2023-10-08 PROCEDURE — 86900 BLOOD TYPING SEROLOGIC ABO: CPT

## 2023-10-08 PROCEDURE — 82803 BLOOD GASES ANY COMBINATION: CPT

## 2023-10-08 PROCEDURE — 82435 ASSAY OF BLOOD CHLORIDE: CPT

## 2023-10-08 PROCEDURE — 36415 COLL VENOUS BLD VENIPUNCTURE: CPT

## 2023-10-08 PROCEDURE — 81025 URINE PREGNANCY TEST: CPT

## 2023-10-08 PROCEDURE — 83036 HEMOGLOBIN GLYCOSYLATED A1C: CPT

## 2023-10-08 PROCEDURE — A9585: CPT

## 2023-10-08 PROCEDURE — C1889: CPT

## 2023-10-08 PROCEDURE — 82947 ASSAY GLUCOSE BLOOD QUANT: CPT

## 2023-10-08 PROCEDURE — C9399: CPT

## 2023-10-08 PROCEDURE — 88304 TISSUE EXAM BY PATHOLOGIST: CPT

## 2023-10-08 PROCEDURE — 86901 BLOOD TYPING SEROLOGIC RH(D): CPT

## 2023-10-08 PROCEDURE — 84132 ASSAY OF SERUM POTASSIUM: CPT

## 2023-10-08 PROCEDURE — 84702 CHORIONIC GONADOTROPIN TEST: CPT

## 2023-10-10 ENCOUNTER — APPOINTMENT (OUTPATIENT)
Dept: ORTHOPEDIC SURGERY | Facility: CLINIC | Age: 48
End: 2023-10-10
Payer: OTHER MISCELLANEOUS

## 2023-10-10 VITALS — WEIGHT: 159 LBS | BODY MASS INDEX: 24.96 KG/M2 | HEIGHT: 67 IN

## 2023-10-10 PROCEDURE — 99214 OFFICE O/P EST MOD 30 MIN: CPT

## 2023-10-10 PROCEDURE — 72100 X-RAY EXAM L-S SPINE 2/3 VWS: CPT

## 2023-10-10 RX ORDER — MELOXICAM 15 MG/1
15 TABLET ORAL
Qty: 30 | Refills: 1 | Status: ACTIVE | COMMUNITY
Start: 2023-10-10 | End: 1900-01-01

## 2023-10-10 RX ORDER — GABAPENTIN 100 MG/1
100 CAPSULE ORAL
Qty: 90 | Refills: 1 | Status: ACTIVE | COMMUNITY
Start: 2023-10-10 | End: 1900-01-01

## 2023-10-10 RX ORDER — TIZANIDINE 2 MG/1
2 TABLET ORAL
Qty: 90 | Refills: 1 | Status: ACTIVE | COMMUNITY
Start: 2023-10-10 | End: 1900-01-01

## 2023-10-10 RX ORDER — PREDNISONE 10 MG/1
10 TABLET ORAL
Qty: 30 | Refills: 0 | Status: ACTIVE | COMMUNITY
Start: 2023-10-10 | End: 1900-01-01

## 2023-10-12 ENCOUNTER — NON-APPOINTMENT (OUTPATIENT)
Age: 48
End: 2023-10-12

## 2023-10-20 NOTE — ED PROVIDER NOTE - PROGRESS NOTE DETAILS
History  Chief Complaint   Patient presents with    Shortness of Breath     Pt has been dealing with upper respiratory infection x14 days. Pt  noticed she's "more confused now than she was before". Family noticed pt struggling to breath while at home      62-year-old female presents to emergency department via EMS for evaluation of change in mental status. Per family, patient has had upper respiratory symptoms including cough for the past 2 weeks. She was evaluated 3 days ago by primary care, was diagnosed with upper respiratory infection, and was prescribed Tessalon Perles and a course of doxycycline. Her symptoms have not improved, and family reports a mental status change. Family reports that patient has seemed more confused. Family also noticed that patient was struggling to breathe when at home. Per EMS, patient was tachypneic. Patient denies any pain, headache, neck pain, back pain, chest pain, abdominal pain, nausea, vomiting. Prior to Admission Medications   Prescriptions Last Dose Informant Patient Reported? Taking?    Cholecalciferol (Vitamin D3) 25 MCG (1000 UT) CAPS  Self, Child Yes No   Sig: Take 25 each by mouth in the morning   Diclofenac Sodium (VOLTAREN) 1 %   No No   Sig: Apply 2 g topically 4 (four) times a day   Elastic Bandages & Supports (CARPAL TUNNEL WRIST STABILIZER) MISC  Self, Child No No   Sig: by Does not apply route daily   amLODIPine (NORVASC) 2.5 mg tablet   No No   Sig: Take 1 tablet (2.5 mg total) by mouth 2 (two) times a day   ascorbic acid (VITAMIN C) 250 mg tablet  Self, Child Yes No   Sig: Take 500 mg by mouth daily   aspirin 81 MG tablet  Self, Child Yes No   Sig: Take by mouth daily    dorzolamide-timolol (COSOPT) 22.3-6.8 MG/ML ophthalmic solution  Self, Child Yes No   Sig: INSTILL 1 DROP IN EACH EYE EVERY DAY   doxepin (SINEquan) 50 mg capsule   No No   Sig: Take 1 capsule (50 mg total) by mouth daily at bedtime   furosemide (LASIX) 20 mg tablet  Child No No   Sig: Take 1 tablet (20 mg total) by mouth if needed (Lower extremity edema) May take 3 times weekly as needed for lower extremity swelling.   gabapentin (NEURONTIN) 300 mg capsule   No No   Sig: Take 1 capsule (300 mg total) by mouth 3 (three) times a day   omeprazole (PriLOSEC) 20 mg delayed release capsule   No No   Sig: Take 1 capsule (20 mg total) by mouth daily   telmisartan (MICARDIS) 20 MG tablet  Child No No   Sig: Take 1 tablet (20 mg total) by mouth daily      Facility-Administered Medications: None       Past Medical History:   Diagnosis Date    Depression     Gout     H/O vaginal hysterectomy     resolved-4/17/2015       Past Surgical History:   Procedure Laterality Date    CATARACT EXTRACTION      TOTAL ABDOMINAL HYSTERECTOMY      with removal of both ovaries    VAGINAL HYSTERECTOMY      resolved-4/17/2015       Family History   Problem Relation Age of Onset    Heart attack Mother         MI    Heart attack Father         MI    Hypertension Sister     Stomach cancer Sister     Hypertension Brother      I have reviewed and agree with the history as documented. E-Cigarette/Vaping    E-Cigarette Use Never User      E-Cigarette/Vaping Substances    Nicotine No     THC No     CBD No     Flavoring No     Other No     Unknown No      Social History     Tobacco Use    Smoking status: Never    Smokeless tobacco: Never   Vaping Use    Vaping Use: Never used   Substance Use Topics    Alcohol use: Not Currently    Drug use: No        Review of Systems   Constitutional:  Negative for chills and fever. HENT:  Negative for congestion, rhinorrhea and sore throat. Respiratory:  Positive for cough and shortness of breath. Cardiovascular:  Negative for chest pain and leg swelling. Gastrointestinal:  Negative for abdominal pain, diarrhea, nausea and vomiting. Genitourinary:  Negative for difficulty urinating. Musculoskeletal:  Negative for arthralgias and neck pain.    Skin:  Negative for rash and wound.   Neurological:  Negative for dizziness, light-headedness and headaches. Psychiatric/Behavioral:  Positive for confusion. Physical Exam  ED Triage Vitals [10/20/23 1232]   Temperature Pulse Respirations Blood Pressure SpO2   97.8 °F (36.6 °C) 58 20 (!) 175/81 96 %      Temp Source Heart Rate Source Patient Position - Orthostatic VS BP Location FiO2 (%)   Oral Monitor Lying Right arm --      Pain Score       --             Orthostatic Vital Signs  Vitals:    10/20/23 1556 10/20/23 1600 10/20/23 1630 10/20/23 1700   BP: 161/76 156/67 160/71 159/73   Pulse: 91 92 97 92   Patient Position - Orthostatic VS: Lying Lying Lying Lying       Physical Exam  Constitutional:       General: She is awake. She is not in acute distress. Appearance: Normal appearance. She is well-developed, well-groomed and normal weight. She is not ill-appearing or toxic-appearing. HENT:      Head: Normocephalic and atraumatic. Mouth/Throat:      Mouth: Mucous membranes are moist.   Eyes:      Extraocular Movements: Extraocular movements intact. Pupils: Pupils are equal, round, and reactive to light. Cardiovascular:      Rate and Rhythm: Normal rate and regular rhythm. Pulses: Normal pulses. Heart sounds: Murmur heard. Pulmonary:      Effort: Pulmonary effort is normal. No tachypnea, accessory muscle usage or respiratory distress. Breath sounds: Normal breath sounds. No wheezing, rhonchi or rales. Abdominal:      Palpations: Abdomen is soft. Tenderness: There is no abdominal tenderness. Musculoskeletal:      Cervical back: Neck supple. Right lower leg: No tenderness. No edema. Left lower leg: No tenderness. No edema. Skin:     General: Skin is warm and dry. Capillary Refill: Capillary refill takes less than 2 seconds. Neurological:      General: No focal deficit present. Mental Status: She is alert and easily aroused. She is confused. GCS: GCS eye subscore is 4. GCS verbal subscore is 5. GCS motor subscore is 6. Cranial Nerves: No cranial nerve deficit, dysarthria or facial asymmetry. Sensory: Sensation is intact. Motor: Motor function is intact. Coordination: Coordination is intact. Comments: Patient is confused. Slow to respond to questioning. Unable to name the month, but is oriented to person, place, year. Unknown baseline   Psychiatric:         Mood and Affect: Mood normal.         Behavior: Behavior normal. Behavior is cooperative.          ED Medications  Medications - No data to display    Diagnostic Studies  Results Reviewed       Procedure Component Value Units Date/Time    HS Troponin I 2hr [[id number]]  (Normal) Collected: 10/20/23 1554    Lab Status: Final result Specimen: Blood from Arm, Left Updated: 10/20/23 1625     hs TnI 2hr 32 ng/L      Delta 2hr hsTnI -1 ng/L     Urine Microscopic [[id number]]  (Abnormal) Collected: 10/20/23 1443    Lab Status: Final result Specimen: Urine, Straight Cath Updated: 10/20/23 1519     RBC, UA 4-10 /hpf      WBC, UA 1-2 /hpf      Epithelial Cells Occasional /hpf      Bacteria, UA Occasional /hpf      MUCUS THREADS Occasional     Hyaline Casts, UA 3-5 /lpf     UA w Reflex to Microscopic w Reflex to Culture [[id number]]  (Abnormal) Collected: 10/20/23 1443    Lab Status: Final result Specimen: Urine, Straight Cath Updated: 10/20/23 1513     Color, UA Light Yellow     Clarity, UA Clear     Specific Gravity, UA 1.011     pH, UA 5.5     Leukocytes, UA Negative     Nitrite, UA Negative     Protein, UA 70 (1+) mg/dl      Glucose, UA Negative mg/dl      Ketones, UA Negative mg/dl      Urobilinogen, UA <2.0 mg/dl      Bilirubin, UA Negative     Occult Blood, UA Trace    FLU/RSV/COVID - if FLU/RSV clinically relevant [[id number]]  (Normal) Collected: 10/20/23 1316    Lab Status: Final result Specimen: Nares from Nose Updated: 10/20/23 1504     SARS-CoV-2 Negative     INFLUENZA A PCR Negative     INFLUENZA B PCR Negative     RSV PCR Negative    Narrative:      FOR PEDIATRIC PATIENTS - copy/paste COVID Guidelines URL to browser: [email or web address]    SARS-CoV-2 assay is a Nucleic Acid Amplification assay intended for the  qualitative detection of nucleic acid from SARS-CoV-2 in nasopharyngeal  swabs. Results are for the presumptive identification of SARS-CoV-2 RNA. Positive results are indicative of infection with SARS-CoV-2, the virus  causing COVID-19, but do not rule out bacterial infection or co-infection  with other viruses. Laboratories within the [facility] and its  territories are required to report all positive results to the appropriate  public health authorities. Negative results do not preclude SARS-CoV-2  infection and should not be used as the sole basis for treatment or other  patient management decisions. Negative results must be combined with  clinical observations, patient history, and epidemiological information. This test has not been FDA cleared or approved. This test has been authorized by FDA under an Emergency Use Authorization  (EUA). This test is only authorized for the duration of time the  declaration that circumstances exist justifying the authorization of the  emergency use of an in vitro diagnostic tests for detection of SARS-CoV-2  virus and/or diagnosis of COVID-19 infection under section 564(b)(1) of  the Act, 21 U. S.C. 396DCY-3(P)(7), unless the authorization is terminated  or revoked sooner. The test has been validated but independent review by FDA  and CLIA is pending. Test performed using [employer]: This RT-PCR assay targets N2,  a region unique to SARS-CoV-2. A conserved region in the E-gene was chosen  for pan-Sarbecovirus detection which includes SARS-CoV-2. According to CMS-2020-01-R, this platform meets the definition of high-throughput technology.     Manual Differential(PHLEBS Do Not Order) [[id number]] Collected: 10/20/23 1316    Lab Status: Final result Specimen: Blood from Arm, Left Updated: 10/20/23 1439     Segmented % 68 %      Bands % 5 %      Lymphocytes % 15 %      Monocytes % 10 %      Eosinophils, % 1 %      Basophils % 0 %      Metamyelocytes% 1 %      Absolute Neutrophils 4.63 Thousand/uL      Lymphocytes Absolute 0.95 Thousand/uL      Monocytes Absolute 0.63 Thousand/uL      Eosinophils Absolute 0.06 Thousand/uL      Basophils Absolute 0.00 Thousand/uL      Total Counted --     RBC Morphology Present     Platelet Estimate Adequate     Large Platelet Present     Anisocytosis Present     Ovalocytes Present    Procalcitonin [[id number]]  (Normal) Collected: 10/20/23 1316    Lab Status: Final result Specimen: Blood from Arm, Left Updated: 10/20/23 1416     Procalcitonin 0.06 ng/ml     HS Troponin 0hr (reflex protocol) [[id number]]  (Normal) Collected: 10/20/23 1316    Lab Status: Final result Specimen: Blood from Arm, Left Updated: 10/20/23 1352     hs TnI 0hr 33 ng/L     Comprehensive metabolic panel [[id number]]  (Abnormal) Collected: 10/20/23 1316    Lab Status: Final result Specimen: Blood from Arm, Left Updated: 10/20/23 1350     Sodium 137 mmol/L      Potassium 4.0 mmol/L      Chloride 104 mmol/L      CO2 23 mmol/L      ANION GAP 10 mmol/L      BUN 23 mg/dL      Creatinine 1.11 mg/dL      Glucose 111 mg/dL      Calcium 11.6 mg/dL      AST 23 U/L      ALT 15 U/L      Alkaline Phosphatase 84 U/L      Total Protein 7.8 g/dL      Albumin 4.5 g/dL      Total Bilirubin 0.59 mg/dL      eGFR 42 ml/min/1.73sq m     Narrative:      [place] guidelines for Chronic Kidney Disease (CKD):     Stage 1 with normal or high GFR (GFR > 90 mL/min/1.73 square meters)    Stage 2 Mild CKD (GFR = 60-89 mL/min/1.73 square meters)    Stage 3A Moderate CKD (GFR = 45-59 mL/min/1.73 square meters)    Stage 3B Moderate CKD (GFR = 30-44 mL/min/1.73 square meters)    Stage 4 Severe CKD (GFR = 15-29 mL/min/1.73 square meters)    Stage 5 End Stage CKD (GFR <15 mL/min/1.73 square meters)  Note: GFR calculation is accurate only with a steady state creatinine    Lactic acid [[id number]]  (Normal) Collected: 10/20/23 1316    Lab Status: Final result Specimen: Blood from Arm, Left Updated: 10/20/23 1349     LACTIC ACID 1.8 mmol/L     Narrative:      Result may be elevated if tourniquet was used during collection. Dl Gutiérrez [[id number]]  (Normal) Collected: 10/20/23 1316    Lab Status: Final result Specimen: Blood from Arm, Left Updated: 10/20/23 1340     Protime 14.2 seconds      INR 1.10    APTT [[id number]]  (Normal) Collected: 10/20/23 1316    Lab Status: Final result Specimen: Blood from Arm, Left Updated: 10/20/23 1340     PTT 28 seconds     Blood culture #2 [[id number]] Collected: 10/20/23 1330    Lab Status: In process Specimen: Blood from Arm, Right Updated: 10/20/23 1335    CBC and differential [[id number]]  (Abnormal) Collected: 10/20/23 1316    Lab Status: Final result Specimen: Blood from Arm, Left Updated: 10/20/23 1332     WBC 6.34 Thousand/uL      RBC 4.28 Million/uL      Hemoglobin 11.5 g/dL      Hematocrit 36.4 %      MCV 85 fL      MCH 26.9 pg      MCHC 31.6 g/dL      RDW 15.9 %      MPV 12.6 fL      Platelets 363 Thousands/uL     Blood culture #1 [[id number]] Collected: 10/20/23 1316    Lab Status: In process Specimen: Blood from Arm, Left Updated: 10/20/23 1327                   XR chest 2 views   Final Result by Jenn Floyd MD (10/20 1405)      No acute cardiopulmonary disease. Workstation performed: [id number]         CT head without contrast   Final Result by Amna Zambrano MD (10/20 1407)      No acute intracranial abnormality. Mild microangiopathy.                Workstation performed: [id number]               Procedures  Procedures      ED Course  ED Course as of 10/20/23 1714   Fri Oct 20, 2023   1401 hs TnI 0hr: 33   1401 LACTIC ACID: 1.8  Within normal limits   1401 WBC: 6.34  Normal   1412 XR chest 2 views   No acute cardiopulmonary disease. 1412 CT head without contrast  No acute intracranial abnormality. Mild microangiopathy. 1506 FLU/RSV/COVID - if FLU/RSV clinically relevant  Negative                             SBIRT 22yo+      Flowsheet Row Most Recent Value   Initial Alcohol Screen: US AUDIT-C     1. How often do you have a drink containing alcohol? 0 Filed at: 10/20/2023 1248   2. How many drinks containing alcohol do you have on a typical day you are drinking? 0 Filed at: 10/20/2023 1248   3a. Male UNDER 65: How often do you have five or more drinks on one occasion? 0 Filed at: 10/20/2023 1248   3b. FEMALE Any Age, or MALE 65+: How often do you have 4 or more drinks on one occassion? 0 Filed at: 10/20/2023 1248   Audit-C Score 0 Filed at: 10/20/2023 1248   DALILA: How many times in the past year have you. .. Used an illegal drug or used a prescription medication for non-medical reasons? Never Filed at: 10/20/2023 [address], a 78 y/o F coming in for change in mental status. Per family, this morning patient was confused. At baseline, patient is able to keep up with ADLs. She lives by herself. Over the past 2 weeks, pt has had cough. She was evaluated by PCP 3 days ago and given tessalon perles and doxycycline course for URI. She denies fever, chills, chest pain, shortness of breath, abd pain, n/v/d, urinary problems. On exam, vitals stable. Afebrile. Not tachypneic. No respiratory distress. O2 saturation 96% on RA. Heart RRR, murmur. Lungs CTAB. Dry cough. Abd soft, nontender. Pt alert and oriented to person, place and year, but not month. She is slow to answer. CN2-12 intact. No focal deficit. 3/5 strength in all extremities. Differential diagnoses include: Upper respiratory infection, pneumonia, suspicion of sepsis, UTI, stroke    Workup included sepsis panel, EKG, CXR, troponin.  Lactate, WBC were within normal limits. flu/covid/rsv negative. Troponin 0h of 33. CXR normal. CT head showed no acute intracranial abnormality. UA with trace blood. Admit to [facility] for further workup of change in mental status. Pt not safe to send home at this time as she lives by herself. Please see ED Course for additional information. Amount and/or Complexity of Data Reviewed  Labs: ordered. Decision-making details documented in ED Course. Radiology: ordered. Decision-making details documented in ED Course. Risk  Decision regarding hospitalization. Disposition  Final diagnoses:   Change in mental status     Time reflects when diagnosis was documented in both MDM as applicable and the Disposition within this note       Time User Action Codes Description Comment    10/20/2023  3:05 PM James Stiles Add [R41.82] Change in mental status           ED Disposition       ED Disposition   Admit    Condition   Stable    Date/Time   Fri Oct 20, 2023 7371    Comment   Case was discussed with [facility] and the patient's admission status was agreed to be Admission Status: inpatient status to the service of Dr. Rama Bravo .                Follow-up Information    None         Current Discharge Medication List        CONTINUE these medications which have NOT CHANGED    Details   amLODIPine (NORVASC) 2.5 mg tablet Take 1 tablet (2.5 mg total) by mouth 2 (two) times a day  Qty: 180 tablet, Refills: 3    Associated Diagnoses: Essential hypertension      ascorbic acid (VITAMIN C) 250 mg tablet Take 500 mg by mouth daily      aspirin 81 MG tablet Take by mouth daily       Cholecalciferol (Vitamin D3) 25 MCG (1000 UT) CAPS Take 25 each by mouth in the morning      Diclofenac Sodium (VOLTAREN) 1 % Apply 2 g topically 4 (four) times a day  Qty: 50 g, Refills: 0    Associated Diagnoses: Pain of right thumb      dorzolamide-timolol (COSOPT) 22.3-6.8 MG/ML ophthalmic solution INSTILL 1 DROP IN EACH EYE EVERY DAY  Refills: 3      doxepin (SINEquan) 50 mg capsule Take 1 capsule (50 mg total) by mouth daily at bedtime  Qty: 90 capsule, Refills: 0    Associated Diagnoses: Depression, unspecified depression type      Elastic Bandages & Supports (CARPAL TUNNEL WRIST STABILIZER) MISC by Does not apply route daily  Qty: 1 each, Refills: 0    Associated Diagnoses: Carpal tunnel syndrome of left wrist      furosemide (LASIX) 20 mg tablet Take 1 tablet (20 mg total) by mouth if needed (Lower extremity edema) May take 3 times weekly as needed for lower extremity swelling. Qty: 30 tablet, Refills: 0    Associated Diagnoses: Nonrheumatic aortic valve stenosis; Mild pulmonary hypertension (HCC)      gabapentin (NEURONTIN) 300 mg capsule Take 1 capsule (300 mg total) by mouth 3 (three) times a day  Qty: 360 capsule, Refills: 0    Associated Diagnoses: Idiopathic peripheral neuropathy      omeprazole (PriLOSEC) 20 mg delayed release capsule Take 1 capsule (20 mg total) by mouth daily  Qty: 90 capsule, Refills: 3    Associated Diagnoses: Gastroesophageal reflux disease without esophagitis      telmisartan (MICARDIS) 20 MG tablet Take 1 tablet (20 mg total) by mouth daily  Qty: 90 tablet, Refills: 3    Associated Diagnoses: Benign essential hypertension           No discharge procedures on file. PDMP Review       None             ED Provider  Attending physically available and evaluated Shyla Cortez. I managed the patient along with the ED Attending.     Electronically Signed by           Regi Torre DO  10/20/23 6721 Pt is well appearing walking with steady gait, stable for discharge and follow up without fail with medical doctor. I had a detailed discussion with the patient and/or guardian regarding the historical points, exam findings, and any diagnostic results supporting the discharge diagnosis. Pt educated on care and need for follow up. Strict return instructions and red flag signs and symptoms discussed with patient. Questions answered. Pt shows understanding of discharge information and agrees to follow.

## 2023-11-10 ENCOUNTER — APPOINTMENT (OUTPATIENT)
Dept: ORTHOPEDIC SURGERY | Facility: CLINIC | Age: 48
End: 2023-11-10
Payer: OTHER MISCELLANEOUS

## 2023-11-10 PROCEDURE — 99214 OFFICE O/P EST MOD 30 MIN: CPT

## 2023-11-10 PROCEDURE — 72100 X-RAY EXAM L-S SPINE 2/3 VWS: CPT

## 2023-11-10 RX ORDER — CYCLOBENZAPRINE HYDROCHLORIDE 5 MG/1
5 TABLET, FILM COATED ORAL
Qty: 90 | Refills: 1 | Status: ACTIVE | COMMUNITY
Start: 2023-11-10 | End: 1900-01-01

## 2023-11-10 RX ORDER — MELOXICAM 15 MG/1
15 TABLET ORAL
Qty: 30 | Refills: 1 | Status: ACTIVE | COMMUNITY
Start: 2023-11-10 | End: 1900-01-01

## 2023-12-10 ENCOUNTER — NON-APPOINTMENT (OUTPATIENT)
Age: 48
End: 2023-12-10

## 2023-12-20 ENCOUNTER — APPOINTMENT (OUTPATIENT)
Dept: ORTHOPEDIC SURGERY | Facility: CLINIC | Age: 48
End: 2023-12-20
Payer: OTHER MISCELLANEOUS

## 2023-12-20 VITALS — WEIGHT: 159 LBS | HEIGHT: 67 IN | BODY MASS INDEX: 24.96 KG/M2

## 2023-12-20 PROCEDURE — 99214 OFFICE O/P EST MOD 30 MIN: CPT

## 2023-12-20 PROCEDURE — 72100 X-RAY EXAM L-S SPINE 2/3 VWS: CPT

## 2023-12-20 RX ORDER — DICLOFENAC SODIUM 16.05 MG/ML
1.5 SOLUTION TOPICAL
Qty: 1 | Refills: 1 | Status: ACTIVE | COMMUNITY
Start: 2023-12-20 | End: 1900-01-01

## 2023-12-20 RX ORDER — TIZANIDINE 2 MG/1
2 TABLET ORAL EVERY 6 HOURS
Qty: 120 | Refills: 1 | Status: ACTIVE | COMMUNITY
Start: 2023-12-20 | End: 1900-01-01

## 2024-01-16 ENCOUNTER — EMERGENCY (EMERGENCY)
Facility: HOSPITAL | Age: 49
LOS: 1 days | Discharge: HOME | End: 2024-01-16
Attending: EMERGENCY MEDICINE
Payer: COMMERCIAL

## 2024-01-16 VITALS
RESPIRATION RATE: 18 BRPM | OXYGEN SATURATION: 98 % | TEMPERATURE: 98 F | WEIGHT: 158.07 LBS | HEIGHT: 67 IN | SYSTOLIC BLOOD PRESSURE: 127 MMHG | DIASTOLIC BLOOD PRESSURE: 86 MMHG | HEART RATE: 106 BPM

## 2024-01-16 VITALS
OXYGEN SATURATION: 99 % | SYSTOLIC BLOOD PRESSURE: 104 MMHG | RESPIRATION RATE: 17 BRPM | TEMPERATURE: 98 F | DIASTOLIC BLOOD PRESSURE: 68 MMHG | HEART RATE: 68 BPM

## 2024-01-16 DIAGNOSIS — Z98.89 OTHER SPECIFIED POSTPROCEDURAL STATES: Chronic | ICD-10-CM

## 2024-01-16 LAB
ALBUMIN SERPL ELPH-MCNC: 4.2 G/DL — SIGNIFICANT CHANGE UP (ref 3.3–5)
ALBUMIN SERPL ELPH-MCNC: 4.2 G/DL — SIGNIFICANT CHANGE UP (ref 3.3–5)
ALP SERPL-CCNC: 89 U/L — SIGNIFICANT CHANGE UP (ref 40–120)
ALP SERPL-CCNC: 89 U/L — SIGNIFICANT CHANGE UP (ref 40–120)
ALT FLD-CCNC: 30 U/L — SIGNIFICANT CHANGE UP (ref 10–45)
ALT FLD-CCNC: 30 U/L — SIGNIFICANT CHANGE UP (ref 10–45)
ANION GAP SERPL CALC-SCNC: 13 MMOL/L — SIGNIFICANT CHANGE UP (ref 5–17)
ANION GAP SERPL CALC-SCNC: 13 MMOL/L — SIGNIFICANT CHANGE UP (ref 5–17)
APTT BLD: 35.1 SEC — SIGNIFICANT CHANGE UP (ref 24.5–35.6)
AST SERPL-CCNC: 29 U/L — SIGNIFICANT CHANGE UP (ref 10–40)
AST SERPL-CCNC: 29 U/L — SIGNIFICANT CHANGE UP (ref 10–40)
BASOPHILS # BLD AUTO: 0.03 K/UL — SIGNIFICANT CHANGE UP (ref 0–0.2)
BASOPHILS NFR BLD AUTO: 0.3 % — SIGNIFICANT CHANGE UP (ref 0–2)
BILIRUB SERPL-MCNC: 0.2 MG/DL — SIGNIFICANT CHANGE UP (ref 0.2–1.2)
BILIRUB SERPL-MCNC: 0.2 MG/DL — SIGNIFICANT CHANGE UP (ref 0.2–1.2)
BUN SERPL-MCNC: 16 MG/DL — SIGNIFICANT CHANGE UP (ref 7–23)
BUN SERPL-MCNC: 16 MG/DL — SIGNIFICANT CHANGE UP (ref 7–23)
CALCIUM SERPL-MCNC: 9.7 MG/DL — SIGNIFICANT CHANGE UP (ref 8.4–10.5)
CALCIUM SERPL-MCNC: 9.7 MG/DL — SIGNIFICANT CHANGE UP (ref 8.4–10.5)
CHLORIDE SERPL-SCNC: 103 MMOL/L — SIGNIFICANT CHANGE UP (ref 96–108)
CHLORIDE SERPL-SCNC: 103 MMOL/L — SIGNIFICANT CHANGE UP (ref 96–108)
CO2 SERPL-SCNC: 25 MMOL/L — SIGNIFICANT CHANGE UP (ref 22–31)
CO2 SERPL-SCNC: 25 MMOL/L — SIGNIFICANT CHANGE UP (ref 22–31)
CREAT SERPL-MCNC: 0.62 MG/DL — SIGNIFICANT CHANGE UP (ref 0.5–1.3)
CREAT SERPL-MCNC: 0.62 MG/DL — SIGNIFICANT CHANGE UP (ref 0.5–1.3)
EGFR: 109 ML/MIN/1.73M2 — SIGNIFICANT CHANGE UP
EGFR: 109 ML/MIN/1.73M2 — SIGNIFICANT CHANGE UP
EOSINOPHIL # BLD AUTO: 0.21 K/UL — SIGNIFICANT CHANGE UP (ref 0–0.5)
EOSINOPHIL NFR BLD AUTO: 2.1 % — SIGNIFICANT CHANGE UP (ref 0–6)
GLUCOSE SERPL-MCNC: 160 MG/DL — HIGH (ref 70–99)
GLUCOSE SERPL-MCNC: 160 MG/DL — HIGH (ref 70–99)
HCT VFR BLD CALC: 38.3 % — SIGNIFICANT CHANGE UP (ref 34.5–45)
HGB BLD-MCNC: 12 G/DL — SIGNIFICANT CHANGE UP (ref 11.5–15.5)
IMM GRANULOCYTES NFR BLD AUTO: 0.3 % — SIGNIFICANT CHANGE UP (ref 0–0.9)
INR BLD: 1.04 RATIO — SIGNIFICANT CHANGE UP (ref 0.85–1.18)
LYMPHOCYTES # BLD AUTO: 2.44 K/UL — SIGNIFICANT CHANGE UP (ref 1–3.3)
LYMPHOCYTES # BLD AUTO: 24.5 % — SIGNIFICANT CHANGE UP (ref 13–44)
MCHC RBC-ENTMCNC: 27 PG — SIGNIFICANT CHANGE UP (ref 27–34)
MCHC RBC-ENTMCNC: 31.3 GM/DL — LOW (ref 32–36)
MCV RBC AUTO: 86.3 FL — SIGNIFICANT CHANGE UP (ref 80–100)
MONOCYTES # BLD AUTO: 0.48 K/UL — SIGNIFICANT CHANGE UP (ref 0–0.9)
MONOCYTES NFR BLD AUTO: 4.8 % — SIGNIFICANT CHANGE UP (ref 2–14)
NEUTROPHILS # BLD AUTO: 6.75 K/UL — SIGNIFICANT CHANGE UP (ref 1.8–7.4)
NEUTROPHILS NFR BLD AUTO: 68 % — SIGNIFICANT CHANGE UP (ref 43–77)
NRBC # BLD: 0 /100 WBCS — SIGNIFICANT CHANGE UP (ref 0–0)
PLATELET # BLD AUTO: 272 K/UL — SIGNIFICANT CHANGE UP (ref 150–400)
POTASSIUM SERPL-MCNC: 3.9 MMOL/L — SIGNIFICANT CHANGE UP (ref 3.5–5.3)
POTASSIUM SERPL-MCNC: 3.9 MMOL/L — SIGNIFICANT CHANGE UP (ref 3.5–5.3)
POTASSIUM SERPL-SCNC: 3.9 MMOL/L — SIGNIFICANT CHANGE UP (ref 3.5–5.3)
POTASSIUM SERPL-SCNC: 3.9 MMOL/L — SIGNIFICANT CHANGE UP (ref 3.5–5.3)
PROT SERPL-MCNC: 7.5 G/DL — SIGNIFICANT CHANGE UP (ref 6–8.3)
PROT SERPL-MCNC: 7.5 G/DL — SIGNIFICANT CHANGE UP (ref 6–8.3)
PROTHROM AB SERPL-ACNC: 10.9 SEC — SIGNIFICANT CHANGE UP (ref 9.5–13)
RBC # BLD: 4.44 M/UL — SIGNIFICANT CHANGE UP (ref 3.8–5.2)
RBC # FLD: 13.5 % — SIGNIFICANT CHANGE UP (ref 10.3–14.5)
SODIUM SERPL-SCNC: 141 MMOL/L — SIGNIFICANT CHANGE UP (ref 135–145)
SODIUM SERPL-SCNC: 141 MMOL/L — SIGNIFICANT CHANGE UP (ref 135–145)
WBC # BLD: 9.94 K/UL — SIGNIFICANT CHANGE UP (ref 3.8–10.5)
WBC # FLD AUTO: 9.94 K/UL — SIGNIFICANT CHANGE UP (ref 3.8–10.5)

## 2024-01-16 PROCEDURE — 96375 TX/PRO/DX INJ NEW DRUG ADDON: CPT | Mod: XU

## 2024-01-16 PROCEDURE — 85730 THROMBOPLASTIN TIME PARTIAL: CPT

## 2024-01-16 PROCEDURE — 20552 NJX 1/MLT TRIGGER POINT 1/2: CPT

## 2024-01-16 PROCEDURE — 85025 COMPLETE CBC W/AUTO DIFF WBC: CPT

## 2024-01-16 PROCEDURE — 99285 EMERGENCY DEPT VISIT HI MDM: CPT | Mod: 57,25

## 2024-01-16 PROCEDURE — 96374 THER/PROPH/DIAG INJ IV PUSH: CPT | Mod: XU

## 2024-01-16 PROCEDURE — 72131 CT LUMBAR SPINE W/O DYE: CPT | Mod: MA

## 2024-01-16 PROCEDURE — 80053 COMPREHEN METABOLIC PANEL: CPT

## 2024-01-16 PROCEDURE — 72131 CT LUMBAR SPINE W/O DYE: CPT | Mod: 26,MA

## 2024-01-16 PROCEDURE — 99284 EMERGENCY DEPT VISIT MOD MDM: CPT | Mod: 25

## 2024-01-16 PROCEDURE — 99285 EMERGENCY DEPT VISIT HI MDM: CPT

## 2024-01-16 PROCEDURE — 85610 PROTHROMBIN TIME: CPT

## 2024-01-16 RX ORDER — TRIAMCINOLONE 4 MG
40 TABLET ORAL ONCE
Refills: 0 | Status: COMPLETED | OUTPATIENT
Start: 2024-01-16 | End: 2024-01-16

## 2024-01-16 RX ORDER — CYCLOBENZAPRINE HYDROCHLORIDE 10 MG/1
1 TABLET, FILM COATED ORAL
Qty: 9 | Refills: 0
Start: 2024-01-16 | End: 2024-01-18

## 2024-01-16 RX ORDER — LIDOCAINE 4 G/100G
1 CREAM TOPICAL ONCE
Refills: 0 | Status: COMPLETED | OUTPATIENT
Start: 2024-01-16 | End: 2024-01-16

## 2024-01-16 RX ORDER — DEXAMETHASONE 0.5 MG/5ML
10 ELIXIR ORAL ONCE
Refills: 0 | Status: COMPLETED | OUTPATIENT
Start: 2024-01-16 | End: 2024-01-16

## 2024-01-16 RX ORDER — TRIAMCINOLONE 4 MG
40 TABLET ORAL ONCE
Refills: 0 | Status: DISCONTINUED | OUTPATIENT
Start: 2024-01-16 | End: 2024-01-16

## 2024-01-16 RX ORDER — MORPHINE SULFATE 50 MG/1
2 CAPSULE, EXTENDED RELEASE ORAL ONCE
Refills: 0 | Status: DISCONTINUED | OUTPATIENT
Start: 2024-01-16 | End: 2024-01-16

## 2024-01-16 RX ADMIN — Medication 40 MILLIGRAM(S): at 19:03

## 2024-01-16 RX ADMIN — MORPHINE SULFATE 2 MILLIGRAM(S): 50 CAPSULE, EXTENDED RELEASE ORAL at 15:14

## 2024-01-16 RX ADMIN — Medication 102 MILLIGRAM(S): at 18:21

## 2024-01-16 RX ADMIN — LIDOCAINE 1 PATCH: 4 CREAM TOPICAL at 15:14

## 2024-01-16 NOTE — ED PROVIDER NOTE - PATIENT PORTAL LINK FT
You can access the FollowMyHealth Patient Portal offered by [facility] by registering at the following website: [email or web address]. By joining [employer]’s FollowMyHealth portal, you will also be able to view your health information using other applications (apps) compatible with our system. You can access the FollowMyHealth Patient Portal offered by [facility] by registering at the following website: [email or web address]. By joining [employer]’s FollowMyHealth portal, you will also be able to view your health information using other applications (apps) compatible with our system.

## 2024-01-16 NOTE — ED ADULT NURSE NOTE - NS ED NURSE LEVEL OF CONSCIOUSNESS ORIENTATION
Vaccine Information Statement(s) was given today. This has been reviewed, questions answered, and verbal consent given by Parent for injection(s) and administration of Meningococcal B.      Patient tolerated without incident. See immunization grid for documentation.   Oriented - self; Oriented - place; Oriented - time

## 2024-01-16 NOTE — ED PROVIDER NOTE - PROGRESS NOTE DETAILS
Ortho paged. Awaiting call back. Vasiliy Swanson PA-C Spoke with resident, Masha Mccracken from ortho, will come eval pt. Vasiliy Swanson PA-C Pt does not require admission at this time and would like to be discharged. Has already completed course of Keflex. Low suspicion for cellulitis at this time. Under good pain control, advised continuing Tylenol. Aware of DVT and need for AC. AC precautions reviewed with pt. Rx sent to pharmacy. To f/u with PMD in 2 days. Copy of results given. Patient stable for discharge.  Follow up instructions given, return to ED precautions reviewed. Importance of follow up emphasized, patient verbalized understanding.  All questions answered. Case dw Dr. Aguilar who agrees with plan. Vasiliy Swanson PA-C Pt ambulating in ED w/o difficulty or assistance. Reports sig improvement in sxs. Cleared by ortho for outpt f/u. Recs appreciated. Medication rx sent to pharmacy. Pt agreeable. Patient stable for discharge.  Follow up instructions given, return to ED precautions reviewed. Importance of follow up emphasized, patient verbalized understanding.  All questions answered. Vasiliy Swanson PA-C

## 2024-01-16 NOTE — ED ADULT NURSE NOTE - OBJECTIVE STATEMENT
48y F AxO x4 came in for right lower back pain that radiates down her right leg. Patient reports that last night while she was asleep she began having a shooting pain down her right lower back and leg. Patient has a history of spinal surgeries. Patient reports that the pain is so severe she is unable to put pressure on her leg and needed help performing ADLs. Patient has full sensation and ROM x4 extremities. Denies fever, chills, headache, blurry vision, dizziness, n/v/d, dysuria, weakness, numbness, tingling, SOB, and chest pain. Patient's  is present at bedside. Bed is in lowest position.

## 2024-01-16 NOTE — ED PROVIDER NOTE - NSFOLLOWUPINSTRUCTIONS_ED_ALL_ED_FT
Please make sure to follow up with your primary care doctor within 1-2 days.  Bring a copy of all of your results with you to your follow up appointments.   Return to the ER as discussed if you develop any new or worsening symptoms.     Please start taking the blood thinner as prescribed.   Take Tylenol as needed for pain control. Please make sure to follow up with your primary care doctor within 1-2 days and with the ORTHOPEDIC SURGERY specialist. The information for follow up can be found below. Bring a copy of all of your results with you to your follow up appointments.   Return to the ER as discussed if you develop any new or worsening symptoms.     Take the medications prescribed to you today as directed.     - Prednisone taper to start tomorrow 40mg x3 days, 30mg x3 days, 20mg  x3 days, 10mg x3 days  - Fexeril 5mg three times a day as needed (do NOT drive while taking this medication)    You may take Tylenol 1000mg and Ibuprofen 400mg every 6 hours as needed for pain. Take Ibuprofen with food.    Jacob Pena  Orthopaedic Surgery  [address] [facility] [address]  [place] [address]  Phone: [phone] Please make sure to follow up with your primary care doctor within 1-2 days and with the ORTHOPEDIC SURGERY specialist. The information for follow up can be found below. Bring a copy of all of your results with you to your follow up appointments.   Return to the ER as discussed if you develop any new or worsening symptoms.     Take the medications prescribed to you today as directed.     - Prednisone taper to start tomorrow 40mg x3 days, 30mg x3 days, 20mg  x3 days, 10mg x3 days  - Fexeril 5mg three times a day as needed (do NOT drive while taking this medication)    You may take Tylenol 1000mg and Ibuprofen 400mg every 6 hours as needed for pain. Take Ibuprofen with food.    Jacob Pena  Orthopaedic Surgery  [address] [facility] [address]  [place] [address]  Phone: [phone] Please make sure to follow up with your primary care doctor within 1-2 days and with the ORTHOPEDIC SURGERY specialist. The information for follow up can be found below. Bring a copy of all of your results with you to your follow up appointments.   Return to the ER as discussed if you develop any new or worsening symptoms.     Take the medications prescribed to you today as directed.     - Prednisone taper to start tomorrow 40 mg (4 pills) for 3 days, 30 mg (3 pills) for 3 days, 20mg (2 pills) for 3 days, 10mg (1 pill) for 3 days. All pills taken once a day.   - Fexeril 5mg three times a day as needed (do NOT drive while taking this medication)    You may take Tylenol 1000mg and Ibuprofen 400mg every 6 hours as needed for pain. Take Ibuprofen with food.    Jacob Pena  Orthopaedic Surgery  [address] [place] [address], [address]  [place] [address]  Phone: [phone] Please make sure to follow up with your primary care doctor within 1-2 days and with the ORTHOPEDIC SURGERY specialist. The information for follow up can be found below. Bring a copy of all of your results with you to your follow up appointments.   Return to the ER as discussed if you develop any new or worsening symptoms.     Take the medications prescribed to you today as directed.     - Prednisone taper to start tomorrow 40 mg (4 pills) for 3 days, 30 mg (3 pills) for 3 days, 20mg (2 pills) for 3 days, 10mg (1 pill) for 3 days. All pills taken once a day.   - Fexeril 5mg three times a day as needed (do NOT drive while taking this medication)    You may take Tylenol 1000mg and Ibuprofen 400mg every 6 hours as needed for pain. Take Ibuprofen with food.    Jacob Pena  Orthopaedic Surgery  [address] [place] [address], [address]  [place] [address]  Phone: [phone]

## 2024-01-16 NOTE — ED PROVIDER NOTE - PHYSICAL EXAMINATION
CONSTITUTIONAL: In no apparent distress.  ENT: Airway patent, moist mucous membranes.   EYES: Pupils equal, round and reactive to light. EOMI. Conjunctiva normal appearing.   CARDIAC: Normal rate, regular rhythm.  Heart sounds S1, S2.    RESPIRATORY: Breath sounds clear and equal bilaterally.   GASTROINTESTINAL: Abdomen soft, non-tender, not distended.  MUSCULOSKELETAL: Spine appears normal. No midline TTP. +R parasacral TTP, no swelling or ecchymosis. Previous healed surgical scars noted. Moving all extremities. Uncomfortable with moving around in stretcher.   NEUROLOGICAL: Alert and oriented x3, no focal deficits, no motor or sensory deficits. 5/5 muscle strength throughout.  SKIN: Skin normal color, warm, dry and intact.   PSYCHIATRIC: Normal mood and affect.

## 2024-01-16 NOTE — ED PROVIDER NOTE - CARE PROVIDERS DIRECT ADDRESSES
[email or web address]

## 2024-01-16 NOTE — ED PROVIDER NOTE - OBJECTIVE STATEMENT
47 yo F with a PMH of HLD, spinal stenosis status post Stage I and Stage II L4-S1 ALIF and L4-S1 PSF spine surgeries on 5/10/23 and 5/16/23 by Dr. Pena p/w acute onset of right sided lower back pain to her right hip and right mid thigh x last night. Sxs started acutely in her sleep, tried to sleep it off but this AM could not bear weight on the RLE w/o sig pain. Also needed help getting dressed (putting pants on). No numbness/tingling or weakness. Took Tylenol at 7AM w/o relief. Has hx of chronic back pain since surgery however has never had this severe and usually on left lower back. No bladder/bowel dsfx, saddle anesthesia, fever, chills, nausea or vomiting.

## 2024-01-16 NOTE — ED PROVIDER NOTE - ATTENDING APP SHARED VISIT CONTRIBUTION OF CARE
Attending MD Up:   I personally have seen and examined this patient.  Physician assistant note reviewed and agree on plan of care and except where noted.  See below for details.     Seen in [facility]R    48F with PMH/PSH including HLD, lumbar radiculopathy s/p L4-S1 canal and neuroforaminal stenosis (5/10/23, Dr. 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 Bojorquez) s/p stage 2 unilateral L4-S1 posterior spinal fusion with Mazor robotic system (5/16/23, Dr. Pena) presents to the ED with back pain.  Reports pain had felt markedly improved after surgery     TO BE COMPLETED Attending MD Up:   I personally have seen and examined this patient.  Physician assistant note reviewed and agree on plan of care and except where noted.  See below for details.     Seen in [facility]R    48F with PMH/PSH including HLD, lumbar radiculopathy s/p L4-S1 canal and neuroforaminal stenosis (5/10/23, Dr. 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 Bojorquez) s/p stage 2 unilateral L4-S1 posterior spinal fusion with Mazor robotic system (5/16/23, Dr. Pena) presents to the ED with back pain.  Reports pain had felt markedly improved after surgery until last night.  Reports developed R sided lower back pain, radiating to R hip, R mid thigh.  Reports this AM could not bear weight without pain in RLE.  Reports needing help getting pants on.  Denies numbness, weakness or tingling in extremities. Reports took Tylenol at 7am without improvement.  Denies loss of urinary or bowel continence. Denies urinary complaints.  Denies chest pain, shortness of breath, abdominal pain, nausea, vomiting, diarrhea.    Exam:   General: NAD  HENT: head NCAT, airway patent  Eyes: anicteric, no conjunctival injection   Lungs: lungs CTAB with good inspiratory effort, no wheezing, no rhonchi, no rales  Cardiac: +S1S2, no obvious m/r/g  GI: abdomen soft with +BS, NT, ND  : no CVAT  MSK: ranging neck and extremities freely, no midline tenderness to palpation, +paralumbar/parasacral tenderness to palpation, no ecchymosis, well healed surgical incisions  Neuro: moving all extremities spontaneously with 5/5 strength, no saddle anesthesia, nonfocal  Psych: normal mood and affect     A/P: 48F with back pain, acute on chronic, will obtain CT L spine to eval area, will give analgesia, will obtain labs, consult ortho as needed

## 2024-01-16 NOTE — CONSULT NOTE ADULT - SUBJECTIVE AND OBJECTIVE BOX
ORTHOPAEDIC SURGERY SPINE CONSULT NOTE     Patient is a 48y Female s/p 2 stage unilateral L4-S1 decompression/fusion (ALIF, PSF) who presents c/o R-sided back pain and R lateal thigh pain. Began last night after turning in bed awkwardly. She has had postop pain in past, mostly in L side, and saw Dr. Pena in office after a fall in December however XRs negative at that time. She was prescribed an antispasmodic but has not been able to take it 2/2 insurance reasons.Denies pain/injury elsewhere. Denies numbness/tingling/paresthesias/weakness. Denies bowel/bladder incontinence. Denies fevers/chills. No other complaints at this time.    HEALTH ISSUES - PROBLEM Dx:          MEDICATIONS  (STANDING):      Allergies    penicillin (Pruritus; Rash)    Intolerances        PAST MEDICAL & SURGICAL HISTORY:  No pertinent past medical history    HLD (hyperlipidemia)    Spinal stenosis, lumbosacral region    Former smoker    Current smoker    S/P                               12.0   9.94  )-----------( 272      ( 2024 15:32 )             38.3       2024 15:32    141    |  103    |  16     ----------------------------<  160    3.9     |  25     |  0.62     Ca    9.7        2024 15:32    TPro  7.5    /  Alb  4.2    /  TBili  0.2    /  DBili  x      /  AST  29     /  ALT  30     /  AlkPhos  89     2024 15:32      PT/INR - ( 2024 15:32 )   PT: 10.9 sec;   INR: 1.04 ratio         PTT - ( 2024 15:32 )  PTT:35.1 sec    Urinalysis Basic - ( 2024 15:32 )    Color: x / Appearance: x / SG: x / pH: x  Gluc: 160 mg/dL / Ketone: x  / Bili: x / Urobili: x   Blood: x / Protein: x / Nitrite: x   Leuk Esterase: x / RBC: x / WBC x   Sq Epi: x / Non Sq Epi: x / Bacteria: x        Vital Signs Last 24 Hrs  T(C): 36.6 (24 @ 15:15), Max: 36.6 (24 @ 15:15)  T(F): 97.9 (24 @ 15:15), Max: 97.9 (24 @ 15:15)  HR: 95 (24 @ 15:15) (95 - 106)  BP: 125/80 (24 @ 15:15) (125/80 - 127/86)  BP(mean): --  RR: 18 (24 @ 15:15) (18 - 18)  SpO2: 98% (24 @ 15:15) (98% - 98%)    Gen: NAD    Spine PE:  Skin intact  No gross deformity  No midline TTP C/T/L/S spine  No bony step offs  L-sided paraspinal tenderness in lumbosacral region   Negative clonus at ankles      Motor:               L2             L3             L4               L5            S1  R         5/5           5/5          5/5             5/5           5/5  L          5/5          5/5           5/5             5/5           5/5    Sensory:             L2          L3         L4      L5       S1         (0=absent, 1=impaired, 2=normal, NT=not testable)  R         2            2            2        2        2  L          2            2           2        2         2    Imaging:    ACC: [id number] EXAM:  CT LUMBAR SPINE   ORDERED BY:  JUANITO VALENZUELA     PROCEDURE DATE:  2024          INTERPRETATION:  INDICATIONS:  History of lumbar surgery with lower back   pain and pain in the right hip    TECHNIQUE:  Serial axial images were obtained using multi slice helical   technique. Sagittal and coronal reformatted images were performed.    COMPARISON EXAMINATION: 6/10/2023    FINDINGS:  VERTEBRAL BODIES AND DISCS:  Right-sided pedicle screw L4,L5 and S1.   Anterior fixation screws L4-L5 and S1 with interbody spacers L4-5 and   L5-S1..  ALIGNMENT:  No subluxations.  L1-L2 LEVEL:  Normal.  L2-L3 LEVEL:  Normal.  L3-L4 LEVEL:  Normal.  L4-L5 LEVEL:  Some erosive changes along the inferior endplate of L4 more   left-sidedwhen compared with the prior study  L5-S1 LEVEL:  Bilateral neural foraminal narrowing related to osteophytes   projecting into the neural foramina bilaterally at this level.  SPINAL CANAL:  No other intradural or extradural defects are seen.  MISCELLANEOUS:  None.    IMPRESSION:  Some evolving erosive changes along the inferior endplate of   L4 on the left when compared with the prior study. Study is otherwise   unchanged compared with 6/10/2023    --- End of Report ---      Procedure note:    A/P: 48y Female with R-sided back pain and radicular symptoms s/p 2 stage lumbar decompression L4-S1 in May now s/p ___    - Prednisone taper 40mg x3 days, 30mg x3 days, 20mg  x3 days, 10mg x3 days  - Fexeril 5mg TID PRN    No acute orthopaedic surgical intervention, please call with questions.  Follow up with Dr. Pean in 1- 2 weeks after discharge.      ORTHOPAEDIC SURGERY SPINE CONSULT NOTE     Patient is a 48y Female s/p 2 stage unilateral L4-S1 decompression/fusion (ALIF, PSF) who presents c/o R-sided back pain and R lateal thigh pain. Began last night after turning in bed awkwardly. She has had postop pain in past, mostly in L side, and saw Dr. Pena in office after a fall in December however XRs negative at that time. She was prescribed an antispasmodic but has not been able to take it 2/2 insurance reasons.Denies pain/injury elsewhere. Denies numbness/tingling/paresthesias/weakness. Denies bowel/bladder incontinence. Denies fevers/chills. No other complaints at this time.    HEALTH ISSUES - PROBLEM Dx:          MEDICATIONS  (STANDING):      Allergies    penicillin (Pruritus; Rash)    Intolerances        PAST MEDICAL & SURGICAL HISTORY:  No pertinent past medical history    HLD (hyperlipidemia)    Spinal stenosis, lumbosacral region    Former smoker    Current smoker    S/P                               12.0   9.94  )-----------( 272      ( 2024 15:32 )             38.3       2024 15:32    141    |  103    |  16     ----------------------------<  160    3.9     |  25     |  0.62     Ca    9.7        2024 15:32    TPro  7.5    /  Alb  4.2    /  TBili  0.2    /  DBili  x      /  AST  29     /  ALT  30     /  AlkPhos  89     2024 15:32      PT/INR - ( 2024 15:32 )   PT: 10.9 sec;   INR: 1.04 ratio         PTT - ( 2024 15:32 )  PTT:35.1 sec    Urinalysis Basic - ( 2024 15:32 )    Color: x / Appearance: x / SG: x / pH: x  Gluc: 160 mg/dL / Ketone: x  / Bili: x / Urobili: x   Blood: x / Protein: x / Nitrite: x   Leuk Esterase: x / RBC: x / WBC x   Sq Epi: x / Non Sq Epi: x / Bacteria: x        Vital Signs Last 24 Hrs  T(C): 36.6 (24 @ 15:15), Max: 36.6 (24 @ 15:15)  T(F): 97.9 (24 @ 15:15), Max: 97.9 (24 @ 15:15)  HR: 95 (24 @ 15:15) (95 - 106)  BP: 125/80 (24 @ 15:15) (125/80 - 127/86)  BP(mean): --  RR: 18 (24 @ 15:15) (18 - 18)  SpO2: 98% (24 @ 15:15) (98% - 98%)    Gen: NAD    Spine PE:  Skin intact  No gross deformity  No midline TTP C/T/L/S spine  No bony step offs  L-sided paraspinal tenderness in lumbosacral region   Negative clonus at ankles      Motor:               L2             L3             L4               L5            S1  R         5/5           5/5          5/5             5/5           5/5  L          5/5          5/5           5/5             5/5           5/5    Sensory:             L2          L3         L4      L5       S1         (0=absent, 1=impaired, 2=normal, NT=not testable)  R         2            2            2        2        2  L          2            2           2        2         2    Imaging:    ACC: [id number] EXAM:  CT LUMBAR SPINE   ORDERED BY:  JUANITO VALENZUELA     PROCEDURE DATE:  2024          INTERPRETATION:  INDICATIONS:  History of lumbar surgery with lower back   pain and pain in the right hip    TECHNIQUE:  Serial axial images were obtained using multi slice helical   technique. Sagittal and coronal reformatted images were performed.    COMPARISON EXAMINATION: 6/10/2023    FINDINGS:  VERTEBRAL BODIES AND DISCS:  Right-sided pedicle screw L4,L5 and S1.   Anterior fixation screws L4-L5 and S1 with interbody spacers L4-5 and   L5-S1..  ALIGNMENT:  No subluxations.  L1-L2 LEVEL:  Normal.  L2-L3 LEVEL:  Normal.  L3-L4 LEVEL:  Normal.  L4-L5 LEVEL:  Some erosive changes along the inferior endplate of L4 more   left-sidedwhen compared with the prior study  L5-S1 LEVEL:  Bilateral neural foraminal narrowing related to osteophytes   projecting into the neural foramina bilaterally at this level.  SPINAL CANAL:  No other intradural or extradural defects are seen.  MISCELLANEOUS:  None.    IMPRESSION:  Some evolving erosive changes along the inferior endplate of   L4 on the left when compared with the prior study. Study is otherwise   unchanged compared with 6/10/2023    --- End of Report ---      Procedure note: under sterile conditions, the patient's R-sided back was prepped with betadine swabs. 3cc 1% lidocaine without epi and 1cc 40mg/1ml Kenalog was injected into the paraspinal musculature at 2 points. Tegaderm and guaze dressing applied.      A/P: 48y Female with R-sided back pain and radicular symptoms s/p 2 stage lumbar decompression L4-S1 in May now s/p trigger point injection by Dr. Pena in ED.    - 10mg IV decadron in ED  - Prednisone taper to start tomorrow 40mg x3 days, 30mg x3 days, 20mg  x3 days, 10mg x3 days  - Fexeril 5mg TID PRN  - Ambulation in ED    No acute orthopaedic surgical intervention, please call with questions.  Follow up with Dr. Pena in 1- 2 weeks after discharge.      ORTHOPAEDIC SURGERY SPINE CONSULT NOTE     Patient is a 48y Female s/p 2 stage unilateral L4-S1 decompression/fusion (ALIF, PSF) who presents c/o R-sided back pain and R lateal thigh pain. Began last night after turning in bed awkwardly. She has had postop pain in past, mostly in L side, and saw Dr. Pena in office after a fall in December however XRs negative at that time. She was prescribed an antispasmodic but has not been able to take it 2/2 insurance reasons.Denies pain/injury elsewhere. Denies numbness/tingling/paresthesias/weakness. Denies bowel/bladder incontinence. Denies fevers/chills. No other complaints at this time.    HEALTH ISSUES - PROBLEM Dx:          MEDICATIONS  (STANDING):      Allergies    penicillin (Pruritus; Rash)    Intolerances        PAST MEDICAL & SURGICAL HISTORY:  No pertinent past medical history    HLD (hyperlipidemia)    Spinal stenosis, lumbosacral region    Former smoker    Current smoker    S/P                               12.0   9.94  )-----------( 272      ( 2024 15:32 )             38.3       2024 15:32    141    |  103    |  16     ----------------------------<  160    3.9     |  25     |  0.62     Ca    9.7        2024 15:32    TPro  7.5    /  Alb  4.2    /  TBili  0.2    /  DBili  x      /  AST  29     /  ALT  30     /  AlkPhos  89     2024 15:32      PT/INR - ( 2024 15:32 )   PT: 10.9 sec;   INR: 1.04 ratio         PTT - ( 2024 15:32 )  PTT:35.1 sec    Urinalysis Basic - ( 2024 15:32 )    Color: x / Appearance: x / SG: x / pH: x  Gluc: 160 mg/dL / Ketone: x  / Bili: x / Urobili: x   Blood: x / Protein: x / Nitrite: x   Leuk Esterase: x / RBC: x / WBC x   Sq Epi: x / Non Sq Epi: x / Bacteria: x        Vital Signs Last 24 Hrs  T(C): 36.6 (24 @ 15:15), Max: 36.6 (24 @ 15:15)  T(F): 97.9 (24 @ 15:15), Max: 97.9 (24 @ 15:15)  HR: 95 (24 @ 15:15) (95 - 106)  BP: 125/80 (24 @ 15:15) (125/80 - 127/86)  BP(mean): --  RR: 18 (24 @ 15:15) (18 - 18)  SpO2: 98% (24 @ 15:15) (98% - 98%)    Gen: NAD    Spine PE:  Skin intact  No gross deformity  No midline TTP C/T/L/S spine  No bony step offs  L-sided paraspinal tenderness in lumbosacral region over the screw site   Negative clonus at ankles      Motor:               L2             L3             L4               L5            S1  R         5/5           5/5          5/5             5/5           5/5  L          5/5          5/5           5/5             5/5           5/5    Sensory:             L2          L3         L4      L5       S1         (0=absent, 1=impaired, 2=normal, NT=not testable)  R         2            2            2        2        2  L          2            2           2        2         2    Imaging:    ACC: [id number] EXAM:  CT LUMBAR SPINE   ORDERED BY:  JUANITO VALENZUELA     PROCEDURE DATE:  2024          INTERPRETATION:  INDICATIONS:  History of lumbar surgery with lower back   pain and pain in the right hip    TECHNIQUE:  Serial axial images were obtained using multi slice helical   technique. Sagittal and coronal reformatted images were performed.    COMPARISON EXAMINATION: 6/10/2023    FINDINGS:  VERTEBRAL BODIES AND DISCS:  Right-sided pedicle screw L4,L5 and S1.   Anterior fixation screws L4-L5 and S1 with interbody spacers L4-5 and   L5-S1..  ALIGNMENT:  No subluxations.  L1-L2 LEVEL:  Normal.  L2-L3 LEVEL:  Normal.  L3-L4 LEVEL:  Normal.  L4-L5 LEVEL:  Some erosive changes along the inferior endplate of L4 more   left-sidedwhen compared with the prior study  L5-S1 LEVEL:  Bilateral neural foraminal narrowing related to osteophytes   projecting into the neural foramina bilaterally at this level.  SPINAL CANAL:  No other intradural or extradural defects are seen.  MISCELLANEOUS:  None.    IMPRESSION:  Some evolving erosive changes along the inferior endplate of   L4 on the left when compared with the prior study. Study is otherwise   unchanged compared with 6/10/2023    --- End of Report ---      Procedure note: under sterile conditions, the patient's R-sided back was prepped with betadine swabs. 3cc 1% lidocaine without epi and 1cc 40mg/1ml Kenalog was injected into the paraspinal musculature at 2 points. Tegaderm and guaze dressing applied.      A/P: 48y Female with R-sided back pain and radicular symptoms s/p 2 stage lumbar decompression L4-S1 in May now s/p trigger point injection by Dr. Pena in ED.    - 10mg IV decadron in ED  - Prednisone taper to start tomorrow 40mg x3 days, 30mg x3 days, 20mg  x3 days, 10mg x3 days  - Fexeril 5mg TID PRN  - Ambulation in ED    No acute orthopaedic surgical intervention, please call with questions.  Follow up with Dr. Pena in 1- 2 weeks after discharge.

## 2024-01-16 NOTE — ED ADULT TRIAGE NOTE - CHIEF COMPLAINT QUOTE
Back pain Had surgery may 2023  Denies incontinence stool or urine Denies numbness Amb to BR     TYLENOL at 7AM  Denies injury

## 2024-01-16 NOTE — ED PROVIDER NOTE - CARE PROVIDER_API CALL
Jacob Pena  Orthopaedic Surgery  [address] [facility], [address]  [place] [address]  Phone: [phone]  Fax: [phone]  Follow Up Time:    Jacob Pena  Orthopaedic Surgery  [address] [facility], [address]  [place] [address]  Phone: [phone]  Fax: [phone]  Follow Up Time:

## 2024-01-16 NOTE — CONSULT NOTE ADULT - ATTENDING COMMENTS
47 yo female with ALIF/PSF with painful hardware.  We discussed several options for pain control, including oral and IV steroids, muscle relaxants, narcotics, which have not alleviated her symptoms.  We performed trigger point injections over the hardware. and the patient will be re-evaluated, if the pain subsides, then she can be discharged to home. If it does not we did discuss removing the hardware.  She would be admitted for that, for intractable pain . I spent 72 minutes with the patient, reviewed history, re-examined patient, and reviewed her imaging, notes and discussed surgical and non surgical options.

## 2024-01-16 NOTE — ED PROVIDER NOTE - WORK/EXCUSE FORM DATE
Thank you! Thank you for allowing me to care for you in the emergency department. I sincerely hope that you are satisfied with your visit today. It is my goal to provide you with excellent care. Below you will find a list of your labs and imaging from your visit today if applicable. Should you have any questions regarding these results please do not hesitate to call the emergency department. Please review [facility] for a more detailed result list since the below list may not be comprehensive. Instructions on how to sign up to [facility] should be provided in this packet. Labs -  No results found for this or any previous visit (from the past 12 hour(s)). Radiologic Studies -   No orders to display          If you feel that you have not received excellent quality care or timely care, please ask to speak to the nurse manager. Please choose us in the future for your continued health care needs. ------------------------------------------------------------------------------------------------------------  The exam and treatment you received in the Emergency Department were for an urgent problem and are not intended as complete care. It is important that you follow-up with a doctor, nurse practitioner, or physician assistant to:  (1) confirm your diagnosis,  (2) re-evaluation of changes in your illness and treatment, and  (3) for ongoing care. If your symptoms become worse or you do not improve as expected and you are unable to reach your usual health care provider, you should return to the Emergency Department. We are available 24 hours a day. Please take your discharge instructions with you when you go to your follow-up appointment. If a prescription has been provided, please have it filled as soon as possible to prevent a delay in treatment. Read the entire medication instruction sheet provided to you by the pharmacy.  If you have any questions or reservations about taking the medication due to side
18-Jan-2024

## 2024-01-16 NOTE — ED ADULT NURSE NOTE - ISOLATION TYPE:
Outpatient Medications Marked as Taking for the 12/2/20 encounter (Telephone) with David Shah, DO   Medication Sig Dispense Refill   • clonazePAM (KLONOPIN) 1 MG tablet Take 1 tablet by mouth nightly as needed (Anxiety or sleep). 90 tablet 1           Pharmacy: Human mail order   None

## 2024-01-24 ENCOUNTER — NON-APPOINTMENT (OUTPATIENT)
Age: 49
End: 2024-01-24

## 2024-01-24 NOTE — PACU DISCHARGE NOTE - NAUSEA/VOMITING:
Patient Name: Duran Martini  YOB: 1949    Referring Provider:  Devyn Green DO      Reason for Referral:  Mr. Martini had genetic testing performed on 1/4/2024 because of a family history of a seizure disorder in his grandson with a GABRG2 c.1000G>A (p.[id number]) likely pathogenic variant previously found in his grandson and son.      Genetic Testing Result:  Positive. 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 GABRG2 pathogenic variants are associated with a spectrum of autosomal dominant epilepsy and seizure disorders.  No other known pathogenic variants were found in a total of 1 gene(s) including: GABRG2. Please refer to the report from [facility] for additional testing information.  These results were discussed with Mr. Martini and his wife via telephone on 1/24/2024.    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 This is the same GABRG2 gene variant previously found in his son and grandson. GABRG2 pathogenic variants are associated with a spectrum of autosomal dominant epilepsy and seizure disorders. The severity and frequency of seizures may vary within and between families, as can response to treatment. Individuals with a GABRG2 pathogenic variant may or may not develop seizures or other features of the condition, this is called reduced penetrance.     Mr. Martini is encouraged to follow-up with his primary care physician to discuss his genetic testing result. Consultation with a neurologist would also be recommended.     The limitations of the testing were discussed with Mr. Martini including the chance that additional pathogenic variants in a gene other than those included in this panel might be the cause of conditions in Mr. Martini or in relatives.    Genetic epilepsies and GABRG2 pathogenic variants  Genetic epilepsies 
(GE) refer to epilepsy syndromes previously classified as idiopathic generalized epilepsies (IGEs) and have been associated with gene mutations using current advances in genetics and sequencing technology. GEs are the most common neurological disorders in the pediatric population and one of the most common neurological disorders in adults.     GEs include several different epilepsy syndromes that vary in clinical severity from relatively benign childhood absence epilepsy () which may remit with age to more severe juvenile myoclonic epilepsy (KARON) and generalized epilepsy with febrile seizures plus (GEFS+). A subpopulation of GEs are associated with severe recurrent seizures and cognitive decline that have been referred to as epileptic encephalopathies which include severe myoclonic epilepsy in infancy (SMEI) or Dravet syndrome, West syndrome or infantile spasms, Ohtohara syndrome and Lennox-Gastaut syndrome.    Heterozygous mutations in GABRG2 have been associated with a spectrum of autosomal dominant GE syndromes with different severities and incomplete penetrance. Some mutations in GABRG2 are associated with simple febrile seizures (FS) or childhood absence epilepsy () with good outcomes while others are associated with the more severe phenotype generalized epilepsy with febrile seizures plus (GEFS+) which continues onto adulthood. Still some other mutations have even worse phenotype like Dravet syndrome with intractable seizures and cognitive decline.      Implications for family members  Because genetic epilepsies associated with GABRG2 gene pathogenic variants are inherited in an autosomal dominant manner, we discussed that each of Mr. Martini's untested children and siblings has a 50% chance of carrying the same GABRG2 pathogenic variant as Mr. Martini.  I encouraged Mr. Martini to share the genetic test results with at risk family members so that they may have a discussion about the family history of an 
inherited seizure disorder caused by the known familial GABRG2 pathogenic variant with a physician and/or genetic counselor.    Mr. Martini is also encouraged to contact me on an annual basis to learn if there have been any updates in genetic information that would apply or if the personal and/or family history changes. Please do not hesitate to contact my office if you have any questions or concerns, [phone].     Devika Wick MS, CGC  
None
None

## 2024-02-02 ENCOUNTER — APPOINTMENT (OUTPATIENT)
Dept: ORTHOPEDIC SURGERY | Facility: CLINIC | Age: 49
End: 2024-02-02
Payer: OTHER MISCELLANEOUS

## 2024-02-02 VITALS — BODY MASS INDEX: 24.96 KG/M2 | WEIGHT: 159 LBS | HEIGHT: 67 IN

## 2024-02-02 PROCEDURE — 99214 OFFICE O/P EST MOD 30 MIN: CPT

## 2024-02-02 PROCEDURE — 72100 X-RAY EXAM L-S SPINE 2/3 VWS: CPT

## 2024-03-12 NOTE — ED PROVIDER NOTE - PROGRESS NOTE DETAILS
Continued Stay SW/CM Assessment/Plan of Care Note       Active Substitute Decision Maker (SDM)       [facility] ERICK[facility] (POA) Health Care Agent 1 - Daughter   Primary Phone: [phone] (Home)                     Progress note:  Referral sent to [facility]  [phone] (phone)  [phone] (fax)    Addendum:  Referral sent to [facility] for informational meeting per Palliative NP order.  Hospice meeting arranged for 3/14 at 6 p.m. with TORITO Mix    See SW/CM flowsheets for other objective data.    Disposition Recommendations:  Preliminary discharge destination:    SW/CM recommendation for discharge: Assisted living, Home care    Destination Pharmacy:        Discharge Plan/Needs:     Continued Care and Services - Admitted Since 3/11/2024       Home Medical Care       Service Provider Request Status Selected Services Address Phone Fax    [facility] Pending - No Request Sent N/A [address] [facility] [address] [address] [phone]                          Prior To Hospitalization:    Living Situation: Alone, Other facility residents and residing at Assisted living    .  Support Systems: Children   Home Devices/Equipment: Mobility assist device, Hospital bed            Mobility Assist Devices: Standard walker, Wheelchair   Type of Service Prior to Hospitalization: Social work, OT, PT               Patient/Family discharge goal (s):  Assisted living, Home Care     Resources provided:           Therapy Recommendations for Discharge:   PT:      Last Filed Values       None          OT:       Last Filed Values       None          SLP:    Last Filed Values       None            Mobility Equipment Recommended for Discharge:        Barriers to Discharge  Identified Barriers to Discharge/Transition Planning:                     Patient requesting an MRI. explained that is not possible. Patient requesting to be discharged so she can go to another hospital to get an MRI. Pt is well appearing walking with steady gait, stable for discharge and follow up without fail with medical doctor. I had a detailed discussion with the patient and/or guardian regarding the historical points, exam findings, and any diagnostic results supporting the discharge diagnosis. Pt educated on care and need for follow up. Strict return instructions and red flag signs and symptoms discussed with patient. Questions answered. Pt shows understanding of discharge information and agrees to follow.

## 2024-03-19 ENCOUNTER — EMERGENCY (EMERGENCY)
Facility: HOSPITAL | Age: 49
LOS: 1 days | Discharge: HOME | End: 2024-03-19
Attending: STUDENT IN AN ORGANIZED HEALTH CARE EDUCATION/TRAINING PROGRAM
Payer: COMMERCIAL

## 2024-03-19 VITALS
RESPIRATION RATE: 20 BRPM | DIASTOLIC BLOOD PRESSURE: 81 MMHG | OXYGEN SATURATION: 97 % | TEMPERATURE: 98 F | SYSTOLIC BLOOD PRESSURE: 124 MMHG | HEART RATE: 79 BPM | HEIGHT: 67 IN | WEIGHT: 154.98 LBS

## 2024-03-19 DIAGNOSIS — Z98.89 OTHER SPECIFIED POSTPROCEDURAL STATES: Chronic | ICD-10-CM

## 2024-03-19 PROCEDURE — 99053 MED SERV 10PM-8AM 24 HR FAC: CPT

## 2024-03-19 PROCEDURE — 99285 EMERGENCY DEPT VISIT HI MDM: CPT

## 2024-03-19 NOTE — ED ADULT TRIAGE NOTE - CHIEF COMPLAINT QUOTE
C/o B/L lower back pain radiating down R leg x 2 days that worsened after moving in the shower, states R hip swelling. Endorses hx lumbar spinal surgery 05/23. Denies injury

## 2024-03-20 VITALS
SYSTOLIC BLOOD PRESSURE: 102 MMHG | RESPIRATION RATE: 16 BRPM | DIASTOLIC BLOOD PRESSURE: 72 MMHG | HEART RATE: 68 BPM | TEMPERATURE: 98 F | OXYGEN SATURATION: 98 %

## 2024-03-20 LAB
ALBUMIN SERPL ELPH-MCNC: 4.4 G/DL — SIGNIFICANT CHANGE UP (ref 3.3–5)
ALP SERPL-CCNC: 133 U/L — HIGH (ref 40–120)
ALT FLD-CCNC: 32 U/L — SIGNIFICANT CHANGE UP (ref 10–45)
ANION GAP SERPL CALC-SCNC: 13 MMOL/L — SIGNIFICANT CHANGE UP (ref 5–17)
APPEARANCE UR: CLEAR — SIGNIFICANT CHANGE UP
APTT BLD: 38 SEC — HIGH (ref 24.5–35.6)
AST SERPL-CCNC: 21 U/L — SIGNIFICANT CHANGE UP (ref 10–40)
BACTERIA # UR AUTO: NEGATIVE /HPF — SIGNIFICANT CHANGE UP
BASOPHILS # BLD AUTO: 0 K/UL — SIGNIFICANT CHANGE UP (ref 0–0.2)
BASOPHILS NFR BLD AUTO: 0 % — SIGNIFICANT CHANGE UP (ref 0–2)
BILIRUB SERPL-MCNC: 0.2 MG/DL — SIGNIFICANT CHANGE UP (ref 0.2–1.2)
BILIRUB UR-MCNC: NEGATIVE — SIGNIFICANT CHANGE UP
BUN SERPL-MCNC: 22 MG/DL — SIGNIFICANT CHANGE UP (ref 7–23)
CALCIUM SERPL-MCNC: 10.4 MG/DL — SIGNIFICANT CHANGE UP (ref 8.4–10.5)
CAST: 0 /LPF — SIGNIFICANT CHANGE UP (ref 0–4)
CHLORIDE SERPL-SCNC: 102 MMOL/L — SIGNIFICANT CHANGE UP (ref 96–108)
CO2 SERPL-SCNC: 26 MMOL/L — SIGNIFICANT CHANGE UP (ref 22–31)
COLOR SPEC: YELLOW — SIGNIFICANT CHANGE UP
CREAT SERPL-MCNC: 0.68 MG/DL — SIGNIFICANT CHANGE UP (ref 0.5–1.3)
DIFF PNL FLD: ABNORMAL
EGFR: 107 ML/MIN/1.73M2 — SIGNIFICANT CHANGE UP
EOSINOPHIL # BLD AUTO: 0.64 K/UL — HIGH (ref 0–0.5)
EOSINOPHIL NFR BLD AUTO: 5.1 % — SIGNIFICANT CHANGE UP (ref 0–6)
GLUCOSE SERPL-MCNC: 144 MG/DL — HIGH (ref 70–99)
GLUCOSE UR QL: 500 MG/DL
HCG SERPL-ACNC: <2 MIU/ML — SIGNIFICANT CHANGE UP
HCT VFR BLD CALC: 41.1 % — SIGNIFICANT CHANGE UP (ref 34.5–45)
HGB BLD-MCNC: 12.7 G/DL — SIGNIFICANT CHANGE UP (ref 11.5–15.5)
INR BLD: 0.96 RATIO — SIGNIFICANT CHANGE UP (ref 0.85–1.18)
KETONES UR-MCNC: NEGATIVE MG/DL — SIGNIFICANT CHANGE UP
LEUKOCYTE ESTERASE UR-ACNC: NEGATIVE — SIGNIFICANT CHANGE UP
LIDOCAIN IGE QN: 59 U/L — SIGNIFICANT CHANGE UP (ref 7–60)
LYMPHOCYTES # BLD AUTO: 41 % — SIGNIFICANT CHANGE UP (ref 13–44)
LYMPHOCYTES # BLD AUTO: 5.12 K/UL — HIGH (ref 1–3.3)
MAGNESIUM SERPL-MCNC: 2 MG/DL — SIGNIFICANT CHANGE UP (ref 1.6–2.6)
MANUAL SMEAR VERIFICATION: SIGNIFICANT CHANGE UP
MCHC RBC-ENTMCNC: 26.6 PG — LOW (ref 27–34)
MCHC RBC-ENTMCNC: 30.9 GM/DL — LOW (ref 32–36)
MCV RBC AUTO: 86 FL — SIGNIFICANT CHANGE UP (ref 80–100)
MONOCYTES # BLD AUTO: 0.64 K/UL — SIGNIFICANT CHANGE UP (ref 0–0.9)
MONOCYTES NFR BLD AUTO: 5.1 % — SIGNIFICANT CHANGE UP (ref 2–14)
NEUTROPHILS # BLD AUTO: 6.09 K/UL — SIGNIFICANT CHANGE UP (ref 1.8–7.4)
NEUTROPHILS NFR BLD AUTO: 48.8 % — SIGNIFICANT CHANGE UP (ref 43–77)
NITRITE UR-MCNC: NEGATIVE — SIGNIFICANT CHANGE UP
NT-PROBNP SERPL-SCNC: <36 PG/ML — SIGNIFICANT CHANGE UP (ref 0–300)
PH UR: 5.5 — SIGNIFICANT CHANGE UP (ref 5–8)
PLAT MORPH BLD: NORMAL — SIGNIFICANT CHANGE UP
PLATELET # BLD AUTO: 262 K/UL — SIGNIFICANT CHANGE UP (ref 150–400)
POTASSIUM SERPL-MCNC: 4.1 MMOL/L — SIGNIFICANT CHANGE UP (ref 3.5–5.3)
POTASSIUM SERPL-SCNC: 4.1 MMOL/L — SIGNIFICANT CHANGE UP (ref 3.5–5.3)
PROT SERPL-MCNC: 8.2 G/DL — SIGNIFICANT CHANGE UP (ref 6–8.3)
PROT UR-MCNC: NEGATIVE MG/DL — SIGNIFICANT CHANGE UP
PROTHROM AB SERPL-ACNC: 10.6 SEC — SIGNIFICANT CHANGE UP (ref 9.5–13)
RBC # BLD: 4.78 M/UL — SIGNIFICANT CHANGE UP (ref 3.8–5.2)
RBC # FLD: 13 % — SIGNIFICANT CHANGE UP (ref 10.3–14.5)
RBC BLD AUTO: NORMAL — SIGNIFICANT CHANGE UP
RBC CASTS # UR COMP ASSIST: 2 /HPF — SIGNIFICANT CHANGE UP (ref 0–4)
SODIUM SERPL-SCNC: 141 MMOL/L — SIGNIFICANT CHANGE UP (ref 135–145)
SP GR SPEC: 1.03 — SIGNIFICANT CHANGE UP (ref 1–1.03)
SQUAMOUS # UR AUTO: 1 /HPF — SIGNIFICANT CHANGE UP (ref 0–5)
TROPONIN T, HIGH SENSITIVITY RESULT: <6 NG/L — SIGNIFICANT CHANGE UP (ref 0–51)
UROBILINOGEN FLD QL: 0.2 MG/DL — SIGNIFICANT CHANGE UP (ref 0.2–1)
WBC # BLD: 12.48 K/UL — HIGH (ref 3.8–10.5)
WBC # FLD AUTO: 12.48 K/UL — HIGH (ref 3.8–10.5)
WBC UR QL: 2 /HPF — SIGNIFICANT CHANGE UP (ref 0–5)

## 2024-03-20 PROCEDURE — 85018 HEMOGLOBIN: CPT

## 2024-03-20 PROCEDURE — 85730 THROMBOPLASTIN TIME PARTIAL: CPT

## 2024-03-20 PROCEDURE — 84295 ASSAY OF SERUM SODIUM: CPT

## 2024-03-20 PROCEDURE — 85610 PROTHROMBIN TIME: CPT

## 2024-03-20 PROCEDURE — 83690 ASSAY OF LIPASE: CPT

## 2024-03-20 PROCEDURE — 72131 CT LUMBAR SPINE W/O DYE: CPT | Mod: 26,MC

## 2024-03-20 PROCEDURE — 83605 ASSAY OF LACTIC ACID: CPT

## 2024-03-20 PROCEDURE — 83735 ASSAY OF MAGNESIUM: CPT

## 2024-03-20 PROCEDURE — 82330 ASSAY OF CALCIUM: CPT

## 2024-03-20 PROCEDURE — 96374 THER/PROPH/DIAG INJ IV PUSH: CPT

## 2024-03-20 PROCEDURE — 82803 BLOOD GASES ANY COMBINATION: CPT

## 2024-03-20 PROCEDURE — 84132 ASSAY OF SERUM POTASSIUM: CPT

## 2024-03-20 PROCEDURE — 99285 EMERGENCY DEPT VISIT HI MDM: CPT | Mod: 25

## 2024-03-20 PROCEDURE — 85014 HEMATOCRIT: CPT

## 2024-03-20 PROCEDURE — 93005 ELECTROCARDIOGRAM TRACING: CPT

## 2024-03-20 PROCEDURE — 82947 ASSAY GLUCOSE BLOOD QUANT: CPT

## 2024-03-20 PROCEDURE — 81001 URINALYSIS AUTO W/SCOPE: CPT

## 2024-03-20 PROCEDURE — 72131 CT LUMBAR SPINE W/O DYE: CPT | Mod: MC

## 2024-03-20 PROCEDURE — 84702 CHORIONIC GONADOTROPIN TEST: CPT

## 2024-03-20 PROCEDURE — 71046 X-RAY EXAM CHEST 2 VIEWS: CPT | Mod: 26

## 2024-03-20 PROCEDURE — 71046 X-RAY EXAM CHEST 2 VIEWS: CPT

## 2024-03-20 PROCEDURE — 83880 ASSAY OF NATRIURETIC PEPTIDE: CPT

## 2024-03-20 PROCEDURE — 85025 COMPLETE CBC W/AUTO DIFF WBC: CPT

## 2024-03-20 PROCEDURE — 84484 ASSAY OF TROPONIN QUANT: CPT

## 2024-03-20 PROCEDURE — 82435 ASSAY OF BLOOD CHLORIDE: CPT

## 2024-03-20 PROCEDURE — 87086 URINE CULTURE/COLONY COUNT: CPT

## 2024-03-20 PROCEDURE — 80053 COMPREHEN METABOLIC PANEL: CPT

## 2024-03-20 RX ORDER — KETOROLAC TROMETHAMINE 30 MG/ML
15 SYRINGE (ML) INJECTION ONCE
Refills: 0 | Status: DISCONTINUED | OUTPATIENT
Start: 2024-03-20 | End: 2024-03-20

## 2024-03-20 RX ORDER — LIDOCAINE 4 G/100G
1 CREAM TOPICAL ONCE
Refills: 0 | Status: COMPLETED | OUTPATIENT
Start: 2024-03-20 | End: 2024-03-20

## 2024-03-20 RX ORDER — OXYCODONE HYDROCHLORIDE 5 MG/1
5 TABLET ORAL ONCE
Refills: 0 | Status: DISCONTINUED | OUTPATIENT
Start: 2024-03-20 | End: 2024-03-20

## 2024-03-20 RX ORDER — OXYCODONE HYDROCHLORIDE 5 MG/1
1 TABLET ORAL
Qty: 12 | Refills: 0
Start: 2024-03-20 | End: 2024-03-22

## 2024-03-20 RX ORDER — CYCLOBENZAPRINE HYDROCHLORIDE 10 MG/1
1 TABLET, FILM COATED ORAL
Qty: 9 | Refills: 0
Start: 2024-03-20 | End: 2024-03-22

## 2024-03-20 RX ORDER — DEXAMETHASONE 0.5 MG/5ML
10 ELIXIR ORAL ONCE
Refills: 0 | Status: DISCONTINUED | OUTPATIENT
Start: 2024-03-20 | End: 2024-03-20

## 2024-03-20 RX ORDER — CYCLOBENZAPRINE HYDROCHLORIDE 10 MG/1
1 TABLET, FILM COATED ORAL
Qty: 15 | Refills: 0
Start: 2024-03-20 | End: 2024-03-24

## 2024-03-20 RX ORDER — CYCLOBENZAPRINE HYDROCHLORIDE 10 MG/1
10 TABLET, FILM COATED ORAL ONCE
Refills: 0 | Status: COMPLETED | OUTPATIENT
Start: 2024-03-20 | End: 2024-03-20

## 2024-03-20 RX ADMIN — LIDOCAINE 1 PATCH: 4 CREAM TOPICAL at 02:27

## 2024-03-20 RX ADMIN — Medication 15 MILLIGRAM(S): at 02:26

## 2024-03-20 RX ADMIN — OXYCODONE HYDROCHLORIDE 5 MILLIGRAM(S): 5 TABLET ORAL at 08:03

## 2024-03-20 RX ADMIN — CYCLOBENZAPRINE HYDROCHLORIDE 10 MILLIGRAM(S): 10 TABLET, FILM COATED ORAL at 02:25

## 2024-03-20 NOTE — ED PROVIDER NOTE - NSFOLLOWUPINSTRUCTIONS_ED_ALL_ED_FT
Follow up with Dr. Pena in 1-2 weeks  Bring a copy of your test results with you to your appointment  Continue your current medication regimen  Return to the Emergency Room if you experience new or worsening symptoms  Take Tylenol 650mg every 6 hrs as needed for pain.  Take Motrin 400-600mg every 6 hrs as needed for pain. Take with food   Oxycodone 5mg every 6 hours as needed for pain. Do not drive or consume alcohol while taking.   Take Flexeril 3x per day as needed for muscle spasm  Take prednisone as directed on the bottle    Back Pain    Back pain is very common in adults. The cause of back pain is rarely dangerous and the pain often gets better over time. The cause of your back pain may not be known and may include strain of muscles or ligaments, degeneration of the spinal disks, or arthritis. Occasionally the pain may radiate down your leg(s). Over-the-counter medicines to reduce pain and inflammation are often the most helpful. Stretching and remaining active frequently helps the healing process.     SEEK IMMEDIATE MEDICAL CARE IF YOU HAVE ANY OF THE FOLLOWING SYMPTOMS: bowel or bladder control problems, unusual weakness or numbness in your arms or legs, nausea or vomiting, abdominal pain, fever, dizziness/lightheadedness.

## 2024-03-20 NOTE — CONSULT NOTE ADULT - SUBJECTIVE AND OBJECTIVE BOX
HPI  48yFemale w/ PMH of s/p 2 stage unilateral L4-S1 decompression/fusion (ALIF, PSF) who presents c/o R-sided back pain and R lateal thigh pain for 2 days. Pt states she has been c/o RLE radiculopathy, for which she presented to ED in 2024 for similar pain. Patient said pain improved with steroid taper. Exacerbated by activity. Radiates down posterior R thigh. Denies weakness, numbness/tingling, hand clumsines. Denies fevers/chills, weight loss, or night pain. Denies change in bowel/bladder function or saddle anesthesia. Denies recent falls/trauma or any other ortho injuries. Community ambulator w cane at baseline.    ROS  Negative unless otherwise specified in HPI.    PAST MEDICAL & SURGICAL Hx  PAST MEDICAL & SURGICAL HISTORY:  HLD (hyperlipidemia)      Spinal stenosis, lumbosacral region      Current smoker      S/P           MEDICATIONS  Home Medications:  Multiple Vitamins oral tablet: 1 tab(s) orally once a day (18 May 2023 11:48)  simvastatin 20 mg oral tablet: 1 tab(s) orally once a day (at bedtime) (10 May 2023 09:29)      ALLERGIES  penicillin (Pruritus; Rash)      FAMILY Hx  FAMILY HISTORY:      SOCIAL Hx  Social History:      VITALS  Vital Signs Last 24 Hrs  T(C): 36.7 (20 Mar 2024 02:22), Max: 36.8 (19 Mar 2024 22:58)  T(F): 98 (20 Mar 2024 02:22), Max: 98.2 (19 Mar 2024 22:58)  HR: 74 (20 Mar 2024 02:22) (74 - 79)  BP: 118/87 (20 Mar 2024 02:22) (118/87 - 124/81)  BP(mean): --  RR: 18 (20 Mar 2024 02:22) (18 - 20)  SpO2: 100% (20 Mar 2024 02:22) (97% - 100%)    Parameters below as of 20 Mar 2024 02:22  Patient On (Oxygen Delivery Method): room air        PHYSICAL EXAM  Gen: Lying in bed, NAD  Resp: No increased WOB  Spine:  Skin intact  + TTP over previous surgical site, no step-offs along c-, t-, l-spine, or sacrum    Motor:                   C5                C6              C7               C8           T1   R            5/5                5/5            5/5             5/5          5/5  L             5/5               5/5             5/5             5/5          5/5                L2             L3             L4               L5            S1  R         5/5           5/5          5/5             5/5           5/5  L          5/5          5/5           5/5             5/5           5/5    Sensory:            C5         C6         C7      C8       T1        (0=absent, 1=impaired, 2=normal, NT=not testable)  R         2            2           2        2         2  L          2            2           2        2         2               L2          L3         L4      L5       S1         (0=absent, 1=impaired, 2=normal, NT=not testable)  R         1            2            2        2        2  L          2            2           2        2         2    (-) Argueta test b/l  (+) RLE Straight leg raise test  (-) Babinski sign b/l  (-) Ankle clonus b/l      LABS                        12.7   12.48 )-----------( 262      ( 20 Mar 2024 02:27 )             41.1     03-20    141  |  102  |  22  ----------------------------<  144<H>  4.1   |  26  |  0.68    Ca    10.4      20 Mar 2024 02:27  Mg     2.0     03-20    TPro  8.2  /  Alb  4.4  /  TBili  0.2  /  DBili  x   /  AST  21  /  ALT  32  /  AlkPhos  133<H>  03-20    PT/INR - ( 20 Mar 2024 02:27 )   PT: 10.6 sec;   INR: 0.96 ratio         PTT - ( 20 Mar 2024 02:27 )  PTT:38.0 sec    IMAGING    CT Lumbar: Redemonstration of erosive change along the inferior endplate of L4.  Mild bilateral neuroforaminal stenosis L5-S1.

## 2024-03-20 NOTE — ED PROVIDER NOTE - CARE PROVIDER_API CALL
Jacob Pena  Orthopaedic Surgery  [address], [address]  [place] [address]  Phone: [phone]  Fax: [phone]  Follow Up Time:

## 2024-03-20 NOTE — ED ADULT NURSE NOTE - CHPI ED NUR SYMPTOMS NEG
no anorexia/no bladder dysfunction/no bowel dysfunction/no constipation/no neck tenderness/no numbness/no tingling

## 2024-03-20 NOTE — ED ADULT NURSE NOTE - NSFALLRISKINTERV_ED_ALL_ED

## 2024-03-20 NOTE — ED ADULT NURSE NOTE - OBJECTIVE STATEMENT
49 y/o F presents to the ED with complaints of right lower back pain radiating to the buttocks x 2-3 days. Pt reports right lower back appears "swollen". Pt denies difficulty ambulating, fever, chills. Pt A&Ox3 gross neuro intact, no difficulty speaking in complete sentences, pulses x 4, monae x4, abdomen soft nontender nondistended, skin intact.

## 2024-03-20 NOTE — CONSULT NOTE ADULT - ASSESSMENT
A/P: 48y Female with R-sided back pain and radicular symptoms s/p 2 stage lumbar decompression L4-S1 in 5/2023 now returning to ED for RLE radiculopathy    -WBAT  -LSO brace for comfort  - Recommend 10mg Decadron IV  - Flexeril for spasms  -pain control  -incentive spirometry  -PT/OT    Will discuss with Dr. Pena and update note accordingly,    For all questions related to patient care, please reach out to the on-call team via the pager.     Marissa Ramirez, PGY 2  Orthopaedic Surgery  [facility] [id number]  [facility] [id number]  [facility] [phone]   A/P: 48y Female with R-sided back pain and radicular symptoms s/p 2 stage lumbar decompression L4-S1 in 5/2023 now returning to ED for RLE radiculopathy. Feeling improved since 10mg of decadron.    -WBAT  -LSO brace for comfort  - Flexeril for spasms  -pain control  -incentive spirometry  -PT/OT    Prednisone Taper:  Prednisone taper to start 6 hours after last dose of decadron:  40mg x3 days, 30mg x3 days, 20mg  x3 days, 10mg x3 days      FU with Dr. Pena outpatient within the next week, call office to make appointment    For all questions related to patient care, please reach out to the on-call team via the pager.     Marissa Ramirez, PGY 2  Orthopaedic Surgery  [facility] [id number]  [facility] [place] [id number]  [facility] [phone]

## 2024-03-20 NOTE — ED PROVIDER NOTE - PROGRESS NOTE DETAILS
patient indicates she is feeling somewhat improved. ortho at bedside as well, she would like to be discharged at this time with pain control and will see Dr. Pena. - Viviane Welsh PA-C

## 2024-03-20 NOTE — ED PROVIDER NOTE - ATTENDING CONTRIBUTION TO CARE
Rony Starks DO: I have personally performed a face to face medical and diagnostic evaluation of the patient. I have discussed with and reviewed the Resident's and/or ACP's and/or Medical/PA/NP student's note and agree with the History, ROS, Physical Exam and MDM unless otherwise indicated. A brief summary of my personal evaluation and impression can be found below.     49 yo F with a PMH of HLD, diabetes, CHF, spinal stenosis status post Stage I and Stage II L4-S1 ALIF and L4-S1 PSF spine surgeries on 5/10/23 and 5/16/23 by Dr. Pena p/w B/L lower back pain radiating down R leg x 2 days. Due to severity of symptoms patient decided to come to emergency department. Their pain/symptom is moderate to severe, constant, non mediating with rest.  Patient also in addition reports chest pain that is not exertional, nonradiating this started over the past day.  Denies trauma, falls, fever, headache, LOC, Head Trauma, AMS, dizziness, syncope, shortness of breath, cough, rhinorrhea, congestion, abdominal pain, nausea, vomiting, diarrhea, constipation, melena, hematochezia, dysuria, hematuria, urinary frequency, flank pain, skin changes, p.o. issues, issues urinating, issues stooling, issues with ambulation.    CONSTITUTIONAL: uncomfortable appearing  SKIN: Warm dry, normal skin turgor  HEAD: NCAT  NECK: Supple; non tender. Full ROM.  CARD: RRR, no murmurs.  RESP: clear to ausculation b/l. No crackles or wheezing.  ABD: mild R sided flank pain  MSK: proximal rle weakness, distal rle str in tact, LLE no weakness, no abd tenderness, mild R sided lower flank tenderness, lower L spine tenderness bony and paravertebral  NEURO: normal motor. normal sensory. CN II-XII intact. Cerebellar testing normal. Normal gait.  PSYCH: Cooperative, appropriate.     w/ leg weakness hx of l spine sx concern for foramenal/spinal stenosis, no urinary/fecal incontinence or saddle anesthesia concerning for acute cord compression, will get CT L spine, ortho spine consultation, check labs, less likely sciatica, not suspicious for dermatomyoisitis/polymositis as pt is having spinous pain radiating down, less likely infeciotus/uti, dispo pending imaging and ortho reccomendations, response to pain control. not suspecting intraabdominal injury at this time

## 2024-03-20 NOTE — ED PROVIDER NOTE - PATIENT PORTAL LINK FT
You can access the FollowMyHealth Patient Portal offered by [facility] by registering at the following website: [email or web address]. By joining [email or web address]’s FollowMyHealth portal, you will also be able to view your health information using other applications (apps) compatible with our system.

## 2024-03-20 NOTE — ED PROVIDER NOTE - CLINICAL SUMMARY MEDICAL DECISION MAKING FREE TEXT BOX
49 yo F with a PMH of HLD, diabetes, CHF, spinal stenosis status post Stage I and Stage II L4-S1 ALIF and L4-S1 PSF spine surgeries on 5/10/23 and 5/16/23 by Dr. Pena p/w B/L lower back pain radiating down R leg x 2 days. Due to severity of symptoms patient decided to come to emergency department. Their pain/symptom is moderate to severe, constant, non mediating with rest.  Patient also in addition reports chest pain that is not exertional, nonradiating this started over the past day.  Denies trauma, falls, fever, headache, LOC, Head Trauma, AMS, dizziness, syncope, shortness of breath, cough, rhinorrhea, congestion, abdominal pain, nausea, vomiting, diarrhea, constipation, melena, hematochezia, dysuria, hematuria, urinary frequency, flank pain, skin changes, p.o. issues, issues urinating, issues stooling, issues with ambulation.    General: non-toxic, NAD   HEENT: NCAT, PERRL   Cardiac: RRR, no murmurs, 2+ peripheral pulses   Chest: CTAB  Abdomen: soft, non-distended, bowel sounds present, no ttp, no rebound or guarding   MSK: Paraspinal tenderness to palpation of L2-L3-L4.  Positive straight leg raise on the right leg.  Extremities: no peripheral edema, calf tenderness, or leg size discrepancies   Skin: no rashes   Neuro: AAOx4, 5+motor, sensory grossly intact   Psych: mood and affect appropriate    Impression: 49 yo F with a PMH of HLD, spinal stenosis status post Stage I and Stage II L4-S1 ALIF and L4-S1 PSF spine surgeries on 5/10/23 and 5/16/23 by Dr. Pena p/w B/L lower back pain radiating down R leg x 2 days. Their symptoms and exam findings are concerning for degenerative disc disease, sciatica.  Due to patient's significant past cardiac history with chest pain plan to evaluate for ACS.    Ordered labs, imaging, medications for diagnosis, management, and treatment.

## 2024-03-21 LAB
CULTURE RESULTS: SIGNIFICANT CHANGE UP
SPECIMEN SOURCE: SIGNIFICANT CHANGE UP

## 2024-03-26 ENCOUNTER — APPOINTMENT (OUTPATIENT)
Age: 49
End: 2024-03-26
Payer: OTHER MISCELLANEOUS

## 2024-03-26 ENCOUNTER — TRANSCRIPTION ENCOUNTER (OUTPATIENT)
Age: 49
End: 2024-03-26

## 2024-03-26 VITALS — WEIGHT: 159 LBS | HEIGHT: 67 IN | BODY MASS INDEX: 24.96 KG/M2

## 2024-03-26 PROCEDURE — 72100 X-RAY EXAM L-S SPINE 2/3 VWS: CPT

## 2024-03-26 PROCEDURE — 99214 OFFICE O/P EST MOD 30 MIN: CPT

## 2024-03-26 RX ORDER — OXYCODONE 10 MG/1
10 TABLET ORAL
Qty: 90 | Refills: 0 | Status: ACTIVE | COMMUNITY
Start: 2024-03-26 | End: 1900-01-01

## 2024-03-26 NOTE — DISCUSSION/SUMMARY
[de-identified] : 47 yo female with ALIF/PSF 2 recent exacerbation was in hospital , still very symptomatic over hardware, recommend MRI of L Spine with/withotu contrast, PT, pain consult to inject over hardware.     Diagnosis, prognosis, natural history and treatment was discussed with patient. Patient was advised if the following symptoms develop: chills, fever,  loss of bladder control, bowel incontinence or urinary retention, numbness/tingling or weakness is present in upper or lower extremities, to go to the nearest emergency room. This may be a new clinical condition not present at the time of the patient visit  that may lead to paralysis and/or death, Patient advised if the above symptoms developed to also call the office immediately to inform us and to go to the nearest emergency room.

## 2024-03-26 NOTE — HISTORY OF PRESENT ILLNESS
[FreeTextEntry1] : stretching back improves pain oxycodone PRN provises pain relief [de-identified] : Pt s/p Anterior Lumbar Interbody Fusion L4-S1 on 05/10/23 with improved symptoms since surgery,increased pain after fall, xrays show hardware intact and no fractures. Patient presents today for f/u. She had 2 visits to ER for intractable pain, treated with trigger point and oral medications. Pt was injured at work on 11/09/2021. Pt is a nurse technician. Pt reports neck pain improved since last visit.  Pt takes Oxycodone 5mg PRN. She has PT 2-3x/week which improves her symptoms.   She was previously hospitalized for coughing, wheezing. She states the coughing aggravated the back pain.   Pt denies fever, chills, weight changes, loss of bladder control, bowel incontinence or urinary retention or saddle anesthesia  Pt takes muscle relaxant, ibuprofen 800mg Tylenol PRN, these provides mild pain relief. Pt had LESI X3-4, last MARY on 11/2022, this provided 60% pain relief for 2 days, performed by Dr Leavitt, does not participate with PT at this time. last session 02/2022 and acupuncture , these provided no pain relief

## 2024-03-26 NOTE — PHYSICAL EXAM
[Normal] : Gait: normal [Argueta's Sign] : negative Argueta's sign [Pronator Drift] : negative pronator drift [SLR] : negative straight leg raise [LE] : Sensory: Intact in bilateral lower extremities [ALL] : dorsalis pedis, posterior tibial, femoral, popliteal, and radial 2+ and symmetric bilaterally [de-identified] : Lumbar ROM: limited, painful TTP right trochanteric regions and over hardware.  NTTP L spine and left paraspinals L region. Skin intact L spine. No rashes, ulcers, blisters. No lymphedema. Lumbar incision well healed [de-identified] : 2 views AP and Lat of LS Spine from S54-Fsdrer 02/02/24. Read and dictated by Dr. Jacob Pena Board Certified orthopaedic surgeon   2 views AP and Lat of LS Spine from D52-Zmzwae 12/20/23 Read and dictated by Dr. Jacob Pena Board Certified orthopaedic surgeon ALIF/PSF L4-S1   2 views AP and Lat of LS Spine from E22-Nnzbfo 10/10/23. Read and dictated by Dr. Jacob Pena Board Certified orthopaedic surgeon ALIF/PSF L4-S1  2 views AP and Lat of Lumbar Spine from J44-Xozdop 08/29/23. Read and dictated by Dr. Jacob Pena Board Certified Orthopaedic surgeon ALIF/PSF L4-S1

## 2024-04-02 ENCOUNTER — NON-APPOINTMENT (OUTPATIENT)
Age: 49
End: 2024-04-02

## 2024-04-16 ENCOUNTER — NON-APPOINTMENT (OUTPATIENT)
Age: 49
End: 2024-04-16

## 2024-04-16 RX ORDER — OXYCODONE 10 MG/1
10 TABLET ORAL EVERY 8 HOURS
Qty: 90 | Refills: 0 | Status: ACTIVE | COMMUNITY
Start: 2024-04-16 | End: 1900-01-01

## 2024-04-30 ENCOUNTER — APPOINTMENT (OUTPATIENT)
Dept: ORTHOPEDIC SURGERY | Facility: CLINIC | Age: 49
End: 2024-04-30
Payer: OTHER MISCELLANEOUS

## 2024-04-30 VITALS — BODY MASS INDEX: 24.96 KG/M2 | HEIGHT: 67 IN | WEIGHT: 159 LBS

## 2024-04-30 DIAGNOSIS — M51.36 OTHER INTERVERTEBRAL DISC DEGENERATION, LUMBAR REGION: ICD-10-CM

## 2024-04-30 PROCEDURE — 72100 X-RAY EXAM L-S SPINE 2/3 VWS: CPT

## 2024-04-30 PROCEDURE — 99214 OFFICE O/P EST MOD 30 MIN: CPT

## 2024-05-16 ENCOUNTER — NON-APPOINTMENT (OUTPATIENT)
Age: 49
End: 2024-05-16

## 2024-06-18 ENCOUNTER — APPOINTMENT (OUTPATIENT)
Dept: MRI IMAGING | Facility: CLINIC | Age: 49
End: 2024-06-18
Payer: OTHER MISCELLANEOUS

## 2024-06-18 ENCOUNTER — OUTPATIENT (OUTPATIENT)
Dept: OUTPATIENT SERVICES | Facility: HOSPITAL | Age: 49
LOS: 1 days | End: 2024-06-18
Payer: COMMERCIAL

## 2024-06-18 DIAGNOSIS — Z98.89 OTHER SPECIFIED POSTPROCEDURAL STATES: Chronic | ICD-10-CM

## 2024-06-18 DIAGNOSIS — M54.16 RADICULOPATHY, LUMBAR REGION: ICD-10-CM

## 2024-06-18 PROCEDURE — A9585: CPT

## 2024-06-18 PROCEDURE — 72158 MRI LUMBAR SPINE W/O & W/DYE: CPT | Mod: 26

## 2024-06-18 PROCEDURE — 72158 MRI LUMBAR SPINE W/O & W/DYE: CPT

## 2024-06-25 ENCOUNTER — APPOINTMENT (OUTPATIENT)
Dept: ORTHOPEDIC SURGERY | Facility: CLINIC | Age: 49
End: 2024-06-25
Payer: OTHER MISCELLANEOUS

## 2024-06-25 VITALS — WEIGHT: 159 LBS | HEIGHT: 67 IN | BODY MASS INDEX: 24.96 KG/M2

## 2024-06-25 DIAGNOSIS — T84.84XA PAIN DUE TO INTERNAL ORTHOPEDIC PROSTHETIC DEVICES, IMPLANTS AND GRAFTS, INITIAL ENCOUNTER: ICD-10-CM

## 2024-06-25 PROCEDURE — 72100 X-RAY EXAM L-S SPINE 2/3 VWS: CPT

## 2024-06-25 PROCEDURE — 99215 OFFICE O/P EST HI 40 MIN: CPT

## 2024-08-02 ENCOUNTER — APPOINTMENT (OUTPATIENT)
Dept: ORTHOPEDIC SURGERY | Facility: CLINIC | Age: 49
End: 2024-08-02

## 2024-08-13 ENCOUNTER — APPOINTMENT (OUTPATIENT)
Dept: ORTHOPEDIC SURGERY | Facility: CLINIC | Age: 49
End: 2024-08-13
Payer: OTHER MISCELLANEOUS

## 2024-08-13 VITALS — BODY MASS INDEX: 24.96 KG/M2 | WEIGHT: 159 LBS | HEIGHT: 67 IN

## 2024-08-13 DIAGNOSIS — M51.36 OTHER INTERVERTEBRAL DISC DEGENERATION, LUMBAR REGION: ICD-10-CM

## 2024-08-13 DIAGNOSIS — T84.84XA PAIN DUE TO INTERNAL ORTHOPEDIC PROSTHETIC DEVICES, IMPLANTS AND GRAFTS, INITIAL ENCOUNTER: ICD-10-CM

## 2024-08-13 PROCEDURE — 99214 OFFICE O/P EST MOD 30 MIN: CPT

## 2024-08-13 NOTE — PHYSICAL EXAM
[Antalgic] : antalgic [Cane] : ambulates with cane [NL] : Motor strength of the left lower extremity was normal [Motor Strength Lower Extremities] : right (weak) [] : Sensory: [L4-RT] : L4 [L5-RT] : L5 [ALL] : dorsalis pedis, posterior tibial, femoral, popliteal, and radial 2+ and symmetric bilaterally [Argueta's Sign] : negative Ragueta's sign [Pronator Drift] : negative pronator drift [SLR] : negative straight leg raise [de-identified] : Lumbar ROM: limited, painful TTP right trochanteric regions and over hardware.  NTTP L spine and left paraspinals L region. Skin intact L spine. No rashes, ulcers, blisters. No lymphedema. Lumbar incision well healed [de-identified] : 3 views AP and Lat, flex/ext of Lumbar Spine from U12-Lorrcs . Read and dictated by Dr. Salomon Pena Board Certified Orthopaedic surgeon ALIF/PSF L4-S1 6/25/2024  EXAM: [id number] - MR SPINE LUMBAR WAW IC - ORDERED BY: SALOMON PENA  PROCEDURE DATE: 06/18/2024  INTERPRETATION: CLINICAL INFORMATION: Back pain  ADDITIONAL CLINICAL INFORMATION: Post-laminectomy syndrome M96.1  TECHNIQUE: Multiplanar, multisequence MRI was performed of the lumbar spine. IV Contrast: Gadavist 6.5 cc administered 1 cc discarded  PRIOR STUDIES: 5/12/2023  FINDINGS:  Patient is status post posterior spinal fusion from L4 to S1 using a left-sided vertical gela secured by transpedicular screws. There are also stand alone and interbody fusion devices at L4-L5 and L5-S1. No visualized hardware complication.  LOCALIZER: No additional findings. BONES: There is no fracture or bone marrow edema. ALIGNMENT: The alignment is normal. SACROILIAC JOINTS/SACRUM: There is no sacral fracture. The SI joints are partially visualized but are intact. CONUS AND CAUDA EQUINA: The distal cord and conus are normal in signal. Conus terminates at L1. VISUALIZED INTRAPELVIC/INTRA-ABDOMINAL SOFT TISSUES: Normal. PARASPINAL SOFT TISSUES: Normal.   INDIVIDUAL LEVELS:  LOWER THORACIC SPINE: No spinal canal or neuroforaminal stenosis.  L1-L2: Mild bilateral facet arthropathy. No spinal canal or neuroforaminal stenosis. L2-L3: Mild to moderate bilateral facet arthropathy. No spinal canal or neuroforaminal stenosis. L3-L4: Moderate bilateral facet arthropathy. No spinal canal or neuroforaminal stenosis. L4-L5: Posterior disc bulge with superimposed left intraforaminal protrusion in conjunction with moderate bilateral facet arthropathy results in mild to moderate left neural foraminal narrowing but no significant right neural foraminal narrowing or central canal narrowing. L5-S1: Posterior osseous ridging with bilateral intraforaminal extension in conjunction with moderate bilateral facet arthropathy results in moderate bilateral neural foraminal narrowing but no significant central canal narrowing.   IMPRESSION:  Status post anterior and posterior spinal fusion from L4 to S1 without visualized hardware complication.  Multilevel discogenic degenerative disease and facet arthropathy of the lumbar spine, most pronounced at L5-S1 where there is moderate bilateral neural foraminal narrowing and L4-L5 where there is mild to moderate left neural foraminal narrowing. No additional areas of significant central canal or neural foraminal narrowing within the lumbar spine. These findings are not significantly changed as compared to 5/20/2023.    2 views AP and Lat of LS Spine from D48-Msrqcz 02/02/24. Read and dictated by Dr. Salomon Pena Board Certified orthopaedic surgeon  2 views AP and Lat of LS Spine from R04-Hixjbi 12/20/23 Read and dictated by Dr. Salomon Pena Board Certified orthopaedic surgeon ALIF/PSF L4-S1   2 views AP and Lat of LS Spine from G08-Faewdf 10/10/23. Read and dictated by Dr. Salomon Pena Board Certified orthopaedic surgeon ALIF/PSF L4-S1  2 views AP and Lat of Lumbar Spine from W11-Nsruiq 08/29/23. Read and dictated by Dr. Salomon Pena Board Certified Orthopaedic surgeon ALIF/PSF L4-S1

## 2024-08-13 NOTE — DISCUSSION/SUMMARY
[de-identified] : 49 yo female with ALIF/PSF 2 recent exacerbation was in hospital , still very symptomatic over hardware, MRI and CT reveals excellent nerve decompression and good fusion at L5S1, recommend Removal of Hardware as it is symptomatic, and further PT or MARY, trigger point will not imrprove it. Post surgically she will need PT 2-3x/week for 12 weeks, and homecare.   Removal of Posterior Spinal Instrumentation L4-S1 (34372) 3 hours Becky Simental Xray  Diagnosis, prognosis, natural history and treatment was discussed with patient. Patient was advised if the following symptoms develop: chills, fever,  loss of bladder control, bowel incontinence or urinary retention, numbness/tingling or weakness is present in upper or lower extremities, to go to the nearest emergency room. This may be a new clinical condition not present at the time of the patient visit  that may lead to paralysis and/or death, Patient advised if the above symptoms developed to also call the office immediately to inform us and to go to the nearest emergency room.

## 2024-08-13 NOTE — PHYSICAL EXAM
[Antalgic] : antalgic [Cane] : ambulates with cane [NL] : Motor strength of the left lower extremity was normal [Motor Strength Lower Extremities] : right (weak) [] : Sensory: [L4-RT] : L4 [L5-RT] : L5 [ALL] : dorsalis pedis, posterior tibial, femoral, popliteal, and radial 2+ and symmetric bilaterally [Argueta's Sign] : negative Argueta's sign [Pronator Drift] : negative pronator drift [SLR] : negative straight leg raise [de-identified] : Lumbar ROM: limited, painful TTP right trochanteric regions and over hardware.  NTTP L spine and left paraspinals L region. Skin intact L spine. No rashes, ulcers, blisters. No lymphedema. Lumbar incision well healed [de-identified] : 3 views AP and Lat, flex/ext of Lumbar Spine from B97-Jsvgta . Read and dictated by Dr. Salomon Pena Board Certified Orthopaedic surgeon ALIF/PSF L4-S1 6/25/2024  EXAM: [id number] - MR SPINE LUMBAR WAW IC - ORDERED BY: SALOMON PENA  PROCEDURE DATE: 06/18/2024  INTERPRETATION: CLINICAL INFORMATION: Back pain  ADDITIONAL CLINICAL INFORMATION: Post-laminectomy syndrome M96.1  TECHNIQUE: Multiplanar, multisequence MRI was performed of the lumbar spine. IV Contrast: Gadavist 6.5 cc administered 1 cc discarded  PRIOR STUDIES: 5/12/2023  FINDINGS:  Patient is status post posterior spinal fusion from L4 to S1 using a left-sided vertical gela secured by transpedicular screws. There are also stand alone and interbody fusion devices at L4-L5 and L5-S1. No visualized hardware complication.  LOCALIZER: No additional findings. BONES: There is no fracture or bone marrow edema. ALIGNMENT: The alignment is normal. SACROILIAC JOINTS/SACRUM: There is no sacral fracture. The SI joints are partially visualized but are intact. CONUS AND CAUDA EQUINA: The distal cord and conus are normal in signal. Conus terminates at L1. VISUALIZED INTRAPELVIC/INTRA-ABDOMINAL SOFT TISSUES: Normal. PARASPINAL SOFT TISSUES: Normal.   INDIVIDUAL LEVELS:  LOWER THORACIC SPINE: No spinal canal or neuroforaminal stenosis.  L1-L2: Mild bilateral facet arthropathy. No spinal canal or neuroforaminal stenosis. L2-L3: Mild to moderate bilateral facet arthropathy. No spinal canal or neuroforaminal stenosis. L3-L4: Moderate bilateral facet arthropathy. No spinal canal or neuroforaminal stenosis. L4-L5: Posterior disc bulge with superimposed left intraforaminal protrusion in conjunction with moderate bilateral facet arthropathy results in mild to moderate left neural foraminal narrowing but no significant right neural foraminal narrowing or central canal narrowing. L5-S1: Posterior osseous ridging with bilateral intraforaminal extension in conjunction with moderate bilateral facet arthropathy results in moderate bilateral neural foraminal narrowing but no significant central canal narrowing.   IMPRESSION:  Status post anterior and posterior spinal fusion from L4 to S1 without visualized hardware complication.  Multilevel discogenic degenerative disease and facet arthropathy of the lumbar spine, most pronounced at L5-S1 where there is moderate bilateral neural foraminal narrowing and L4-L5 where there is mild to moderate left neural foraminal narrowing. No additional areas of significant central canal or neural foraminal narrowing within the lumbar spine. These findings are not significantly changed as compared to 5/20/2023.    2 views AP and Lat of LS Spine from H75-Rerfvr 02/02/24. Read and dictated by Dr. Salomon Pena Board Certified orthopaedic surgeon  2 views AP and Lat of LS Spine from B60-Mdikoy 12/20/23 Read and dictated by Dr. Salomon Pena Board Certified orthopaedic surgeon ALIF/PSF L4-S1   2 views AP and Lat of LS Spine from M93-Rcarzm 10/10/23. Read and dictated by Dr. Salomon Pena Board Certified orthopaedic surgeon ALIF/PSF L4-S1  2 views AP and Lat of Lumbar Spine from T04-Tipfdk 08/29/23. Read and dictated by Dr. Salomon Pena Board Certified Orthopaedic surgeon ALIF/PSF L4-S1

## 2024-08-13 NOTE — DISCUSSION/SUMMARY
[de-identified] : 47 yo female with ALIF/PSF 2 recent exacerbation was in hospital , still very symptomatic over hardware, MRI and CT reveals excellent nerve decompression and good fusion at L5S1, recommend Removal of Hardware as it is symptomatic, and further PT or MARY, trigger point will not imrprove it. Post surgically she will need PT 2-3x/week for 12 weeks, and homecare.   Removal of Posterior Spinal Instrumentation L4-S1 (35570) 3 hours Becky Simental Xray  Diagnosis, prognosis, natural history and treatment was discussed with patient. Patient was advised if the following symptoms develop: chills, fever,  loss of bladder control, bowel incontinence or urinary retention, numbness/tingling or weakness is present in upper or lower extremities, to go to the nearest emergency room. This may be a new clinical condition not present at the time of the patient visit  that may lead to paralysis and/or death, Patient advised if the above symptoms developed to also call the office immediately to inform us and to go to the nearest emergency room.

## 2024-08-13 NOTE — HISTORY OF PRESENT ILLNESS
[de-identified] : Pt s/p Anterior Lumbar Interbody Fusion L4-S1 on 05/10/23 with improved symptoms since surgery, increased pain after fall, x-rays show hardware intact and no fractures. Patient states that she fell while attempting to go to the bathroom when her legs gave out in June 2024. Patient presents today for f/u. Pt was injured at work on 11/09/2021. 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  Patient states that lower back pain has been persistent.  Patient states that LBP, radiates down the posterior-lateral aspect of RLE. Patient also reports numbness, tingling, and weakness to RLE. Patient states that pain is worst with prolonged siting and walking. Patient admits to taking Tylenol, Ibuprofen, and muscle relaxers, which provides little to no improvement in pain. Currently her pain management is with Tramadol. Patient previously seen by Dr. Ezra Leavitt and received trigger point injections to the lower back, with no relief 04/2024. Patient states that pain is mostly over spinal hardware with pain, numbness and tingling RLE.  Patient ambulates with a cane and can perform ADL's independently with difficulty. Patient denies fever, chills, weight changes, loss of bladder control, bowel incontinence or urinary retention or saddle anesthesia. She reports no change in her symptoms since her last visit. She has not returned to work.    [FreeTextEntry1] : stretching back improves pain oxycodone PRN provises pain relief

## 2024-08-13 NOTE — DISCUSSION/SUMMARY
[de-identified] : 49 yo female with ALIF/PSF 2 recent exacerbation was in hospital , still very symptomatic over hardware, MRI and CT reveals excellent nerve decompression and good fusion at L5S1, recommend Removal of Hardware as it is symptomatic, and further PT or MARY, trigger point will not imrprove it. Post surgically she will need PT 2-3x/week for 12 weeks, and homecare.   Removal of Posterior Spinal Instrumentation L4-S1 (02110) 3 hours Becky Simental Xray  Diagnosis, prognosis, natural history and treatment was discussed with patient. Patient was advised if the following symptoms develop: chills, fever,  loss of bladder control, bowel incontinence or urinary retention, numbness/tingling or weakness is present in upper or lower extremities, to go to the nearest emergency room. This may be a new clinical condition not present at the time of the patient visit  that may lead to paralysis and/or death, Patient advised if the above symptoms developed to also call the office immediately to inform us and to go to the nearest emergency room.

## 2024-08-13 NOTE — HISTORY OF PRESENT ILLNESS
[de-identified] : Pt s/p Anterior Lumbar Interbody Fusion L4-S1 on 05/10/23 with improved symptoms since surgery, increased pain after fall, x-rays show hardware intact and no fractures. Patient states that she fell while attempting to go to the bathroom when her legs gave out in June 2024. Patient presents today for f/u. Pt was injured at work on 11/09/2021. 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  Patient states that lower back pain has been persistent.  Patient states that LBP, radiates down the posterior-lateral aspect of RLE. Patient also reports numbness, tingling, and weakness to RLE. Patient states that pain is worst with prolonged siting and walking. Patient admits to taking Tylenol, Ibuprofen, and muscle relaxers, which provides little to no improvement in pain. Currently her pain management is with Tramadol. Patient previously seen by Dr. Ezra Leavitt and received trigger point injections to the lower back, with no relief 04/2024. Patient states that pain is mostly over spinal hardware with pain, numbness and tingling RLE.  Patient ambulates with a cane and can perform ADL's independently with difficulty. Patient denies fever, chills, weight changes, loss of bladder control, bowel incontinence or urinary retention or saddle anesthesia. She reports no change in her symptoms since her last visit. She has not returned to work.    [FreeTextEntry1] : stretching back improves pain oxycodone PRN provises pain relief

## 2024-08-13 NOTE — HISTORY OF PRESENT ILLNESS
[de-identified] : Pt s/p Anterior Lumbar Interbody Fusion L4-S1 on 05/10/23 with improved symptoms since surgery, increased pain after fall, x-rays show hardware intact and no fractures. Patient states that she fell while attempting to go to the bathroom when her legs gave out in June 2024. Patient presents today for f/u. Pt was injured at work on 11/09/2021. 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  Patient states that lower back pain has been persistent.  Patient states that LBP, radiates down the posterior-lateral aspect of RLE. Patient also reports numbness, tingling, and weakness to RLE. Patient states that pain is worst with prolonged siting and walking. Patient admits to taking Tylenol, Ibuprofen, and muscle relaxers, which provides little to no improvement in pain. Currently her pain management is with Tramadol. Patient previously seen by Dr. Ezra Leavitt and received trigger point injections to the lower back, with no relief 04/2024. Patient states that pain is mostly over spinal hardware with pain, numbness and tingling RLE.  Patient ambulates with a cane and can perform ADL's independently with difficulty. Patient denies fever, chills, weight changes, loss of bladder control, bowel incontinence or urinary retention or saddle anesthesia. She reports no change in her symptoms since her last visit. She has not returned to work.    [FreeTextEntry1] : stretching back improves pain oxycodone PRN provises pain relief The patient is a 67y Female complaining of ingestion.

## 2024-08-13 NOTE — PHYSICAL EXAM
[Antalgic] : antalgic [Cane] : ambulates with cane [NL] : Motor strength of the left lower extremity was normal [Motor Strength Lower Extremities] : right (weak) [] : Sensory: [L4-RT] : L4 [L5-RT] : L5 [ALL] : dorsalis pedis, posterior tibial, femoral, popliteal, and radial 2+ and symmetric bilaterally [Argueta's Sign] : negative Argueta's sign [Pronator Drift] : negative pronator drift [SLR] : negative straight leg raise [de-identified] : Lumbar ROM: limited, painful TTP right trochanteric regions and over hardware.  NTTP L spine and left paraspinals L region. Skin intact L spine. No rashes, ulcers, blisters. No lymphedema. Lumbar incision well healed [de-identified] : 3 views AP and Lat, flex/ext of Lumbar Spine from Y51-Akhtcc . Read and dictated by Dr. Salomon Pena Board Certified Orthopaedic surgeon ALIF/PSF L4-S1 6/25/2024  EXAM: [id number] - MR SPINE LUMBAR WAW IC - ORDERED BY: SALOMON PENA  PROCEDURE DATE: 06/18/2024  INTERPRETATION: CLINICAL INFORMATION: Back pain  ADDITIONAL CLINICAL INFORMATION: Post-laminectomy syndrome M96.1  TECHNIQUE: Multiplanar, multisequence MRI was performed of the lumbar spine. IV Contrast: Gadavist 6.5 cc administered 1 cc discarded  PRIOR STUDIES: 5/12/2023  FINDINGS:  Patient is status post posterior spinal fusion from L4 to S1 using a left-sided vertical gela secured by transpedicular screws. There are also stand alone and interbody fusion devices at L4-L5 and L5-S1. No visualized hardware complication.  LOCALIZER: No additional findings. BONES: There is no fracture or bone marrow edema. ALIGNMENT: The alignment is normal. SACROILIAC JOINTS/SACRUM: There is no sacral fracture. The SI joints are partially visualized but are intact. CONUS AND CAUDA EQUINA: The distal cord and conus are normal in signal. Conus terminates at L1. VISUALIZED INTRAPELVIC/INTRA-ABDOMINAL SOFT TISSUES: Normal. PARASPINAL SOFT TISSUES: Normal.   INDIVIDUAL LEVELS:  LOWER THORACIC SPINE: No spinal canal or neuroforaminal stenosis.  L1-L2: Mild bilateral facet arthropathy. No spinal canal or neuroforaminal stenosis. L2-L3: Mild to moderate bilateral facet arthropathy. No spinal canal or neuroforaminal stenosis. L3-L4: Moderate bilateral facet arthropathy. No spinal canal or neuroforaminal stenosis. L4-L5: Posterior disc bulge with superimposed left intraforaminal protrusion in conjunction with moderate bilateral facet arthropathy results in mild to moderate left neural foraminal narrowing but no significant right neural foraminal narrowing or central canal narrowing. L5-S1: Posterior osseous ridging with bilateral intraforaminal extension in conjunction with moderate bilateral facet arthropathy results in moderate bilateral neural foraminal narrowing but no significant central canal narrowing.   IMPRESSION:  Status post anterior and posterior spinal fusion from L4 to S1 without visualized hardware complication.  Multilevel discogenic degenerative disease and facet arthropathy of the lumbar spine, most pronounced at L5-S1 where there is moderate bilateral neural foraminal narrowing and L4-L5 where there is mild to moderate left neural foraminal narrowing. No additional areas of significant central canal or neural foraminal narrowing within the lumbar spine. These findings are not significantly changed as compared to 5/20/2023.    2 views AP and Lat of LS Spine from H90-Mzphja 02/02/24. Read and dictated by Dr. Salomon Pena Board Certified orthopaedic surgeon  2 views AP and Lat of LS Spine from H28-Ndscru 12/20/23 Read and dictated by Dr. Salomon Pena Board Certified orthopaedic surgeon ALIF/PSF L4-S1   2 views AP and Lat of LS Spine from H07-Cjxrzt 10/10/23. Read and dictated by Dr. Salomon Pena Board Certified orthopaedic surgeon ALIF/PSF L4-S1  2 views AP and Lat of Lumbar Spine from G10-Uuttni 08/29/23. Read and dictated by Dr. Salomon Pena Board Certified Orthopaedic surgeon ALIF/PSF L4-S1

## 2024-09-10 ENCOUNTER — APPOINTMENT (OUTPATIENT)
Dept: ORTHOPEDIC SURGERY | Facility: CLINIC | Age: 49
End: 2024-09-10
Payer: OTHER MISCELLANEOUS

## 2024-09-10 VITALS — HEIGHT: 67 IN | WEIGHT: 159 LBS | BODY MASS INDEX: 24.96 KG/M2

## 2024-09-10 DIAGNOSIS — T84.84XA PAIN DUE TO INTERNAL ORTHOPEDIC PROSTHETIC DEVICES, IMPLANTS AND GRAFTS, INITIAL ENCOUNTER: ICD-10-CM

## 2024-09-10 PROCEDURE — 72100 X-RAY EXAM L-S SPINE 2/3 VWS: CPT

## 2024-09-10 PROCEDURE — 99214 OFFICE O/P EST MOD 30 MIN: CPT

## 2024-09-10 NOTE — DISCUSSION/SUMMARY
[de-identified] : 49 yo female with ALIF/PSF 2 recent exacerbation was in hospital , still very symptomatic over hardware, MRI and CT reveals excellent nerve decompression and good fusion at L5S1, recommend Removal of Hardware as it is symptomatic.  Her symptoms were exacerbated recently due to PT, and has been worsening.  Recommend surgery, awaiting authroization.   Removal of Posterior Spinal Instrumentation L4-S1 (17546) 3 hours Becky Simental Xray  Diagnosis, prognosis, natural history and treatment was discussed with patient. Patient was advised if the following symptoms develop: chills, fever,  loss of bladder control, bowel incontinence or urinary retention, numbness/tingling or weakness is present in upper or lower extremities, to go to the nearest emergency room. This may be a new clinical condition not present at the time of the patient visit  that may lead to paralysis and/or death, Patient advised if the above symptoms developed to also call the office immediately to inform us and to go to the nearest emergency room.

## 2024-09-10 NOTE — REASON FOR VISIT
[Initial Visit] : an initial visit for [Follow-Up Visit] : a follow-up visit for [Workers' Comp: Date of Injury: _______] : This visit is related to worker's compensation. Date of Injury: [unfilled]

## 2024-09-10 NOTE — PHYSICAL EXAM
[Antalgic] : antalgic [Cane] : ambulates with cane [NL] : Motor strength of the left lower extremity was normal [Motor Strength Lower Extremities] : right (5/5) [] : Sensory: [L4-RT] : L4 [L5-RT] : L5 [ALL] : dorsalis pedis, posterior tibial, femoral, popliteal, and radial 2+ and symmetric bilaterally [Argueta's Sign] : negative Argueta's sign [Pronator Drift] : negative pronator drift [SLR] : negative straight leg raise [de-identified] : Lumbar ROM: limited, painful TTP right trochanteric regions and over hardware.  NTTP L spine and left paraspinals L region. Skin intact L spine. No rashes, ulcers, blisters. No lymphedema. Lumbar incision well healed [de-identified] : 3 views AP and Lat, flex/ext of Lumbar Spine from [id number] . Read and dictated by Dr. Salomon Pena Board Certified Orthopaedic surgeon ALIF/PSF L4-S1 6/25/2024  EXAM: [id number] - MR SPINE LUMBAR WAW IC - ORDERED BY: SALOMON PENA  PROCEDURE DATE: 06/18/2024  INTERPRETATION: CLINICAL INFORMATION: Back pain  ADDITIONAL CLINICAL INFORMATION: Post-laminectomy syndrome M96.1  TECHNIQUE: Multiplanar, multisequence MRI was performed of the lumbar spine. IV Contrast: Gadavist 6.5 cc administered 1 cc discarded  PRIOR STUDIES: 5/12/2023  FINDINGS:  Patient is status post posterior spinal fusion from L4 to S1 using a left-sided vertical gela secured by transpedicular screws. There are also stand alone and interbody fusion devices at L4-L5 and L5-S1. No visualized hardware complication.  LOCALIZER: No additional findings. BONES: There is no fracture or bone marrow edema. ALIGNMENT: The alignment is normal. SACROILIAC JOINTS/SACRUM: There is no sacral fracture. The SI joints are partially visualized but are intact. CONUS AND CAUDA EQUINA: The distal cord and conus are normal in signal. Conus terminates at L1. VISUALIZED INTRAPELVIC/INTRA-ABDOMINAL SOFT TISSUES: Normal. PARASPINAL SOFT TISSUES: Normal.   INDIVIDUAL LEVELS:  LOWER THORACIC SPINE: No spinal canal or neuroforaminal stenosis.  L1-L2: Mild bilateral facet arthropathy. No spinal canal or neuroforaminal stenosis. L2-L3: Mild to moderate bilateral facet arthropathy. No spinal canal or neuroforaminal stenosis. L3-L4: Moderate bilateral facet arthropathy. No spinal canal or neuroforaminal stenosis. L4-L5: Posterior disc bulge with superimposed left intraforaminal protrusion in conjunction with moderate bilateral facet arthropathy results in mild to moderate left neural foraminal narrowing but no significant right neural foraminal narrowing or central canal narrowing. L5-S1: Posterior osseous ridging with bilateral intraforaminal extension in conjunction with moderate bilateral facet arthropathy results in moderate bilateral neural foraminal narrowing but no significant central canal narrowing.   IMPRESSION:  Status post anterior and posterior spinal fusion from L4 to S1 without visualized hardware complication.  Multilevel discogenic degenerative disease and facet arthropathy of the lumbar spine, most pronounced at L5-S1 where there is moderate bilateral neural foraminal narrowing and L4-L5 where there is mild to moderate left neural foraminal narrowing. No additional areas of significant central canal or neural foraminal narrowing within the lumbar spine. These findings are not significantly changed as compared to 5/20/2023.    2 views AP and Lat of LS Spine from [id number]-[id number] 02/02/24. Read and dictated by Dr. Salomon Pena Board Certified orthopaedic surgeon  2 views AP and Lat of LS Spine from [id number]-Dgzkls 12/20/23 Read and dictated by Dr. Salomon Pena Board Certified orthopaedic surgeon ALIF/PSF L4-S1   2 views AP and Lat of LS Spine from [id number]-Lwxvzj 10/10/23. Read and dictated by Dr. Salomon Pena Board Certified orthopaedic surgeon ALIF/PSF L4-S1  2 views AP and Lat of Lumbar Spine from [id number]-Ruubmf 08/29/23. Read and dictated by Dr. Salomon Pena Board Certified Orthopaedic surgeon ALIF/PSF L4-S1

## 2024-09-10 NOTE — REVIEW OF SYSTEMS
Patient called stating he was unable to make the below scheduled appt due to no transportation that day. Requesting to reschedule. Booked appt on 11/5/20. [Negative] : Psychiatric

## 2024-09-10 NOTE — HISTORY OF PRESENT ILLNESS
[de-identified] : Pt s/p Anterior Lumbar Interbody Fusion L4-S1 on 05/10/23 with improved symptoms since surgery, increased pain after fall, x-rays show hardware intact and no fractures. Patient states that she fell while attempting to go to the bathroom when her legs gave out in June 2024. Patient presents today for f/u. Symptoms  worsening siince last visit, she states PT has exacerbated her symptoms. Pt was injured at work on 11/09/2021. 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  Patient states that lower back pain has been persistent.  Patient states that LBP, radiates down the posterior-lateral aspect of RLE. Patient also reports numbness, tingling, and weakness to RLE. Patient states that pain is worst with prolonged siting and walking. Patient admits to taking Tylenol, Ibuprofen, and muscle relaxers, which provides little to no improvement in pain. Currently her pain management is with Tramadol. Patient previously seen by Dr. Ezra Leavitt and received trigger point injections to the lower back, with no relief 04/2024. Patient states that pain is mostly over spinal hardware with pain, numbness and tingling RLE.  Patient ambulates with a cane and can perform ADL's independently with difficulty. Patient denies fever, chills, weight changes, loss of bladder control, bowel incontinence or urinary retention or saddle anesthesia. She reports no change in her symptoms since her last visit. She has not returned to work.    [FreeTextEntry1] : stretching back improves pain oxycodone PRN provises pain relief

## 2024-09-10 NOTE — PHYSICAL EXAM
[Antalgic] : antalgic [Cane] : ambulates with cane [NL] : Motor strength of the left lower extremity was normal [Motor Strength Lower Extremities] : right (5/5) [] : Sensory: [L4-RT] : L4 [L5-RT] : L5 [ALL] : dorsalis pedis, posterior tibial, femoral, popliteal, and radial 2+ and symmetric bilaterally [Argueta's Sign] : negative Argueta's sign [Pronator Drift] : negative pronator drift [SLR] : negative straight leg raise [de-identified] : Lumbar ROM: limited, painful TTP right trochanteric regions and over hardware.  NTTP L spine and left paraspinals L region. Skin intact L spine. No rashes, ulcers, blisters. No lymphedema. Lumbar incision well healed [de-identified] : 3 views AP and Lat, flex/ext of Lumbar Spine from L35-Qcelis . Read and dictated by Dr. Salomon Pena Board Certified Orthopaedic surgeon ALIF/PSF L4-S1 6/25/2024  EXAM: [id number] - MR SPINE LUMBAR WAW IC - ORDERED BY: SALOMON PENA  PROCEDURE DATE: 06/18/2024  INTERPRETATION: CLINICAL INFORMATION: Back pain  ADDITIONAL CLINICAL INFORMATION: Post-laminectomy syndrome M96.1  TECHNIQUE: Multiplanar, multisequence MRI was performed of the lumbar spine. IV Contrast: Gadavist 6.5 cc administered 1 cc discarded  PRIOR STUDIES: 5/12/2023  FINDINGS:  Patient is status post posterior spinal fusion from L4 to S1 using a left-sided vertical gela secured by transpedicular screws. There are also stand alone and interbody fusion devices at L4-L5 and L5-S1. No visualized hardware complication.  LOCALIZER: No additional findings. BONES: There is no fracture or bone marrow edema. ALIGNMENT: The alignment is normal. SACROILIAC JOINTS/SACRUM: There is no sacral fracture. The SI joints are partially visualized but are intact. CONUS AND CAUDA EQUINA: The distal cord and conus are normal in signal. Conus terminates at L1. VISUALIZED INTRAPELVIC/INTRA-ABDOMINAL SOFT TISSUES: Normal. PARASPINAL SOFT TISSUES: Normal.   INDIVIDUAL LEVELS:  LOWER THORACIC SPINE: No spinal canal or neuroforaminal stenosis.  L1-L2: Mild bilateral facet arthropathy. No spinal canal or neuroforaminal stenosis. L2-L3: Mild to moderate bilateral facet arthropathy. No spinal canal or neuroforaminal stenosis. L3-L4: Moderate bilateral facet arthropathy. No spinal canal or neuroforaminal stenosis. L4-L5: Posterior disc bulge with superimposed left intraforaminal protrusion in conjunction with moderate bilateral facet arthropathy results in mild to moderate left neural foraminal narrowing but no significant right neural foraminal narrowing or central canal narrowing. L5-S1: Posterior osseous ridging with bilateral intraforaminal extension in conjunction with moderate bilateral facet arthropathy results in moderate bilateral neural foraminal narrowing but no significant central canal narrowing.   IMPRESSION:  Status post anterior and posterior spinal fusion from L4 to S1 without visualized hardware complication.  Multilevel discogenic degenerative disease and facet arthropathy of the lumbar spine, most pronounced at L5-S1 where there is moderate bilateral neural foraminal narrowing and L4-L5 where there is mild to moderate left neural foraminal narrowing. No additional areas of significant central canal or neural foraminal narrowing within the lumbar spine. These findings are not significantly changed as compared to 5/20/2023.    2 views AP and Lat of LS Spine from [id number]-[id number] 02/02/24. Read and dictated by Dr. Salomon Pena Board Certified orthopaedic surgeon  2 views AP and Lat of LS Spine from W19-Axtohv 12/20/23 Read and dictated by Dr. Salomon Pena Board Certified orthopaedic surgeon ALIF/PSF L4-S1   2 views AP and Lat of LS Spine from [id number]-Oqzgzg 10/10/23. Read and dictated by Dr. Salomon Pena Board Certified orthopaedic surgeon ALIF/PSF L4-S1  2 views AP and Lat of Lumbar Spine from J60-Pbnbah 08/29/23. Read and dictated by Dr. Salomon Pena Board Certified Orthopaedic surgeon ALIF/PSF L4-S1

## 2024-09-10 NOTE — HISTORY OF PRESENT ILLNESS
[de-identified] : Pt s/p Anterior Lumbar Interbody Fusion L4-S1 on 05/10/23 with improved symptoms since surgery, increased pain after fall, x-rays show hardware intact and no fractures. Patient states that she fell while attempting to go to the bathroom when her legs gave out in June 2024. Patient presents today for f/u. Symptoms  worsening siince last visit, she states PT has exacerbated her symptoms. Pt was injured at work on 11/09/2021. 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  Patient states that lower back pain has been persistent.  Patient states that LBP, radiates down the posterior-lateral aspect of RLE. Patient also reports numbness, tingling, and weakness to RLE. Patient states that pain is worst with prolonged siting and walking. Patient admits to taking Tylenol, Ibuprofen, and muscle relaxers, which provides little to no improvement in pain. Currently her pain management is with Tramadol. Patient previously seen by Dr. Ezra Leavitt and received trigger point injections to the lower back, with no relief 04/2024. Patient states that pain is mostly over spinal hardware with pain, numbness and tingling RLE.  Patient ambulates with a cane and can perform ADL's independently with difficulty. Patient denies fever, chills, weight changes, loss of bladder control, bowel incontinence or urinary retention or saddle anesthesia. She reports no change in her symptoms since her last visit. She has not returned to work.    [FreeTextEntry1] : stretching back improves pain oxycodone PRN provises pain relief

## 2024-09-10 NOTE — DISCUSSION/SUMMARY
[de-identified] : 49 yo female with ALIF/PSF 2 recent exacerbation was in hospital , still very symptomatic over hardware, MRI and CT reveals excellent nerve decompression and good fusion at L5S1, recommend Removal of Hardware as it is symptomatic.  Her symptoms were exacerbated recently due to PT, and has been worsening.  Recommend surgery, awaiting authroization.   Removal of Posterior Spinal Instrumentation L4-S1 (42196) 3 hours Becky Simental Xray  Diagnosis, prognosis, natural history and treatment was discussed with patient. Patient was advised if the following symptoms develop: chills, fever,  loss of bladder control, bowel incontinence or urinary retention, numbness/tingling or weakness is present in upper or lower extremities, to go to the nearest emergency room. This may be a new clinical condition not present at the time of the patient visit  that may lead to paralysis and/or death, Patient advised if the above symptoms developed to also call the office immediately to inform us and to go to the nearest emergency room.

## 2024-09-17 ENCOUNTER — NON-APPOINTMENT (OUTPATIENT)
Age: 49
End: 2024-09-17

## 2024-09-21 ENCOUNTER — NON-APPOINTMENT (OUTPATIENT)
Age: 49
End: 2024-09-21

## 2024-09-29 ENCOUNTER — INPATIENT (INPATIENT)
Facility: HOSPITAL | Age: 49
LOS: 4 days | Discharge: HOME | DRG: 448 | End: 2024-10-04
Attending: ORTHOPAEDIC SURGERY | Admitting: ORTHOPAEDIC SURGERY
Payer: COMMERCIAL

## 2024-09-29 VITALS
OXYGEN SATURATION: 98 % | HEART RATE: 94 BPM | HEIGHT: 67 IN | RESPIRATION RATE: 20 BRPM | TEMPERATURE: 98 F | SYSTOLIC BLOOD PRESSURE: 123 MMHG | DIASTOLIC BLOOD PRESSURE: 78 MMHG | WEIGHT: 145.06 LBS

## 2024-09-29 DIAGNOSIS — Z98.89 OTHER SPECIFIED POSTPROCEDURAL STATES: Chronic | ICD-10-CM

## 2024-09-29 DIAGNOSIS — M54.50 LOW BACK PAIN, UNSPECIFIED: ICD-10-CM

## 2024-09-29 LAB
ALBUMIN SERPL ELPH-MCNC: 4.3 G/DL — SIGNIFICANT CHANGE UP (ref 3.3–5)
ALP SERPL-CCNC: 109 U/L — SIGNIFICANT CHANGE UP (ref 40–120)
ALT FLD-CCNC: 23 U/L — SIGNIFICANT CHANGE UP (ref 10–45)
ANION GAP SERPL CALC-SCNC: 12 MMOL/L — SIGNIFICANT CHANGE UP (ref 5–17)
APPEARANCE UR: CLEAR — SIGNIFICANT CHANGE UP
AST SERPL-CCNC: 18 U/L — SIGNIFICANT CHANGE UP (ref 10–40)
BACTERIA # UR AUTO: NEGATIVE /HPF — SIGNIFICANT CHANGE UP
BASOPHILS # BLD AUTO: 0.05 K/UL — SIGNIFICANT CHANGE UP (ref 0–0.2)
BASOPHILS NFR BLD AUTO: 0.5 % — SIGNIFICANT CHANGE UP (ref 0–2)
BILIRUB SERPL-MCNC: 0.2 MG/DL — SIGNIFICANT CHANGE UP (ref 0.2–1.2)
BILIRUB UR-MCNC: NEGATIVE — SIGNIFICANT CHANGE UP
BUN SERPL-MCNC: 17 MG/DL — SIGNIFICANT CHANGE UP (ref 7–23)
CALCIUM SERPL-MCNC: 10.1 MG/DL — SIGNIFICANT CHANGE UP (ref 8.4–10.5)
CAST: 0 /LPF — SIGNIFICANT CHANGE UP (ref 0–4)
CHLORIDE SERPL-SCNC: 102 MMOL/L — SIGNIFICANT CHANGE UP (ref 96–108)
CO2 SERPL-SCNC: 28 MMOL/L — SIGNIFICANT CHANGE UP (ref 22–31)
COLOR SPEC: SIGNIFICANT CHANGE UP
CREAT SERPL-MCNC: 0.77 MG/DL — SIGNIFICANT CHANGE UP (ref 0.5–1.3)
DIFF PNL FLD: NEGATIVE — SIGNIFICANT CHANGE UP
EGFR: 94 ML/MIN/1.73M2 — SIGNIFICANT CHANGE UP
EOSINOPHIL # BLD AUTO: 0.35 K/UL — SIGNIFICANT CHANGE UP (ref 0–0.5)
EOSINOPHIL NFR BLD AUTO: 3.3 % — SIGNIFICANT CHANGE UP (ref 0–6)
GLUCOSE BLDC GLUCOMTR-MCNC: 113 MG/DL — HIGH (ref 70–99)
GLUCOSE SERPL-MCNC: 137 MG/DL — HIGH (ref 70–99)
GLUCOSE UR QL: >=1000 MG/DL
HCG UR QL: NEGATIVE — SIGNIFICANT CHANGE UP
HCT VFR BLD CALC: 41.4 % — SIGNIFICANT CHANGE UP (ref 34.5–45)
HGB BLD-MCNC: 12.5 G/DL — SIGNIFICANT CHANGE UP (ref 11.5–15.5)
IMM GRANULOCYTES NFR BLD AUTO: 0.3 % — SIGNIFICANT CHANGE UP (ref 0–0.9)
KETONES UR-MCNC: NEGATIVE MG/DL — SIGNIFICANT CHANGE UP
LEUKOCYTE ESTERASE UR-ACNC: NEGATIVE — SIGNIFICANT CHANGE UP
LYMPHOCYTES # BLD AUTO: 3.71 K/UL — HIGH (ref 1–3.3)
LYMPHOCYTES # BLD AUTO: 35.1 % — SIGNIFICANT CHANGE UP (ref 13–44)
MCHC RBC-ENTMCNC: 25.7 PG — LOW (ref 27–34)
MCHC RBC-ENTMCNC: 30.2 GM/DL — LOW (ref 32–36)
MCV RBC AUTO: 85.2 FL — SIGNIFICANT CHANGE UP (ref 80–100)
MONOCYTES # BLD AUTO: 0.65 K/UL — SIGNIFICANT CHANGE UP (ref 0–0.9)
MONOCYTES NFR BLD AUTO: 6.1 % — SIGNIFICANT CHANGE UP (ref 2–14)
NEUTROPHILS # BLD AUTO: 5.79 K/UL — SIGNIFICANT CHANGE UP (ref 1.8–7.4)
NEUTROPHILS NFR BLD AUTO: 54.7 % — SIGNIFICANT CHANGE UP (ref 43–77)
NITRITE UR-MCNC: NEGATIVE — SIGNIFICANT CHANGE UP
NRBC # BLD: 0 /100 WBCS — SIGNIFICANT CHANGE UP (ref 0–0)
PH UR: 6.5 — SIGNIFICANT CHANGE UP (ref 5–8)
PLATELET # BLD AUTO: 255 K/UL — SIGNIFICANT CHANGE UP (ref 150–400)
POTASSIUM SERPL-MCNC: 3.9 MMOL/L — SIGNIFICANT CHANGE UP (ref 3.5–5.3)
POTASSIUM SERPL-SCNC: 3.9 MMOL/L — SIGNIFICANT CHANGE UP (ref 3.5–5.3)
PROT SERPL-MCNC: 7.9 G/DL — SIGNIFICANT CHANGE UP (ref 6–8.3)
PROT UR-MCNC: NEGATIVE MG/DL — SIGNIFICANT CHANGE UP
RBC # BLD: 4.86 M/UL — SIGNIFICANT CHANGE UP (ref 3.8–5.2)
RBC # FLD: 13.6 % — SIGNIFICANT CHANGE UP (ref 10.3–14.5)
RBC CASTS # UR COMP ASSIST: 1 /HPF — SIGNIFICANT CHANGE UP (ref 0–4)
SODIUM SERPL-SCNC: 142 MMOL/L — SIGNIFICANT CHANGE UP (ref 135–145)
SP GR SPEC: 1.03 — SIGNIFICANT CHANGE UP (ref 1–1.03)
SQUAMOUS # UR AUTO: 1 /HPF — SIGNIFICANT CHANGE UP (ref 0–5)
UROBILINOGEN FLD QL: 0.2 MG/DL — SIGNIFICANT CHANGE UP (ref 0.2–1)
WBC # BLD: 10.58 K/UL — HIGH (ref 3.8–10.5)
WBC # FLD AUTO: 10.58 K/UL — HIGH (ref 3.8–10.5)
WBC UR QL: 2 /HPF — SIGNIFICANT CHANGE UP (ref 0–5)

## 2024-09-29 PROCEDURE — 72131 CT LUMBAR SPINE W/O DYE: CPT | Mod: 26,MC

## 2024-09-29 PROCEDURE — 99223 1ST HOSP IP/OBS HIGH 75: CPT

## 2024-09-29 PROCEDURE — 71045 X-RAY EXAM CHEST 1 VIEW: CPT | Mod: 26

## 2024-09-29 PROCEDURE — 99285 EMERGENCY DEPT VISIT HI MDM: CPT

## 2024-09-29 RX ORDER — GLUCAGON INJECTION, SOLUTION 0.5 MG/.1ML
1 INJECTION, SOLUTION SUBCUTANEOUS ONCE
Refills: 0 | Status: DISCONTINUED | OUTPATIENT
Start: 2024-09-29 | End: 2024-10-01

## 2024-09-29 RX ORDER — ALCOHOL ANTISEPTIC PADS
25 PADS, MEDICATED (EA) TOPICAL ONCE
Refills: 0 | Status: DISCONTINUED | OUTPATIENT
Start: 2024-09-29 | End: 2024-10-01

## 2024-09-29 RX ORDER — KETOROLAC TROMETHAMINE 10 MG/1
15 TABLET, FILM COATED ORAL ONCE
Refills: 0 | Status: DISCONTINUED | OUTPATIENT
Start: 2024-09-29 | End: 2024-09-29

## 2024-09-29 RX ORDER — INSULIN LISPRO 100/ML
VIAL (ML) SUBCUTANEOUS
Refills: 0 | Status: DISCONTINUED | OUTPATIENT
Start: 2024-09-29 | End: 2024-10-01

## 2024-09-29 RX ORDER — ALCOHOL ANTISEPTIC PADS
12.5 PADS, MEDICATED (EA) TOPICAL ONCE
Refills: 0 | Status: DISCONTINUED | OUTPATIENT
Start: 2024-09-29 | End: 2024-10-01

## 2024-09-29 RX ORDER — OXYCODONE HYDROCHLORIDE 30 MG/1
5 TABLET, FILM COATED, EXTENDED RELEASE ORAL EVERY 4 HOURS
Refills: 0 | Status: DISCONTINUED | OUTPATIENT
Start: 2024-09-29 | End: 2024-10-01

## 2024-09-29 RX ORDER — CYCLOBENZAPRINE HCL 10 MG
5 TABLET ORAL ONCE
Refills: 0 | Status: COMPLETED | OUTPATIENT
Start: 2024-09-29 | End: 2024-09-29

## 2024-09-29 RX ORDER — INSULIN LISPRO 100/ML
VIAL (ML) SUBCUTANEOUS AT BEDTIME
Refills: 0 | Status: DISCONTINUED | OUTPATIENT
Start: 2024-09-29 | End: 2024-10-01

## 2024-09-29 RX ORDER — ROSUVASTATIN CALCIUM 20 MG/1
20 TABLET, COATED ORAL AT BEDTIME
Refills: 0 | Status: DISCONTINUED | OUTPATIENT
Start: 2024-09-29 | End: 2024-10-01

## 2024-09-29 RX ORDER — OXYCODONE HYDROCHLORIDE 30 MG/1
10 TABLET, FILM COATED, EXTENDED RELEASE ORAL EVERY 4 HOURS
Refills: 0 | Status: DISCONTINUED | OUTPATIENT
Start: 2024-09-29 | End: 2024-10-01

## 2024-09-29 RX ORDER — PANTOPRAZOLE SODIUM 40 MG/1
40 TABLET, DELAYED RELEASE ORAL
Refills: 0 | Status: DISCONTINUED | OUTPATIENT
Start: 2024-09-29 | End: 2024-10-01

## 2024-09-29 RX ORDER — SODIUM CHLORIDE IRRIG SOLUTION 0.9 %
1000 SOLUTION, IRRIGATION IRRIGATION
Refills: 0 | Status: DISCONTINUED | OUTPATIENT
Start: 2024-09-29 | End: 2024-10-01

## 2024-09-29 RX ORDER — METOPROLOL TARTRATE 50 MG
12.5 TABLET ORAL ONCE
Refills: 0 | Status: COMPLETED | OUTPATIENT
Start: 2024-09-29 | End: 2024-09-29

## 2024-09-29 RX ORDER — SENNOSIDES 8.6 MG
2 TABLET ORAL AT BEDTIME
Refills: 0 | Status: DISCONTINUED | OUTPATIENT
Start: 2024-09-29 | End: 2024-10-01

## 2024-09-29 RX ORDER — GABAPENTIN 800 MG/1
100 TABLET, FILM COATED ORAL THREE TIMES A DAY
Refills: 0 | Status: DISCONTINUED | OUTPATIENT
Start: 2024-09-29 | End: 2024-10-01

## 2024-09-29 RX ORDER — CYCLOBENZAPRINE HCL 10 MG
5 TABLET ORAL EVERY 8 HOURS
Refills: 0 | Status: DISCONTINUED | OUTPATIENT
Start: 2024-09-29 | End: 2024-10-01

## 2024-09-29 RX ORDER — METOPROLOL TARTRATE 50 MG
12.5 TABLET ORAL
Refills: 0 | Status: DISCONTINUED | OUTPATIENT
Start: 2024-09-29 | End: 2024-10-01

## 2024-09-29 RX ORDER — ACETAMINOPHEN 325 MG
975 TABLET ORAL EVERY 8 HOURS
Refills: 0 | Status: DISCONTINUED | OUTPATIENT
Start: 2024-09-29 | End: 2024-10-01

## 2024-09-29 RX ORDER — LIDOCAINE 50 MG/G
1 CREAM TOPICAL ONCE
Refills: 0 | Status: COMPLETED | OUTPATIENT
Start: 2024-09-29 | End: 2024-09-29

## 2024-09-29 RX ORDER — DULOXETINE HCL 20 MG
30 CAPSULE,DELAYED RELEASE (ENTERIC COATED) ORAL DAILY
Refills: 0 | Status: DISCONTINUED | OUTPATIENT
Start: 2024-09-29 | End: 2024-10-01

## 2024-09-29 RX ORDER — ALCOHOL ANTISEPTIC PADS
15 PADS, MEDICATED (EA) TOPICAL ONCE
Refills: 0 | Status: DISCONTINUED | OUTPATIENT
Start: 2024-09-29 | End: 2024-10-01

## 2024-09-29 RX ADMIN — LIDOCAINE 1 PATCH: 50 CREAM TOPICAL at 19:13

## 2024-09-29 RX ADMIN — Medication 975 MILLIGRAM(S): at 23:27

## 2024-09-29 RX ADMIN — KETOROLAC TROMETHAMINE 15 MILLIGRAM(S): 10 TABLET, FILM COATED ORAL at 19:13

## 2024-09-29 RX ADMIN — Medication 5 MILLIGRAM(S): at 19:13

## 2024-09-29 RX ADMIN — Medication 12.5 MILLIGRAM(S): at 23:29

## 2024-09-29 RX ADMIN — Medication 2 TABLET(S): at 22:27

## 2024-09-29 RX ADMIN — Medication 17 GRAM(S): at 22:28

## 2024-09-29 RX ADMIN — Medication 5 MILLIGRAM(S): at 22:27

## 2024-09-29 RX ADMIN — GABAPENTIN 100 MILLIGRAM(S): 800 TABLET, FILM COATED ORAL at 22:28

## 2024-09-29 RX ADMIN — Medication 975 MILLIGRAM(S): at 22:27

## 2024-09-29 NOTE — ED ADULT NURSE REASSESSMENT NOTE - NS ED NURSE REASSESS COMMENT FT1
Report received from GAMAL Carballo. Pt received A&Ox4, vitals retaken and documented. Bed locked and in lowest position, side rails raised, call bell within reach. Currently pending UA

## 2024-09-29 NOTE — ED PROVIDER NOTE - CROS ED NEURO POS
Patient is in the supine position.   The body was positioned using the following devices: draw sheet.  Procedure performed on a bed/cart.The patient was positioned by Irina Guevara radiating pain to RLE, numbness, and weakness

## 2024-09-29 NOTE — ED ADULT NURSE NOTE - OBJECTIVE STATEMENT
49 yr old female to ED accomp by spouse c/o Rt sided lower back pain rad RLE x 2days. Pt has h/o spinal fusion . Took oxycodone this am Pain has worsened Amb to BR with assist cane Denies chest pain or sob Denies fever or chills Denies abd pain N/V Denies burn or diff urinating Denies incontinence stool or urine. 49 yr old female to ED accomp by spouse c/o Rt sided lower back pain rad RLE x 2days. Pt has h/o spinal fusion . Took oxycodone this am Pain has worsened Amb to BR with assist cane Denies chest pain or sob Denies fever or chills Denies abd pain N/V Denies burn or diff urinating Denies incontinence stool or urine. Surgery 5/24 H/O Type 2 diabetes C/o Min numbness rle. Made comfortable

## 2024-09-29 NOTE — ED PROVIDER NOTE - ATTENDING CONTRIBUTION TO CARE
PMD Pena Ortho/Spine pain  49-year-old female past medical history DM, palpitations, anterior lumbar fusion L4-S1 May/23,HLD is ER complaint of worsening back pain for the past 2 days.  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  Pain right low back radiating to right leg.  Patient was evaluated with a cane.  She reports exacerbations of pain requiring additional visits roughly every month.  There is no fever chills dysuria hematuria nausea vomiting abdominal pain.  Physical exam adult female awake alert looking mildly uncomfortable.  HEENT normocephalic atraumatic  Chest clear A&P.  No respiratory stress no use of  muscles.  CV no rubs gallops murmur.  Abdomen soft positive bowel sounds.  MSK mild tenderness lumbar spine, subjective decreased sensation in right lower extremity power 5 out of 5 both lower extremities.  Olivier Horvath MD, Facep

## 2024-09-29 NOTE — ED PROVIDER NOTE - PHYSICAL EXAMINATION
NAD. VSS. Afebrile, Neck supple. Lungs clear. ABD soft, non tender. +Generalized lumbar tender. +Diminished sensory to RLE, 3/3 strength of all extremities.

## 2024-09-29 NOTE — ED PROVIDER NOTE - OBJECTIVE STATEMENT
50yo female with PMHx of DM, Palpitations, HLD, S/P Anterior Lumbar Fusion L4-S1 (Dr. Pena, 5/2023) presents to ED with worsening low back pain radiating pain to RLE and RLE numbness for few days. Denies recent injury. Denies urinary or bowel problems. Denies genital numbness. Denies fever, chills, or recent sickness. Denies CP/SOB/ABD pain or N/V/D.

## 2024-09-29 NOTE — H&P ADULT - HISTORY OF PRESENT ILLNESS
HPI  48yFemale w/ PMH of s/p 2 stage unilateral L4-S1 decompression/fusion (ALIF, PSF) who presents c/o R-sided back pain and RLE parasthesias and numbness. Pt states she has been c/o RLE radiculopathy, for which she presented to ED in 2024 and 2024 for similar pain. Patient said pain improved with steroid taper previously. Exacerbated by activity and patient is unable to do ADLs. Radiates down posterior R thigh. Denies weakness, hand clumsines. Denies fevers/chills, weight loss, or night pain. Denies change in bowel/bladder function or saddle anesthesia. Denies recent falls/trauma or any other ortho injuries. Community ambulator w cane at baseline.      ROS  Negative unless otherwise specified in HPI.    PAST MEDICAL & SURGICAL Hx  PAST MEDICAL & SURGICAL HISTORY:  HLD (hyperlipidemia)      Spinal stenosis, lumbosacral region      Current smoker      S/P           MEDICATIONS  Home Medications:  Multiple Vitamins oral tablet: 1 tab(s) orally once a day (18 May 2023 11:48)  simvastatin 20 mg oral tablet: 1 tab(s) orally once a day (at bedtime) (10 May 2023 09:29)      ALLERGIES  penicillin (Pruritus; Rash)      FAMILY Hx  FAMILY HISTORY:      SOCIAL Hx  Social History:      VITALS  Vital Signs Last 24 Hrs  T(C): 36.9 (29 Sep 2024 19:05), Max: 36.9 (29 Sep 2024 19:05)  T(F): 98.4 (29 Sep 2024 19:05), Max: 98.4 (29 Sep 2024 19:05)  HR: 80 (29 Sep 2024 19:05) (80 - 94)  BP: 102/68 (29 Sep 2024 19:05) (102/68 - 123/78)  BP(mean): --  RR: 19 (29 Sep 2024 19:05) (19 - 20)  SpO2: 98% (29 Sep 2024 19:05) (98% - 98%)    Parameters below as of 29 Sep 2024 19:05  Patient On (Oxygen Delivery Method): room air        PHYSICAL EXAM  Gen: Lying in bed, NAD  Resp: No increased WOB  Spine:  Skin intact  No bony TTP or step-offs along c-, t-, l-spine, or sacrum    Motor:               L2             L3             L4               L5            S1  R         5/5           5/5          5/5             5/5           5/5  L          5/5          5/5           5/5             5/5           5/5    Sensory:               L2          L3         L4      L5       S1         (0=absent, 1=impaired, 2=normal, NT=not testable)  R         1            1            1        1        1  L          2            2           2        2         2    (+) Straight leg raise test RLE  (-) Babinski sign b/l  (-) Ankle clonus b/l  Gait grossly unremarkable    LABS                        12.5   10.58 )-----------( 255      ( 29 Sep 2024 19:01 )             41.4         142  |  102  |  17  ----------------------------<  137[H]  3.9   |  28  |  0.77    Ca    10.1      29 Sep 2024 19:01    TPro  7.9  /  Alb  4.3  /  TBili  0.2  /  DBili  x   /  AST  18  /  ALT  23  /  AlkPhos  109            ASSESSMENT & PLAN  49yFemale w/ painful hardware s/p 2 stage lumbar decompression L4-S1 in 2023 and inability to do ADLs 2/2 severe pain  -WBAT  -Admit for pain control  -incentive spirometry  -PT/OT    For all questions related to patient care, please reach out to the on-call team via the pager.     Marissa Ramirez, PGY 3  Orthopaedic Surgery  [facility] [phone]  [facility] [phone]  [facility] [phone]   HPI  48yFemale w/ PMH of s/p 2 stage unilateral L4-S1 decompression/fusion (ALIF, PSF) who presents c/o R-sided back pain and RLE parasthesias and numbness. Pt states she has been c/o RLE radiculopathy, for which she presented to ED in 2024 and 2024 for similar pain. Patient said pain improved with steroid taper previously. She also has been treated by pain management with a numbner of trigger point and MARY.  The hazel nhas become unbearable this past weekend, and the RLE has been giving way. Exacerbated by activity and patient is unable to do ADLs. Radiates down posterior R thigh. . Denies fevers/chills, weight loss, or night pain. Denies change in bowel/bladder function or saddle anesthesia. Denies recent falls/trauma or any other ortho injuries. Community ambulator w cane at baseline.      ROS  Negative unless otherwise specified in HPI.    PAST MEDICAL & SURGICAL Hx  PAST MEDICAL & SURGICAL HISTORY:  HLD (hyperlipidemia)      Spinal stenosis, lumbosacral region      Current smoker      S/P           MEDICATIONS  Home Medications:  Multiple Vitamins oral tablet: 1 tab(s) orally once a day (18 May 2023 11:48)  simvastatin 20 mg oral tablet: 1 tab(s) orally once a day (at bedtime) (10 May 2023 09:29)      ALLERGIES  penicillin (Pruritus; Rash)      FAMILY Hx  FAMILY HISTORY:      SOCIAL Hx  Social History:      VITALS  Vital Signs Last 24 Hrs  T(C): 36.9 (29 Sep 2024 19:05), Max: 36.9 (29 Sep 2024 19:05)  T(F): 98.4 (29 Sep 2024 19:05), Max: 98.4 (29 Sep 2024 19:05)  HR: 80 (29 Sep 2024 19:05) (80 - 94)  BP: 102/68 (29 Sep 2024 19:05) (102/68 - 123/78)  BP(mean): --  RR: 19 (29 Sep 2024 19:05) (19 - 20)  SpO2: 98% (29 Sep 2024 19:05) (98% - 98%)    Parameters below as of 29 Sep 2024 19:05  Patient On (Oxygen Delivery Method): room air        PHYSICAL EXAM  Gen: Lying in bed, NAD  Resp: No increased WOB  Spine:  Skin intact  No bony TTP or step-offs along c-, t-, l-spine, or sacrum    Motor:               L2             L3             L4               L5            S1  R         5/5           5/5          5/5             5/5           5/5  L          5/5          5/5           5/5             5/5           5/5    Sensory:               L2          L3         L4      L5       S1         (0=absent, 1=impaired, 2=normal, NT=not testable)  R         1            1            1        1        1  L          2            2           2        2         2    (+) Straight leg raise test RLE  (-) Babinski sign b/l  (-) Ankle clonus b/l  Gait grossly unremarkable    LABS                        12.5   10.58 )-----------( 255      ( 29 Sep 2024 19:01 )             41.4         142  |  102  |  17  ----------------------------<  137[H]  3.9   |  28  |  0.77    Ca    10.1      29 Sep 2024 19:01    TPro  7.9  /  Alb  4.3  /  TBili  0.2  /  DBili  x   /  AST  18  /  ALT  23  /  AlkPhos  109            ASSESSMENT & PLAN  49yFemale w/ painful hardware s/p 2 stage lumbar decompression L4-S1 in 2023 and inability to do ADLs , and has pain related weakness, and is a fall risk.   -WBAT  -Admit for pain control  -incentive spirometry  -PT/OT  - If no improvement consider hardware removal.     For all questions related to patient care, please reach out to the on-call team via the pager.     Marissa Ramirez, PGY 3  Orthopaedic Surgery  [facility] [phone]  [facility] [phone]  [facility] [phone]

## 2024-09-30 ENCOUNTER — TRANSCRIPTION ENCOUNTER (OUTPATIENT)
Age: 49
End: 2024-09-30

## 2024-09-30 LAB
A1C WITH ESTIMATED AVERAGE GLUCOSE RESULT: 6.6 % — HIGH (ref 4–5.6)
ANION GAP SERPL CALC-SCNC: 13 MMOL/L — SIGNIFICANT CHANGE UP (ref 5–17)
APTT BLD: 34.8 SEC — SIGNIFICANT CHANGE UP (ref 24.5–35.6)
B-OH-BUTYR SERPL-SCNC: 0.1 MMOL/L — SIGNIFICANT CHANGE UP
BASOPHILS # BLD AUTO: 0.04 K/UL — SIGNIFICANT CHANGE UP (ref 0–0.2)
BASOPHILS NFR BLD AUTO: 0.5 % — SIGNIFICANT CHANGE UP (ref 0–2)
BUN SERPL-MCNC: 21 MG/DL — SIGNIFICANT CHANGE UP (ref 7–23)
CALCIUM SERPL-MCNC: 9.4 MG/DL — SIGNIFICANT CHANGE UP (ref 8.4–10.5)
CHLORIDE SERPL-SCNC: 103 MMOL/L — SIGNIFICANT CHANGE UP (ref 96–108)
CO2 SERPL-SCNC: 26 MMOL/L — SIGNIFICANT CHANGE UP (ref 22–31)
CREAT SERPL-MCNC: 0.84 MG/DL — SIGNIFICANT CHANGE UP (ref 0.5–1.3)
EGFR: 85 ML/MIN/1.73M2 — SIGNIFICANT CHANGE UP
EOSINOPHIL # BLD AUTO: 0.4 K/UL — SIGNIFICANT CHANGE UP (ref 0–0.5)
EOSINOPHIL NFR BLD AUTO: 4.5 % — SIGNIFICANT CHANGE UP (ref 0–6)
ESTIMATED AVERAGE GLUCOSE: 143 MG/DL — HIGH (ref 68–114)
FLUAV AG NPH QL: SIGNIFICANT CHANGE UP
FLUBV AG NPH QL: SIGNIFICANT CHANGE UP
GLUCOSE BLDC GLUCOMTR-MCNC: 112 MG/DL — HIGH (ref 70–99)
GLUCOSE BLDC GLUCOMTR-MCNC: 132 MG/DL — HIGH (ref 70–99)
GLUCOSE BLDC GLUCOMTR-MCNC: 138 MG/DL — HIGH (ref 70–99)
GLUCOSE BLDC GLUCOMTR-MCNC: 175 MG/DL — HIGH (ref 70–99)
GLUCOSE SERPL-MCNC: 124 MG/DL — HIGH (ref 70–99)
HCT VFR BLD CALC: 39.8 % — SIGNIFICANT CHANGE UP (ref 34.5–45)
HGB BLD-MCNC: 12 G/DL — SIGNIFICANT CHANGE UP (ref 11.5–15.5)
IMM GRANULOCYTES NFR BLD AUTO: 0.3 % — SIGNIFICANT CHANGE UP (ref 0–0.9)
INR BLD: 0.95 RATIO — SIGNIFICANT CHANGE UP (ref 0.85–1.16)
LYMPHOCYTES # BLD AUTO: 4.19 K/UL — HIGH (ref 1–3.3)
LYMPHOCYTES # BLD AUTO: 47.5 % — HIGH (ref 13–44)
MCHC RBC-ENTMCNC: 26.4 PG — LOW (ref 27–34)
MCHC RBC-ENTMCNC: 30.2 GM/DL — LOW (ref 32–36)
MCV RBC AUTO: 87.5 FL — SIGNIFICANT CHANGE UP (ref 80–100)
MONOCYTES # BLD AUTO: 0.63 K/UL — SIGNIFICANT CHANGE UP (ref 0–0.9)
MONOCYTES NFR BLD AUTO: 7.1 % — SIGNIFICANT CHANGE UP (ref 2–14)
NEUTROPHILS # BLD AUTO: 3.53 K/UL — SIGNIFICANT CHANGE UP (ref 1.8–7.4)
NEUTROPHILS NFR BLD AUTO: 40.1 % — LOW (ref 43–77)
NRBC # BLD: 0 /100 WBCS — SIGNIFICANT CHANGE UP (ref 0–0)
PLATELET # BLD AUTO: 254 K/UL — SIGNIFICANT CHANGE UP (ref 150–400)
POTASSIUM SERPL-MCNC: 3.5 MMOL/L — SIGNIFICANT CHANGE UP (ref 3.5–5.3)
POTASSIUM SERPL-SCNC: 3.5 MMOL/L — SIGNIFICANT CHANGE UP (ref 3.5–5.3)
PROTHROM AB SERPL-ACNC: 10.8 SEC — SIGNIFICANT CHANGE UP (ref 9.9–13.4)
RBC # BLD: 4.55 M/UL — SIGNIFICANT CHANGE UP (ref 3.8–5.2)
RBC # FLD: 13.7 % — SIGNIFICANT CHANGE UP (ref 10.3–14.5)
RSV RNA NPH QL NAA+NON-PROBE: SIGNIFICANT CHANGE UP
SARS-COV-2 RNA SPEC QL NAA+PROBE: SIGNIFICANT CHANGE UP
SODIUM SERPL-SCNC: 142 MMOL/L — SIGNIFICANT CHANGE UP (ref 135–145)
WBC # BLD: 8.82 K/UL — SIGNIFICANT CHANGE UP (ref 3.8–10.5)
WBC # FLD AUTO: 8.82 K/UL — SIGNIFICANT CHANGE UP (ref 3.8–10.5)

## 2024-09-30 PROCEDURE — 99233 SBSQ HOSP IP/OBS HIGH 50: CPT | Mod: 57

## 2024-09-30 RX ORDER — POVIDONE-IODINE 10 %
1 SOLUTION, NON-ORAL TOPICAL ONCE
Refills: 0 | Status: COMPLETED | OUTPATIENT
Start: 2024-09-30 | End: 2024-09-30

## 2024-09-30 RX ORDER — INFLUENZA VIRUS VACCINE 15; 15; 15; 15 UG/.5ML; UG/.5ML; UG/.5ML; UG/.5ML
0.5 SUSPENSION INTRAMUSCULAR ONCE
Refills: 0 | Status: DISCONTINUED | OUTPATIENT
Start: 2024-09-30 | End: 2024-10-04

## 2024-09-30 RX ORDER — CHLORHEXIDINE GLUCONATE ORAL RINSE 1.2 MG/ML
1 SOLUTION DENTAL EVERY 12 HOURS
Refills: 0 | Status: COMPLETED | OUTPATIENT
Start: 2024-09-30 | End: 2024-10-01

## 2024-09-30 RX ORDER — SODIUM CHLORIDE 0.9 % (FLUSH) 0.9 %
1000 SYRINGE (ML) INJECTION
Refills: 0 | Status: DISCONTINUED | OUTPATIENT
Start: 2024-09-30 | End: 2024-10-01

## 2024-09-30 RX ORDER — SODIUM CHLORIDE 0.9 % (FLUSH) 0.9 %
500 SYRINGE (ML) INJECTION ONCE
Refills: 0 | Status: COMPLETED | OUTPATIENT
Start: 2024-09-30 | End: 2024-09-30

## 2024-09-30 RX ADMIN — Medication 975 MILLIGRAM(S): at 05:11

## 2024-09-30 RX ADMIN — LIDOCAINE 1 PATCH: 50 CREAM TOPICAL at 09:43

## 2024-09-30 RX ADMIN — Medication 1000 MILLILITER(S): at 05:11

## 2024-09-30 RX ADMIN — Medication 975 MILLIGRAM(S): at 06:11

## 2024-09-30 RX ADMIN — Medication 975 MILLIGRAM(S): at 21:09

## 2024-09-30 RX ADMIN — Medication 975 MILLIGRAM(S): at 22:02

## 2024-09-30 RX ADMIN — PANTOPRAZOLE SODIUM 40 MILLIGRAM(S): 40 TABLET, DELAYED RELEASE ORAL at 05:11

## 2024-09-30 RX ADMIN — GABAPENTIN 100 MILLIGRAM(S): 800 TABLET, FILM COATED ORAL at 21:09

## 2024-09-30 RX ADMIN — Medication 975 MILLIGRAM(S): at 15:00

## 2024-09-30 RX ADMIN — Medication 17 GRAM(S): at 21:10

## 2024-09-30 RX ADMIN — Medication 5 MILLIGRAM(S): at 21:09

## 2024-09-30 RX ADMIN — Medication 5 MILLIGRAM(S): at 14:06

## 2024-09-30 RX ADMIN — Medication 975 MILLIGRAM(S): at 14:06

## 2024-09-30 RX ADMIN — Medication 12.5 MILLIGRAM(S): at 12:22

## 2024-09-30 RX ADMIN — GABAPENTIN 100 MILLIGRAM(S): 800 TABLET, FILM COATED ORAL at 05:11

## 2024-09-30 RX ADMIN — GABAPENTIN 100 MILLIGRAM(S): 800 TABLET, FILM COATED ORAL at 14:06

## 2024-09-30 RX ADMIN — LIDOCAINE 1 PATCH: 50 CREAM TOPICAL at 08:00

## 2024-09-30 RX ADMIN — Medication 1 APPLICATION(S): at 10:57

## 2024-09-30 RX ADMIN — CHLORHEXIDINE GLUCONATE ORAL RINSE 1 APPLICATION(S): 1.2 SOLUTION DENTAL at 17:42

## 2024-09-30 RX ADMIN — ROSUVASTATIN CALCIUM 20 MILLIGRAM(S): 20 TABLET, COATED ORAL at 21:09

## 2024-09-30 RX ADMIN — Medication 2 TABLET(S): at 21:09

## 2024-09-30 RX ADMIN — Medication 30 MILLIGRAM(S): at 12:27

## 2024-09-30 RX ADMIN — Medication 5 MILLIGRAM(S): at 05:11

## 2024-09-30 NOTE — PROVIDER CONTACT NOTE (OTHER) - ACTION/TREATMENT ORDERED:
Marissa Ramirez MD made aware bolus ordered. No further interventions. Safety precautions monitored.

## 2024-09-30 NOTE — CONSULT NOTE ADULT - ASSESSMENT
ASSESSMENT & PLAN  49yFemale w/ painful hardware s/p 2 stage lumbar decompression L4-S1 in 5/2023 and inability to do ADLs 2/2 severe pain. Nasal congestion  -WBAT  -For OR tomorrow  -Admit for pain control  -Pain control with Oxy  -incentive spirometry  -PT/OT  -RVP   -FSSS  HbA1c - 6.6    Based on patient's RCRI score of 0,  would consider her mild to moderate risk for planned procedure. RVP pending. However, patient is optimized from medical perspective to proceed.  No evidence of clinical HF or unstable cardiac symptoms.

## 2024-09-30 NOTE — PROGRESS NOTE ADULT - ATTENDING COMMENTS
Patients pain is still unbearable, and she walked with PT yesterday, and had giving way episode x2, secondary topain.  Recommend Exploration of lumbar fusion and hardware removal, pending medical clearance.

## 2024-09-30 NOTE — PATIENT PROFILE ADULT - FALL HARM RISK - HARM RISK INTERVENTIONS

## 2024-09-30 NOTE — CONSULT NOTE ADULT - SUBJECTIVE AND OBJECTIVE BOX
Patient is a 49y old  Female who presents with a chief complaint of Painful hardware (30 Sep 2024 06:23)      HPI:  HPI  48yFemale w/ PMH of s/p 2 stage unilateral L4-S1 decompression/fusion (ALIF, PSF) who presents c/o R-sided back pain and RLE parasthesias and numbness. Pt states she has been c/o RLE radiculopathy, for which she presented to ED in 2024 and 2024 for similar pain. Patient said pain improved with steroid taper previously. Exacerbated by activity and patient is unable to do ADLs. Radiates down posterior R thigh. Denies weakness, hand clumsines. Denies fevers/chills, weight loss, or night pain. Denies change in bowel/bladder function or saddle anesthesia. Denies recent falls/trauma or any other ortho injuries. Community ambulator w cane at baseline.      ROS  Nasal congestion- Will order RVP    PAST MEDICAL & SURGICAL Hx  PAST MEDICAL & SURGICAL HISTORY:  HLD (hyperlipidemia)      Spinal stenosis, lumbosacral region      Current smoker      S/P           MEDICATIONS  Home Medications:  Multiple Vitamins oral tablet: 1 tab(s) orally once a day (18 May 2023 11:48)  simvastatin 20 mg oral tablet: 1 tab(s) orally once a day (at bedtime) (10 May 2023 09:29)      ALLERGIES  penicillin (Pruritus; Rash)      FAMILY Hx  FAMILY HISTORY:      SOCIAL Hx  Social History:      VITALS  Vital Signs Last 24 Hrs  T(C): 36.9 (29 Sep 2024 19:05), Max: 36.9 (29 Sep 2024 19:05)  T(F): 98.4 (29 Sep 2024 19:05), Max: 98.4 (29 Sep 2024 19:05)  HR: 80 (29 Sep 2024 19:05) (80 - 94)  BP: 102/68 (29 Sep 2024 19:05) (102/68 - 123/78)  BP(mean): --  RR: 19 (29 Sep 2024 19:05) (19 - 20)  SpO2: 98% (29 Sep 2024 19:05) (98% - 98%)    Parameters below as of 29 Sep 2024 19:05  Patient On (Oxygen Delivery Method): room air        PHYSICAL EXAM  Gen: Lying in bed, NAD  Resp: No increased WOB  Spine:      LABS                        12.5   10.58 )-----------( 255      ( 29 Sep 2024 19:01 )             41.4     -    142  |  102  |  17  ----------------------------<  137[H]  3.9   |  28  |  0.77    Ca    10.1      29 Sep 2024 19:01    TPro  7.9  /  Alb  4.3  /  TBili  0.2  /  DBili  x   /  AST  18  /  ALT  23  /  AlkPhos  109  09-            PAST MEDICAL & SURGICAL HISTORY:  HLD (hyperlipidemia)      Spinal stenosis, lumbosacral region      Current smoker      S/P           Review of Systems:   CONSTITUTIONAL: No fever,  or fatigue  NECK: No pain or stiffness  RESPIRATORY: Nasal congetion  CARDIOVASCULAR: No chest pain, palpitations, dizziness, or leg swelling  GASTROINTESTINAL: No abdominal  pain. No nausea, vomiting.  NEUROLOGICAL: No headaches,           Allergies    penicillin (Pruritus; Rash)    Intolerances        Social History:     FAMILY HISTORY:  No pertinent family history in first degree relatives        MEDICATIONS  (STANDING):  acetaminophen     Tablet .. 975 milliGRAM(s) Oral every 8 hours  chlorhexidine 2% Cloths 1 Application(s) Topical every 12 hours  cyclobenzaprine 5 milliGRAM(s) Oral every 8 hours  dextrose 5%. 1000 milliLiter(s) (50 mL/Hr) IV Continuous <Continuous>  dextrose 5%. 1000 milliLiter(s) (100 mL/Hr) IV Continuous <Continuous>  dextrose 50% Injectable 25 Gram(s) IV Push once  dextrose 50% Injectable 25 Gram(s) IV Push once  dextrose 50% Injectable 12.5 Gram(s) IV Push once  DULoxetine 30 milliGRAM(s) Oral daily  gabapentin 100 milliGRAM(s) Oral three times a day  glucagon  Injectable 1 milliGRAM(s) IntraMuscular once  influenza   Vaccine 0.5 milliLiter(s) IntraMuscular once  insulin lispro (ADMELOG) corrective regimen sliding scale   SubCutaneous three times a day before meals  insulin lispro (ADMELOG) corrective regimen sliding scale   SubCutaneous at bedtime  metoprolol tartrate 12.5 milliGRAM(s) Oral two times a day  pantoprazole    Tablet 40 milliGRAM(s) Oral before breakfast  polyethylene glycol 3350 17 Gram(s) Oral at bedtime  rosuvastatin 20 milliGRAM(s) Oral at bedtime  senna 2 Tablet(s) Oral at bedtime  sodium chloride 0.9%. 1000 milliLiter(s) (100 mL/Hr) IV Continuous <Continuous>    MEDICATIONS  (PRN):  dextrose Oral Gel 15 Gram(s) Oral once PRN Blood Glucose LESS THAN 70 milliGRAM(s)/deciliter  oxyCODONE    IR 5 milliGRAM(s) Oral every 4 hours PRN Moderate Pain (4 - 6)  oxyCODONE    IR 10 milliGRAM(s) Oral every 4 hours PRN Severe Pain (7 - 10)      CAPILLARY BLOOD GLUCOSE      POCT Blood Glucose.: 132 mg/dL (30 Sep 2024 09:04)  POCT Blood Glucose.: 113 mg/dL (29 Sep 2024 21:57)    I&O's Summary    29 Sep 2024 07:01  -  30 Sep 2024 07:00  --------------------------------------------------------  IN: 300 mL / OUT: 2 mL / NET: 298 mL        T(C): 37.2 (24 @ 11:54), Max: 37.2 (24 @ 11:54)  HR: 79 (24 @ 11:54) (59 - 94)  BP: 129/84 (24 @ 11:54) (93/60 - 129/84)  RR: 18 (24 @ 11:54) (18 - 20)  SpO2: 100% (24 @ 11:54) (98% - 100%)    PHYSICAL EXAM:    GENERAL: NAD  NECK: Supple, No JVD  CHEST/LUNG: Clear to auscultation bilaterally; No wheezing.  HEART: Regular rate and rhythm; No murmurs, rubs, or gallops  ABDOMEN: Soft, Nontender, Nondistended; Bowel sounds present  EXTREMITIES:  2+ Peripheral Pulses, No edema  NEUROLOGY: AAOx 3      LABS:                        12.0   8.82  )-----------( 254      ( 30 Sep 2024 06:34 )             39.8         142  |  103  |  21  ----------------------------<  124[H]  3.5   |  26  |  0.84    Ca    9.4      30 Sep 2024 06:34    TPro  7.9  /  Alb  4.3  /  TBili  0.2  /  DBili  x   /  AST  18  /  ALT  23  /  AlkPhos  109  09-29    PT/INR - ( 30 Sep 2024 07:14 )   PT: 10.8 sec;   INR: 0.95 ratio         PTT - ( 30 Sep 2024 07:14 )  PTT:34.8 sec      Urinalysis Basic - ( 30 Sep 2024 06:34 )    Color: x / Appearance: x / SG: x / pH: x  Gluc: 124 mg/dL / Ketone: x  / Bili: x / Urobili: x   Blood: x / Protein: x / Nitrite: x   Leuk Esterase: x / RBC: x / WBC x   Sq Epi: x / Non Sq Epi: x / Bacteria: x      CAPILLARY BLOOD GLUCOSE      POCT Blood Glucose.: 132 mg/dL (30 Sep 2024 09:04)  POCT Blood Glucose.: 113 mg/dL (29 Sep 2024 21:57)        RADIOLOGY & ADDITIONAL TESTS:    Imaging Personally Reviewed:    Consultant(s) Notes Reviewed:      Care Discussed with Consultants/Other Providers:    Thanks for consult. Will follow.

## 2024-09-30 NOTE — PROGRESS NOTE ADULT - SUBJECTIVE AND OBJECTIVE BOX
Orthopedic Surgery Progress Note     S: Patient seen and examined today. Hypotensive to 93/60 overnight. Received 500 cc bolous Pain is well controlled. Denies f/c, chest pain, shortness of breath, dizziness.      Physical Exam:  Gen: NAD  Resp: Symmetric chest rise, No increased WOB      Spine:  Skin intact  No bony TTP or step-offs along c-, t-, l-spine, or sacrum    Motor:               L2             L3             L4               L5            S1  R         5/5           5/5          5/5             5/5           5/5  L          5/5          5/5           5/5             5/5           5/5    Sensory:               L2          L3         L4      L5       S1         (0=absent, 1=impaired, 2=normal, NT=not testable)  R         1            1            1        1        1  L          2            2           2        2         2                                       BLE: WWP, compartments soft and compressible      Vital Signs Last 24 Hrs  T(C): 36.7 (30 Sep 2024 04:35), Max: 36.9 (29 Sep 2024 19:05)  T(F): 98 (30 Sep 2024 04:35), Max: 98.4 (29 Sep 2024 19:05)  HR: 59 (30 Sep 2024 04:35) (59 - 94)  BP: 93/60 (30 Sep 2024 04:35) (93/60 - 128/85)  BP(mean): --  RR: 18 (30 Sep 2024 04:35) (18 - 20)  SpO2: 98% (30 Sep 2024 04:35) (98% - 100%)    Parameters below as of 30 Sep 2024 04:35  Patient On (Oxygen Delivery Method): room air        24 @ 07:01  -  24 @ 06:24  --------------------------------------------------------  IN: 150 mL / OUT: 0 mL / NET: 150 mL        LABS:                        12.5   10.58 )-----------( 255      ( 29 Sep 2024 19:01 )             41.4         142  |  102  |  17  ----------------------------<  137[H]  3.9   |  28  |  0.77    Ca    10.1      29 Sep 2024 19:01    TPro  7.9  /  Alb  4.3  /  TBili  0.2  /  DBili  x   /  AST  18  /  ALT  23  /  AlkPhos  109        PAST MEDICAL & SURGICAL Hx  PAST MEDICAL & SURGICAL HISTORY:  HLD (hyperlipidemia)      Spinal stenosis, lumbosacral region      Current smoker      S/P           MEDICATIONS  (STANDING):  acetaminophen     Tablet .. 975 milliGRAM(s) Oral every 8 hours  cyclobenzaprine 5 milliGRAM(s) Oral every 8 hours  dextrose 5%. 1000 milliLiter(s) (50 mL/Hr) IV Continuous <Continuous>  dextrose 5%. 1000 milliLiter(s) (100 mL/Hr) IV Continuous <Continuous>  dextrose 50% Injectable 12.5 Gram(s) IV Push once  dextrose 50% Injectable 25 Gram(s) IV Push once  dextrose 50% Injectable 25 Gram(s) IV Push once  DULoxetine 30 milliGRAM(s) Oral daily  gabapentin 100 milliGRAM(s) Oral three times a day  glucagon  Injectable 1 milliGRAM(s) IntraMuscular once  influenza   Vaccine 0.5 milliLiter(s) IntraMuscular once  insulin lispro (ADMELOG) corrective regimen sliding scale   SubCutaneous three times a day before meals  insulin lispro (ADMELOG) corrective regimen sliding scale   SubCutaneous at bedtime  metoprolol tartrate 12.5 milliGRAM(s) Oral two times a day  pantoprazole    Tablet 40 milliGRAM(s) Oral before breakfast  polyethylene glycol 3350 17 Gram(s) Oral at bedtime  rosuvastatin 20 milliGRAM(s) Oral at bedtime  senna 2 Tablet(s) Oral at bedtime    MEDICATIONS  (PRN):  dextrose Oral Gel 15 Gram(s) Oral once PRN Blood Glucose LESS THAN 70 milliGRAM(s)/deciliter  oxyCODONE    IR 10 milliGRAM(s) Oral every 4 hours PRN Severe Pain (7 - 10)  oxyCODONE    IR 5 milliGRAM(s) Oral every 4 hours PRN Moderate Pain (4 - 6)        A/P:  49yFemale w/ painful hardware s/p 2 stage lumbar decompression L4-S1 in 2023 and inability to do ADLs 2/2 severe pain. Plan for return to OR this week.     - WBAT  -incentive spirometry  - PT/OT  - Please document medical clearance for OR  - EKG   - Hold SGLT   - F/u B hydroxy-butyrate       It was a privilege to care for this patient. For all questions related to patient care, please reach out via the on-call pager.*     Jazlyn Streeter, PGY1  Orthopedic Surgery  [facility]: [phone]  [facility]: [phone]  [facility]: [phone] Orthopedic Surgery Progress Note     S: Patient seen and examined today. Hypotensive to 93/60 overnight. Received 500 cc bolous Pain is well controlled. Denies f/c, chest pain, shortness of breath, dizziness. Pain is not well controlled, difficulty ambulating.       Physical Exam:  Gen: NAD  Resp: Symmetric chest rise, No increased WOB      Spine:  Skin intact  TTP over hardware    Motor:               L2             L3             L4               L5            S1  R         5/5           5/5          5/5             5/5           5/5  L          5/5          5/5           5/5             5/5           5/5    Sensory:               L2          L3         L4      L5       S1         (0=absent, 1=impaired, 2=normal, NT=not testable)  R         1            1            1        1        1  L          2            2           2        2         2                                       BLE: WWP, compartments soft and compressible      Vital Signs Last 24 Hrs  T(C): 36.7 (30 Sep 2024 04:35), Max: 36.9 (29 Sep 2024 19:05)  T(F): 98 (30 Sep 2024 04:35), Max: 98.4 (29 Sep 2024 19:05)  HR: 59 (30 Sep 2024 04:35) (59 - 94)  BP: 93/60 (30 Sep 2024 04:35) (93/60 - 128/85)  BP(mean): --  RR: 18 (30 Sep 2024 04:35) (18 - 20)  SpO2: 98% (30 Sep 2024 04:35) (98% - 100%)    Parameters below as of 30 Sep 2024 04:35  Patient On (Oxygen Delivery Method): room air        24 @ 07:01  -  24 @ 06:24  --------------------------------------------------------  IN: 150 mL / OUT: 0 mL / NET: 150 mL        LABS:                        12.5   10.58 )-----------( 255      ( 29 Sep 2024 19:01 )             41.4         142  |  102  |  17  ----------------------------<  137[H]  3.9   |  28  |  0.77    Ca    10.1      29 Sep 2024 19:01    TPro  7.9  /  Alb  4.3  /  TBili  0.2  /  DBili  x   /  AST  18  /  ALT  23  /  AlkPhos  109        PAST MEDICAL & SURGICAL Hx  PAST MEDICAL & SURGICAL HISTORY:  HLD (hyperlipidemia)      Spinal stenosis, lumbosacral region      Current smoker      S/P           MEDICATIONS  (STANDING):  acetaminophen     Tablet .. 975 milliGRAM(s) Oral every 8 hours  cyclobenzaprine 5 milliGRAM(s) Oral every 8 hours  dextrose 5%. 1000 milliLiter(s) (50 mL/Hr) IV Continuous <Continuous>  dextrose 5%. 1000 milliLiter(s) (100 mL/Hr) IV Continuous <Continuous>  dextrose 50% Injectable 12.5 Gram(s) IV Push once  dextrose 50% Injectable 25 Gram(s) IV Push once  dextrose 50% Injectable 25 Gram(s) IV Push once  DULoxetine 30 milliGRAM(s) Oral daily  gabapentin 100 milliGRAM(s) Oral three times a day  glucagon  Injectable 1 milliGRAM(s) IntraMuscular once  influenza   Vaccine 0.5 milliLiter(s) IntraMuscular once  insulin lispro (ADMELOG) corrective regimen sliding scale   SubCutaneous three times a day before meals  insulin lispro (ADMELOG) corrective regimen sliding scale   SubCutaneous at bedtime  metoprolol tartrate 12.5 milliGRAM(s) Oral two times a day  pantoprazole    Tablet 40 milliGRAM(s) Oral before breakfast  polyethylene glycol 3350 17 Gram(s) Oral at bedtime  rosuvastatin 20 milliGRAM(s) Oral at bedtime  senna 2 Tablet(s) Oral at bedtime    MEDICATIONS  (PRN):  dextrose Oral Gel 15 Gram(s) Oral once PRN Blood Glucose LESS THAN 70 milliGRAM(s)/deciliter  oxyCODONE    IR 10 milliGRAM(s) Oral every 4 hours PRN Severe Pain (7 - 10)  oxyCODONE    IR 5 milliGRAM(s) Oral every 4 hours PRN Moderate Pain (4 - 6)        A/P:  49yFemale w/ painful hardware s/p 2 stage lumbar decompression L4-S1 in 2023 and inability to do ADLs 2/2 severe pain. Plan for return to OR this week.     - WBAT  -incentive spirometry  - PT/OT  - Please document medical clearance for OR  - EKG   - Hold SGLT   - F/u B hydroxy-butyrate       It was a privilege to care for this patient. For all questions related to patient care, please reach out via the on-call pager.*     Jazlyn Streeter, PGY1  Orthopedic Surgery  [facility]: [phone]  [facility]: [phone]  [facility]: [phone]

## 2024-10-01 ENCOUNTER — APPOINTMENT (OUTPATIENT)
Dept: ORTHOPEDIC SURGERY | Facility: HOSPITAL | Age: 49
End: 2024-10-01

## 2024-10-01 ENCOUNTER — TRANSCRIPTION ENCOUNTER (OUTPATIENT)
Age: 49
End: 2024-10-01

## 2024-10-01 LAB
ANION GAP SERPL CALC-SCNC: 12 MMOL/L — SIGNIFICANT CHANGE UP (ref 5–17)
APTT BLD: 35 SEC — SIGNIFICANT CHANGE UP (ref 24.5–35.6)
BUN SERPL-MCNC: 16 MG/DL — SIGNIFICANT CHANGE UP (ref 7–23)
CALCIUM SERPL-MCNC: 9.1 MG/DL — SIGNIFICANT CHANGE UP (ref 8.4–10.5)
CHLORIDE SERPL-SCNC: 103 MMOL/L — SIGNIFICANT CHANGE UP (ref 96–108)
CO2 SERPL-SCNC: 24 MMOL/L — SIGNIFICANT CHANGE UP (ref 22–31)
CREAT SERPL-MCNC: 0.76 MG/DL — SIGNIFICANT CHANGE UP (ref 0.5–1.3)
DAT C3-SP REAG RBC QL: POSITIVE — SIGNIFICANT CHANGE UP
EGFR: 95 ML/MIN/1.73M2 — SIGNIFICANT CHANGE UP
ELUATE ANTIBODY 1: SIGNIFICANT CHANGE UP
GLUCOSE BLDC GLUCOMTR-MCNC: 120 MG/DL — HIGH (ref 70–99)
GLUCOSE BLDC GLUCOMTR-MCNC: 170 MG/DL — HIGH (ref 70–99)
GLUCOSE SERPL-MCNC: 229 MG/DL — HIGH (ref 70–99)
HCG SERPL-ACNC: <2 MIU/ML — SIGNIFICANT CHANGE UP
HCT VFR BLD CALC: 39.6 % — SIGNIFICANT CHANGE UP (ref 34.5–45)
HGB BLD-MCNC: 12 G/DL — SIGNIFICANT CHANGE UP (ref 11.5–15.5)
INR BLD: 0.88 RATIO — SIGNIFICANT CHANGE UP (ref 0.85–1.16)
MCHC RBC-ENTMCNC: 26.1 PG — LOW (ref 27–34)
MCHC RBC-ENTMCNC: 30.3 GM/DL — LOW (ref 32–36)
MCV RBC AUTO: 86.1 FL — SIGNIFICANT CHANGE UP (ref 80–100)
NRBC # BLD: 0 /100 WBCS — SIGNIFICANT CHANGE UP (ref 0–0)
PLATELET # BLD AUTO: 267 K/UL — SIGNIFICANT CHANGE UP (ref 150–400)
POTASSIUM SERPL-MCNC: 3.8 MMOL/L — SIGNIFICANT CHANGE UP (ref 3.5–5.3)
POTASSIUM SERPL-SCNC: 3.8 MMOL/L — SIGNIFICANT CHANGE UP (ref 3.5–5.3)
PROTHROM AB SERPL-ACNC: 10 SEC — SIGNIFICANT CHANGE UP (ref 9.9–13.4)
RBC # BLD: 4.6 M/UL — SIGNIFICANT CHANGE UP (ref 3.8–5.2)
RBC # FLD: 13.5 % — SIGNIFICANT CHANGE UP (ref 10.3–14.5)
SODIUM SERPL-SCNC: 139 MMOL/L — SIGNIFICANT CHANGE UP (ref 135–145)
WBC # BLD: 8.68 K/UL — SIGNIFICANT CHANGE UP (ref 3.8–10.5)
WBC # FLD AUTO: 8.68 K/UL — SIGNIFICANT CHANGE UP (ref 3.8–10.5)

## 2024-10-01 PROCEDURE — 22612 ARTHRD PST TQ 1NTRSPC LUMBAR: CPT

## 2024-10-01 PROCEDURE — 22852 REMOVE SPINE FIXATION DEVICE: CPT

## 2024-10-01 PROCEDURE — 15734 MUSCLE-SKIN GRAFT TRUNK: CPT

## 2024-10-01 DEVICE — SURGIFOAM PAD 8CM X 12.5CM X 10MM (100)
Type: IMPLANTABLE DEVICE | Site: BILATERAL | Status: NON-FUNCTIONAL
Removed: 2024-10-01

## 2024-10-01 DEVICE — SURGIFLO MATRIX WITH THROMBIN KIT
Type: IMPLANTABLE DEVICE | Site: BILATERAL | Status: NON-FUNCTIONAL
Removed: 2024-10-01

## 2024-10-01 RX ORDER — TRAMADOL HYDROCHLORIDE 50 MG/1
50 TABLET, COATED ORAL EVERY 6 HOURS
Refills: 0 | Status: DISCONTINUED | OUTPATIENT
Start: 2024-10-01 | End: 2024-10-04

## 2024-10-01 RX ORDER — ACETAMINOPHEN 325 MG
1000 TABLET ORAL EVERY 8 HOURS
Refills: 0 | Status: COMPLETED | OUTPATIENT
Start: 2024-10-01 | End: 2024-10-02

## 2024-10-01 RX ORDER — OXYCODONE HYDROCHLORIDE 30 MG/1
5 TABLET, FILM COATED, EXTENDED RELEASE ORAL ONCE
Refills: 0 | Status: DISCONTINUED | OUTPATIENT
Start: 2024-10-01 | End: 2024-10-01

## 2024-10-01 RX ORDER — ONDANSETRON HCL/PF 4 MG/2 ML
4 VIAL (ML) INJECTION ONCE
Refills: 0 | Status: DISCONTINUED | OUTPATIENT
Start: 2024-10-01 | End: 2024-10-01

## 2024-10-01 RX ORDER — CEFAZOLIN SODIUM 1 G
2000 VIAL (EA) INJECTION EVERY 8 HOURS
Refills: 0 | Status: COMPLETED | OUTPATIENT
Start: 2024-10-01 | End: 2024-10-02

## 2024-10-01 RX ORDER — PREDNISONE 5 MG/1
40 TABLET ORAL DAILY
Refills: 0 | Status: COMPLETED | OUTPATIENT
Start: 2024-10-02 | End: 2024-10-04

## 2024-10-01 RX ORDER — PREDNISONE 5 MG/1
10 TABLET ORAL DAILY
Refills: 0 | Status: CANCELLED | OUTPATIENT
Start: 2024-10-11 | End: 2024-10-04

## 2024-10-01 RX ORDER — OXYCODONE HYDROCHLORIDE 30 MG/1
10 TABLET, FILM COATED, EXTENDED RELEASE ORAL EVERY 4 HOURS
Refills: 0 | Status: DISCONTINUED | OUTPATIENT
Start: 2024-10-01 | End: 2024-10-04

## 2024-10-01 RX ORDER — DIPHENHYDRAMINE HCL 12.5MG/5ML
25 LIQUID (ML) ORAL EVERY 6 HOURS
Refills: 0 | Status: DISCONTINUED | OUTPATIENT
Start: 2024-10-01 | End: 2024-10-04

## 2024-10-01 RX ORDER — OXYCODONE HYDROCHLORIDE 30 MG/1
5 TABLET, FILM COATED, EXTENDED RELEASE ORAL
Refills: 0 | Status: DISCONTINUED | OUTPATIENT
Start: 2024-10-01 | End: 2024-10-01

## 2024-10-01 RX ORDER — PREDNISONE 5 MG/1
20 TABLET ORAL DAILY
Refills: 0 | Status: CANCELLED | OUTPATIENT
Start: 2024-10-08 | End: 2024-10-04

## 2024-10-01 RX ORDER — FENTANYL CITRATE-0.9 % NACL/PF 300MCG/30
25 PATIENT CONTROLLED ANALGESIA VIAL INJECTION
Refills: 0 | Status: DISCONTINUED | OUTPATIENT
Start: 2024-10-01 | End: 2024-10-01

## 2024-10-01 RX ORDER — PREDNISONE 5 MG/1
30 TABLET ORAL DAILY
Refills: 0 | Status: DISCONTINUED | OUTPATIENT
Start: 2024-10-05 | End: 2024-10-04

## 2024-10-01 RX ORDER — ASPIRIN 325 MG
81 TABLET ORAL DAILY
Refills: 0 | Status: DISCONTINUED | OUTPATIENT
Start: 2024-10-02 | End: 2024-10-04

## 2024-10-01 RX ORDER — SODIUM CHLORIDE 0.9 % (FLUSH) 0.9 %
1000 SYRINGE (ML) INJECTION
Refills: 0 | Status: DISCONTINUED | OUTPATIENT
Start: 2024-10-01 | End: 2024-10-02

## 2024-10-01 RX ORDER — HYDROMORPHONE HYDROCHLORIDE 1 MG/ML
0.5 INJECTION, SOLUTION INTRAMUSCULAR; INTRAVENOUS; SUBCUTANEOUS ONCE
Refills: 0 | Status: DISCONTINUED | OUTPATIENT
Start: 2024-10-01 | End: 2024-10-04

## 2024-10-01 RX ORDER — OXYCODONE HYDROCHLORIDE 30 MG/1
10 TABLET, FILM COATED, EXTENDED RELEASE ORAL
Refills: 0 | Status: DISCONTINUED | OUTPATIENT
Start: 2024-10-01 | End: 2024-10-01

## 2024-10-01 RX ORDER — OXYCODONE HYDROCHLORIDE 30 MG/1
5 TABLET, FILM COATED, EXTENDED RELEASE ORAL EVERY 4 HOURS
Refills: 0 | Status: DISCONTINUED | OUTPATIENT
Start: 2024-10-01 | End: 2024-10-04

## 2024-10-01 RX ORDER — SENNOSIDES 8.6 MG
2 TABLET ORAL AT BEDTIME
Refills: 0 | Status: DISCONTINUED | OUTPATIENT
Start: 2024-10-01 | End: 2024-10-04

## 2024-10-01 RX ORDER — MAGNESIUM HYDROXIDE 400 MG/5ML
30 SUSPENSION, ORAL (FINAL DOSE FORM) ORAL DAILY
Refills: 0 | Status: DISCONTINUED | OUTPATIENT
Start: 2024-10-01 | End: 2024-10-04

## 2024-10-01 RX ORDER — ONDANSETRON HCL/PF 4 MG/2 ML
4 VIAL (ML) INJECTION EVERY 6 HOURS
Refills: 0 | Status: DISCONTINUED | OUTPATIENT
Start: 2024-10-01 | End: 2024-10-04

## 2024-10-01 RX ADMIN — Medication 25 MICROGRAM(S): at 12:40

## 2024-10-01 RX ADMIN — Medication 975 MILLIGRAM(S): at 05:36

## 2024-10-01 RX ADMIN — Medication 975 MILLIGRAM(S): at 06:15

## 2024-10-01 RX ADMIN — OXYCODONE HYDROCHLORIDE 5 MILLIGRAM(S): 30 TABLET, FILM COATED, EXTENDED RELEASE ORAL at 13:30

## 2024-10-01 RX ADMIN — OXYCODONE HYDROCHLORIDE 5 MILLIGRAM(S): 30 TABLET, FILM COATED, EXTENDED RELEASE ORAL at 15:39

## 2024-10-01 RX ADMIN — OXYCODONE HYDROCHLORIDE 5 MILLIGRAM(S): 30 TABLET, FILM COATED, EXTENDED RELEASE ORAL at 12:30

## 2024-10-01 RX ADMIN — Medication 150 MILLILITER(S): at 12:54

## 2024-10-01 RX ADMIN — Medication 100 MILLIGRAM(S): at 17:09

## 2024-10-01 RX ADMIN — Medication 400 MILLIGRAM(S): at 21:45

## 2024-10-01 RX ADMIN — OXYCODONE HYDROCHLORIDE 10 MILLIGRAM(S): 30 TABLET, FILM COATED, EXTENDED RELEASE ORAL at 22:45

## 2024-10-01 RX ADMIN — OXYCODONE HYDROCHLORIDE 5 MILLIGRAM(S): 30 TABLET, FILM COATED, EXTENDED RELEASE ORAL at 14:39

## 2024-10-01 RX ADMIN — Medication 25 MICROGRAM(S): at 12:55

## 2024-10-01 RX ADMIN — Medication 25 MILLIGRAM(S): at 15:32

## 2024-10-01 RX ADMIN — PANTOPRAZOLE SODIUM 40 MILLIGRAM(S): 40 TABLET, DELAYED RELEASE ORAL at 05:36

## 2024-10-01 RX ADMIN — Medication 2 TABLET(S): at 21:45

## 2024-10-01 RX ADMIN — Medication 1000 MILLIGRAM(S): at 22:45

## 2024-10-01 RX ADMIN — CHLORHEXIDINE GLUCONATE ORAL RINSE 1 APPLICATION(S): 1.2 SOLUTION DENTAL at 05:36

## 2024-10-01 RX ADMIN — Medication 5 MILLIGRAM(S): at 17:09

## 2024-10-01 RX ADMIN — OXYCODONE HYDROCHLORIDE 10 MILLIGRAM(S): 30 TABLET, FILM COATED, EXTENDED RELEASE ORAL at 21:45

## 2024-10-01 NOTE — DISCHARGE NOTE PROVIDER - NSDCFUADDAPPT_GEN_ALL_CORE_FT
APPTS ARE READY TO BE MADE: [X] YES    Best Family or Patient Contact (if needed):    Additional Information about above appointments (if needed):    1: Dr. Pena in 7-10 days  2:   3:     Other comments or requests:    APPTS ARE READY TO BE MADE: [X] YES    Best Family or Patient Contact (if needed):    Additional Information about above appointments (if needed):    1: Dr. Pena in 7-10 days  2:   3:     Other comments or requests:   Prior to outreaching the patient, it was visible that the patient has secured a follow up appointment which was not scheduled by our team. Patient scheduled on 10/11 at 2:15P at [address] [place] [address] with Dr. Jacob Pena

## 2024-10-01 NOTE — PHYSICAL THERAPY INITIAL EVALUATION ADULT - GAIT PATTERN USED, PT EVAL
Forms completed and faxed to [facility] ([phone]). Confirmation fax received. 3-point gait

## 2024-10-01 NOTE — DISCHARGE NOTE PROVIDER - NSDCFUSCHEDAPPT_GEN_ALL_CORE_FT
Jacob Pena  [facility]  ORTHOSURG [address] [facility]  Scheduled Appointment: 10/11/2024

## 2024-10-01 NOTE — PRE PROCEDURE NOTE - PRE PROCEDURE EVALUATION
- NPO after midnight for OR  - Going to OR for Removal of Hardware of L4-S1 PSF  - Pre-op labs: CBC, BMP, coags, T&S

## 2024-10-01 NOTE — DISCHARGE NOTE PROVIDER - NSDCCPTREATMENT_GEN_ALL_CORE_FT
PRINCIPAL PROCEDURE  Procedure: Exploration of spine with removal of hardware  Findings and Treatment: R L4-S1 w in situ fusion

## 2024-10-01 NOTE — DISCHARGE NOTE PROVIDER - NSDCCPCAREPLAN_GEN_ALL_CORE_FT
PRINCIPAL DISCHARGE DIAGNOSIS  Diagnosis: Low back pain with radiation  Assessment and Plan of Treatment:

## 2024-10-01 NOTE — PROGRESS NOTE ADULT - SUBJECTIVE AND OBJECTIVE BOX
Patient seen and examined at bedside. Patient reports pain well controlled on medications. No acute events overnight. Pt denies fevers, chills, new onset numbness, weakness or tingling in the extremities.    T(C): 37.1 (10-01-24 @ 05:03), Max: 37.2 (09-30-24 @ 11:54)  T(F): 98.8 (10-01-24 @ 05:03), Max: 98.9 (09-30-24 @ 11:54)  HR: 65 (10-01-24 @ 05:03) (65 - 79)  BP: 98/64 (10-01-24 @ 05:03) (98/64 - 129/84)  RR: 18 (10-01-24 @ 05:03) (18 - 18)  SpO2: 96% (10-01-24 @ 05:03) (96% - 100%)    Physical Exam:  Gen: NAD  Resp: Symmetric chest rise, No increased WOB      Spine:  Skin intact  No bony TTP or step-offs along c-, t-, l-spine, or sacrum    Motor:               L2             L3             L4               L5            S1  R         5/5           5/5          5/5             5/5           5/5  L          5/5          5/5           5/5             5/5           5/5    Sensory:               L2          L3         L4      L5       S1         (0=absent, 1=impaired, 2=normal, NT=not testable)  R         1            1            1        1        1  L          2            2           2        2         2                                       A/P:  49yFemale w/ painful hardware s/p 2 stage lumbar decompression L4-S1 in 5/2023 and inability to do ADLs 2/2 severe pain. Plan for return to OR this week.     - WBAT  -incentive spirometry  - PT/OT  - Please document medical clearance for OR--> appreciated  - EKG   - Hold SGLT   - F/u B hydroxy-butyrate  Patient seen and examined at bedside. Patient reports pain is not well controlled on medications. No acute events overnight. Pt denies fevers, chills, new onset numbness, weakness or tingling in the extremities.    T(C): 37.1 (10-01-24 @ 05:03), Max: 37.2 (09-30-24 @ 11:54)  T(F): 98.8 (10-01-24 @ 05:03), Max: 98.9 (09-30-24 @ 11:54)  HR: 65 (10-01-24 @ 05:03) (65 - 79)  BP: 98/64 (10-01-24 @ 05:03) (98/64 - 129/84)  RR: 18 (10-01-24 @ 05:03) (18 - 18)  SpO2: 96% (10-01-24 @ 05:03) (96% - 100%)    Physical Exam:  Gen: NAD  Resp: Symmetric chest rise, No increased WOB      Spine:  Skin intact  No bony TTP or step-offs along c-, t-, l-spine, or sacrum    Motor:               L2             L3             L4               L5            S1  R         5/5           5/5          5/5             5/5           5/5  L          5/5          5/5           5/5             5/5           5/5    Sensory:               L2          L3         L4      L5       S1         (0=absent, 1=impaired, 2=normal, NT=not testable)  R         1            1            1        1        1  L          2            2           2        2         2                                       A/P:  49yFemale w/ painful hardware s/p 2 stage lumbar decompression L4-S1 in 5/2023 and inability to do ADLs 2/2 severe pain. Plan for return to OR this week.     - WBAT  -incentive spirometry  - PT/OT  - Please document medical clearance for OR--> appreciated  - EKG   - Hold SGLT   - F/u B hydroxy-butyrate

## 2024-10-01 NOTE — DISCHARGE NOTE PROVIDER - CARE PROVIDERS DIRECT ADDRESSES
[email or web address]

## 2024-10-01 NOTE — BRIEF OPERATIVE NOTE - NSICDXBRIEFPROCEDURE_GEN_ALL_CORE_FT
PROCEDURES:  Exploration of spine with removal of hardware 01-Oct-2024 11:54:20 R L4-S1 w in situ fusion Penny Dumont

## 2024-10-01 NOTE — BRIEF OPERATIVE NOTE - NSICDXBRIEFPOSTOP_GEN_ALL_CORE_FT
POST-OP DIAGNOSIS:  Painful orthopaedic hardware 01-Oct-2024 11:54:46 s/p R L4-S1 PSF Penny Dumont

## 2024-10-01 NOTE — PRE-ANESTHESIA EVALUATION ADULT - NSANTHPEFT_GEN_ALL_CORE
Pt c/o abd cramping rated a 3/10. Pt medicated and requested with motrin po. Pt up to chair. NAD  extremities warm and well perfused

## 2024-10-01 NOTE — PHYSICAL THERAPY INITIAL EVALUATION ADULT - PERTINENT HX OF CURRENT PROBLEM, REHAB EVAL
48yFemale w/ PMH of s/p 2 stage unilateral L4-S1 decompression/fusion (ALIF, PSF) who presents c/o R-sided back pain and RLE parasthesias and numbness. Pt states she has been c/o RLE radiculopathy, for which she presented to ED in January 2024 and March 2024 for similar pain. Patient said pain improved with steroid taper previously. She also has been treated by pain management with a number of trigger point and MARY. The pain has become unbearable this past weekend, and the RLE has been giving way. Exacerbated by activity and patient is unable to do ADLs. Radiates down posterior R thigh. Denies fevers/chills, weight loss, or night pain. 48yFemale w/ PMH of s/p 2 stage unilateral L4-S1 decompression/fusion (ALIF, PSF) who presents c/o R-sided back pain and RLE parasthesias and numbness. Pt states she has been c/o RLE radiculopathy, for which she presented to ED in January 2024 and March 2024 for similar pain. Patient said pain improved with steroid taper previously. She also has been treated by pain management with a number of trigger point and MARY. The pain has become unbearable this past weekend, and the RLE has been giving way. Exacerbated by activity and patient is unable to do ADLs. Radiates down posterior R thigh. Denies fevers/chills, weight loss, or night pain. Now s/p Removal of L4-S1 PSF hardware on 10/1.

## 2024-10-01 NOTE — PHYSICAL THERAPY INITIAL EVALUATION ADULT - ADDITIONAL COMMENTS
Pt lives with her  in a private house with 5 steps to enter, 10+10 inside however pt can stay on main level. Pt states she ambulated with a cane, required assistance for ADLs from her . Pt also owns rolling walker, shower chair, commode.

## 2024-10-01 NOTE — PHYSICAL THERAPY INITIAL EVALUATION ADULT - BALANCE DISTURBANCE, IDENTIFIED IMPAIRMENT CONTRIBUTE, REHAB EVAL
Last office visit date: 10/17/2023   Medication Refill Protocol Failed.  Failed criteria: see below. Sent to clinician to review.     Name from pharmacy: Mounjaro 12.5 MG/0.5ML Subcutaneous Solution Pen-injector          Will file in chart as: Mounjaro 12.5 MG/0.5ML Solution Pen-injector    The original prescription was discontinued on 1/29/2024 by Bette Alfonso CMA for the following reason: Reorder. Renewing this prescription may not be appropriate.     Possible duplicate: Hover to review recent actions on this medication    Sig: INJECT 12.5MG INTO THE SKIN EVERY 7 DAYS    Disp: 4 mL    Refills: 0    Start: 2/27/2024    Class: Eprescribe    For: Type 2 diabetes mellitus with other specified complication, without long-term current use of insulin (CMD)    Last ordered: 1 month ago (1/2/2024) by ADALGISA Fernandes    Last refill: 2/3/2024    Rx #: [id number]    GLP-1 Agonists Antihyperglycemics Refill Protocol - 12 Month Protocol [id number]/27/2024 06:23 AM   Protocol Details Medication (including dose and sig) on current meds list    eGFR resulted within last 12 months -- IF CRITERIA FAILED REFER TO PROTOCOL DETAILS    Seen by prescribing provider or same department within the last 12 months or has a future appt in 3 months - IF FAILED PLEASE LOOK AT CHART REVIEW FOR LAST VISIT AND PROCEED ACCORDINGLY    Lipid Panel resulted within last 15 months    Hgb A1c less than 8 within last 12 months looking at last value -- IF CRITERIA FAILED REFER TO PROTOCOL DETAILS    eGFR greater than 29 within last 12 months looking at last value         pain/impaired postural control/decreased strength

## 2024-10-01 NOTE — DISCHARGE NOTE PROVIDER - NSDCFUADDINST_GEN_ALL_CORE_FT
Followup with Dr. Pena in 7-10 days- please call office for telehealth appointment.  Activity: Ambulation as tolerated with LSO brace in place. No heavy lifting.  Dressing: Keep clean and dry at all times. Do not shower.  Sponge bathe only.  Followup with your Primary Care Physician within 1-2 weeks for continued diabetes management/general checkup/discuss Lantus discharged to home  with.

## 2024-10-01 NOTE — PROGRESS NOTE ADULT - ATTENDING COMMENTS
I agree with above. I agree with above. I reviewed surgical procedure in detail with patient, including all major risks, benefits, and complications with patient.

## 2024-10-01 NOTE — DISCHARGE NOTE PROVIDER - HOSPITAL COURSE
48yFemale w/ PMH of s/p 2 stage unilateral L4-S1 decompression/fusion (ALIF, PSF) who presents c/o R-sided back pain and RLE parasthesias and numbness. Pt states she has been c/o RLE radiculopathy, for which she presented to ED in January 2024 and March 2024 for similar pain. Patient said pain improved with steroid taper previously. She also has been treated by pain management with a numbner of trigger point and MARY.  The hazel nhas become unbearable this past weekend, and the RLE has been giving way. Exacerbated by activity and patient is unable to do ADLs. Radiates down posterior R thigh. . Denies fevers/chills, weight loss, or night pain. Denies change in bowel/bladder function or saddle anesthesia. Denies recent falls/trauma or any other ortho injuries. Community ambulator w cane at baseline.      Hospital Course:  Patient underwent Exploration of spine with removal of hardware/R L4-S1 w in situ fusion without complications.   Evaluated and treated by physical therapy whom recommended      48yFemale w/ PMH of s/p 2 stage unilateral L4-S1 decompression/fusion (ALIF, PSF) who presents c/o R-sided back pain and RLE parasthesias and numbness. Pt states she has been c/o RLE radiculopathy, for which she presented to ED in January 2024 and March 2024 for similar pain. Patient said pain improved with steroid taper previously. She also has been treated by pain management with a numbner of trigger point and MARY.  The hazel nhas become unbearable this past weekend, and the RLE has been giving way. Exacerbated by activity and patient is unable to do ADLs. Radiates down posterior R thigh. . Denies fevers/chills, weight loss, or night pain. Denies change in bowel/bladder function or saddle anesthesia. Denies recent falls/trauma or any other ortho injuries. Community ambulator w cane at baseline.      Hospital Course:  Patient underwent Exploration of spine with removal of hardware/R L4-S1 w in situ fusion without complications.   Evaluated and treated by physical therapy whom recommended Home with Home PT for discharge disposition.   LSO brace ordered and delivered to patient.  Patient discharged to home when PT cleared with no new neurological deficits.      48yFemale w/ PMH of s/p 2 stage unilateral L4-S1 decompression/fusion (ALIF, PSF) who presents c/o R-sided back pain and RLE parasthesias and numbness. Pt states she has been c/o RLE radiculopathy, for which she presented to ED in January 2024 and March 2024 for similar pain. Patient said pain improved with steroid taper previously. She also has been treated by pain management with a numbner of trigger point and MARY.  The hazel nhas become unbearable this past weekend, and the RLE has been giving way. Exacerbated by activity and patient is unable to do ADLs. Radiates down posterior R thigh. . Denies fevers/chills, weight loss, or night pain. Denies change in bowel/bladder function or saddle anesthesia. Denies recent falls/trauma or any other ortho injuries. Community ambulator w cane at baseline.      Hospital Course:  Patient underwent Exploration of spine with removal of hardware/R L4-S1 w in situ fusion without complications.   Medicine team consulted for postoperative comanagement and recommendations followed.  Evaluated and treated by physical therapy whom recommended Home with Home PT for discharge disposition.   LSO brace ordered and delivered to patient.  Patient noted to be hyperglycemic secondary to steroid taper started postop. Patient treated with lantus and discharged with Lantus  taper as per Medicine Team.   Patient discharged to home when PT cleared with no new neurological deficits.

## 2024-10-01 NOTE — DISCHARGE NOTE PROVIDER - CARE PROVIDER_API CALL
Jacob Pena  Orthopaedic Surgery  [address], [address]  [place] [address]  Phone: [phone]  Fax: [phone]  Follow Up Time:

## 2024-10-01 NOTE — DISCHARGE NOTE PROVIDER - NSDCMRMEDTOKEN_GEN_ALL_CORE_FT
4mm pen needles: to be used with insulin pen as directed  acetaminophen 325 mg oral tablet: 3 tab(s) orally every 8 hours x 1 week, then as needed for mild pain  Alcohol Swabs with Benzocaine topical pad: Apply topically to affected area once a day  cyclobenzaprine 10 mg oral tablet: 1 tab(s) orally 3 times a day as needed for  severe pain  cyclobenzaprine 5 mg oral tablet: 1 tab(s) orally every 8 hours as needed for  muscle spasm  gabapentin 100 mg oral capsule: 1 cap(s) orally 3 times a day  glimepiride 1 mg oral tablet: 1 tab(s) orally once a day 3 tabs orally once daily (5/21-23); 2 tabs orally once daily (5/24-26); 1 tab orally once daily (5/27-30), then stop. Take with prednisone dosing  glucometer: use as directed  lancets: use as directed for insulin management  Lantus Solostar Pen 100 units/mL subcutaneous solution: 10 unit(s) subcutaneous once a day (at bedtime) (5/21-23); 8 units subcutaneous once a day at bedtime (5/24-26); 6 units subcutaneous once daily at bedtime (5/27-5/30). Then stop.  metFORMIN 1000 mg oral tablet: 1 tab(s) orally 2 times a day start this dosing once completed with prednisone taper (on 5/30/23)  metFORMIN 500 mg oral tablet: 1 tab(s) orally 2 times a day while on prednisone - last day 5/29/23  methocarbamol 500 mg oral tablet: 2 tab(s) orally 3 times a day  Multiple Vitamins oral tablet: 1 tab(s) orally once a day  Narcan 4 mg/0.1 mL nasal spray: 4 milligram(s) intranasally every 2 to 3 minutes alternating nostrils as needed for overdose  oxyCODONE 5 mg oral tablet: 1 tab(s) orally every 4 hours as needed for  severe pain MDD: 006  oxyCODONE 5 mg oral tablet: 1 tab(s) orally every 6 hours as needed for  severe pain MDD: 4  pantoprazole 40 mg oral delayed release tablet: 1 tab(s) orally once a day (before a meal)  predniSONE 10 mg oral tablet: 1 tab(s) orally once a day Take taper as follows:  4 tabs orally once daily on 5/20  3 tabs orally once daily on 5/21; 5/22; 5/23  2 tabs orally once daily on 5/24, 5/25, 5/26  1 tab orally once daily on 5/27; 5/28; 5/29. Then stop  predniSONE 10 mg oral tablet: 1 tab(s) orally once a day 40 mg (4 pills) for 3 days, 30 mg (3 pills) for 3 days, 20mg (2 pills) for 3 days, 10mg (1 pill) for 3 days. All pills taken once a day.  senna leaf extract oral tablet: 2 tab(s) orally once a day (at bedtime) as needed for  constipation  simvastatin 20 mg oral tablet: 1 tab(s) orally once a day (at bedtime)  test strips: use as directed with gluometer  traMADol 50 mg oral tablet: 1 tab(s) orally every 6 hours as needed for  moderate pain MDD: 004   aspirin 81 mg oral delayed release tablet: 1 tab(s) orally once a day  DULoxetine 30 mg oral delayed release capsule: 1 cap(s) orally once a day  gabapentin 100 mg oral capsule: 1 cap(s) orally 3 times a day  glimepiride 1 mg oral tablet: 1 tab(s) orally once a day 3 tabs orally once daily (5/21-23); 2 tabs orally once daily (5/24-26); 1 tab orally once daily (5/27-30), then stop. Take with prednisone dosing  Lantus Solostar Pen 100 units/mL subcutaneous solution: 10 unit(s) subcutaneous once a day (at bedtime) (5/21-23); 8 units subcutaneous once a day at bedtime (5/24-26); 6 units subcutaneous once daily at bedtime (5/27-5/30). Then stop.  magnesium hydroxide 8% oral suspension: 30 milliliter(s) orally once a day As needed Constipation  metFORMIN 1000 mg oral tablet: 1 tab(s) orally 2 times a day start this dosing once completed with prednisone taper (on 5/30/23)  metFORMIN 500 mg oral tablet: 1 tab(s) orally 2 times a day while on prednisone - last day 5/29/23  Narcan 4 mg/0.1 mL nasal spray: 4 milligram(s) intranasally every 2 to 3 minutes alternating nostrils as needed for overdose  oxyCODONE 5 mg oral tablet: 1 tab(s) orally every 4 hours as needed for  severe pain MDD: 006  oxyCODONE 5 mg oral tablet: 1 tab(s) orally every 6 hours as needed for  severe pain MDD: 4  pantoprazole 40 mg oral delayed release tablet: 1 tab(s) orally once a day (before a meal)  predniSONE 10 mg oral tablet: 1 tab(s) orally once a day Take taper as follows:  4 tabs orally once daily on 5/20  3 tabs orally once daily on 5/21; 5/22; 5/23  2 tabs orally once daily on 5/24, 5/25, 5/26  1 tab orally once daily on 5/27; 5/28; 5/29. Then stop  senna leaf extract oral tablet: 2 tab(s) orally once a day (at bedtime) as needed for  constipation  simvastatin 20 mg oral tablet: 1 tab(s) orally once a day (at bedtime)  traMADol 50 mg oral tablet: 1 tab(s) orally every 6 hours as needed for  moderate pain MDD: 004   aspirin 81 mg oral delayed release tablet: 1 tab(s) orally once a day  DULoxetine 30 mg oral delayed release capsule: 1 cap(s) orally once a day  gabapentin 100 mg oral capsule: 1 cap(s) orally 3 times a day  glimepiride 1 mg oral tablet: 1 tab(s) orally once a day 3 tabs orally once daily (5/21-23); 2 tabs orally once daily (5/24-26); 1 tab orally once daily (5/27-30), then stop. Take with prednisone dosing  Lantus Solostar Pen 100 units/mL subcutaneous solution: 10 unit(s) subcutaneous once a day (at bedtime) (5/21-23); 8 units subcutaneous once a day at bedtime (5/24-26); 6 units subcutaneous once daily at bedtime (5/27-5/30). Then stop.  magnesium hydroxide 8% oral suspension: 30 milliliter(s) orally once a day As needed Constipation  metFORMIN 1000 mg oral tablet: 1 tab(s) orally 2 times a day start this dosing once completed with prednisone taper (on 5/30/23)  metFORMIN 500 mg oral tablet: 1 tab(s) orally 2 times a day while on prednisone - last day 5/29/23  Narcan 4 mg/0.1 mL nasal spray: 4 milligram(s) intranasally every 2 to 3 minutes alternating nostrils as needed for overdose  oxyCODONE 5 mg oral tablet: 1 tab(s) orally every 6 hours as needed for  severe pain MDD: 4  pantoprazole 40 mg oral delayed release tablet: 1 tab(s) orally once a day (before a meal)  predniSONE 10 mg oral tablet: 1 tab(s) orally once a day Take taper as follows:  4 tabs orally once daily on 5/20  3 tabs orally once daily on 5/21; 5/22; 5/23  2 tabs orally once daily on 5/24, 5/25, 5/26  1 tab orally once daily on 5/27; 5/28; 5/29. Then stop  senna leaf extract oral tablet: 2 tab(s) orally once a day (at bedtime) as needed for  constipation  simvastatin 20 mg oral tablet: 1 tab(s) orally once a day (at bedtime)  traMADol 50 mg oral tablet: 1 tab(s) orally every 6 hours as needed for  moderate pain MDD: 004   aspirin 81 mg oral delayed release tablet: 1 tab(s) orally once a day  DULoxetine 30 mg oral delayed release capsule: 1 cap(s) orally once a day  gabapentin 100 mg oral capsule: 1 cap(s) orally 3 times a day  glimepiride 1 mg oral tablet: 1 tab(s) orally once a day 3 tabs orally once daily (5/21-23); 2 tabs orally once daily (5/24-26); 1 tab orally once daily (5/27-30), then stop. Take with prednisone dosing  Lantus Solostar Pen 100 units/mL subcutaneous solution: 10 unit(s) subcutaneous once a day (at bedtime) (5/21-23); 8 units subcutaneous once a day at bedtime (5/24-26); 6 units subcutaneous once daily at bedtime (5/27-5/30). Then stop.  magnesium hydroxide 8% oral suspension: 30 milliliter(s) orally once a day As needed Constipation  metFORMIN 1000 mg oral tablet: 1 tab(s) orally 2 times a day start this dosing once completed with prednisone taper (on 5/30/23)  metFORMIN 500 mg oral tablet: 1 tab(s) orally 2 times a day while on prednisone - last day 5/29/23  Narcan 4 mg/0.1 mL nasal spray: 4 milligram(s) intranasally every 2 to 3 minutes alternating nostrils as needed for overdose  oxyCODONE 5 mg oral tablet: 1 tab(s) orally every 6 hours as needed for  severe pain MDD: 4  pantoprazole 40 mg oral delayed release tablet: 1 tab(s) orally once a day (before a meal)  senna leaf extract oral tablet: 2 tab(s) orally once a day (at bedtime) as needed for  constipation  simvastatin 20 mg oral tablet: 1 tab(s) orally once a day (at bedtime)  traMADol 50 mg oral tablet: 1 tab(s) orally every 6 hours as needed for  moderate pain MDD: 004   aspirin 81 mg oral delayed release tablet: 1 tab(s) orally once a day  DULoxetine 30 mg oral delayed release capsule: 1 cap(s) orally once a day  gabapentin 100 mg oral capsule: 1 cap(s) orally 3 times a day  magnesium hydroxide 8% oral suspension: 30 milliliter(s) orally once a day As needed Constipation  Narcan 4 mg/0.1 mL nasal spray: 4 milligram(s) intranasally every 2 to 3 minutes alternating nostrils as needed for overdose  oxyCODONE 5 mg oral tablet: 1 tab(s) orally every 6 hours as needed for  severe pain MDD: 4  pantoprazole 40 mg oral delayed release tablet: 1 tab(s) orally once a day (before a meal)  senna leaf extract oral tablet: 2 tab(s) orally once a day (at bedtime) as needed for  constipation  simvastatin 20 mg oral tablet: 1 tab(s) orally once a day (at bedtime)  traMADol 50 mg oral tablet: 1 tab(s) orally every 6 hours as needed for  moderate pain MDD: 004   aspirin 81 mg oral delayed release tablet: 1 tab(s) orally once a day  DULoxetine 30 mg oral delayed release capsule: 1 cap(s) orally once a day  gabapentin 100 mg oral capsule: 1 cap(s) orally 3 times a day  Januvia 25 mg oral tablet: 1 tab(s) orally once a day  magnesium hydroxide 8% oral suspension: 30 milliliter(s) orally once a day As needed Constipation  Narcan 4 mg/0.1 mL nasal spray: 4 milligram(s) intranasally every 2 to 3 minutes alternating nostrils as needed for overdose  oxyCODONE 5 mg oral tablet: 1 tab(s) orally every 6 hours as needed for  severe pain MDD: 4  pantoprazole 40 mg oral delayed release tablet: 1 tab(s) orally once a day (before a meal)  senna leaf extract oral tablet: 2 tab(s) orally once a day (at bedtime) as needed for  constipation  simvastatin 20 mg oral tablet: 1 tab(s) orally once a day (at bedtime)  traMADol 50 mg oral tablet: 1 tab(s) orally every 6 hours as needed for  moderate pain MDD: 004   acetaminophen 325 mg oral tablet: 3 tab(s) orally every 8 hours x 1 week, then as needed for mild pain MDD: 009  aspirin 81 mg oral delayed release tablet: 1 tab(s) orally once a day x 4 weeks to prevent blood clots MDD: 001  DULoxetine 30 mg oral delayed release capsule: 1 cap(s) orally once a day  Farxiga 5 mg oral tablet: 1 tab(s) orally once a day MDD: 1  gabapentin 100 mg oral capsule: 1 cap(s) orally 3 times a day MDD: 003  Januvia 25 mg oral tablet: 1 tab(s) orally once a day as per home regimen  Lantus Solostar Pen 100 units/mL subcutaneous solution: 10 unit(s) subcutaneous once a day on 10/5/24 through 10/7/24; then 8 units subcutaneous once a day on 10/8/24 through 10/10/24; then 6 units subcutaneous once a day 10/11/24 through 10/13/23; then stop.  magnesium hydroxide 8% oral suspension: 30 milliliter(s) orally once a day As needed Constipation  metoprolol tartrate 25 mg oral tablet: 0.5 tab(s) orally once a day in the morning  Metoprolol Tartrate 25 mg oral tablet: 0.5 tab(s) orally once a day (at bedtime)  Narcan 4 mg/0.1 mL nasal spray: 4 milligram(s) intranasally every 2 to 3 minutes alternating nostrils as needed for overdose  oxyCODONE 5 mg oral tablet: 1 tab(s) orally every 4 hours as needed for  moderate pain ; 2 Tabs PO Q4H PRN Severe Pain MDD: 006  pantoprazole 40 mg oral delayed release tablet: 1 tab(s) orally once a day before breakfast MDD: 001  predniSONE 10 mg oral tablet: 10 milligram(s) orally once a day as follows: Starting 10/5/24 take 3 Tabs PO Daily X 3 days, then starting 10/8/24 take 2 Tabs PO Daily X 3 days, then starting 10//11/24 take 1 tab PO Daily X 3 days, then stop  senna leaf extract oral tablet: 2 tab(s) orally once a day (at bedtime) as needed for  constipation to prevent constipation. Stop if having diarrhea or loose stools MDD: 002  simvastatin 20 mg oral tablet: 1 tab(s) orally once a day (at bedtime)  traMADol 50 mg oral tablet: 1 tab(s) orally every 6 hours as needed for  mild pain MDD: 004

## 2024-10-02 ENCOUNTER — TRANSCRIPTION ENCOUNTER (OUTPATIENT)
Age: 49
End: 2024-10-02

## 2024-10-02 LAB
ANION GAP SERPL CALC-SCNC: 17 MMOL/L — SIGNIFICANT CHANGE UP (ref 5–17)
BASOPHILS # BLD AUTO: 0.02 K/UL — SIGNIFICANT CHANGE UP (ref 0–0.2)
BASOPHILS NFR BLD AUTO: 0.2 % — SIGNIFICANT CHANGE UP (ref 0–2)
BUN SERPL-MCNC: 14 MG/DL — SIGNIFICANT CHANGE UP (ref 7–23)
CALCIUM SERPL-MCNC: 9.6 MG/DL — SIGNIFICANT CHANGE UP (ref 8.4–10.5)
CHLORIDE SERPL-SCNC: 100 MMOL/L — SIGNIFICANT CHANGE UP (ref 96–108)
CO2 SERPL-SCNC: 19 MMOL/L — LOW (ref 22–31)
CREAT SERPL-MCNC: 0.63 MG/DL — SIGNIFICANT CHANGE UP (ref 0.5–1.3)
EGFR: 108 ML/MIN/1.73M2 — SIGNIFICANT CHANGE UP
EOSINOPHIL # BLD AUTO: 0.01 K/UL — SIGNIFICANT CHANGE UP (ref 0–0.5)
EOSINOPHIL NFR BLD AUTO: 0.1 % — SIGNIFICANT CHANGE UP (ref 0–6)
GLUCOSE BLDC GLUCOMTR-MCNC: 233 MG/DL — HIGH (ref 70–99)
GLUCOSE BLDC GLUCOMTR-MCNC: 267 MG/DL — HIGH (ref 70–99)
GLUCOSE BLDC GLUCOMTR-MCNC: 300 MG/DL — HIGH (ref 70–99)
GLUCOSE BLDC GLUCOMTR-MCNC: 322 MG/DL — HIGH (ref 70–99)
GLUCOSE SERPL-MCNC: 281 MG/DL — HIGH (ref 70–99)
HCT VFR BLD CALC: 41.8 % — SIGNIFICANT CHANGE UP (ref 34.5–45)
HGB BLD-MCNC: 12.5 G/DL — SIGNIFICANT CHANGE UP (ref 11.5–15.5)
IMM GRANULOCYTES NFR BLD AUTO: 0.9 % — SIGNIFICANT CHANGE UP (ref 0–0.9)
LYMPHOCYTES # BLD AUTO: 1.33 K/UL — SIGNIFICANT CHANGE UP (ref 1–3.3)
LYMPHOCYTES # BLD AUTO: 10.9 % — LOW (ref 13–44)
MCHC RBC-ENTMCNC: 26 PG — LOW (ref 27–34)
MCHC RBC-ENTMCNC: 29.9 GM/DL — LOW (ref 32–36)
MCV RBC AUTO: 87.1 FL — SIGNIFICANT CHANGE UP (ref 80–100)
MONOCYTES # BLD AUTO: 0.22 K/UL — SIGNIFICANT CHANGE UP (ref 0–0.9)
MONOCYTES NFR BLD AUTO: 1.8 % — LOW (ref 2–14)
NEUTROPHILS # BLD AUTO: 10.48 K/UL — HIGH (ref 1.8–7.4)
NEUTROPHILS NFR BLD AUTO: 86.1 % — HIGH (ref 43–77)
NRBC # BLD: 0 /100 WBCS — SIGNIFICANT CHANGE UP (ref 0–0)
PLATELET # BLD AUTO: 258 K/UL — SIGNIFICANT CHANGE UP (ref 150–400)
POTASSIUM SERPL-MCNC: 4.3 MMOL/L — SIGNIFICANT CHANGE UP (ref 3.5–5.3)
POTASSIUM SERPL-SCNC: 4.3 MMOL/L — SIGNIFICANT CHANGE UP (ref 3.5–5.3)
RBC # BLD: 4.8 M/UL — SIGNIFICANT CHANGE UP (ref 3.8–5.2)
RBC # FLD: 13.4 % — SIGNIFICANT CHANGE UP (ref 10.3–14.5)
SODIUM SERPL-SCNC: 136 MMOL/L — SIGNIFICANT CHANGE UP (ref 135–145)
WBC # BLD: 12.17 K/UL — HIGH (ref 3.8–10.5)
WBC # FLD AUTO: 12.17 K/UL — HIGH (ref 3.8–10.5)

## 2024-10-02 RX ORDER — INSULIN LISPRO 100/ML
3 VIAL (ML) SUBCUTANEOUS ONCE
Refills: 0 | Status: COMPLETED | OUTPATIENT
Start: 2024-10-02 | End: 2024-10-02

## 2024-10-02 RX ORDER — LINAGLIPTIN 5 MG/1
5 TABLET, FILM COATED ORAL DAILY
Refills: 0 | Status: DISCONTINUED | OUTPATIENT
Start: 2024-10-02 | End: 2024-10-04

## 2024-10-02 RX ORDER — ACETAMINOPHEN 325 MG
975 TABLET ORAL EVERY 8 HOURS
Refills: 0 | Status: DISCONTINUED | OUTPATIENT
Start: 2024-10-02 | End: 2024-10-04

## 2024-10-02 RX ORDER — SODIUM CHLORIDE IRRIG SOLUTION 0.9 %
1000 SOLUTION, IRRIGATION IRRIGATION
Refills: 0 | Status: DISCONTINUED | OUTPATIENT
Start: 2024-10-02 | End: 2024-10-04

## 2024-10-02 RX ORDER — METOPROLOL TARTRATE 50 MG
12.5 TABLET ORAL AT BEDTIME
Refills: 0 | Status: DISCONTINUED | OUTPATIENT
Start: 2024-10-02 | End: 2024-10-04

## 2024-10-02 RX ORDER — PANTOPRAZOLE SODIUM 40 MG/1
40 TABLET, DELAYED RELEASE ORAL
Refills: 0 | Status: DISCONTINUED | OUTPATIENT
Start: 2024-10-02 | End: 2024-10-04

## 2024-10-02 RX ORDER — ALCOHOL ANTISEPTIC PADS
15 PADS, MEDICATED (EA) TOPICAL ONCE
Refills: 0 | Status: DISCONTINUED | OUTPATIENT
Start: 2024-10-02 | End: 2024-10-04

## 2024-10-02 RX ORDER — ALCOHOL ANTISEPTIC PADS
25 PADS, MEDICATED (EA) TOPICAL ONCE
Refills: 0 | Status: DISCONTINUED | OUTPATIENT
Start: 2024-10-02 | End: 2024-10-04

## 2024-10-02 RX ORDER — GABAPENTIN 800 MG/1
100 TABLET, FILM COATED ORAL THREE TIMES A DAY
Refills: 0 | Status: DISCONTINUED | OUTPATIENT
Start: 2024-10-02 | End: 2024-10-04

## 2024-10-02 RX ORDER — 5-HYDROXYTRYPTOPHAN (5-HTP) 100 MG
5 TABLET,DISINTEGRATING ORAL AT BEDTIME
Refills: 0 | Status: DISCONTINUED | OUTPATIENT
Start: 2024-10-02 | End: 2024-10-04

## 2024-10-02 RX ORDER — DIPHENHYDRAMINE HCL 12.5MG/5ML
25 LIQUID (ML) ORAL ONCE
Refills: 0 | Status: DISCONTINUED | OUTPATIENT
Start: 2024-10-02 | End: 2024-10-02

## 2024-10-02 RX ORDER — ROSUVASTATIN CALCIUM 20 MG/1
20 TABLET, COATED ORAL AT BEDTIME
Refills: 0 | Status: DISCONTINUED | OUTPATIENT
Start: 2024-10-02 | End: 2024-10-04

## 2024-10-02 RX ORDER — INSULIN LISPRO 100/ML
VIAL (ML) SUBCUTANEOUS AT BEDTIME
Refills: 0 | Status: DISCONTINUED | OUTPATIENT
Start: 2024-10-02 | End: 2024-10-04

## 2024-10-02 RX ORDER — METOPROLOL TARTRATE 50 MG
12.5 TABLET ORAL DAILY
Refills: 0 | Status: DISCONTINUED | OUTPATIENT
Start: 2024-10-02 | End: 2024-10-04

## 2024-10-02 RX ORDER — ALCOHOL ANTISEPTIC PADS
12.5 PADS, MEDICATED (EA) TOPICAL ONCE
Refills: 0 | Status: DISCONTINUED | OUTPATIENT
Start: 2024-10-02 | End: 2024-10-04

## 2024-10-02 RX ORDER — INSULIN LISPRO 100/ML
VIAL (ML) SUBCUTANEOUS
Refills: 0 | Status: DISCONTINUED | OUTPATIENT
Start: 2024-10-02 | End: 2024-10-04

## 2024-10-02 RX ORDER — DULOXETINE HCL 20 MG
30 CAPSULE,DELAYED RELEASE (ENTERIC COATED) ORAL DAILY
Refills: 0 | Status: DISCONTINUED | OUTPATIENT
Start: 2024-10-02 | End: 2024-10-04

## 2024-10-02 RX ADMIN — Medication 3 UNIT(S): at 09:32

## 2024-10-02 RX ADMIN — LINAGLIPTIN 5 MILLIGRAM(S): 5 TABLET, FILM COATED ORAL at 10:24

## 2024-10-02 RX ADMIN — Medication 2 TABLET(S): at 21:17

## 2024-10-02 RX ADMIN — OXYCODONE HYDROCHLORIDE 10 MILLIGRAM(S): 30 TABLET, FILM COATED, EXTENDED RELEASE ORAL at 17:23

## 2024-10-02 RX ADMIN — Medication 1000 MILLIGRAM(S): at 13:13

## 2024-10-02 RX ADMIN — Medication 100 MILLIGRAM(S): at 01:53

## 2024-10-02 RX ADMIN — Medication 5 MILLIGRAM(S): at 01:52

## 2024-10-02 RX ADMIN — Medication 12.5 MILLIGRAM(S): at 09:55

## 2024-10-02 RX ADMIN — Medication 25 MILLIGRAM(S): at 22:20

## 2024-10-02 RX ADMIN — Medication 1000 MILLIGRAM(S): at 06:02

## 2024-10-02 RX ADMIN — Medication 4: at 17:18

## 2024-10-02 RX ADMIN — Medication 975 MILLIGRAM(S): at 21:17

## 2024-10-02 RX ADMIN — GABAPENTIN 100 MILLIGRAM(S): 800 TABLET, FILM COATED ORAL at 13:17

## 2024-10-02 RX ADMIN — Medication 1: at 21:16

## 2024-10-02 RX ADMIN — Medication 400 MILLIGRAM(S): at 12:13

## 2024-10-02 RX ADMIN — PREDNISONE 40 MILLIGRAM(S): 5 TABLET ORAL at 05:02

## 2024-10-02 RX ADMIN — OXYCODONE HYDROCHLORIDE 10 MILLIGRAM(S): 30 TABLET, FILM COATED, EXTENDED RELEASE ORAL at 09:22

## 2024-10-02 RX ADMIN — Medication 400 MILLIGRAM(S): at 05:03

## 2024-10-02 RX ADMIN — Medication 25 MILLIGRAM(S): at 05:01

## 2024-10-02 RX ADMIN — Medication 975 MILLIGRAM(S): at 22:00

## 2024-10-02 RX ADMIN — Medication 30 MILLIGRAM(S): at 12:13

## 2024-10-02 RX ADMIN — GABAPENTIN 100 MILLIGRAM(S): 800 TABLET, FILM COATED ORAL at 21:17

## 2024-10-02 RX ADMIN — Medication 81 MILLIGRAM(S): at 12:13

## 2024-10-02 RX ADMIN — Medication 12.5 MILLIGRAM(S): at 21:17

## 2024-10-02 RX ADMIN — Medication 25 MILLIGRAM(S): at 12:26

## 2024-10-02 RX ADMIN — Medication 5 MILLIGRAM(S): at 05:02

## 2024-10-02 RX ADMIN — PANTOPRAZOLE SODIUM 40 MILLIGRAM(S): 40 TABLET, DELAYED RELEASE ORAL at 08:17

## 2024-10-02 RX ADMIN — Medication 2: at 12:13

## 2024-10-02 RX ADMIN — Medication 5 MILLIGRAM(S): at 12:13

## 2024-10-02 RX ADMIN — Medication 17 GRAM(S): at 10:25

## 2024-10-02 RX ADMIN — OXYCODONE HYDROCHLORIDE 10 MILLIGRAM(S): 30 TABLET, FILM COATED, EXTENDED RELEASE ORAL at 08:22

## 2024-10-02 RX ADMIN — Medication 5 MILLIGRAM(S): at 21:17

## 2024-10-02 RX ADMIN — ROSUVASTATIN CALCIUM 20 MILLIGRAM(S): 20 TABLET, COATED ORAL at 21:17

## 2024-10-02 NOTE — DISCHARGE NOTE NURSING/CASE MANAGEMENT/SOCIAL WORK - NURSING SECTION COMPLETE
He and I would like you to wait until your cardiac workup is complete and then I can see about starting you on trulicity or victoza.  Insurance may be a shun with these medications, but I can try to get one of them authorized.  Let's just wait a bit to make sure everything is okay from a cardiac standpoint first.     Patient/Caregiver provided printed discharge information.

## 2024-10-02 NOTE — CHART NOTE - NSCHARTNOTEFT_GEN_A_CORE
CAPRINI SCORE [CLOT]    AGE RELATED RISK FACTORS                                                       MOBILITY RELATED FACTORS  [x] Age 41-60 years                                            (1 Point)                  [ ] Bed rest                                                        (1 Point)  [ ] Age: 61-74 years                                           (2 Points)                 [ ] Plaster cast                                                   (2 Points)  [ ] Age= 75 years                                              (3 Points)                 [ ] Bed bound for more than 72 hours                 (2 Points)    DISEASE RELATED RISK FACTORS                                               GENDER SPECIFIC FACTORS  [ ] Edema in the lower extremities                       (1 Point)                  [ ] Pregnancy                                                     (1 Point)  [ ] Varicose veins                                               (1 Point)                  [ ] Post-partum < 6 weeks                                   (1 Point)             [ ] BMI > 25 Kg/m2                                            (1 Point)                  [ ] Hormonal therapy  or oral contraception          (1 Point)                 [ ] Sepsis (in the previous month)                        (1 Point)                  [ ] History of pregnancy complications                 (1 point)  [ ] Pneumonia or serious lung disease                                               [ ] Unexplained or recurrent                     (1 Point)           (in the previous month)                               (1 Point)  [ ] Abnormal pulmonary function test                     (1 Point)                 SURGERY RELATED RISK FACTORS  [ ] Acute myocardial infarction                              (1 Point)                 [ ]  Section                                             (1 Point)  [ ] Congestive heart failure (in the previous month)  (1 Point)               [ ] Minor surgery                                                  (1 Point)   [ ] Inflammatory bowel disease                             (1 Point)                 [ ] Arthroscopic surgery                                        (2 Points)  [ ] Central venous access                                      (2 Points)                [x] General surgery lasting more than 45 minutes   (2 Points)       [ ] Stroke (in the previous month)                          (5 Points)               [ ] Elective arthroplasty                                         (5 Points)                                                                                                                                               HEMATOLOGY RELATED FACTORS                                                 TRAUMA RELATED RISK FACTORS  [ ] Prior episodes of VTE                                     (3 Points)                [ ] Fracture of the hip, pelvis, or leg                       (5 Points)  [ ] Positive family history for VTE                         (3 Points)                 [ ] Acute spinal cord injury (in the previous month)  (5 Points)  [ ] Prothrombin 97496 A                                     (3 Points)                 [ ] Paralysis  (less than 1 month)                             (5 Points)  [ ] Factor V Leiden                                             (3 Points)                  [ ] Multiple Trauma within 1 month                        (5 Points)  [ ] Lupus anticoagulants                                     (3 Points)                                                           [ ] Anticardiolipin antibodies                               (3 Points)                                                       [ ] High homocysteine in the blood                      (3 Points)                                             [ ] Other congenital or acquired thrombophilia      (3 Points)                                                [ ] Heparin induced thrombocytopenia                  (3 Points)                                          Total Score [x]    Caprini Score 0 - 2:  Low Risk, No VTE Prophylaxis required for most patients, encourage ambulation  Caprini Score 3 - 6:  At Risk, pharmacologic VTE prophylaxis is indicated for most patients (in the absence of a contraindication)  Caprini Score Greater than or = 7:  High Risk, pharmacologic VTE prophylaxis is indicated for most patients (in the absence of a contraindication)
Patient Resting without complaints.  No Chest Pain, SOB, N/V.  Pre op s/sx :  ; Post op, patient reports:   .  T(C): 36.2 (10-01-24 @ 13:30), Max: 37.1 (09-30-24 @ 20:42)  HR: 69 (10-01-24 @ 13:45) (65 - 87)  BP: 112/67 (10-01-24 @ 13:45) (98/64 - 134/83)  RR: 16 (10-01-24 @ 13:45) (14 - 18)  SpO2: 100% (10-01-24 @ 13:45) (96% - 100%)  Wt(kg): --  Exam:   Alert/Oriented, No Acute Distress  Cards: +S1/S2, RRR  Pulm: CTAB           Dressing: [ ] clean/dry/intact  [ ] Other:           Drains: : None         Sensation: intact to light touch           Motor exam:                            Lower extremity           PF          DF         EHL       FHL                                                                               R        5/5        5/5        5/5       5/5                                           L         5/5        5/5        5/5       5/5                                                                  Calves Soft/Non-tender bilaterally            warm well perfused; capillary refill <3 seconds              LABS:                        12.0   8.68  )-----------( 267      ( 01 Oct 2024 01:19 )             39.6     10-01    139  |  103  |  16  ----------------------------<  229[H]  3.8   |  24  |  0.76    Ca    9.1      01 Oct 2024 01:19    TPro  7.9  /  Alb  4.3  /  TBili  0.2  /  DBili  x   /  AST  18  /  ALT  23  /  AlkPhos  109  09-29        A/P :  49y Female s/p Removal of L4-S1 PSF painful hardware; VSS, NAD   levels: C1-C7, L1-S1   -    Pain control  -    Taper  -    DVT ppx: Lovenox, ASA 81mg, SCDs.  Discharge on Eliquis 2.5mg PO bID x 30 days       -    Physical Therapy  -    Weight bearing status: WBAT  -     DIspo plan pending PT recommendations    Mannie Shirley PA-C  Team Pager #[phone]
Patient was measured and dispensed a LSO with rigid anterior posterior and lateral panels. The orthosis will stabilize and control the lumbar spine, reduce pain and ROM, and allow for safer ambulation. Care use and function were explained. Contact info was provided. All went without incident.   [facility]  [phone]

## 2024-10-02 NOTE — OCCUPATIONAL THERAPY INITIAL EVALUATION ADULT - ADDITIONAL COMMENTS
pt reports lives in private home with spouse, 5 steps to enter, flight of stairs inside, has first floor setup, walk-in shower. Prior to admission required some assist with ADLs/ambulating with cane. Owns RW, shower chair, commode.

## 2024-10-02 NOTE — DISCHARGE NOTE NURSING/CASE MANAGEMENT/SOCIAL WORK - PATIENT PORTAL LINK FT
You can access the FollowMyHealth Patient Portal offered by [facility] by registering at the following website: [email or web address]. By joining [facility]’s FollowMyHealth portal, you will also be able to view your health information using other applications (apps) compatible with our system.

## 2024-10-02 NOTE — DISCHARGE NOTE NURSING/CASE MANAGEMENT/SOCIAL WORK - HAS THE PATIENT USED TOBACCO IN THE PAST 30 DAYS?
[FreeTextEntry1] : 51 y/o M w polyarthralgias \par =b/l knee, and foot pain\par =reported swelling of knees, fingers; no swelling on exam\par =worse w activities\par =Heberden's nodes, nodule on R 4th PIP? \par \par Given hx and exam, with obesity, pt likely has OA. Will screen for inflammatory/AI condition however. \par Unsure of nodule on R 4th PIP. Rheumatoid nodule? Wart? \par \par Plan\par -Labs w serologies, inflammatory markers\par -Obtain Xrays from [facility]. \par RTO in 2 weeks to discuss dx and treatment plan\par  No

## 2024-10-02 NOTE — OCCUPATIONAL THERAPY INITIAL EVALUATION ADULT - PERTINENT HX OF CURRENT PROBLEM, REHAB EVAL
48yFemale w/ PMH of s/p 2 stage unilateral L4-S1 decompression/fusion (ALIF, PSF) who presents c/o R-sided back pain and RLE parasthesias and numbness. Pt states she has been c/o RLE radiculopathy, for which she presented to ED in January 2024 and March 2024 for similar pain. Patient said pain improved with steroid taper previously. She also has been treated by pain management with a numbner of trigger point and MARY.  The hazel nhas become unbearable this past weekend, and the RLE has been giving way. Exacerbated by activity and patient is unable to do ADLs. Radiates down posterior R thigh. . Denies fevers/chills, weight loss, or night pain. Denies change in bowel/bladder function or saddle anesthesia. Denies recent falls/trauma or any other ortho injuries. Community ambulator w cane at baseline. now s/p Exploration of spine with removal of hardware for prior L4-S1 R sided fusion 10/1.

## 2024-10-02 NOTE — PROGRESS NOTE ADULT - SUBJECTIVE AND OBJECTIVE BOX
Patient seen and examined at bedside. Patient reports pain well controlled on medications. No acute events overnight. Pt denies fevers, chills, new onset numbness, weakness or tingling in the extremities.    Vital Signs (24 Hrs):  T(C): 36.7 (10-02-24 @ 04:50), Max: 37.1 (10-01-24 @ 08:02)  HR: 71 (10-02-24 @ 04:50) (65 - 90)  BP: 127/82 (10-02-24 @ 04:50) (98/64 - 134/83)  RR: 18 (10-02-24 @ 04:50) (14 - 18)  SpO2: 97% (10-02-24 @ 04:50) (96% - 100%)  Wt(kg): --    LABS:                          12.0   8.68  )-----------( 267      ( 01 Oct 2024 01:19 )             39.6     10-01    139  |  103  |  16  ----------------------------<  229[H]  3.8   |  24  |  0.76    Ca    9.1      01 Oct 2024 01:19        PT/INR - ( 01 Oct 2024 01:19 )   PT: 10.0 sec;   INR: 0.88 ratio         PTT - ( 01 Oct 2024 01:19 )  PTT:35.0 sec    Physical Exam:  General: NAD  Spine:  Dressing CDI  Motor:                   C5                C6              C7               C8           T1   R            5/5                5/5            5/5             5/5          5/5  L             5/5               5/5             5/5             5/5          5/5                L2             L3             L4               L5            S1  R         5/5           5/5          5/5             5/5           5/5  L          5/5          5/5           5/5             5/5           5/5    Sensory:            C5         C6         C7      C8       T1        (0=absent, 1=impaired, 2=normal, NT=not testable)  R         2            2           2        2         2  L          2            2           2        2         2               L2          L3         L4      L5       S1         (0=absent, 1=impaired, 2=normal, NT=not testable)  R         1            1           1       1         1  L          2            2           2        2         2    Negative Argueta, Babinski, or Clonus      A/P :  49y Female s/p Removal of L4-S1 PSF painful hardware     -    Pain control  -    Taper  -    DVT ppx: Lovenox, ASA 81mg, SCDs.  Discharge on Eliquis 2.5mg PO BID x 30 days       -    Physical Therapy  -    Weight bearing status: WBAT  -     Dispo per PT

## 2024-10-03 LAB
ANION GAP SERPL CALC-SCNC: 14 MMOL/L — SIGNIFICANT CHANGE UP (ref 5–17)
BASOPHILS # BLD AUTO: 0.04 K/UL — SIGNIFICANT CHANGE UP (ref 0–0.2)
BASOPHILS NFR BLD AUTO: 0.2 % — SIGNIFICANT CHANGE UP (ref 0–2)
BUN SERPL-MCNC: 22 MG/DL — SIGNIFICANT CHANGE UP (ref 7–23)
CALCIUM SERPL-MCNC: 10 MG/DL — SIGNIFICANT CHANGE UP (ref 8.4–10.5)
CHLORIDE SERPL-SCNC: 101 MMOL/L — SIGNIFICANT CHANGE UP (ref 96–108)
CO2 SERPL-SCNC: 25 MMOL/L — SIGNIFICANT CHANGE UP (ref 22–31)
CREAT SERPL-MCNC: 0.64 MG/DL — SIGNIFICANT CHANGE UP (ref 0.5–1.3)
EGFR: 108 ML/MIN/1.73M2 — SIGNIFICANT CHANGE UP
EOSINOPHIL # BLD AUTO: 0.03 K/UL — SIGNIFICANT CHANGE UP (ref 0–0.5)
EOSINOPHIL NFR BLD AUTO: 0.2 % — SIGNIFICANT CHANGE UP (ref 0–6)
GLUCOSE BLDC GLUCOMTR-MCNC: 190 MG/DL — HIGH (ref 70–99)
GLUCOSE BLDC GLUCOMTR-MCNC: 256 MG/DL — HIGH (ref 70–99)
GLUCOSE BLDC GLUCOMTR-MCNC: 273 MG/DL — HIGH (ref 70–99)
GLUCOSE BLDC GLUCOMTR-MCNC: 284 MG/DL — HIGH (ref 70–99)
GLUCOSE SERPL-MCNC: 196 MG/DL — HIGH (ref 70–99)
HCT VFR BLD CALC: 40.6 % — SIGNIFICANT CHANGE UP (ref 34.5–45)
HGB BLD-MCNC: 12.4 G/DL — SIGNIFICANT CHANGE UP (ref 11.5–15.5)
IMM GRANULOCYTES NFR BLD AUTO: 1.1 % — HIGH (ref 0–0.9)
LYMPHOCYTES # BLD AUTO: 19.2 % — SIGNIFICANT CHANGE UP (ref 13–44)
LYMPHOCYTES # BLD AUTO: 3.76 K/UL — HIGH (ref 1–3.3)
MCHC RBC-ENTMCNC: 26.2 PG — LOW (ref 27–34)
MCHC RBC-ENTMCNC: 30.5 GM/DL — LOW (ref 32–36)
MCV RBC AUTO: 85.7 FL — SIGNIFICANT CHANGE UP (ref 80–100)
MONOCYTES # BLD AUTO: 1.4 K/UL — HIGH (ref 0–0.9)
MONOCYTES NFR BLD AUTO: 7.2 % — SIGNIFICANT CHANGE UP (ref 2–14)
NEUTROPHILS # BLD AUTO: 14.11 K/UL — HIGH (ref 1.8–7.4)
NEUTROPHILS NFR BLD AUTO: 72.1 % — SIGNIFICANT CHANGE UP (ref 43–77)
NRBC # BLD: 0 /100 WBCS — SIGNIFICANT CHANGE UP (ref 0–0)
PLATELET # BLD AUTO: 324 K/UL — SIGNIFICANT CHANGE UP (ref 150–400)
POTASSIUM SERPL-MCNC: 3.8 MMOL/L — SIGNIFICANT CHANGE UP (ref 3.5–5.3)
POTASSIUM SERPL-SCNC: 3.8 MMOL/L — SIGNIFICANT CHANGE UP (ref 3.5–5.3)
RBC # BLD: 4.74 M/UL — SIGNIFICANT CHANGE UP (ref 3.8–5.2)
RBC # FLD: 13.8 % — SIGNIFICANT CHANGE UP (ref 10.3–14.5)
SODIUM SERPL-SCNC: 140 MMOL/L — SIGNIFICANT CHANGE UP (ref 135–145)
WBC # BLD: 19.55 K/UL — HIGH (ref 3.8–10.5)
WBC # FLD AUTO: 19.55 K/UL — HIGH (ref 3.8–10.5)

## 2024-10-03 RX ORDER — INSULIN GLARGINE 300 U/ML
12 INJECTION, SOLUTION SUBCUTANEOUS ONCE
Refills: 0 | Status: COMPLETED | OUTPATIENT
Start: 2024-10-03 | End: 2024-10-03

## 2024-10-03 RX ORDER — DULOXETINE HCL 20 MG
1 CAPSULE,DELAYED RELEASE (ENTERIC COATED) ORAL
Qty: 0 | Refills: 0 | DISCHARGE
Start: 2024-10-03

## 2024-10-03 RX ORDER — MAGNESIUM HYDROXIDE 400 MG/5ML
30 SUSPENSION, ORAL (FINAL DOSE FORM) ORAL
Qty: 0 | Refills: 0 | DISCHARGE
Start: 2024-10-03

## 2024-10-03 RX ORDER — ASPIRIN 325 MG
1 TABLET ORAL
Qty: 0 | Refills: 0 | DISCHARGE
Start: 2024-10-03

## 2024-10-03 RX ADMIN — Medication 975 MILLIGRAM(S): at 22:50

## 2024-10-03 RX ADMIN — Medication 3: at 18:05

## 2024-10-03 RX ADMIN — Medication 3: at 11:56

## 2024-10-03 RX ADMIN — Medication 975 MILLIGRAM(S): at 15:01

## 2024-10-03 RX ADMIN — Medication 81 MILLIGRAM(S): at 12:06

## 2024-10-03 RX ADMIN — PREDNISONE 40 MILLIGRAM(S): 5 TABLET ORAL at 05:43

## 2024-10-03 RX ADMIN — Medication 12.5 MILLIGRAM(S): at 22:06

## 2024-10-03 RX ADMIN — GABAPENTIN 100 MILLIGRAM(S): 800 TABLET, FILM COATED ORAL at 22:06

## 2024-10-03 RX ADMIN — Medication 975 MILLIGRAM(S): at 16:01

## 2024-10-03 RX ADMIN — Medication 30 MILLIGRAM(S): at 12:05

## 2024-10-03 RX ADMIN — PANTOPRAZOLE SODIUM 40 MILLIGRAM(S): 40 TABLET, DELAYED RELEASE ORAL at 05:44

## 2024-10-03 RX ADMIN — Medication 4 MILLIGRAM(S): at 18:05

## 2024-10-03 RX ADMIN — LINAGLIPTIN 5 MILLIGRAM(S): 5 TABLET, FILM COATED ORAL at 12:06

## 2024-10-03 RX ADMIN — Medication 975 MILLIGRAM(S): at 22:06

## 2024-10-03 RX ADMIN — GABAPENTIN 100 MILLIGRAM(S): 800 TABLET, FILM COATED ORAL at 15:01

## 2024-10-03 RX ADMIN — Medication 3: at 08:58

## 2024-10-03 RX ADMIN — INSULIN GLARGINE 12 UNIT(S): 300 INJECTION, SOLUTION SUBCUTANEOUS at 11:55

## 2024-10-03 RX ADMIN — Medication 2 TABLET(S): at 22:05

## 2024-10-03 RX ADMIN — ROSUVASTATIN CALCIUM 20 MILLIGRAM(S): 20 TABLET, COATED ORAL at 22:05

## 2024-10-03 RX ADMIN — Medication 975 MILLIGRAM(S): at 05:43

## 2024-10-03 RX ADMIN — Medication 975 MILLIGRAM(S): at 06:43

## 2024-10-03 RX ADMIN — GABAPENTIN 100 MILLIGRAM(S): 800 TABLET, FILM COATED ORAL at 05:43

## 2024-10-03 NOTE — PROGRESS NOTE ADULT - SUBJECTIVE AND OBJECTIVE BOX
Patient comfortable  No complaints    T(C): 36.6 (10-03-24 @ 04:33), Max: 36.9 (10-02-24 @ 08:00)  HR: 72 (10-03-24 @ 04:33) (72 - 93)  BP: 100/63 (10-03-24 @ 04:33) (100/63 - 132/89)  RR: 18 (10-03-24 @ 04:33) (18 - 18)  SpO2: 98% (10-03-24 @ 04:33) (96% - 98%)      PHYSICAL EXAM:  NAD, Alert and oriented X3  Back: Dressing replaced, steristrips C/D/I--->left in place, gauze/tegaderm dsg placed  Motor:                   C5                C6              C7               C8           T1   R            5/5                5/5            5/5             5/5          5/5  L             5/5               5/5             5/5             5/5          5/5                L2             L3             L4               L5            S1  R         5/5           5/5          5/5             5/5           5/5  L          5/5          5/5           5/5             5/5           5/5    Sensory:            C5         C6         C7      C8       T1        (0=absent, 1=impaired, 2=normal, NT=not testable)  R         2            2           2        2         2  L          2            2           2        2         2               L2          L3         L4      L5       S1         (0=absent, 1=impaired, 2=normal, NT=not testable)  R         1            1           1       1         1  L          2            2           2        2         2                                              LABS:                        12.5   12.17 )-----------( 258      ( 02 Oct 2024 06:47 )             41.8     10-02    136  |  100  |  14  ----------------------------<  281[H]  4.3   |  19[L]  |  0.63    Ca    9.6      02 Oct 2024 06:49          RADIOLOGY & ADDITIONAL TESTS:

## 2024-10-03 NOTE — PROGRESS NOTE ADULT - SUBJECTIVE AND OBJECTIVE BOX
Patient is a 49y old  Female who presents with a chief complaint of Painful hardware (03 Oct 2024 06:57)      SUBJECTIVE / OVERNIGHT EVENTS:    Events noted.  CONSTITUTIONAL: No fever,  or fatigue  RESPIRATORY: No cough, wheezing,  No shortness of breath  CARDIOVASCULAR: No chest pain, palpitations, dizziness, or leg swelling  GASTROINTESTINAL: No abdominal or epigastric pain.       MEDICATIONS  (STANDING):  acetaminophen     Tablet .. 975 milliGRAM(s) Oral every 8 hours  aspirin enteric coated 81 milliGRAM(s) Oral daily  dextrose 5%. 1000 milliLiter(s) (50 mL/Hr) IV Continuous <Continuous>  dextrose 5%. 1000 milliLiter(s) (100 mL/Hr) IV Continuous <Continuous>  dextrose 50% Injectable 12.5 Gram(s) IV Push once  dextrose 50% Injectable 25 Gram(s) IV Push once  dextrose 50% Injectable 25 Gram(s) IV Push once  DULoxetine 30 milliGRAM(s) Oral daily  gabapentin 100 milliGRAM(s) Oral three times a day  HYDROmorphone  Injectable 0.5 milliGRAM(s) IV Push once  influenza   Vaccine 0.5 milliLiter(s) IntraMuscular once  insulin lispro (ADMELOG) corrective regimen sliding scale   SubCutaneous at bedtime  insulin lispro (ADMELOG) corrective regimen sliding scale   SubCutaneous three times a day before meals  linagliptin 5 milliGRAM(s) Oral daily  metoprolol tartrate 12.5 milliGRAM(s) Oral at bedtime  metoprolol tartrate 12.5 milliGRAM(s) Oral daily  pantoprazole    Tablet 40 milliGRAM(s) Oral before breakfast  predniSONE   Tablet 40 milliGRAM(s) Oral daily  rosuvastatin 20 milliGRAM(s) Oral at bedtime  senna 2 Tablet(s) Oral at bedtime    MEDICATIONS  (PRN):  dextrose Oral Gel 15 Gram(s) Oral once PRN Blood Glucose LESS THAN 70 milliGRAM(s)/deciliter  diphenhydrAMINE 25 milliGRAM(s) Oral every 6 hours PRN Rash and/or Itching  magnesium hydroxide Suspension 30 milliLiter(s) Oral daily PRN Constipation  melatonin 5 milliGRAM(s) Oral at bedtime PRN Sleep  ondansetron Injectable 4 milliGRAM(s) IV Push every 6 hours PRN Nausea and/or Vomiting  oxyCODONE    IR 5 milliGRAM(s) Oral every 4 hours PRN Moderate Pain (4 - 6)  oxyCODONE    IR 10 milliGRAM(s) Oral every 4 hours PRN Severe Pain (7 - 10)  polyethylene glycol 3350 17 Gram(s) Oral daily PRN Constipation  traMADol 50 milliGRAM(s) Oral every 6 hours PRN Mild Pain (1 - 3)        CAPILLARY BLOOD GLUCOSE      POCT Blood Glucose.: 190 mg/dL (03 Oct 2024 21:05)  POCT Blood Glucose.: 273 mg/dL (03 Oct 2024 17:25)  POCT Blood Glucose.: 284 mg/dL (03 Oct 2024 11:31)  POCT Blood Glucose.: 256 mg/dL (03 Oct 2024 08:14)    I&O's Summary    02 Oct 2024 07:01  -  03 Oct 2024 07:00  --------------------------------------------------------  IN: 1220 mL / OUT: 2250 mL / NET: -1030 mL    03 Oct 2024 07:01  -  03 Oct 2024 22:11  --------------------------------------------------------  IN: 720 mL / OUT: 1 mL / NET: 719 mL        T(C): 36.9 (10-03-24 @ 20:50), Max: 37.3 (10-03-24 @ 08:01)  HR: 72 (10-03-24 @ 20:50) (65 - 95)  BP: 116/73 (10-03-24 @ 20:50) (100/63 - 122/76)  RR: 18 (10-03-24 @ 20:50) (18 - 18)  SpO2: 99% (10-03-24 @ 20:50) (98% - 99%)    PHYSICAL EXAM:    NECK: Supple, No JVD  CHEST/LUNG: Clear to auscultation bilaterally; No wheezing.  HEART: Regular rate and rhythm; No murmurs, rubs, or gallops  ABDOMEN: Soft, Nontender, Nondistended; Bowel sounds present  EXTREMITIES:   No edema  NEUROLOGY: AAO X 3      LABS:                        12.4   19.55 )-----------( 324      ( 03 Oct 2024 07:14 )             40.6     10-03    140  |  101  |  22  ----------------------------<  196[H]  3.8   |  25  |  0.64    Ca    10.0      03 Oct 2024 07:19            Urinalysis Basic - ( 03 Oct 2024 07:19 )    Color: x / Appearance: x / SG: x / pH: x  Gluc: 196 mg/dL / Ketone: x  / Bili: x / Urobili: x   Blood: x / Protein: x / Nitrite: x   Leuk Esterase: x / RBC: x / WBC x   Sq Epi: x / Non Sq Epi: x / Bacteria: x      CAPILLARY BLOOD GLUCOSE      POCT Blood Glucose.: 190 mg/dL (03 Oct 2024 21:05)  POCT Blood Glucose.: 273 mg/dL (03 Oct 2024 17:25)  POCT Blood Glucose.: 284 mg/dL (03 Oct 2024 11:31)  POCT Blood Glucose.: 256 mg/dL (03 Oct 2024 08:14)        RADIOLOGY & ADDITIONAL TESTS:    Imaging Personally Reviewed:    Consultant(s) Notes Reviewed:      Care Discussed with Consultants/Other Providers:    Kenan Vincent MD, CMD, FACP    [phone] [place] [address]  Office Tel: [phone]  Cell: [phone]

## 2024-10-04 VITALS
OXYGEN SATURATION: 95 % | RESPIRATION RATE: 18 BRPM | TEMPERATURE: 99 F | SYSTOLIC BLOOD PRESSURE: 107 MMHG | DIASTOLIC BLOOD PRESSURE: 68 MMHG | HEART RATE: 98 BPM

## 2024-10-04 LAB
ANION GAP SERPL CALC-SCNC: 14 MMOL/L — SIGNIFICANT CHANGE UP (ref 5–17)
BASOPHILS # BLD AUTO: 0.14 K/UL — SIGNIFICANT CHANGE UP (ref 0–0.2)
BASOPHILS NFR BLD AUTO: 0.9 % — SIGNIFICANT CHANGE UP (ref 0–2)
BUN SERPL-MCNC: 21 MG/DL — SIGNIFICANT CHANGE UP (ref 7–23)
CALCIUM SERPL-MCNC: 9.5 MG/DL — SIGNIFICANT CHANGE UP (ref 8.4–10.5)
CHLORIDE SERPL-SCNC: 105 MMOL/L — SIGNIFICANT CHANGE UP (ref 96–108)
CO2 SERPL-SCNC: 22 MMOL/L — SIGNIFICANT CHANGE UP (ref 22–31)
CREAT SERPL-MCNC: 0.61 MG/DL — SIGNIFICANT CHANGE UP (ref 0.5–1.3)
EGFR: 109 ML/MIN/1.73M2 — SIGNIFICANT CHANGE UP
EOSINOPHIL # BLD AUTO: 0.27 K/UL — SIGNIFICANT CHANGE UP (ref 0–0.5)
EOSINOPHIL NFR BLD AUTO: 1.7 % — SIGNIFICANT CHANGE UP (ref 0–6)
GLUCOSE BLDC GLUCOMTR-MCNC: 209 MG/DL — HIGH (ref 70–99)
GLUCOSE BLDC GLUCOMTR-MCNC: 213 MG/DL — HIGH (ref 70–99)
GLUCOSE SERPL-MCNC: 157 MG/DL — HIGH (ref 70–99)
HCT VFR BLD CALC: 39.1 % — SIGNIFICANT CHANGE UP (ref 34.5–45)
HGB BLD-MCNC: 11.7 G/DL — SIGNIFICANT CHANGE UP (ref 11.5–15.5)
LYMPHOCYTES # BLD AUTO: 36.5 % — SIGNIFICANT CHANGE UP (ref 13–44)
LYMPHOCYTES # BLD AUTO: 5.77 K/UL — HIGH (ref 1–3.3)
MCHC RBC-ENTMCNC: 25.9 PG — LOW (ref 27–34)
MCHC RBC-ENTMCNC: 29.9 GM/DL — LOW (ref 32–36)
MCV RBC AUTO: 86.7 FL — SIGNIFICANT CHANGE UP (ref 80–100)
MONOCYTES # BLD AUTO: 0.68 K/UL — SIGNIFICANT CHANGE UP (ref 0–0.9)
MONOCYTES NFR BLD AUTO: 4.3 % — SIGNIFICANT CHANGE UP (ref 2–14)
NEUTROPHILS # BLD AUTO: 8.94 K/UL — HIGH (ref 1.8–7.4)
NEUTROPHILS NFR BLD AUTO: 55.7 % — SIGNIFICANT CHANGE UP (ref 43–77)
PLATELET # BLD AUTO: 283 K/UL — SIGNIFICANT CHANGE UP (ref 150–400)
POTASSIUM SERPL-MCNC: 4.2 MMOL/L — SIGNIFICANT CHANGE UP (ref 3.5–5.3)
POTASSIUM SERPL-SCNC: 4.2 MMOL/L — SIGNIFICANT CHANGE UP (ref 3.5–5.3)
RBC # BLD: 4.51 M/UL — SIGNIFICANT CHANGE UP (ref 3.8–5.2)
RBC # FLD: 14.1 % — SIGNIFICANT CHANGE UP (ref 10.3–14.5)
SODIUM SERPL-SCNC: 141 MMOL/L — SIGNIFICANT CHANGE UP (ref 135–145)
WBC # BLD: 15.8 K/UL — HIGH (ref 3.8–10.5)
WBC # FLD AUTO: 15.8 K/UL — HIGH (ref 3.8–10.5)

## 2024-10-04 PROCEDURE — 86900 BLOOD TYPING SEROLOGIC ABO: CPT

## 2024-10-04 PROCEDURE — 36415 COLL VENOUS BLD VENIPUNCTURE: CPT

## 2024-10-04 PROCEDURE — 97161 PT EVAL LOW COMPLEX 20 MIN: CPT

## 2024-10-04 PROCEDURE — C9399: CPT

## 2024-10-04 PROCEDURE — 87637 SARSCOV2&INF A&B&RSV AMP PRB: CPT

## 2024-10-04 PROCEDURE — 97166 OT EVAL MOD COMPLEX 45 MIN: CPT

## 2024-10-04 PROCEDURE — 96374 THER/PROPH/DIAG INJ IV PUSH: CPT

## 2024-10-04 PROCEDURE — 97116 GAIT TRAINING THERAPY: CPT

## 2024-10-04 PROCEDURE — 0241U: CPT

## 2024-10-04 PROCEDURE — 82010 KETONE BODYS QUAN: CPT

## 2024-10-04 PROCEDURE — 86870 RBC ANTIBODY IDENTIFICATION: CPT

## 2024-10-04 PROCEDURE — 72131 CT LUMBAR SPINE W/O DYE: CPT | Mod: MC

## 2024-10-04 PROCEDURE — 81001 URINALYSIS AUTO W/SCOPE: CPT

## 2024-10-04 PROCEDURE — 99285 EMERGENCY DEPT VISIT HI MDM: CPT

## 2024-10-04 PROCEDURE — 85730 THROMBOPLASTIN TIME PARTIAL: CPT

## 2024-10-04 PROCEDURE — 85027 COMPLETE CBC AUTOMATED: CPT

## 2024-10-04 PROCEDURE — 81025 URINE PREGNANCY TEST: CPT

## 2024-10-04 PROCEDURE — 86850 RBC ANTIBODY SCREEN: CPT

## 2024-10-04 PROCEDURE — 83036 HEMOGLOBIN GLYCOSYLATED A1C: CPT

## 2024-10-04 PROCEDURE — 82962 GLUCOSE BLOOD TEST: CPT

## 2024-10-04 PROCEDURE — 86880 COOMBS TEST DIRECT: CPT

## 2024-10-04 PROCEDURE — 76000 FLUOROSCOPY <1 HR PHYS/QHP: CPT

## 2024-10-04 PROCEDURE — 84702 CHORIONIC GONADOTROPIN TEST: CPT

## 2024-10-04 PROCEDURE — 86922 COMPATIBILITY TEST ANTIGLOB: CPT

## 2024-10-04 PROCEDURE — 86860 RBC ANTIBODY ELUTION: CPT

## 2024-10-04 PROCEDURE — 85610 PROTHROMBIN TIME: CPT

## 2024-10-04 PROCEDURE — 86901 BLOOD TYPING SEROLOGIC RH(D): CPT

## 2024-10-04 PROCEDURE — 71045 X-RAY EXAM CHEST 1 VIEW: CPT

## 2024-10-04 PROCEDURE — C1713: CPT

## 2024-10-04 PROCEDURE — 85025 COMPLETE CBC W/AUTO DIFF WBC: CPT

## 2024-10-04 PROCEDURE — 80048 BASIC METABOLIC PNL TOTAL CA: CPT

## 2024-10-04 PROCEDURE — 97110 THERAPEUTIC EXERCISES: CPT

## 2024-10-04 PROCEDURE — C1889: CPT

## 2024-10-04 PROCEDURE — 80053 COMPREHEN METABOLIC PANEL: CPT

## 2024-10-04 RX ORDER — ASPIRIN 325 MG
1 TABLET ORAL
Qty: 30 | Refills: 0
Start: 2024-10-04 | End: 2024-11-02

## 2024-10-04 RX ORDER — OXYCODONE HYDROCHLORIDE 30 MG/1
1 TABLET, FILM COATED, EXTENDED RELEASE ORAL
Qty: 42 | Refills: 0
Start: 2024-10-04 | End: 2024-10-10

## 2024-10-04 RX ORDER — TRAMADOL HYDROCHLORIDE 50 MG/1
1 TABLET, COATED ORAL
Qty: 28 | Refills: 0
Start: 2024-10-04 | End: 2024-10-10

## 2024-10-04 RX ORDER — PANTOPRAZOLE SODIUM 40 MG/1
1 TABLET, DELAYED RELEASE ORAL
Qty: 30 | Refills: 0
Start: 2024-10-04 | End: 2024-11-02

## 2024-10-04 RX ORDER — METOPROLOL TARTRATE 50 MG
0.5 TABLET ORAL
Refills: 0 | DISCHARGE

## 2024-10-04 RX ORDER — DAPAGLIFLOZIN 10 MG/1
1 TABLET, FILM COATED ORAL
Qty: 1 | Refills: 0
Start: 2024-10-04

## 2024-10-04 RX ORDER — INSULIN GLARGINE 300 U/ML
12 INJECTION, SOLUTION SUBCUTANEOUS ONCE
Refills: 0 | Status: COMPLETED | OUTPATIENT
Start: 2024-10-04 | End: 2024-10-04

## 2024-10-04 RX ORDER — DAPAGLIFLOZIN 10 MG/1
1 TABLET, FILM COATED ORAL
Refills: 0 | DISCHARGE

## 2024-10-04 RX ORDER — INSULIN GLARGINE 300 U/ML
10 INJECTION, SOLUTION SUBCUTANEOUS
Qty: 1 | Refills: 0
Start: 2024-10-04 | End: 2024-10-12

## 2024-10-04 RX ORDER — GABAPENTIN 800 MG/1
1 TABLET, FILM COATED ORAL
Qty: 42 | Refills: 0
Start: 2024-10-04 | End: 2024-10-17

## 2024-10-04 RX ORDER — NALOXONE HYDROCHLORIDE 0.4 MG/ML
4 INJECTION, SOLUTION INTRAMUSCULAR; INTRAVENOUS; SUBCUTANEOUS
Qty: 1 | Refills: 0
Start: 2024-10-04

## 2024-10-04 RX ORDER — SENNOSIDES 8.6 MG
2 TABLET ORAL
Qty: 60 | Refills: 0
Start: 2024-10-04 | End: 2024-11-02

## 2024-10-04 RX ORDER — PREDNISONE 5 MG/1
10 TABLET ORAL
Qty: 18 | Refills: 0
Start: 2024-10-04 | End: 2024-10-12

## 2024-10-04 RX ORDER — SITAGLIPTIN PHOSPHATE 100 MG
1 TABLET ORAL
Qty: 0 | Refills: 0 | DISCHARGE

## 2024-10-04 RX ORDER — ACETAMINOPHEN 325 MG
3 TABLET ORAL
Qty: 126 | Refills: 0
Start: 2024-10-04 | End: 2024-10-17

## 2024-10-04 RX ORDER — DULOXETINE HCL 20 MG
1 CAPSULE,DELAYED RELEASE (ENTERIC COATED) ORAL
Refills: 0 | DISCHARGE

## 2024-10-04 RX ADMIN — Medication 12.5 MILLIGRAM(S): at 05:06

## 2024-10-04 RX ADMIN — LINAGLIPTIN 5 MILLIGRAM(S): 5 TABLET, FILM COATED ORAL at 12:14

## 2024-10-04 RX ADMIN — Medication 975 MILLIGRAM(S): at 06:00

## 2024-10-04 RX ADMIN — INSULIN GLARGINE 12 UNIT(S): 300 INJECTION, SOLUTION SUBCUTANEOUS at 08:57

## 2024-10-04 RX ADMIN — PREDNISONE 40 MILLIGRAM(S): 5 TABLET ORAL at 05:06

## 2024-10-04 RX ADMIN — GABAPENTIN 100 MILLIGRAM(S): 800 TABLET, FILM COATED ORAL at 13:04

## 2024-10-04 RX ADMIN — Medication 2: at 08:58

## 2024-10-04 RX ADMIN — PANTOPRAZOLE SODIUM 40 MILLIGRAM(S): 40 TABLET, DELAYED RELEASE ORAL at 05:06

## 2024-10-04 RX ADMIN — Medication 2: at 12:16

## 2024-10-04 RX ADMIN — GABAPENTIN 100 MILLIGRAM(S): 800 TABLET, FILM COATED ORAL at 05:06

## 2024-10-04 RX ADMIN — Medication 30 MILLIGRAM(S): at 12:14

## 2024-10-04 RX ADMIN — Medication 975 MILLIGRAM(S): at 14:05

## 2024-10-04 RX ADMIN — Medication 975 MILLIGRAM(S): at 05:06

## 2024-10-04 RX ADMIN — Medication 975 MILLIGRAM(S): at 13:05

## 2024-10-04 RX ADMIN — OXYCODONE HYDROCHLORIDE 10 MILLIGRAM(S): 30 TABLET, FILM COATED, EXTENDED RELEASE ORAL at 06:32

## 2024-10-04 RX ADMIN — Medication 81 MILLIGRAM(S): at 12:14

## 2024-10-04 NOTE — PROGRESS NOTE ADULT - SUBJECTIVE AND OBJECTIVE BOX
Orthopedic Progress Note    Patient seen and examined at bedside.  Patient denies CP, SOB, dizziness, HA, N/V/D.  Patient reports pain controlled.    T(C): 37 (10-04-24 @ 04:22), Max: 37.3 (10-03-24 @ 08:01)  HR: 73 (10-04-24 @ 04:22) (65 - 95)  BP: 108/68 (10-04-24 @ 04:22) (108/67 - 122/76)  RR: 18 (10-04-24 @ 04:22) (18 - 18)  SpO2: 99% (10-04-24 @ 04:22) (98% - 99%)  Wt(kg): --    Exam:  Gen: No acute distress  Dressing: Clean, dry, and intact to back  Motor:             C5(Del/Bi)        C6(EF/WE)        C7 (EE/WF)        C8 (FDS)        T1 (FAbd)  R             5/5                 5/5                  5/5                   5/5               5/5  L              5/5                 5/5                  5/5                   5/5               5/5    Motor:              L2 (IP)        L3 (Quad)        L4 (TA)        L5 (EHL)        S1(Gas)        S2(FHL)  R            5/5              5/5                5/5              5/5               5/5              5/5  L             5/5              5/5                5/5              5/5               5/5              5/5    Sensory:                 C5       C6       C7       C8       T1        (0=absent, 1=impaired, 2=normal, NT=not testable)  R              2         2         2          2         2  L               2         2         2          2         2        Sensory:              L2       L3       L4       L5       S1       (0=absent, 1=impaired, 2=normal, NT=not testable)  R           1        1        1         1        1  L            2        2        2         2        2            Calves soft and nontender Orthopedic Progress Note    Patient seen and examined at bedside.  Patient denies CP, SOB, dizziness, HA, N/V/D.  Patient reports pain controlled.    T(C): 37 (10-04-24 @ 04:22), Max: 37.3 (10-03-24 @ 08:01)  HR: 73 (10-04-24 @ 04:22) (65 - 95)  BP: 108/68 (10-04-24 @ 04:22) (108/67 - 122/76)  RR: 18 (10-04-24 @ 04:22) (18 - 18)  SpO2: 99% (10-04-24 @ 04:22) (98% - 99%)  Wt(kg): --    Exam:  Gen: No acute distress  Dressing: Clean, dry, and intact to back  Motor:             C5(Del/Bi)        C6(EF/WE)        C7 (EE/WF)        C8 (FDS)        T1 (FAbd)  R             5/5                 5/5                  5/5                     5/5               5/5  L              5/5                 5/5                  5/5                     5/5               5/5    Motor:              L2 (IP)        L3 (Quad)        L4 (TA)        L5 (EHL)        S1(Gas)        S2(FHL)  R            5/5              5/5                5/5              5/5               5/5              5/5  L             5/5              5/5                5/5              5/5               5/5              5/5    Sensory:                 C5       C6       C7       C8       T1        (0=absent, 1=impaired, 2=normal, NT=not testable)  R              2         2         2          2         2  L               2         2         2          2         2        Sensory:              L2       L3       L4       L5       S1       (0=absent, 1=impaired, 2=normal, NT=not testable)  R           1        1        1         1        1  L            2        2        2         2        2            Calves soft and nontender Orthopedic Progress Note    Patient seen and examined at bedside.  Patient denies CP, SOB, dizziness, HA, N/V/D.  Patient reports pain controlled.    T(C): 37 (10-04-24 @ 04:22), Max: 37.3 (10-03-24 @ 08:01)  HR: 73 (10-04-24 @ 04:22) (65 - 95)  BP: 108/68 (10-04-24 @ 04:22) (108/67 - 122/76)  RR: 18 (10-04-24 @ 04:22) (18 - 18)  SpO2: 99% (10-04-24 @ 04:22) (98% - 99%)  Wt(kg): --    Exam:  Gen: No acute distress  Dressing: Clean, dry, and intact to back  Motor:             C5(Del/Bi)        C6(EF/WE)        C7 (EE/WF)        C8 (FDS)        T1 (FAbd)  R             5/5                 5/5                   5/5                    5/5               5/5  L              5/5                 5/5                   5/5                    5/5               5/5    Motor:              L2 (IP)        L3 (Quad)        L4 (TA)        L5 (EHL)        S1(Gas)        S2(FHL)  R            5/5              5/5                5/5              5/5               5/5              5/5  L             5/5              5/5                5/5              5/5               5/5              5/5    Sensory:                 C5       C6       C7       C8       T1        (0=absent, 1=impaired, 2=normal, NT=not testable)  R              2         2         2          2         2  L               2         2         2          2         2        Sensory:              L2       L3       L4       L5       S1       (0=absent, 1=impaired, 2=normal, NT=not testable)  R           1        1        1         1        1  L            2        2        2         2        2            Calves soft and nontender Orthopedic Progress Note    Patient seen and examined at bedside.  Patient denies CP, SOB, dizziness, HA, N/V/D.  Patient reports pain controlled.    T(C): 37 (10-04-24 @ 04:22), Max: 37.3 (10-03-24 @ 08:01)  HR: 73 (10-04-24 @ 04:22) (65 - 95)  BP: 108/68 (10-04-24 @ 04:22) (108/67 - 122/76)  RR: 18 (10-04-24 @ 04:22) (18 - 18)  SpO2: 99% (10-04-24 @ 04:22) (98% - 99%)  Wt(kg): --    Exam:  Gen: No acute distress  Dressing: Clean, dry, and intact to back  Motor:             C5(Del/Bi)        C6(EF/WE)        C7 (EE/WF)        C8 (FDS)        T1 (FAbd)  R             5/5                 5/5                    5/5                   5/5               5/5  L              5/5                 5/5                    5/5                   5/5               5/5    Motor:              L2 (IP)        L3 (Quad)        L4 (TA)        L5 (EHL)        S1(Gas)        S2(FHL)  R            5/5              5/5                5/5              5/5               5/5              5/5  L             5/5              5/5                5/5              5/5               5/5              5/5    Sensory:                 C5       C6       C7       C8       T1        (0=absent, 1=impaired, 2=normal, NT=not testable)  R              2         2         2          2         2  L               2         2         2          2         2        Sensory:              L2       L3       L4       L5       S1       (0=absent, 1=impaired, 2=normal, NT=not testable)  R           1        1        1         1        1  L            2        2        2         2        2            Calves soft and nontender Orthopedic Progress Note    Patient seen and examined at bedside.  Patient denies CP, SOB, dizziness, HA, N/V/D.  Patient reports lower back pain this morning.    T(C): 37 (10-04-24 @ 04:22), Max: 37.3 (10-03-24 @ 08:01)  HR: 73 (10-04-24 @ 04:22) (65 - 95)  BP: 108/68 (10-04-24 @ 04:22) (108/67 - 122/76)  RR: 18 (10-04-24 @ 04:22) (18 - 18)  SpO2: 99% (10-04-24 @ 04:22) (98% - 99%)  Wt(kg): --    Exam:  Gen: No acute distress  Dressing: Clean, dry, and intact to back  Motor:             C5(Del/Bi)        C6(EF/WE)        C7 (EE/WF)        C8 (FDS)        T1 (FAbd)  R             5/5                 5/5                    5/5                   5/5               5/5  L              5/5                 5/5                    5/5                   5/5               5/5    Motor:              L2 (IP)        L3 (Quad)        L4 (TA)        L5 (EHL)        S1(Gas)        S2(FHL)  R            5/5              5/5                5/5              5/5               5/5              5/5  L             5/5              5/5                5/5              5/5               5/5              5/5    Sensory:                 C5       C6       C7       C8       T1        (0=absent, 1=impaired, 2=normal, NT=not testable)  R              2         2         2          2         2  L               2         2         2          2         2        Sensory:              L2       L3       L4       L5       S1       (0=absent, 1=impaired, 2=normal, NT=not testable)  R           1        1        1         1        1  L            2        2        2         2        2            Calves soft and nontender

## 2024-10-04 NOTE — PROGRESS NOTE ADULT - REASON FOR ADMISSION
Painful hardware

## 2024-10-04 NOTE — PROGRESS NOTE ADULT - ASSESSMENT
A/P: 48 y/o female s/p right L4-S1 DUNG with in situ fusion, POD # 3    - Pain control/analgesia  - Steroid taper  - DVT ppx: Aspirin, SCDs  - PT/OT  - WBAT/OOB  - Incentive spirometer  - GI ppx  - Bowel regimen  - Follow up AM labs  - Appreciate medicine comanagement  - Notify ortho for questions  - Dispo: PT recommended home with outpatient PT, PT cleared    Gabriela Patterson PA-C  Orthopedic Surgery Team  Team Pager #[phone]
ASSESSMENT & PLAN  49yFemale w/ painful hardware s/p 2 stage lumbar decompression L4-S1 in 5/2023.  S/p L4-S1 DUNG  -WBAT  -Pain control with Oxy  -incentive spirometry  -PT/OT    DM II- Uncontrolled - Hyperglycemia:    FSSS  Lantus 12 units one dose  Dc on Lantus + Sliding scale          
A/P: 48 y/o F POD#2 s/p L4-S1 DUNG    DVT ppx - ASA 81mg PO Daily  Ambulation as tolerated  PT  OT  Pain management prn  Gi ppx  Discharge planning to home pend PT clearance        HECTOR Munoz  Orthopedic Surgery  [phone]

## 2024-10-08 ENCOUNTER — NON-APPOINTMENT (OUTPATIENT)
Age: 49
End: 2024-10-08

## 2024-10-11 ENCOUNTER — APPOINTMENT (OUTPATIENT)
Dept: ORTHOPEDIC SURGERY | Facility: CLINIC | Age: 49
End: 2024-10-11
Payer: OTHER MISCELLANEOUS

## 2024-10-11 VITALS — WEIGHT: 149 LBS | BODY MASS INDEX: 23.39 KG/M2 | HEIGHT: 67 IN

## 2024-10-11 DIAGNOSIS — T84.84XA PAIN DUE TO INTERNAL ORTHOPEDIC PROSTHETIC DEVICES, IMPLANTS AND GRAFTS, INITIAL ENCOUNTER: ICD-10-CM

## 2024-10-11 DIAGNOSIS — M51.369: ICD-10-CM

## 2024-10-11 PROCEDURE — 72100 X-RAY EXAM L-S SPINE 2/3 VWS: CPT

## 2024-10-11 PROCEDURE — 99024 POSTOP FOLLOW-UP VISIT: CPT

## 2024-10-11 RX ORDER — OXYCODONE 10 MG/1
10 TABLET ORAL
Qty: 90 | Refills: 0 | Status: ACTIVE | COMMUNITY
Start: 2024-10-11 | End: 1900-01-01

## 2024-10-25 RX ORDER — METHYLPREDNISOLONE 4 MG/1
4 TABLET ORAL
Qty: 1 | Refills: 0 | Status: ACTIVE | COMMUNITY
Start: 2024-10-25 | End: 1900-01-01

## 2024-10-25 RX ORDER — CYCLOBENZAPRINE HYDROCHLORIDE 5 MG/1
5 TABLET, FILM COATED ORAL 3 TIMES DAILY
Qty: 90 | Refills: 0 | Status: ACTIVE | COMMUNITY
Start: 2024-10-25 | End: 1900-01-01

## 2024-10-29 RX ORDER — PREDNISONE 10 MG/1
10 TABLET ORAL
Qty: 30 | Refills: 0 | Status: ACTIVE | COMMUNITY
Start: 2024-10-29 | End: 1900-01-01

## 2024-11-05 ENCOUNTER — NON-APPOINTMENT (OUTPATIENT)
Age: 49
End: 2024-11-05

## 2024-11-26 ENCOUNTER — APPOINTMENT (OUTPATIENT)
Dept: ORTHOPEDIC SURGERY | Facility: CLINIC | Age: 49
End: 2024-11-26
Payer: OTHER MISCELLANEOUS

## 2024-11-26 PROCEDURE — 99024 POSTOP FOLLOW-UP VISIT: CPT

## 2025-01-28 ENCOUNTER — APPOINTMENT (OUTPATIENT)
Dept: ORTHOPEDIC SURGERY | Facility: CLINIC | Age: 50
End: 2025-01-28
Payer: OTHER MISCELLANEOUS

## 2025-01-28 VITALS — WEIGHT: 149 LBS | HEIGHT: 67 IN | BODY MASS INDEX: 23.39 KG/M2

## 2025-01-28 DIAGNOSIS — T84.84XA PAIN DUE TO INTERNAL ORTHOPEDIC PROSTHETIC DEVICES, IMPLANTS AND GRAFTS, INITIAL ENCOUNTER: ICD-10-CM

## 2025-01-28 PROCEDURE — 99214 OFFICE O/P EST MOD 30 MIN: CPT

## 2025-01-28 PROCEDURE — 72100 X-RAY EXAM L-S SPINE 2/3 VWS: CPT

## 2025-01-28 RX ORDER — PREDNISONE 10 MG/1
10 TABLET ORAL
Qty: 30 | Refills: 1 | Status: ACTIVE | COMMUNITY
Start: 2025-01-28 | End: 1900-01-01

## 2025-02-06 ENCOUNTER — APPOINTMENT (OUTPATIENT)
Dept: ORTHOPEDIC SURGERY | Facility: CLINIC | Age: 50
End: 2025-02-06
Payer: OTHER MISCELLANEOUS

## 2025-02-06 DIAGNOSIS — M54.16 RADICULOPATHY, LUMBAR REGION: ICD-10-CM

## 2025-02-06 PROCEDURE — 99214 OFFICE O/P EST MOD 30 MIN: CPT | Mod: 95

## 2025-02-15 ENCOUNTER — INPATIENT (INPATIENT)
Facility: HOSPITAL | Age: 50
LOS: 8 days | Discharge: HOME | DRG: 552 | End: 2025-02-24
Attending: STUDENT IN AN ORGANIZED HEALTH CARE EDUCATION/TRAINING PROGRAM | Admitting: STUDENT IN AN ORGANIZED HEALTH CARE EDUCATION/TRAINING PROGRAM
Payer: COMMERCIAL

## 2025-02-15 VITALS
RESPIRATION RATE: 18 BRPM | WEIGHT: 160.06 LBS | HEART RATE: 115 BPM | HEIGHT: 68 IN | OXYGEN SATURATION: 98 % | DIASTOLIC BLOOD PRESSURE: 81 MMHG | TEMPERATURE: 98 F | SYSTOLIC BLOOD PRESSURE: 115 MMHG

## 2025-02-15 DIAGNOSIS — E11.9 TYPE 2 DIABETES MELLITUS WITHOUT COMPLICATIONS: ICD-10-CM

## 2025-02-15 DIAGNOSIS — E78.5 HYPERLIPIDEMIA, UNSPECIFIED: ICD-10-CM

## 2025-02-15 DIAGNOSIS — Z29.9 ENCOUNTER FOR PROPHYLACTIC MEASURES, UNSPECIFIED: ICD-10-CM

## 2025-02-15 DIAGNOSIS — M54.50 LOW BACK PAIN, UNSPECIFIED: ICD-10-CM

## 2025-02-15 DIAGNOSIS — Z98.89 OTHER SPECIFIED POSTPROCEDURAL STATES: Chronic | ICD-10-CM

## 2025-02-15 DIAGNOSIS — M54.9 DORSALGIA, UNSPECIFIED: ICD-10-CM

## 2025-02-15 LAB
ALBUMIN SERPL ELPH-MCNC: 4.2 G/DL — SIGNIFICANT CHANGE UP (ref 3.3–5)
ALP SERPL-CCNC: 95 U/L — SIGNIFICANT CHANGE UP (ref 40–120)
ALT FLD-CCNC: 28 U/L — SIGNIFICANT CHANGE UP (ref 10–45)
ANION GAP SERPL CALC-SCNC: 15 MMOL/L — SIGNIFICANT CHANGE UP (ref 5–17)
AST SERPL-CCNC: 22 U/L — SIGNIFICANT CHANGE UP (ref 10–40)
BASOPHILS # BLD AUTO: 0.04 K/UL — SIGNIFICANT CHANGE UP (ref 0–0.2)
BASOPHILS NFR BLD AUTO: 0.4 % — SIGNIFICANT CHANGE UP (ref 0–2)
BILIRUB SERPL-MCNC: 0.2 MG/DL — SIGNIFICANT CHANGE UP (ref 0.2–1.2)
BUN SERPL-MCNC: 16 MG/DL — SIGNIFICANT CHANGE UP (ref 7–23)
CALCIUM SERPL-MCNC: 9.5 MG/DL — SIGNIFICANT CHANGE UP (ref 8.4–10.5)
CHLORIDE SERPL-SCNC: 105 MMOL/L — SIGNIFICANT CHANGE UP (ref 96–108)
CO2 SERPL-SCNC: 24 MMOL/L — SIGNIFICANT CHANGE UP (ref 22–31)
CREAT SERPL-MCNC: 0.97 MG/DL — SIGNIFICANT CHANGE UP (ref 0.5–1.3)
CRP SERPL-MCNC: 8 MG/L — HIGH (ref 0–4)
EGFR: 71 ML/MIN/1.73M2 — SIGNIFICANT CHANGE UP
EOSINOPHIL # BLD AUTO: 0.25 K/UL — SIGNIFICANT CHANGE UP (ref 0–0.5)
EOSINOPHIL NFR BLD AUTO: 2.5 % — SIGNIFICANT CHANGE UP (ref 0–6)
GLUCOSE BLDC GLUCOMTR-MCNC: 140 MG/DL — HIGH (ref 70–99)
GLUCOSE SERPL-MCNC: 157 MG/DL — HIGH (ref 70–99)
HCT VFR BLD CALC: 41.3 % — SIGNIFICANT CHANGE UP (ref 34.5–45)
HGB BLD-MCNC: 12.4 G/DL — SIGNIFICANT CHANGE UP (ref 11.5–15.5)
IMM GRANULOCYTES NFR BLD AUTO: 0.3 % — SIGNIFICANT CHANGE UP (ref 0–0.9)
LYMPHOCYTES # BLD AUTO: 3.88 K/UL — HIGH (ref 1–3.3)
LYMPHOCYTES # BLD AUTO: 38.5 % — SIGNIFICANT CHANGE UP (ref 13–44)
MCHC RBC-ENTMCNC: 25.9 PG — LOW (ref 27–34)
MCHC RBC-ENTMCNC: 30 G/DL — LOW (ref 32–36)
MCV RBC AUTO: 86.2 FL — SIGNIFICANT CHANGE UP (ref 80–100)
MONOCYTES # BLD AUTO: 0.64 K/UL — SIGNIFICANT CHANGE UP (ref 0–0.9)
MONOCYTES NFR BLD AUTO: 6.3 % — SIGNIFICANT CHANGE UP (ref 2–14)
NEUTROPHILS # BLD AUTO: 5.25 K/UL — SIGNIFICANT CHANGE UP (ref 1.8–7.4)
NEUTROPHILS NFR BLD AUTO: 52 % — SIGNIFICANT CHANGE UP (ref 43–77)
NRBC BLD AUTO-RTO: 0 /100 WBCS — SIGNIFICANT CHANGE UP (ref 0–0)
PLATELET # BLD AUTO: 211 K/UL — SIGNIFICANT CHANGE UP (ref 150–400)
POTASSIUM SERPL-MCNC: 3.7 MMOL/L — SIGNIFICANT CHANGE UP (ref 3.5–5.3)
POTASSIUM SERPL-SCNC: 3.7 MMOL/L — SIGNIFICANT CHANGE UP (ref 3.5–5.3)
PROT SERPL-MCNC: 7 G/DL — SIGNIFICANT CHANGE UP (ref 6–8.3)
RBC # BLD: 4.79 M/UL — SIGNIFICANT CHANGE UP (ref 3.8–5.2)
RBC # FLD: 13.6 % — SIGNIFICANT CHANGE UP (ref 10.3–14.5)
SODIUM SERPL-SCNC: 144 MMOL/L — SIGNIFICANT CHANGE UP (ref 135–145)
WBC # BLD: 10.09 K/UL — SIGNIFICANT CHANGE UP (ref 3.8–10.5)
WBC # FLD AUTO: 10.09 K/UL — SIGNIFICANT CHANGE UP (ref 3.8–10.5)

## 2025-02-15 PROCEDURE — 99223 1ST HOSP IP/OBS HIGH 75: CPT

## 2025-02-15 PROCEDURE — 72131 CT LUMBAR SPINE W/O DYE: CPT | Mod: 26

## 2025-02-15 PROCEDURE — 99285 EMERGENCY DEPT VISIT HI MDM: CPT

## 2025-02-15 RX ORDER — SODIUM CHLORIDE 9 G/1000ML
1000 INJECTION, SOLUTION INTRAVENOUS
Refills: 0 | Status: DISCONTINUED | OUTPATIENT
Start: 2025-02-15 | End: 2025-02-24

## 2025-02-15 RX ORDER — OXYCODONE HYDROCHLORIDE 30 MG/1
5 TABLET ORAL ONCE
Refills: 0 | Status: DISCONTINUED | OUTPATIENT
Start: 2025-02-15 | End: 2025-02-15

## 2025-02-15 RX ORDER — INSULIN LISPRO 100 U/ML
INJECTION, SOLUTION INTRAVENOUS; SUBCUTANEOUS AT BEDTIME
Refills: 0 | Status: DISCONTINUED | OUTPATIENT
Start: 2025-02-15 | End: 2025-02-24

## 2025-02-15 RX ORDER — DEXTROSE 50 % IN WATER 50 %
25 SYRINGE (ML) INTRAVENOUS ONCE
Refills: 0 | Status: DISCONTINUED | OUTPATIENT
Start: 2025-02-15 | End: 2025-02-24

## 2025-02-15 RX ORDER — DULOXETINE 20 MG/1
30 CAPSULE, DELAYED RELEASE ORAL DAILY
Refills: 0 | Status: DISCONTINUED | OUTPATIENT
Start: 2025-02-15 | End: 2025-02-24

## 2025-02-15 RX ORDER — IBUPROFEN 200 MG
400 TABLET ORAL EVERY 6 HOURS
Refills: 0 | Status: DISCONTINUED | OUTPATIENT
Start: 2025-02-15 | End: 2025-02-24

## 2025-02-15 RX ORDER — NALOXONE HYDROCHLORIDE 0.4 MG/ML
0.4 INJECTION, SOLUTION INTRAMUSCULAR; INTRAVENOUS; SUBCUTANEOUS ONCE
Refills: 0 | Status: DISCONTINUED | OUTPATIENT
Start: 2025-02-15 | End: 2025-02-24

## 2025-02-15 RX ORDER — SENNA 187 MG
2 TABLET ORAL AT BEDTIME
Refills: 0 | Status: DISCONTINUED | OUTPATIENT
Start: 2025-02-15 | End: 2025-02-24

## 2025-02-15 RX ORDER — IBUPROFEN 200 MG
600 TABLET ORAL ONCE
Refills: 0 | Status: COMPLETED | OUTPATIENT
Start: 2025-02-15 | End: 2025-02-15

## 2025-02-15 RX ORDER — METOPROLOL SUCCINATE 50 MG/1
25 TABLET, EXTENDED RELEASE ORAL
Refills: 0 | Status: DISCONTINUED | OUTPATIENT
Start: 2025-02-15 | End: 2025-02-24

## 2025-02-15 RX ORDER — ACETAMINOPHEN 500 MG/5ML
975 LIQUID (ML) ORAL ONCE
Refills: 0 | Status: COMPLETED | OUTPATIENT
Start: 2025-02-15 | End: 2025-02-15

## 2025-02-15 RX ORDER — DEXTROSE 50 % IN WATER 50 %
12.5 SYRINGE (ML) INTRAVENOUS ONCE
Refills: 0 | Status: DISCONTINUED | OUTPATIENT
Start: 2025-02-15 | End: 2025-02-24

## 2025-02-15 RX ORDER — BISACODYL 5 MG
5 TABLET, DELAYED RELEASE (ENTERIC COATED) ORAL DAILY
Refills: 0 | Status: DISCONTINUED | OUTPATIENT
Start: 2025-02-15 | End: 2025-02-18

## 2025-02-15 RX ORDER — OXYCODONE HYDROCHLORIDE 30 MG/1
10 TABLET ORAL EVERY 4 HOURS
Refills: 0 | Status: DISCONTINUED | OUTPATIENT
Start: 2025-02-15 | End: 2025-02-20

## 2025-02-15 RX ORDER — OXYCODONE HYDROCHLORIDE 30 MG/1
5 TABLET ORAL EVERY 4 HOURS
Refills: 0 | Status: DISCONTINUED | OUTPATIENT
Start: 2025-02-15 | End: 2025-02-21

## 2025-02-15 RX ORDER — ACETAMINOPHEN 500 MG/5ML
650 LIQUID (ML) ORAL EVERY 6 HOURS
Refills: 0 | Status: DISCONTINUED | OUTPATIENT
Start: 2025-02-15 | End: 2025-02-18

## 2025-02-15 RX ORDER — GLUCAGON 3 MG/1
1 POWDER NASAL ONCE
Refills: 0 | Status: DISCONTINUED | OUTPATIENT
Start: 2025-02-15 | End: 2025-02-24

## 2025-02-15 RX ORDER — INSULIN LISPRO 100 U/ML
INJECTION, SOLUTION INTRAVENOUS; SUBCUTANEOUS
Refills: 0 | Status: DISCONTINUED | OUTPATIENT
Start: 2025-02-15 | End: 2025-02-24

## 2025-02-15 RX ORDER — DEXTROSE 50 % IN WATER 50 %
15 SYRINGE (ML) INTRAVENOUS ONCE
Refills: 0 | Status: DISCONTINUED | OUTPATIENT
Start: 2025-02-15 | End: 2025-02-24

## 2025-02-15 RX ORDER — ROSUVASTATIN CALCIUM 20 MG/1
40 TABLET, FILM COATED ORAL AT BEDTIME
Refills: 0 | Status: DISCONTINUED | OUTPATIENT
Start: 2025-02-15 | End: 2025-02-24

## 2025-02-15 RX ORDER — LIDOCAINE HYDROCHLORIDE 20 MG/ML
1 JELLY TOPICAL DAILY
Refills: 0 | Status: DISCONTINUED | OUTPATIENT
Start: 2025-02-15 | End: 2025-02-24

## 2025-02-15 RX ORDER — LIDOCAINE HYDROCHLORIDE 20 MG/ML
1 JELLY TOPICAL ONCE
Refills: 0 | Status: COMPLETED | OUTPATIENT
Start: 2025-02-15 | End: 2025-02-15

## 2025-02-15 RX ORDER — POLYETHYLENE GLYCOL 3350 17 G/17G
17 POWDER, FOR SOLUTION ORAL DAILY
Refills: 0 | Status: DISCONTINUED | OUTPATIENT
Start: 2025-02-15 | End: 2025-02-18

## 2025-02-15 RX ORDER — ENOXAPARIN SODIUM 100 MG/ML
40 INJECTION SUBCUTANEOUS EVERY 24 HOURS
Refills: 0 | Status: DISCONTINUED | OUTPATIENT
Start: 2025-02-15 | End: 2025-02-24

## 2025-02-15 RX ADMIN — Medication 600 MILLIGRAM(S): at 16:51

## 2025-02-15 RX ADMIN — Medication 975 MILLIGRAM(S): at 16:51

## 2025-02-15 RX ADMIN — ROSUVASTATIN CALCIUM 40 MILLIGRAM(S): 20 TABLET, FILM COATED ORAL at 23:41

## 2025-02-15 RX ADMIN — Medication 2 TABLET(S): at 23:42

## 2025-02-15 RX ADMIN — POLYETHYLENE GLYCOL 3350 17 GRAM(S): 17 POWDER, FOR SOLUTION ORAL at 23:42

## 2025-02-15 RX ADMIN — Medication 400 MILLIGRAM(S): at 23:41

## 2025-02-15 RX ADMIN — LIDOCAINE HYDROCHLORIDE 1 PATCH: 20 JELLY TOPICAL at 16:52

## 2025-02-15 RX ADMIN — METOPROLOL SUCCINATE 25 MILLIGRAM(S): 50 TABLET, EXTENDED RELEASE ORAL at 23:41

## 2025-02-15 RX ADMIN — OXYCODONE HYDROCHLORIDE 5 MILLIGRAM(S): 30 TABLET ORAL at 16:51

## 2025-02-15 RX ADMIN — DULOXETINE 30 MILLIGRAM(S): 20 CAPSULE, DELAYED RELEASE ORAL at 23:41

## 2025-02-15 RX ADMIN — OXYCODONE HYDROCHLORIDE 5 MILLIGRAM(S): 30 TABLET ORAL at 20:30

## 2025-02-15 NOTE — H&P ADULT - NSHPPHYSICALEXAM_GEN_ALL_CORE
Vital Signs Last 24 Hrs  T(C): 36.6 (15 Feb 2025 20:10), Max: 36.9 (15 Feb 2025 15:00)  T(F): 97.8 (15 Feb 2025 20:10), Max: 98.5 (15 Feb 2025 15:00)  HR: 85 (15 Feb 2025 20:10) (76 - 115)  BP: 115/77 (15 Feb 2025 20:10) (115/77 - 128/82)  RR: 18 (15 Feb 2025 20:10) (18 - 18)  SpO2: 98% (15 Feb 2025 20:10) (98% - 98%)    Parameters below as of 15 Feb 2025 20:10  Patient On (Oxygen Delivery Method): room air    CONSTITUTIONAL: Well-groomed, in no apparent distress  EYES: No conjunctival or scleral injection, non-icteric;   ENMT: No external nasal lesions; MMM  NECK: Trachea midline without palpable neck mass; thyroid not enlarged and non-tender  RESPIRATORY: Breathing comfortably; no dullness to percussion; lungs CTA without wheeze/rhonchi/rales  CARDIOVASCULAR: +S1S2, RRR, no M/G/R; pedal pulses full and symmetric; no lower extremity edema  GASTROINTESTINAL: No palpable masses or tenderness, +BS throughout, no rebound/guarding; no hepatosplenomegaly; no hernia palpated  LYMPHATIC: No cervical LAD or tenderness  SKIN: No rashes or ulcers noted  MSK: ttp across lower back. Sensation in LE intact w/ strength intact  NEUROLOGIC: CN II-XII intact; sensation intact in LEs b/l to light touch  PSYCHIATRIC: A&Ox3; mood and affect appropriate; appropriate insight and judgment

## 2025-02-15 NOTE — ED PROVIDER NOTE - ATTENDING CONTRIBUTION TO CARE
49-year-old female history of DM, chronic low back pain with prior back surgery most recently removal of hardware and fusion of L4-S1 on October 23, 2024 with Dr. Pena coming in today complaining of acute worsening of her chronic low back pain with radiation to the right lower extremity.  States pain worsened yesterday w/o inciting event.  Reports pain and numbness in the right gluteal region radiating around to the right hip and down the right medial aspect of the leg to the foot which she has had but severity is increased.  She has been able to ambulate.  She denies any fever.  She has no bowel or bladder dysfunction.   Denies any recent fall trauma or injury.  Reports at times she has numbness in the left leg as well.  Vital signs are nonactionable. Physical exam w/o midline spinal TTP CTL spine. +R lumbar paraspinal TTP and TTP about R gluteal region. Reports dec sensation R medial leg, R lateral foot and R sole of foot. Able to ambulate but w pain. Motor 5/5 throughout. Will treat pain sx, low suspicion for SCC, will re-eval after pain control. Consider ortho/spine consult as she follows w them OP.

## 2025-02-15 NOTE — H&P ADULT - NSHPLABSRESULTS_GEN_ALL_CORE
LABS:                      12.4   10.09 )-----------( 211      ( 15 Feb 2025 22:44 )             41.3     IMAGING:  CT Lumbar Spine No Cont (02.15.25 @ 19:32)  IMPRESSION:  - Prior L4-5 and L5-S1 discectomies with associated anterior integrated interbody fusion devices.  - Interval removal of the right-sided pedicle screws at L4-S1 and associated interlocking gela.  - No evidence of an acute lumbar spine fracture. Postoperative changes.   - Mild degenerative changes.

## 2025-02-15 NOTE — ED ADULT NURSE NOTE - OBJECTIVE STATEMENT
48 y/o F with PMH of 2 stage unilateral L4-S1 decompression/fusion (ALIF, PSF), Exploration of spine with removal of hardware/R L4-S1 w in situ fusion in October 2024 presents to ED complaining of back pain. Pt report her pain has gotten significantly worse since yesterday, especially throughout the night when she went to the bathroom. Pt has chronic numbness to R buttock and thigh. No trauma, fever, saddle anesthesia, urinary/fecal incontinence or retention, Pt takes oxy at home for pain but has not been working. Pt did not taken any medications today yet. Upon arrival, pt is A&OX4, satting well on RA. Afebrile orally. Full strength and sensation in all extremities. Pt ambulates with a cane at baseline. Denies headache, dizziness, vision changes, chest pain, shortness of breath, abdominal pain, nausea, vomiting, diarrhea, fevers, chills, dysuria, hematuria, recent illness travel or fall.

## 2025-02-15 NOTE — H&P ADULT - ASSESSMENT
49F w/ PMH of SS s/p 2 stage unilateral L4-S1 decompression/fusion (ALIF, PSF), exploration of spine w/ removal of hardware/R L4-S1 w/ in situ fusion 10/'24 pw acute on chronic LBP

## 2025-02-15 NOTE — ED PROVIDER NOTE - PHYSICAL EXAMINATION
General: no acute distress  Psych: mood appropriate  Head: normocephalic; atraumatic  Eyes: conjunctivae clear bilaterally, sclerae anicteric  ENT: no nasal flaring, patent nares  Cardio: regular rate and rhythm; normal heart sounds  Resp: clear to auscultation bilaterally  GI: abdomen soft, nontender, nondistended  Neuro: [strength 5/5 in upper and lower extremities; normal sensation]  Skin: [no rashes or bruising noted]  MSK: [normal movement of extremities]  Lymph/Vasc: [no LE edema] General: no acute distress  Psych: mood appropriate  Head: normocephalic; atraumatic  Eyes: conjunctivae clear bilaterally, sclerae anicteric  ENT: no nasal flaring, patent nares  Cardio: regular rate and rhythm; normal heart sounds  Resp: clear to auscultation bilaterally  GI: abdomen soft, nontender, nondistended  Neuro: A&Ox3; sensation diminished to lateral aspect of R thigh; LE strength 5/5 bilaterally; antalgic gait  Skin: no rashes or bruising noted  MSK: normal movement of extremities; midline lumbar tenderness to palpation  Lymph/Vasc: no LE edema General: no acute distress  Psych: mood appropriate  Head: normocephalic; atraumatic  Eyes: conjunctivae clear bilaterally, sclerae anicteric  ENT: no nasal flaring, patent nares  Cardio: regular rate and rhythm; normal heart sounds  Resp: clear to auscultation bilaterally  GI: abdomen soft, nontender, nondistended  Neuro: A&Ox3; sensation diminished to lateral aspect of R thigh and to R foot; LE strength 5/5 bilaterally; antalgic gait  Skin: no rashes or bruising noted  MSK: normal movement of extremities; midline lumbar tenderness to palpation  Lymph/Vasc: no LE edema

## 2025-02-15 NOTE — ED PROVIDER NOTE - CLINICAL SUMMARY MEDICAL DECISION MAKING FREE TEXT BOX
NAIN Mix PGY2- patient here with worsening lower back pain, radiating down both legs, with numbness in RLE. has these symptoms chronically, worsening last night when patient walking to the bathroom. still able to walk with antalgic gait. no meds taken for pain today. with numbness to RLE, no new neuro deficits, strength intact. will give meds, reassess. if pain not well controlled, will obtain ortho consult.

## 2025-02-15 NOTE — ED ADULT TRIAGE NOTE - CHIEF COMPLAINT QUOTE
h/o Multi lumbar spinal fusion last 2023, came here for low back pain, hip, thigh and leg weakness, denies any trauma or incontinence

## 2025-02-15 NOTE — H&P ADULT - NSHPSOCIALHISTORY_GEN_ALL_CORE
- Former 1.5ppd x20 yrs tobacco use, quit >5yrs ago. Current social vaping  - Denies EtOH consumption  - Denies illicit drug use

## 2025-02-15 NOTE — CONSULT NOTE ADULT - SUBJECTIVE AND OBJECTIVE BOX
HPI  49yFemale c/o chronic lower back pain that continues to worsen. Pt had a 2 stage unilateral L4-S1 ALIF and PSF in , and DUNG R L4-S1 in Oct 2024 w Dr. Pena. Has been following w him outpatient since but continues to complain of back pain and diminished sensation in RLE. Notes that the pain has been ongoing and worsening since mid-January. Able to ambulate with a RW (her baseline), but has pain with ambulation. Denies focal weakness or radicular sxs. Denies fevers/chills, acute changes in bowel/bladder function, or saddle anesthesia. Denies recent falls/trauma or any other ortho injuries.    ROS  Negative unless otherwise specified in HPI.    PAST MEDICAL & SURGICAL Hx  PAST MEDICAL & SURGICAL HISTORY:  HLD (hyperlipidemia)      Spinal stenosis, lumbosacral region      Current smoker      S/P           MEDICATIONS  Home Medications:  DULoxetine 30 mg oral delayed release capsule: 1 cap(s) orally once a day (15 Feb 2025 23:10)  Farxiga 5 mg oral tablet: 1 tab(s) orally once a day (15 Feb 2025 23:10)  Januvia 100 mg oral tablet: 1 tab(s) orally once a day (15 Feb 2025 23:10)  metoprolol tartrate 25 mg oral tablet: 1 tab(s) orally 2 times a day (15 Feb 2025 23:10)  rosuvastatin 40 mg oral tablet: 1 tab(s) orally once a day (15 Feb 2025 23:10)      ALLERGIES  penicillin (Pruritus; Rash)      FAMILY Hx  FAMILY HISTORY:  FH: heart disease (Mother)        SOCIAL Hx  Social History:      VITALS  Vital Signs Last 24 Hrs  T(C): 36.6 (15 Feb 2025 20:10), Max: 36.9 (15 Feb 2025 15:00)  T(F): 97.8 (15 Feb 2025 20:10), Max: 98.5 (15 Feb 2025 15:00)  HR: 85 (15 Feb 2025 20:10) (76 - 115)  BP: 115/77 (15 Feb 2025 20:10) (115/77 - 128/82)  BP(mean): --  RR: 18 (15 Feb 2025 20:10) (18 - 18)  SpO2: 98% (15 Feb 2025 20:10) (98% - 98%)    Parameters below as of 15 Feb 2025 20:10  Patient On (Oxygen Delivery Method): room air        PHYSICAL EXAM  Gen: Lying in bed, NAD  Resp: No increased WOB  Spine:  Skin intact  TTP over Lsp and mild ttp over Csp, no step-offs along c-, t-, l-spine, or sacrum    Motor:                   C5                C6              C7               C8           T1   R            5/5                5/5            5/5             5/5          5/5  L             5/5               5/5             5/5             5/5          5/5                L2             L3             L4               L5            S1  R         5/5           5/5          5/5             5/5           5/5  L          5/5          5/5           5/5             5/5           5/5    Sensory:            C5         C6         C7      C8       T1        (0=absent, 1=impaired, 2=normal, NT=not testable)  R         2            2           2        2         2  L          2            2           2        2         2               L2          L3         L4      L5       S1         (0=absent, 1=impaired, 2=normal, NT=not testable)  R          1          1            1        1        1  L          2            2           2        2         2    (+) saddle/perianal SILT  (-) Argueta test b/l  (-) Straight leg raise test b/l  (-) Babinski sign b/l  (-) Ankle clonus b/l      LABS                        12.4   10.09 )-----------( 211      ( 15 Feb 2025 22:44 )             41.3               IMAGING  < from: CT Lumbar Spine No Cont (02.15.25 @ 19:32) >  IMPRESSION:  Prior L4-5 and L5-S1 discectomies with associated anterior integrated   interbody fusion devices.  Interval removal of the right-sided pedicle screws at L4-S1 and   associated interlocking gela.  No evidence of an acute lumbar spine fracture. Postoperative changes.   Mild degenerative changes.    < end of copied text >

## 2025-02-15 NOTE — ED PROVIDER NOTE - OBJECTIVE STATEMENT
48yFemale w/ PMH of s/p 2 stage unilateral L4-S1 decompression/fusion (ALIF, PSF), Exploration of spine with removal of hardware/R L4-S1 w in situ fusion in October 2024, presenting with acute worsening of chronic lower back pain. States back pain was gradually worsening throughout the day yesterday, then worsening more acutely overnight when patient got up to go to the bathroom. Also with chronic numbness to R buttock and thigh which she states is worse than usual. 48yFemale w/ PMH of s/p 2 stage unilateral L4-S1 decompression/fusion (ALIF, PSF), Exploration of spine with removal of hardware/R L4-S1 w in situ fusion in October 2024, presenting with acute worsening of chronic lower back pain. States back pain was gradually worsening throughout the day yesterday, then worsening more acutely overnight when patient got up to go to the bathroom. Also with chronic numbness to R buttock and thigh which she states is worse than usual. No trauma, fever, saddle anesthesia, urinary/fecal incontinence or retention, chest pain, shortness of breath, recent illness, or any other symptoms. Normally takes oxycodone which she states does not help with the pain, was recently prescribed tramadol, took first dose yesterday. No meds taken today for pain.

## 2025-02-15 NOTE — ED ADULT NURSE NOTE - NSFALLRISKINTERV_ED_ALL_ED

## 2025-02-15 NOTE — H&P ADULT - HISTORY OF PRESENT ILLNESS
Pt is a 49F w/ PMH of SS s/p 2 stage unilateral L4-S1 decompression/fusion (ALIF, PSF), exploration of spine w/ removal of hardware/R L4-S1 w/ in situ fusion 10/'24 pw acute on chronic LBP.     Reports hx of LBP w/ reported 80% numbness of right gluteal region w/ radiation down right leg since last procedure. Over the past day she reports worsening of the lower back pain mainly in the right lower back w/ radiation down b/l legs. Rated as a 10/10 at worst and described as arrows shooting at her back. Otherwise denies trauma, saddle anesthesia, or urinary/fecal incontinence.    In the ED VSS w/ CT L spine w/ out acute process. Evaluated by ortho w/ no reported acute surgical intervention. Administered Tylenol, Motrin, Oxy 5mg x2 w/ reported improvement in radiating pain, but persistent right LBP now a 9/10.

## 2025-02-15 NOTE — ED PROVIDER NOTE - PROGRESS NOTE DETAILS
NAIN Mix PGY2- patient still with significant pain after meds, going from 10/10 to 8/10. will obtain CT scan, ortho consult NAIN Mix PGY2- spoke with ortho, will see patient in ED. awaiting imaging Dr. Woo:  Cleared by ortho, however pt still in pain and would like to be admitted for pain control.

## 2025-02-15 NOTE — H&P ADULT - PROBLEM SELECTOR PLAN 1
- w/out  red flag symptoms  - CT L-spine w/out acute process  - Pain control as ordered   - Bowel regimen w/ pain regimen  - PT eval  - Outpt ortho f/u

## 2025-02-16 LAB
A1C WITH ESTIMATED AVERAGE GLUCOSE RESULT: 7.8 % — HIGH (ref 4–5.6)
ERYTHROCYTE [SEDIMENTATION RATE] IN BLOOD: 35 MM/HR — HIGH (ref 0–20)
ESTIMATED AVERAGE GLUCOSE: 177 MG/DL — HIGH (ref 68–114)
GLUCOSE BLDC GLUCOMTR-MCNC: 130 MG/DL — HIGH (ref 70–99)
GLUCOSE BLDC GLUCOMTR-MCNC: 140 MG/DL — HIGH (ref 70–99)
GLUCOSE BLDC GLUCOMTR-MCNC: 151 MG/DL — HIGH (ref 70–99)
GLUCOSE BLDC GLUCOMTR-MCNC: 224 MG/DL — HIGH (ref 70–99)

## 2025-02-16 PROCEDURE — 99232 SBSQ HOSP IP/OBS MODERATE 35: CPT

## 2025-02-16 PROCEDURE — 72156 MRI NECK SPINE W/O & W/DYE: CPT | Mod: 26

## 2025-02-16 PROCEDURE — 72157 MRI CHEST SPINE W/O & W/DYE: CPT | Mod: 26

## 2025-02-16 PROCEDURE — 72158 MRI LUMBAR SPINE W/O & W/DYE: CPT | Mod: 26

## 2025-02-16 RX ORDER — INFLUENZA A VIRUS A/IDAHO/07/2018 (H1N1) ANTIGEN (MDCK CELL DERIVED, PROPIOLACTONE INACTIVATED, INFLUENZA A VIRUS A/INDIANA/08/2018 (H3N2) ANTIGEN (MDCK CELL DERIVED, PROPIOLACTONE INACTIVATED), INFLUENZA B VIRUS B/SINGAPORE/INFTT-16-0610/2016 ANTIGEN (MDCK CELL DERIVED, PROPIOLACTONE INACTIVATED), INFLUENZA B VIRUS B/IOWA/06/2017 ANTIGEN (MDCK CELL DERIVED, PROPIOLACTONE INACTIVATED) 15; 15; 15; 15 UG/.5ML; UG/.5ML; UG/.5ML; UG/.5ML
0.5 INJECTION, SUSPENSION INTRAMUSCULAR ONCE
Refills: 0 | Status: DISCONTINUED | OUTPATIENT
Start: 2025-02-16 | End: 2025-02-24

## 2025-02-16 RX ORDER — MELATONIN 5 MG
5 TABLET ORAL AT BEDTIME
Refills: 0 | Status: DISCONTINUED | OUTPATIENT
Start: 2025-02-16 | End: 2025-02-24

## 2025-02-16 RX ADMIN — ROSUVASTATIN CALCIUM 40 MILLIGRAM(S): 20 TABLET, FILM COATED ORAL at 23:05

## 2025-02-16 RX ADMIN — LIDOCAINE HYDROCHLORIDE 1 PATCH: 20 JELLY TOPICAL at 05:40

## 2025-02-16 RX ADMIN — Medication 5 MILLIGRAM(S): at 23:47

## 2025-02-16 RX ADMIN — Medication 400 MILLIGRAM(S): at 06:31

## 2025-02-16 RX ADMIN — LIDOCAINE HYDROCHLORIDE 1 PATCH: 20 JELLY TOPICAL at 19:24

## 2025-02-16 RX ADMIN — INSULIN LISPRO 1: 100 INJECTION, SOLUTION INTRAVENOUS; SUBCUTANEOUS at 08:46

## 2025-02-16 RX ADMIN — Medication 400 MILLIGRAM(S): at 23:55

## 2025-02-16 RX ADMIN — OXYCODONE HYDROCHLORIDE 10 MILLIGRAM(S): 30 TABLET ORAL at 23:06

## 2025-02-16 RX ADMIN — Medication 400 MILLIGRAM(S): at 18:10

## 2025-02-16 RX ADMIN — Medication 2 TABLET(S): at 23:05

## 2025-02-16 RX ADMIN — LIDOCAINE HYDROCHLORIDE 1 PATCH: 20 JELLY TOPICAL at 17:24

## 2025-02-16 RX ADMIN — Medication 400 MILLIGRAM(S): at 14:49

## 2025-02-16 RX ADMIN — Medication 400 MILLIGRAM(S): at 05:48

## 2025-02-16 RX ADMIN — Medication 400 MILLIGRAM(S): at 23:05

## 2025-02-16 RX ADMIN — ENOXAPARIN SODIUM 40 MILLIGRAM(S): 100 INJECTION SUBCUTANEOUS at 05:48

## 2025-02-16 RX ADMIN — METOPROLOL SUCCINATE 25 MILLIGRAM(S): 50 TABLET, EXTENDED RELEASE ORAL at 13:50

## 2025-02-16 RX ADMIN — Medication 400 MILLIGRAM(S): at 13:49

## 2025-02-16 RX ADMIN — OXYCODONE HYDROCHLORIDE 10 MILLIGRAM(S): 30 TABLET ORAL at 23:55

## 2025-02-16 RX ADMIN — Medication 400 MILLIGRAM(S): at 17:25

## 2025-02-16 RX ADMIN — OXYCODONE HYDROCHLORIDE 10 MILLIGRAM(S): 30 TABLET ORAL at 01:22

## 2025-02-16 RX ADMIN — DULOXETINE 30 MILLIGRAM(S): 20 CAPSULE, DELAYED RELEASE ORAL at 13:49

## 2025-02-16 RX ADMIN — Medication 40 MILLIGRAM(S): at 05:48

## 2025-02-16 NOTE — PATIENT PROFILE ADULT - FUNCTIONAL ASSESSMENT - BASIC MOBILITY 6.
4-calculated by average/Not able to assess (calculate score using [facility] averaging method)

## 2025-02-16 NOTE — PHYSICAL THERAPY INITIAL EVALUATION ADULT - ADDITIONAL COMMENTS
Pt lives in private home w/ 6 steps to enter and first floor setup. Pt lives w/ spouse. Pt independent ambulating w/ cane and ADL's PTA. Pt owns cane, RW, shower chair, and commode.

## 2025-02-16 NOTE — PROGRESS NOTE ADULT - SUBJECTIVE AND OBJECTIVE BOX
[facility] Division of Hospital Medicine  Lianne Rm MD  Available via MS Teams    SUBJECTIVE / OVERNIGHT EVENTS: No acute events overnight. Patient still with some pain in right lower back radiating down. But otherwise states pain everywhere else has improved. Some constipation. Denies any chest pain or SOB. No other concerns or complaints at this time.     Review of Systems:   CONSTITUTIONAL: No fever   EYES: No eye pain, visual disturbances, or discharge  ENMT: No difficulty hearing   RESPIRATORY: No SOB. No cough   CARDIOVASCULAR: No chest pain   GASTROINTESTINAL: No abdominal or epigastric pain. No nausea, vomiting, or hematemesis; constipation  GENITOURINARY: No dysuria   NEUROLOGICAL: No headaches   SKIN: No itching    MUSCULOSKELETAL: No joint pain or swelling; right sided lower back pain  PSYCHIATRIC: No depression or anxiety  HEME/LYMPH: No easy bruising or bleeding gums      MEDICATIONS  (STANDING):  dextrose 5%. 1000 milliLiter(s) (50 mL/Hr) IV Continuous <Continuous>  dextrose 5%. 1000 milliLiter(s) (100 mL/Hr) IV Continuous <Continuous>  dextrose 50% Injectable 25 Gram(s) IV Push once  dextrose 50% Injectable 12.5 Gram(s) IV Push once  dextrose 50% Injectable 25 Gram(s) IV Push once  DULoxetine 30 milliGRAM(s) Oral daily  enoxaparin Injectable 40 milliGRAM(s) SubCutaneous every 24 hours  glucagon  Injectable 1 milliGRAM(s) IntraMuscular once  ibuprofen  Tablet. 400 milliGRAM(s) Oral every 6 hours  influenza   Vaccine 0.5 milliLiter(s) IntraMuscular once  insulin lispro (ADMELOG) corrective regimen sliding scale   SubCutaneous three times a day before meals  insulin lispro (ADMELOG) corrective regimen sliding scale   SubCutaneous at bedtime  lidocaine   4% Patch 1 Patch Transdermal daily  metoprolol tartrate 25 milliGRAM(s) Oral two times a day  naloxone Injectable 0.4 milliGRAM(s) IV Push once  pantoprazole    Tablet 40 milliGRAM(s) Oral before breakfast  polyethylene glycol 3350 17 Gram(s) Oral daily  rosuvastatin 40 milliGRAM(s) Oral at bedtime  senna 2 Tablet(s) Oral at bedtime    MEDICATIONS  (PRN):  acetaminophen     Tablet .. 650 milliGRAM(s) Oral every 6 hours PRN Temp greater or equal to 38C (100.4F), Mild Pain (1 - 3)  bisacodyl 5 milliGRAM(s) Oral daily PRN Constipation  dextrose Oral Gel 15 Gram(s) Oral once PRN Blood Glucose LESS THAN 70 milliGRAM(s)/deciliter  oxyCODONE    IR 5 milliGRAM(s) Oral every 4 hours PRN Moderate Pain (4 - 6)  oxyCODONE    IR 10 milliGRAM(s) Oral every 4 hours PRN Severe Pain (7 - 10)      PHYSICAL EXAM:  Vital Signs Last 24 Hrs  T(C): 36.4 (16 Feb 2025 05:30), Max: 36.9 (15 Feb 2025 15:00)  T(F): 97.5 (16 Feb 2025 05:30), Max: 98.5 (15 Feb 2025 15:00)  HR: 76 (16 Feb 2025 09:30) (66 - 115)  BP: 106/69 (16 Feb 2025 09:30) (106/69 - 128/82)  RR: 19 (16 Feb 2025 05:30) (16 - 19)  SpO2: 96% (16 Feb 2025 09:30) (96% - 98%)    Parameters below as of 16 Feb 2025 09:30  Patient On (Oxygen Delivery Method): room air      CONSTITUTIONAL: some distress due to back pain, well-developed   EYES: PERRLA; conjunctiva and sclera clear  ENMT: Moist oral mucosa, no pharyngeal injection or exudates  NECK: Supple   RESPIRATORY: Normal respiratory effort; lungs are clear to auscultation bilaterally  CARDIOVASCULAR: Regular rate and rhythm, normal S1 and S2  ABDOMEN: Nontender to palpation, normoactive bowel sounds   MUSCULOSKELETAL: no clubbing or cyanosis of digits; no joint swelling or tenderness to palpation  PSYCH: A+O to person, place, and time; affect appropriate  NEUROLOGY: no gross sensory deficits   SKIN: No rashes     LABS:                        12.4   10.09 )-----------( 211      ( 15 Feb 2025 22:44 )             41.3     02-15    144  |  105  |  16  ----------------------------<  157[H]  3.7   |  24  |  0.97    Ca    9.5      15 Feb 2025 22:44    TPro  7.0  /  Alb  4.2  /  TBili  0.2  /  DBili  x   /  AST  22  /  ALT  28  /  AlkPhos  95  02-15      Urinalysis Basic - ( 15 Feb 2025 22:44 )    Color: x / Appearance: x / SG: x / pH: x  Gluc: 157 mg/dL / Ketone: x  / Bili: x / Urobili: x   Blood: x / Protein: x / Nitrite: x   Leuk Esterase: x / RBC: x / WBC x   Sq Epi: x / Non Sq Epi: x / Bacteria: x      RADIOLOGY & ADDITIONAL TESTS:  New Imaging Personally Reviewed Today:  New Electrocardiogram Personally Reviewed Today:  Other Results Reviewed Today:   Prior or Outpatient Records Reviewed Today with Summary:    COORDINATION OF CARE:  Consultant Communication and Details of Discussion (where applicable):

## 2025-02-16 NOTE — PHYSICAL THERAPY INITIAL EVALUATION ADULT - PERTINENT HX OF CURRENT PROBLEM, REHAB EVAL
Pt is a 49F w/ PMH of SS s/p 2 stage unilateral L4-S1 decompression/fusion (ALIF, PSF), exploration of spine w/ removal of hardware/R L4-S1 w/ in situ fusion 10/'24 pw acute on chronic LBP. Reports hx of LBP w/ reported 80% numbness of right gluteal region w/ radiation down right leg since last procedure. Over the past day she reports worsening of the lower back pain mainly in the right lower back w/ radiation down b/l legs. Rated as a 10/10 at worst and described as arrows shooting at her back. Otherwise denies trauma, saddle anesthesia, or urinary/fecal incontinence. In the ED VSS w/ CT L spine w/ out acute process. Evaluated by ortho w/ no reported acute surgical intervention. CT LUMBAR SPINE 2/15: Prior L4-5 and L5-S1 discectomies with associated anterior integrated interbody fusion devices. Interval removal of the right-sided pedicle screws at L4-S1 and associated interlocking gela. No evidence of an acute lumbar spine fracture. Postoperative changes. Mild degenerative changes.

## 2025-02-16 NOTE — PHYSICAL THERAPY INITIAL EVALUATION ADULT - PLANNED THERAPY INTERVENTIONS, PT EVAL
STAIR GOAL: Pt will negotiate 6 stairs independent in 2 wks to improve overall functional mobility./balance training/bed mobility training/gait training/strengthening/transfer training

## 2025-02-16 NOTE — PHYSICAL THERAPY INITIAL EVALUATION ADULT - BALANCE TRAINING, PT EVAL
Pt will perform standing dynamic tasks w/ good balance in 2wks to improve overall functional mobility.

## 2025-02-16 NOTE — PATIENT PROFILE ADULT - MONEY FOR FOOD
[FreeTextEntry1] : 64F PMH PE on Eliquis, DM, RADHA, hypothyroidism, recurrent paroxysmal afib, asthma, Crohn's, seizures, anxiety/depression, and recent right humeral fracture presenting for bilateral lower extremity swelling for the past 2 weeks.   # bilateral lower extremity edema - starting 3 weeks ago after Botox procedure for bladder for urinary frequency - on exam, pain on palpation of both legs and upon dorsiflexion and extension of foot. 1+ pitting edema. Equal size of both legs. No redness or ulcerations noted. Lungs are clear. - sedentary for the past few weeks. - hold gabapentin for now, as it could be contributing to swelling - cbc, cmp, tsh - pelvic us ordered to rule out obstruction in the setting of recent procedure - us duplex bilateral lower extremity to rule out clot, although less likely given bilaterality and on eliquis - on lasix 20mg -> increased to 30mg for now. If no improvement in a week, can take 40mg daily - recommended to monitor BP at home. - if negative workup, most likely venous insufficiency from weight and being sedentary - will recommend leg elevation and compression stocking if reassuring workup  Recent cardiac workup - TTE 02/2023 EF 67%, mild MR/TR/PI - Holter monitor 02/2023 NSR without significant ectopy (PVCs <1%) no

## 2025-02-17 LAB
ANION GAP SERPL CALC-SCNC: 14 MMOL/L — SIGNIFICANT CHANGE UP (ref 5–17)
BUN SERPL-MCNC: 16 MG/DL — SIGNIFICANT CHANGE UP (ref 7–23)
CALCIUM SERPL-MCNC: 9.5 MG/DL — SIGNIFICANT CHANGE UP (ref 8.4–10.5)
CHLORIDE SERPL-SCNC: 103 MMOL/L — SIGNIFICANT CHANGE UP (ref 96–108)
CO2 SERPL-SCNC: 22 MMOL/L — SIGNIFICANT CHANGE UP (ref 22–31)
CREAT SERPL-MCNC: 0.89 MG/DL — SIGNIFICANT CHANGE UP (ref 0.5–1.3)
EGFR: 79 ML/MIN/1.73M2 — SIGNIFICANT CHANGE UP
GLUCOSE BLDC GLUCOMTR-MCNC: 160 MG/DL — HIGH (ref 70–99)
GLUCOSE BLDC GLUCOMTR-MCNC: 175 MG/DL — HIGH (ref 70–99)
GLUCOSE BLDC GLUCOMTR-MCNC: 175 MG/DL — HIGH (ref 70–99)
GLUCOSE BLDC GLUCOMTR-MCNC: 178 MG/DL — HIGH (ref 70–99)
GLUCOSE BLDC GLUCOMTR-MCNC: 178 MG/DL — HIGH (ref 70–99)
GLUCOSE SERPL-MCNC: 175 MG/DL — HIGH (ref 70–99)
HCT VFR BLD CALC: 41.6 % — SIGNIFICANT CHANGE UP (ref 34.5–45)
HGB BLD-MCNC: 12.7 G/DL — SIGNIFICANT CHANGE UP (ref 11.5–15.5)
MAGNESIUM SERPL-MCNC: 1.8 MG/DL — SIGNIFICANT CHANGE UP (ref 1.6–2.6)
MCHC RBC-ENTMCNC: 26.3 PG — LOW (ref 27–34)
MCHC RBC-ENTMCNC: 30.5 G/DL — LOW (ref 32–36)
MCV RBC AUTO: 86.3 FL — SIGNIFICANT CHANGE UP (ref 80–100)
NRBC BLD AUTO-RTO: 0 /100 WBCS — SIGNIFICANT CHANGE UP (ref 0–0)
PHOSPHATE SERPL-MCNC: 3.4 MG/DL — SIGNIFICANT CHANGE UP (ref 2.5–4.5)
PLATELET # BLD AUTO: 240 K/UL — SIGNIFICANT CHANGE UP (ref 150–400)
POTASSIUM SERPL-MCNC: 3.9 MMOL/L — SIGNIFICANT CHANGE UP (ref 3.5–5.3)
POTASSIUM SERPL-SCNC: 3.9 MMOL/L — SIGNIFICANT CHANGE UP (ref 3.5–5.3)
RBC # BLD: 4.82 M/UL — SIGNIFICANT CHANGE UP (ref 3.8–5.2)
RBC # FLD: 13.4 % — SIGNIFICANT CHANGE UP (ref 10.3–14.5)
SODIUM SERPL-SCNC: 139 MMOL/L — SIGNIFICANT CHANGE UP (ref 135–145)
WBC # BLD: 9.31 K/UL — SIGNIFICANT CHANGE UP (ref 3.8–10.5)
WBC # FLD AUTO: 9.31 K/UL — SIGNIFICANT CHANGE UP (ref 3.8–10.5)

## 2025-02-17 PROCEDURE — 99232 SBSQ HOSP IP/OBS MODERATE 35: CPT

## 2025-02-17 PROCEDURE — 99221 1ST HOSP IP/OBS SF/LOW 40: CPT | Mod: GC

## 2025-02-17 RX ADMIN — LIDOCAINE HYDROCHLORIDE 1 PATCH: 20 JELLY TOPICAL at 05:12

## 2025-02-17 RX ADMIN — LIDOCAINE HYDROCHLORIDE 1 PATCH: 20 JELLY TOPICAL at 17:12

## 2025-02-17 RX ADMIN — INSULIN LISPRO 1: 100 INJECTION, SOLUTION INTRAVENOUS; SUBCUTANEOUS at 08:44

## 2025-02-17 RX ADMIN — Medication 400 MILLIGRAM(S): at 05:09

## 2025-02-17 RX ADMIN — METOPROLOL SUCCINATE 25 MILLIGRAM(S): 50 TABLET, EXTENDED RELEASE ORAL at 05:09

## 2025-02-17 RX ADMIN — Medication 400 MILLIGRAM(S): at 17:11

## 2025-02-17 RX ADMIN — Medication 5 MILLIGRAM(S): at 23:24

## 2025-02-17 RX ADMIN — INSULIN LISPRO 1: 100 INJECTION, SOLUTION INTRAVENOUS; SUBCUTANEOUS at 14:07

## 2025-02-17 RX ADMIN — METOPROLOL SUCCINATE 25 MILLIGRAM(S): 50 TABLET, EXTENDED RELEASE ORAL at 17:11

## 2025-02-17 RX ADMIN — INSULIN LISPRO 1: 100 INJECTION, SOLUTION INTRAVENOUS; SUBCUTANEOUS at 17:33

## 2025-02-17 RX ADMIN — ROSUVASTATIN CALCIUM 40 MILLIGRAM(S): 20 TABLET, FILM COATED ORAL at 22:35

## 2025-02-17 RX ADMIN — ENOXAPARIN SODIUM 40 MILLIGRAM(S): 100 INJECTION SUBCUTANEOUS at 05:10

## 2025-02-17 RX ADMIN — DULOXETINE 30 MILLIGRAM(S): 20 CAPSULE, DELAYED RELEASE ORAL at 12:29

## 2025-02-17 RX ADMIN — Medication 2 TABLET(S): at 22:35

## 2025-02-17 RX ADMIN — Medication 400 MILLIGRAM(S): at 06:26

## 2025-02-17 RX ADMIN — Medication 400 MILLIGRAM(S): at 13:24

## 2025-02-17 RX ADMIN — Medication 40 MILLIGRAM(S): at 05:10

## 2025-02-17 RX ADMIN — Medication 400 MILLIGRAM(S): at 23:23

## 2025-02-17 RX ADMIN — OXYCODONE HYDROCHLORIDE 5 MILLIGRAM(S): 30 TABLET ORAL at 23:25

## 2025-02-17 RX ADMIN — OXYCODONE HYDROCHLORIDE 5 MILLIGRAM(S): 30 TABLET ORAL at 22:35

## 2025-02-17 RX ADMIN — Medication 400 MILLIGRAM(S): at 12:29

## 2025-02-17 NOTE — PROGRESS NOTE ADULT - SUBJECTIVE AND OBJECTIVE BOX
Patient seen and examined at bedside. Pain well controlled with medication. Patient denies any numbness, tingling, weakness, or any other orthopaedic complaint. Denies N/V/CP/SOB.    LABS:                        12.7   9.31  )-----------( 240      ( 17 Feb 2025 07:03 )             41.6     02-17    139  |  103  |  16  ----------------------------<  175[H]  3.9   |  22  |  0.89    Ca    9.5      17 Feb 2025 07:01  Phos  3.4     02-17  Mg     1.8     02-17    TPro  7.0  /  Alb  4.2  /  TBili  0.2  /  DBili  x   /  AST  22  /  ALT  28  /  AlkPhos  95  02-15      Urinalysis Basic - ( 17 Feb 2025 07:01 )    Color: x / Appearance: x / SG: x / pH: x  Gluc: 175 mg/dL / Ketone: x  / Bili: x / Urobili: x   Blood: x / Protein: x / Nitrite: x   Leuk Esterase: x / RBC: x / WBC x   Sq Epi: x / Non Sq Epi: x / Bacteria: x        VITAL SIGNS:  T(C): 36.7 (02-17-25 @ 04:58), Max: 36.9 (02-16-25 @ 20:59)  HR: 86 (02-17-25 @ 04:58) (72 - 90)  BP: 134/81 (02-17-25 @ 04:58) (102/66 - 134/81)  RR: 19 (02-17-25 @ 04:58) (18 - 19)  SpO2: 98% (02-17-25 @ 04:58) (96% - 98%)    Physical Exam:  Gen: NAD    Spine Exam:  Skin intact, surgical incisions well healed  Negative clonus  Negative babinski  Negative briseno  No calf TTP    Motor:               C5           C6            C7               C8           T1   R         5/5          5/5            5/5             5/5          5/5  L          5/5          5/5            5/5             5/5          5/5                L2             L3             L4               L5            S1  R         5/5           5/5          5/5             5/5           5/5  L          5/5          5/5           5/5             5/5           5/5    Sensory:            C5         C6         C7      C8       T1        (0=absent, 1=impaired, 2=normal, NT=not testable)  R         2            2           2        2         2  L          2            2           2        2         2               L2          L3         L4      L5       S1         (0=absent, 1=impaired, 2=normal, NT=not testable)  R         1            1            0        0        0  L          2            2           2        2         2    IMAGING:  MR T Spine-  IMPRESSION:  Multiple degenerative changes in the cervical spine including right C3-C4   facet joint arthrosis contributing to severe right neuroforaminal   stenosis.    Postsurgical changes spanning L4-S1 as described above. Prior removal of   posterior fusion hardware at these levels with enhancement in the right   posterior paraspinal soft tissues. This could represent postsurgical   changes but superimposed infectious process cannot be ruled out. No   abscess identified. No significant thoracolumbar spinal canal stenosis.        49yFemale w/ chronic lower back pain that is worsening, s/p 2 stage unilateral L4-S1 ALIF and PSF in 2023 and DUNG R L4-S1 in Oct 2024 w Dr. Pena.    Plan:  -FU MRI CTLsp w and w/out contrast  -WBAT  -f/u ESR/CRP  -pain control  -incentive spirometry  -PT/OT  -ortho to follow    -Will discuss with Dr. Pena and update plan accordingly     Patient seen and examined at bedside. Pain well controlled with medication. Patient denies any numbness, tingling, weakness, or any other orthopaedic complaint. Denies N/V/CP/SOB.    LABS:                        12.7   9.31  )-----------( 240      ( 17 Feb 2025 07:03 )             41.6     02-17    139  |  103  |  16  ----------------------------<  175[H]  3.9   |  22  |  0.89    Ca    9.5      17 Feb 2025 07:01  Phos  3.4     02-17  Mg     1.8     02-17    TPro  7.0  /  Alb  4.2  /  TBili  0.2  /  DBili  x   /  AST  22  /  ALT  28  /  AlkPhos  95  02-15      Urinalysis Basic - ( 17 Feb 2025 07:01 )    Color: x / Appearance: x / SG: x / pH: x  Gluc: 175 mg/dL / Ketone: x  / Bili: x / Urobili: x   Blood: x / Protein: x / Nitrite: x   Leuk Esterase: x / RBC: x / WBC x   Sq Epi: x / Non Sq Epi: x / Bacteria: x        VITAL SIGNS:  T(C): 36.7 (02-17-25 @ 04:58), Max: 36.9 (02-16-25 @ 20:59)  HR: 86 (02-17-25 @ 04:58) (72 - 90)  BP: 134/81 (02-17-25 @ 04:58) (102/66 - 134/81)  RR: 19 (02-17-25 @ 04:58) (18 - 19)  SpO2: 98% (02-17-25 @ 04:58) (96% - 98%)    Physical Exam:  Gen: NAD    Spine Exam:  +Ruthie test on the Right hip  Skin intact, surgical incisions well healed  Negative clonus  Negative babinski  Negative briseno  No calf TTP    Motor:               C5           C6            C7               C8           T1   R         5/5          5/5            5/5             5/5          5/5  L          5/5          5/5            5/5             5/5          5/5                L2             L3             L4               L5            S1  R         5/5           5/5          5/5             5/5           5/5  L          5/5          5/5           5/5             5/5           5/5    Sensory:            C5         C6         C7      C8       T1        (0=absent, 1=impaired, 2=normal, NT=not testable)  R         2            2           2        2         2  L          2            2           2        2         2               L2          L3         L4      L5       S1         (0=absent, 1=impaired, 2=normal, NT=not testable)  R         1            1            0        0        0  L          2            2           2        2         2    IMAGING:  MR T Spine-  IMPRESSION:  Multiple degenerative changes in the cervical spine including right C3-C4   facet joint arthrosis contributing to severe right neuroforaminal   stenosis.    Postsurgical changes spanning L4-S1 as described above. Prior removal of   posterior fusion hardware at these levels with enhancement in the right   posterior paraspinal soft tissues. This could represent postsurgical   changes but superimposed infectious process cannot be ruled out. No   abscess identified. No significant thoracolumbar spinal canal stenosis.        49yFemale w/ chronic lower back pain that is worsening, s/p 2 stage unilateral L4-S1 ALIF and PSF in 2023 and DUNG R L4-S1 in Oct 2024 w Dr. Pena.    Plan:  -FU MRI pelvis to rule out sacroiliitis given +Ruthie  -WBAT  -f/u ESR/CRP  -pain control  -incentive spirometry  -PT/OT  -ortho to follow    -Will discuss with Dr. Pena and update plan accordingly

## 2025-02-17 NOTE — CONSULT NOTE ADULT - ATTENDING COMMENTS
This is a 49F with PMH DM, spinal stenosis/radiculopathy s/p fusion in 2023 and removal of hardware in October 2024, has been having persistent low back pain and mild radiculopathic pain since DUNG but woke up this past Friday with severe low back pain radiating down the posterior aspects of both legs. Due to severe pain she came to the hospital, treated with pain medications in the ED with some relief but pain persists while admitted. Seen by Orthopedics and MRI L spine shows postoperative changes and enhancement of the right posterior paraspinal soft tissues. Neurology consulted.    exam  General: Appears in pain    Mental Status:  A&Ox3.    Cranial Nerves: VF intact, PERRL, EOMI, normal sensation bilaterally, no facial weakness, hearing intact, palate midline, SCM normal, tongue protrudes to midline.    Motor:  Normal tone, bulk and power throughout including arms and legs, proximal and distal. No abnormal movements.    Sensation: Decreased to sharp/soft from bilateral mid-shins distally. Reduced vibration to ankles    Reflexes: 2+ bilateral upper extremities and patellas, absent at both achilles    Imp: This is a 49F with PMH DM, spinal stenosis/radiculopathy s/p fusion in 2023 and removal of hardware in October 2024 who was admitted for worsening back pain radiating down the legs. Patients exam largely reassuring with evidence of neuropathy vs significant radiculopathy (can be further elucidated as an outpatient with EMG). No evidence of myelopathy or rapidly progressive neuropathy.    - Pain control per primary team  - If there are further specific questions or concerns please feel free to contact us again    Will sign off

## 2025-02-17 NOTE — PROGRESS NOTE ADULT - SUBJECTIVE AND OBJECTIVE BOX
[facility] Division of Hospital Medicine  Lianne Rm MD  Available via MS Teams    SUBJECTIVE / OVERNIGHT EVENTS: No acute events overnight. Patient still with similar pain on right lower back radiating down with no improvement. Denies any new pain elsewhere. No chest pain or SOB. Some constipation. No other concerns or complaints at this time.     Review of Systems:   CONSTITUTIONAL: No fever   EYES: No eye pain, visual disturbances, or discharge  ENMT: No difficulty hearing   RESPIRATORY: No SOB. No cough   CARDIOVASCULAR: No chest pain   GASTROINTESTINAL: No abdominal or epigastric pain. No nausea, vomiting, or hematemesis; constipation   GENITOURINARY: No dysuria   NEUROLOGICAL: No headache   SKIN: No itching   MUSCULOSKELETAL: some right lower back pain radiating down   PSYCHIATRIC: No depression or anxiety   HEME/LYMPH: No easy bruising or bleeding gums      MEDICATIONS  (STANDING):  dextrose 5%. 1000 milliLiter(s) (50 mL/Hr) IV Continuous <Continuous>  dextrose 5%. 1000 milliLiter(s) (100 mL/Hr) IV Continuous <Continuous>  dextrose 50% Injectable 25 Gram(s) IV Push once  dextrose 50% Injectable 12.5 Gram(s) IV Push once  dextrose 50% Injectable 25 Gram(s) IV Push once  DULoxetine 30 milliGRAM(s) Oral daily  enoxaparin Injectable 40 milliGRAM(s) SubCutaneous every 24 hours  glucagon  Injectable 1 milliGRAM(s) IntraMuscular once  ibuprofen  Tablet. 400 milliGRAM(s) Oral every 6 hours  influenza   Vaccine 0.5 milliLiter(s) IntraMuscular once  insulin lispro (ADMELOG) corrective regimen sliding scale   SubCutaneous three times a day before meals  insulin lispro (ADMELOG) corrective regimen sliding scale   SubCutaneous at bedtime  lidocaine   4% Patch 1 Patch Transdermal daily  melatonin 5 milliGRAM(s) Oral at bedtime  metoprolol tartrate 25 milliGRAM(s) Oral two times a day  naloxone Injectable 0.4 milliGRAM(s) IV Push once  pantoprazole    Tablet 40 milliGRAM(s) Oral before breakfast  polyethylene glycol 3350 17 Gram(s) Oral daily  rosuvastatin 40 milliGRAM(s) Oral at bedtime  senna 2 Tablet(s) Oral at bedtime    MEDICATIONS  (PRN):  acetaminophen     Tablet .. 650 milliGRAM(s) Oral every 6 hours PRN Temp greater or equal to 38C (100.4F), Mild Pain (1 - 3)  bisacodyl 5 milliGRAM(s) Oral daily PRN Constipation  dextrose Oral Gel 15 Gram(s) Oral once PRN Blood Glucose LESS THAN 70 milliGRAM(s)/deciliter  oxyCODONE    IR 5 milliGRAM(s) Oral every 4 hours PRN Moderate Pain (4 - 6)  oxyCODONE    IR 10 milliGRAM(s) Oral every 4 hours PRN Severe Pain (7 - 10)      PHYSICAL EXAM:  Vital Signs Last 24 Hrs  T(C): 36.7 (17 Feb 2025 04:58), Max: 36.9 (16 Feb 2025 20:59)  T(F): 98 (17 Feb 2025 04:58), Max: 98.4 (16 Feb 2025 20:59)  HR: 86 (17 Feb 2025 04:58) (72 - 90)  BP: 134/81 (17 Feb 2025 04:58) (102/66 - 134/81)  RR: 19 (17 Feb 2025 04:58) (18 - 19)  SpO2: 98% (17 Feb 2025 04:58) (96% - 98%)    Parameters below as of 17 Feb 2025 04:58  Patient On (Oxygen Delivery Method): room air      CONSTITUTIONAL: NAD, well-developed   EYES: PERRLA; conjunctiva and sclera clear  ENMT: Moist oral mucosa, no pharyngeal injection or exudates  NECK: Supple   RESPIRATORY: Normal respiratory effort; lungs are clear to auscultation bilaterally  CARDIOVASCULAR: Regular rate and rhythm, normal S1 and S2   ABDOMEN: Nontender to palpation, normoactive bowel sounds   MUSCULOSKELETAL: no clubbing or cyanosis of digits; tenderness to palpation around right lower back   PSYCH: A+O to person, place, and time; affect appropriate  NEUROLOGY: no gross sensory deficits   SKIN: No rashes    LABS:                        12.7   9.31  )-----------( 240      ( 17 Feb 2025 07:03 )             41.6     02-17    139  |  103  |  16  ----------------------------<  175[H]  3.9   |  22  |  0.89    Ca    9.5      17 Feb 2025 07:01  Phos  3.4     02-17  Mg     1.8     02-17    TPro  7.0  /  Alb  4.2  /  TBili  0.2  /  DBili  x   /  AST  22  /  ALT  28  /  AlkPhos  95  02-15      Urinalysis Basic - ( 17 Feb 2025 07:01 )    Color: x / Appearance: x / SG: x / pH: x  Gluc: 175 mg/dL / Ketone: x  / Bili: x / Urobili: x   Blood: x / Protein: x / Nitrite: x   Leuk Esterase: x / RBC: x / WBC x   Sq Epi: x / Non Sq Epi: x / Bacteria: x      RADIOLOGY & ADDITIONAL TESTS:  New Imaging Personally Reviewed Today:  < from: MR Thoracic Spine w/wo IV Cont (02.16.25 @ 13:40) >  IMPRESSION:  Multiple degenerative changes in the cervical spine including right C3-C4   facet joint arthrosis contributing to severe right neuroforaminal   stenosis.    Postsurgical changes spanning L4-S1 as described above. Prior removal of   posterior fusion hardware at these levels with enhancement in the right   posterior paraspinal soft tissues. This could represent postsurgical   changes but superimposed infectious process cannot be ruled out. No   abscess identified. No significant thoracolumbar spinal canal stenosis.    < end of copied text >    New Electrocardiogram Personally Reviewed Today:  Other Results Reviewed Today:   Prior or Outpatient Records Reviewed Today with Summary:    COORDINATION OF CARE:  Consultant Communication and Details of Discussion (where applicable):

## 2025-02-17 NOTE — CONSULT NOTE ADULT - SUBJECTIVE AND OBJECTIVE BOX
Neurology - Consult Note    -  Spectra: [phone] ([facility]), [phone] ([facility])  -    HPI: Patient NINA HINTON is a 49y (1975) wo/man with a PMHx significant for ***      Review of Systems:  INCOMPLETE   CONSTITUTIONAL: No fevers or chills  EYES AND ENT: No visual changes or no throat pain   NECK: No pain or stiffness  RESPIRATORY: No hemoptysis or shortness of breath  CARDIOVASCULAR: No chest pain or palpitations  GASTROINTESTINAL: No melena or hematochezia  GENITOURINARY: No dysuria or hematuria  NEUROLOGICAL: +As stated in HPI above  SKIN: No itching, burning, rashes, or lesions   All other review of systems is negative unless indicated above.    Allergies:  penicillin (Pruritus; Rash)      PMHx/PSHx/Family Hx: As above, otherwise see below   No pertinent past medical history    HLD (hyperlipidemia)    Spinal stenosis, lumbosacral region    Former smoker    Current smoker        Social Hx:  No current use of tobacco, alcohol, or illicit drugs  Lives with ***    Medications:  MEDICATIONS  (STANDING):  dextrose 5%. 1000 milliLiter(s) (50 mL/Hr) IV Continuous <Continuous>  dextrose 5%. 1000 milliLiter(s) (100 mL/Hr) IV Continuous <Continuous>  dextrose 50% Injectable 25 Gram(s) IV Push once  dextrose 50% Injectable 12.5 Gram(s) IV Push once  dextrose 50% Injectable 25 Gram(s) IV Push once  DULoxetine 30 milliGRAM(s) Oral daily  enoxaparin Injectable 40 milliGRAM(s) SubCutaneous every 24 hours  glucagon  Injectable 1 milliGRAM(s) IntraMuscular once  ibuprofen  Tablet. 400 milliGRAM(s) Oral every 6 hours  influenza   Vaccine 0.5 milliLiter(s) IntraMuscular once  insulin lispro (ADMELOG) corrective regimen sliding scale   SubCutaneous three times a day before meals  insulin lispro (ADMELOG) corrective regimen sliding scale   SubCutaneous at bedtime  lidocaine   4% Patch 1 Patch Transdermal daily  melatonin 5 milliGRAM(s) Oral at bedtime  metoprolol tartrate 25 milliGRAM(s) Oral two times a day  naloxone Injectable 0.4 milliGRAM(s) IV Push once  pantoprazole    Tablet 40 milliGRAM(s) Oral before breakfast  polyethylene glycol 3350 17 Gram(s) Oral daily  rosuvastatin 40 milliGRAM(s) Oral at bedtime  senna 2 Tablet(s) Oral at bedtime    MEDICATIONS  (PRN):  acetaminophen     Tablet .. 650 milliGRAM(s) Oral every 6 hours PRN Temp greater or equal to 38C (100.4F), Mild Pain (1 - 3)  bisacodyl 5 milliGRAM(s) Oral daily PRN Constipation  dextrose Oral Gel 15 Gram(s) Oral once PRN Blood Glucose LESS THAN 70 milliGRAM(s)/deciliter  oxyCODONE    IR 5 milliGRAM(s) Oral every 4 hours PRN Moderate Pain (4 - 6)  oxyCODONE    IR 10 milliGRAM(s) Oral every 4 hours PRN Severe Pain (7 - 10)      Vitals:  T(C): 36.7 (02-17-25 @ 04:58), Max: 36.9 (02-16-25 @ 20:59)  HR: 86 (02-17-25 @ 04:58) (72 - 90)  BP: 134/81 (02-17-25 @ 04:58) (102/66 - 134/81)  RR: 19 (02-17-25 @ 04:58) (18 - 19)  SpO2: 98% (02-17-25 @ 04:58) (96% - 98%)    Physical Examination: INCOMPLETE  General - NAD, pleasant, cooperative   Cardiovascular - Peripheral pulses palpable, no edema  Neurologic Exam:  Mental status - Awake, Alert, Oriented to person, place, and time. Speech fluent, repetition and naming intact. Follows simple and complex commands. Attention/concentration, recent and remote memory (including registration and recall), and fund of knowledge intact    Cranial nerves:  CN II: Visual fields are full to confrontation. Fundoscopic exam is normal with sharp discs. Pupils are 4 mm and briskly reactive to light. Visual acuity is 20/20 bilaterally.  CN III, IV, VI: EOMI, no nystagmus, no ptosis  CN V: Facial sensation is intact to pinprick in all 3 divisions bilaterally.  CN VII: Face is symmetric with normal eye closure and smile.  CN VII: Hearing is normal to rubbing fingers  CN IX, X: Palate elevates symmetrically. Phonation is normal.  CN XI: Head turning and shoulder shrug are intact  CN XII: Tongue is midline with normal movements and no atrophy.    Motor - Normal bulk and tone throughout. No pronator drift of out-stretched arms.  Strength testing            Deltoid(C5)  Biceps(C6)    Triceps(C7)     Wrist Extension    Wrist Flexion (C8)     Interossei  (T1)       R            5                 5                        5                     5                              5                                      5                         5  L             5                 5                        5                     5                              5                                      5                          5              Hip Flexion(L2/3)    Hip Extension (L4/5)   Knee Flexion (L4/5/S1)    Knee Extension (L3/4)   Dorsiflexion (L4/5)   Plantar Flexion (S1)  R              5                                    5                                     5                                                     5                                              5                          5  L              5                                     5                                     5                                                     5                                              5                          5    Sensation - Light touch/temperature OR pain/vibration intact in fingers and toes     DTR's -             Biceps      Triceps     Brachioradialis      Patellar    Ankle    Toes/plantar response  R             2+             2+                  2+                       2+            2+                 Down  L              2+             2+                 2+                        2+           2+                 Down    Coordination - Rapid alternating movements and fine finger movements are intact. There is no dysmetria on finger-to-nose and heel-knee-shin. There are no abnormal or extraneous movements. Romberg?    Gait and station - Posture is normal. Gait is steady with normal steps, base, arm swing, and turning. Heel and toe walking are normal. Tandem gait is normal       Labs:                        12.7   9.31  )-----------( 240      ( 17 Feb 2025 07:03 )             41.6     02-17    139  |  103  |  16  ----------------------------<  175[H]  3.9   |  22  |  0.89    Ca    9.5      17 Feb 2025 07:01  Phos  3.4     02-17  Mg     1.8     02-17    TPro  7.0  /  Alb  4.2  /  TBili  0.2  /  DBili  x   /  AST  22  /  ALT  28  /  AlkPhos  95  02-15    CAPILLARY BLOOD GLUCOSE      POCT Blood Glucose.: 178 mg/dL (17 Feb 2025 08:21)    LIVER FUNCTIONS - ( 15 Feb 2025 22:44 )  Alb: 4.2 g/dL / Pro: 7.0 g/dL / ALK PHOS: 95 U/L / ALT: 28 U/L / AST: 22 U/L / GGT: x               CSF:                  Radiology:     Neurology - Consult Note    -  Spectra: [phone] ([facility]), [phone] ([facility])  -    HPI: Patient NINA HINTON is a 49y (1975) woman with a PMHx significant for HTN, DM , spinal stenosis s/p L4-S1 decompression/fusion ALIF and PSF (L4-S1) in 2023 and removal of hardware - 2024 (residual numbness of of the RLE distal> proximal). Here for worsening back pain radiating down the legs. Seen by ortho spine service, MRI Spine repeated with paraspinal soft tissue hyperintensity reported as post surgical changes vs infection. Neurology consulted by spine surgery. ESR- 35, CRP- 8, A1c- 7.8.    At the time of evaluation patient reports that on friday, 2/14 she developed shooting pain down both legs R>L which was new since her surgery ( had it prior to surgery but had resolved). Reports improvement in the pain. It is now localised to the R. side of her back ( stabbing pain)since starting pain medication, is able to walk around with a walker but pain limited. Denies any bowel or bladder involvement or new saddle anesthesia. MRI without any cord involvement of cauda equina. No fevers or chills.       Allergies:  penicillin (Pruritus; Rash)      PMHx/PSHx/Family Hx: As above, otherwise see below   No pertinent past medical history    HLD (hyperlipidemia)    Spinal stenosis, lumbosacral region    Former smoker    Current smoker        Social Hx:  No current use of tobacco, alcohol, or illicit drugs    Medications:  MEDICATIONS  (STANDING):  dextrose 5%. 1000 milliLiter(s) (50 mL/Hr) IV Continuous <Continuous>  dextrose 5%. 1000 milliLiter(s) (100 mL/Hr) IV Continuous <Continuous>  dextrose 50% Injectable 25 Gram(s) IV Push once  dextrose 50% Injectable 12.5 Gram(s) IV Push once  dextrose 50% Injectable 25 Gram(s) IV Push once  DULoxetine 30 milliGRAM(s) Oral daily  enoxaparin Injectable 40 milliGRAM(s) SubCutaneous every 24 hours  glucagon  Injectable 1 milliGRAM(s) IntraMuscular once  ibuprofen  Tablet. 400 milliGRAM(s) Oral every 6 hours  influenza   Vaccine 0.5 milliLiter(s) IntraMuscular once  insulin lispro (ADMELOG) corrective regimen sliding scale   SubCutaneous three times a day before meals  insulin lispro (ADMELOG) corrective regimen sliding scale   SubCutaneous at bedtime  lidocaine   4% Patch 1 Patch Transdermal daily  melatonin 5 milliGRAM(s) Oral at bedtime  metoprolol tartrate 25 milliGRAM(s) Oral two times a day  naloxone Injectable 0.4 milliGRAM(s) IV Push once  pantoprazole    Tablet 40 milliGRAM(s) Oral before breakfast  polyethylene glycol 3350 17 Gram(s) Oral daily  rosuvastatin 40 milliGRAM(s) Oral at bedtime  senna 2 Tablet(s) Oral at bedtime    MEDICATIONS  (PRN):  acetaminophen     Tablet .. 650 milliGRAM(s) Oral every 6 hours PRN Temp greater or equal to 38C (100.4F), Mild Pain (1 - 3)  bisacodyl 5 milliGRAM(s) Oral daily PRN Constipation  dextrose Oral Gel 15 Gram(s) Oral once PRN Blood Glucose LESS THAN 70 milliGRAM(s)/deciliter  oxyCODONE    IR 5 milliGRAM(s) Oral every 4 hours PRN Moderate Pain (4 - 6)  oxyCODONE    IR 10 milliGRAM(s) Oral every 4 hours PRN Severe Pain (7 - 10)      Vitals:  T(C): 36.7 (02-17-25 @ 04:58), Max: 36.9 (02-16-25 @ 20:59)  HR: 86 (02-17-25 @ 04:58) (72 - 90)  BP: 134/81 (02-17-25 @ 04:58) (102/66 - 134/81)  RR: 19 (02-17-25 @ 04:58) (18 - 19)  SpO2: 98% (02-17-25 @ 04:58) (96% - 98%)    Physical Examination:   General - NAD  Cardiovascular - No edema  Neurologic Exam:  Mental status - Awake, Alert, Oriented to conversation. Speech fluent. Follows simple commands.    Cranial nerves:  CN II: Pupils are 4 mm and briskly reactive to light.   CN III, IV, VI: EOMI, no nystagmus, no ptosis  CN VII: Face is symmetric with normal eye closure and smile.  CN XI: Head turning and shoulder shrug are intact  CN XII: Tongue is midline with normal movements and no atrophy.    Motor - Normal bulk and tone throughout. R. paraspinal tenderness   Strength testing            Deltoid(C5)  Biceps(C6)    Triceps(C7)     Wrist Extension    Wrist Flexion (C8)     Interossei  (T1)       R            5                 5                        5                     5                              5               5                         5  L             5                 5                        5                     5                              5              5                          5              Hip Flexion(L2/3)    Hip Extension (L4/5)   Knee Flexion (L4/5/S1)    Knee Extension (L3/4)             Dorsiflexion (L4/5)   Plantar Flexion (S1)  R              5                                    5                                     5                           5                                              5                          5  L              5                                     5                                     5                           5                                              5                          5    Sensation - Light touch and pin prick reduced over RLE distal > proximal ( gluteal region, posterior thigh > anterior thigh , distal leg and foot)  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    Gait and station - Not tested, pain limited.       Labs:                        12.7   9.31  )-----------( 240      ( 17 Feb 2025 07:03 )             41.6     02-17    139  |  103  |  16  ----------------------------<  175[H]  3.9   |  22  |  0.89    Ca    9.5      17 Feb 2025 07:01  Phos  3.4     02-17  Mg     1.8     02-17    TPro  7.0  /  Alb  4.2  /  TBili  0.2  /  DBili  x   /  AST  22  /  ALT  28  /  AlkPhos  95  02-15    CAPILLARY BLOOD GLUCOSE      POCT Blood Glucose.: 178 mg/dL (17 Feb 2025 08:21)    LIVER FUNCTIONS - ( 15 Feb 2025 22:44 )  Alb: 4.2 g/dL / Pro: 7.0 g/dL / ALK PHOS: 95 U/L / ALT: 28 U/L / AST: 22 U/L / GGT: x             Radiology:    MRI C, T and L spine 2/16    IMPRESSION:  Multiple degenerative changes in the cervical spine including right C3-C4   facet joint arthrosis contributing to severe right neuroforaminal   stenosis.    Postsurgical changes spanning L4-S1 as described above. Prior removal of   posterior fusion hardware at these levels with enhancement in the right   posterior paraspinal soft tissues. This could represent postsurgical   changes but superimposed infectious process cannot be ruled out. No   abscess identified. No significant thoracolumbar spinal canal stenosis.

## 2025-02-18 LAB
GLUCOSE BLDC GLUCOMTR-MCNC: 134 MG/DL — HIGH (ref 70–99)
GLUCOSE BLDC GLUCOMTR-MCNC: 180 MG/DL — HIGH (ref 70–99)
GLUCOSE BLDC GLUCOMTR-MCNC: 194 MG/DL — HIGH (ref 70–99)
GLUCOSE BLDC GLUCOMTR-MCNC: 217 MG/DL — HIGH (ref 70–99)

## 2025-02-18 PROCEDURE — 72195 MRI PELVIS W/O DYE: CPT | Mod: 26

## 2025-02-18 PROCEDURE — 99232 SBSQ HOSP IP/OBS MODERATE 35: CPT

## 2025-02-18 PROCEDURE — 99222 1ST HOSP IP/OBS MODERATE 55: CPT

## 2025-02-18 RX ORDER — SITAGLIPTIN 100 MG/1
1 TABLET, FILM COATED ORAL
Refills: 0 | DISCHARGE

## 2025-02-18 RX ORDER — ACETAMINOPHEN 500 MG/5ML
975 LIQUID (ML) ORAL EVERY 8 HOURS
Refills: 0 | Status: DISCONTINUED | OUTPATIENT
Start: 2025-02-18 | End: 2025-02-24

## 2025-02-18 RX ORDER — ROSUVASTATIN CALCIUM 20 MG/1
1 TABLET, FILM COATED ORAL
Refills: 0 | DISCHARGE

## 2025-02-18 RX ORDER — METOPROLOL SUCCINATE 50 MG/1
1 TABLET, EXTENDED RELEASE ORAL
Refills: 0 | DISCHARGE

## 2025-02-18 RX ORDER — BISACODYL 5 MG
10 TABLET, DELAYED RELEASE (ENTERIC COATED) ORAL ONCE
Refills: 0 | Status: COMPLETED | OUTPATIENT
Start: 2025-02-18 | End: 2025-02-19

## 2025-02-18 RX ORDER — DAPAGLIFLOZIN 5 MG/1
1 TABLET, FILM COATED ORAL
Refills: 0 | DISCHARGE

## 2025-02-18 RX ORDER — POLYETHYLENE GLYCOL 3350 17 G/17G
17 POWDER, FOR SOLUTION ORAL
Refills: 0 | Status: DISCONTINUED | OUTPATIENT
Start: 2025-02-18 | End: 2025-02-24

## 2025-02-18 RX ADMIN — Medication 600 MILLIGRAM(S): at 17:12

## 2025-02-18 RX ADMIN — DULOXETINE 30 MILLIGRAM(S): 20 CAPSULE, DELAYED RELEASE ORAL at 12:42

## 2025-02-18 RX ADMIN — Medication 975 MILLIGRAM(S): at 22:11

## 2025-02-18 RX ADMIN — Medication 400 MILLIGRAM(S): at 05:16

## 2025-02-18 RX ADMIN — Medication 400 MILLIGRAM(S): at 13:41

## 2025-02-18 RX ADMIN — ROSUVASTATIN CALCIUM 40 MILLIGRAM(S): 20 TABLET, FILM COATED ORAL at 21:41

## 2025-02-18 RX ADMIN — OXYCODONE HYDROCHLORIDE 10 MILLIGRAM(S): 30 TABLET ORAL at 17:12

## 2025-02-18 RX ADMIN — Medication 975 MILLIGRAM(S): at 21:41

## 2025-02-18 RX ADMIN — METOPROLOL SUCCINATE 25 MILLIGRAM(S): 50 TABLET, EXTENDED RELEASE ORAL at 05:16

## 2025-02-18 RX ADMIN — Medication 400 MILLIGRAM(S): at 17:47

## 2025-02-18 RX ADMIN — OXYCODONE HYDROCHLORIDE 10 MILLIGRAM(S): 30 TABLET ORAL at 17:47

## 2025-02-18 RX ADMIN — OXYCODONE HYDROCHLORIDE 10 MILLIGRAM(S): 30 TABLET ORAL at 06:16

## 2025-02-18 RX ADMIN — Medication 400 MILLIGRAM(S): at 17:12

## 2025-02-18 RX ADMIN — METOPROLOL SUCCINATE 25 MILLIGRAM(S): 50 TABLET, EXTENDED RELEASE ORAL at 17:47

## 2025-02-18 RX ADMIN — OXYCODONE HYDROCHLORIDE 10 MILLIGRAM(S): 30 TABLET ORAL at 05:16

## 2025-02-18 RX ADMIN — OXYCODONE HYDROCHLORIDE 5 MILLIGRAM(S): 30 TABLET ORAL at 17:13

## 2025-02-18 RX ADMIN — Medication 400 MILLIGRAM(S): at 06:16

## 2025-02-18 RX ADMIN — Medication 400 MILLIGRAM(S): at 12:44

## 2025-02-18 RX ADMIN — Medication 5 MILLIGRAM(S): at 21:41

## 2025-02-18 RX ADMIN — LIDOCAINE HYDROCHLORIDE 1 PATCH: 20 JELLY TOPICAL at 17:13

## 2025-02-18 RX ADMIN — Medication 975 MILLIGRAM(S): at 17:13

## 2025-02-18 RX ADMIN — POLYETHYLENE GLYCOL 3350 17 GRAM(S): 17 POWDER, FOR SOLUTION ORAL at 12:42

## 2025-02-18 RX ADMIN — LIDOCAINE HYDROCHLORIDE 1 PATCH: 20 JELLY TOPICAL at 06:38

## 2025-02-18 RX ADMIN — INSULIN LISPRO 1: 100 INJECTION, SOLUTION INTRAVENOUS; SUBCUTANEOUS at 08:39

## 2025-02-18 RX ADMIN — ENOXAPARIN SODIUM 40 MILLIGRAM(S): 100 INJECTION SUBCUTANEOUS at 05:18

## 2025-02-18 RX ADMIN — LIDOCAINE HYDROCHLORIDE 1 PATCH: 20 JELLY TOPICAL at 06:19

## 2025-02-18 RX ADMIN — Medication 2 TABLET(S): at 21:41

## 2025-02-18 RX ADMIN — Medication 400 MILLIGRAM(S): at 00:23

## 2025-02-18 RX ADMIN — LIDOCAINE HYDROCHLORIDE 1 PATCH: 20 JELLY TOPICAL at 12:42

## 2025-02-18 RX ADMIN — INSULIN LISPRO 1: 100 INJECTION, SOLUTION INTRAVENOUS; SUBCUTANEOUS at 12:39

## 2025-02-18 RX ADMIN — Medication 40 MILLIGRAM(S): at 05:16

## 2025-02-18 RX ADMIN — INSULIN LISPRO 2: 100 INJECTION, SOLUTION INTRAVENOUS; SUBCUTANEOUS at 17:48

## 2025-02-18 NOTE — PROGRESS NOTE ADULT - SUBJECTIVE AND OBJECTIVE BOX
Patient seen and examined at bedside. Pain well controlled with medication. Patient denies any numbness, tingling, weakness, or any other orthopaedic complaint. Denies N/V/CP/SOB.    Vital Signs (24 Hrs):  T(C): 36.7 (02-18-25 @ 04:42), Max: 36.8 (02-17-25 @ 17:03)  HR: 71 (02-18-25 @ 04:42) (71 - 83)  BP: 110/73 (02-18-25 @ 04:42) (95/61 - 114/74)  RR: 18 (02-18-25 @ 04:42) (18 - 19)  SpO2: 98% (02-18-25 @ 04:42) (96% - 98%)  Wt(kg): --    LABS:                          12.7   9.31  )-----------( 240      ( 17 Feb 2025 07:03 )             41.6     02-17    139  |  103  |  16  ----------------------------<  175[H]  3.9   |  22  |  0.89    Ca    9.5      17 Feb 2025 07:01  Phos  3.4     02-17  Mg     1.8     02-17            Physical Exam:  Gen: NAD    Spine Exam:  +Ruthie test on the Right hip  Skin intact, surgical incisions well healed  Negative clonus  Negative babinski  Negative briseno  No calf TTP    Motor:               C5           C6            C7               C8           T1   R         5/5          5/5            5/5             5/5          5/5  L          5/5          5/5            5/5             5/5          5/5                L2             L3             L4               L5            S1  R         5/5           5/5          5/5             5/5           5/5  L          5/5          5/5           5/5             5/5           5/5    Sensory:            C5         C6         C7      C8       T1        (0=absent, 1=impaired, 2=normal, NT=not testable)  R         2            2           2        2         2  L          2            2           2        2         2               L2          L3         L4      L5       S1         (0=absent, 1=impaired, 2=normal, NT=not testable)  R         1            1            0        0        0  L          2            2           2        2         2    IMAGING:  MR T Spine-  IMPRESSION:  Multiple degenerative changes in the cervical spine including right C3-C4   facet joint arthrosis contributing to severe right neuroforaminal   stenosis.    Postsurgical changes spanning L4-S1 as described above. Prior removal of   posterior fusion hardware at these levels with enhancement in the right   posterior paraspinal soft tissues. This could represent postsurgical   changes but superimposed infectious process cannot be ruled out. No   abscess identified. No significant thoracolumbar spinal canal stenosis.        49yFemale w/ chronic lower back pain that is worsening, s/p 2 stage unilateral L4-S1 ALIF and PSF in 2023 and DUNG R L4-S1 in Oct 2024 w Dr. Pena.    Plan:  -FU MRI pelvis to rule out sacroiliitis given +Ruthie  -WBAT  -f/u ESR/CRP  -pain control  -incentive spirometry  -PT/OT  -ortho to follow    -Will discuss with Dr. Pena and update plan accordingly

## 2025-02-18 NOTE — PROGRESS NOTE ADULT - SUBJECTIVE AND OBJECTIVE BOX
Mya Garcia MD  Division of Hospital Medicine  Reachable on MS Teams    PROGRESS NOTE:     Patient is a 49y old  Female who presents with a chief complaint of LBP (18 Feb 2025 13:38)      SUBJECTIVE / OVERNIGHT EVENTS: no acute events overnight, seen this AM, still having 9/10 pain. Pending MRI pelvis. Had small BM yesterday but amenable to trial of suppository.     ADDITIONAL REVIEW OF SYSTEMS:    MEDICATIONS  (STANDING):  acetaminophen     Tablet .. 975 milliGRAM(s) Oral every 8 hours  bisacodyl Suppository 10 milliGRAM(s) Rectal once  dextrose 5%. 1000 milliLiter(s) (50 mL/Hr) IV Continuous <Continuous>  dextrose 5%. 1000 milliLiter(s) (100 mL/Hr) IV Continuous <Continuous>  dextrose 50% Injectable 25 Gram(s) IV Push once  dextrose 50% Injectable 12.5 Gram(s) IV Push once  dextrose 50% Injectable 25 Gram(s) IV Push once  DULoxetine 30 milliGRAM(s) Oral daily  enoxaparin Injectable 40 milliGRAM(s) SubCutaneous every 24 hours  glucagon  Injectable 1 milliGRAM(s) IntraMuscular once  ibuprofen  Tablet. 400 milliGRAM(s) Oral every 6 hours  influenza   Vaccine 0.5 milliLiter(s) IntraMuscular once  insulin lispro (ADMELOG) corrective regimen sliding scale   SubCutaneous three times a day before meals  insulin lispro (ADMELOG) corrective regimen sliding scale   SubCutaneous at bedtime  lidocaine   4% Patch 1 Patch Transdermal daily  melatonin 5 milliGRAM(s) Oral at bedtime  metoprolol tartrate 25 milliGRAM(s) Oral two times a day  naloxone Injectable 0.4 milliGRAM(s) IV Push once  pantoprazole    Tablet 40 milliGRAM(s) Oral before breakfast  polyethylene glycol 3350 17 Gram(s) Oral two times a day  rosuvastatin 40 milliGRAM(s) Oral at bedtime  senna 2 Tablet(s) Oral at bedtime    MEDICATIONS  (PRN):  dextrose Oral Gel 15 Gram(s) Oral once PRN Blood Glucose LESS THAN 70 milliGRAM(s)/deciliter  oxyCODONE    IR 5 milliGRAM(s) Oral every 4 hours PRN Moderate Pain (4 - 6)  oxyCODONE    IR 10 milliGRAM(s) Oral every 4 hours PRN Severe Pain (7 - 10)      CAPILLARY BLOOD GLUCOSE      POCT Blood Glucose.: 180 mg/dL (18 Feb 2025 12:22)  POCT Blood Glucose.: 194 mg/dL (18 Feb 2025 08:18)  POCT Blood Glucose.: 178 mg/dL (17 Feb 2025 21:25)  POCT Blood Glucose.: 160 mg/dL (17 Feb 2025 17:18)    I&O's Summary    18 Feb 2025 07:01  -  18 Feb 2025 17:03  --------------------------------------------------------  IN: 240 mL / OUT: 0 mL / NET: 240 mL        PHYSICAL EXAM:  Vital Signs Last 24 Hrs  T(C): 36.7 (18 Feb 2025 12:30), Max: 36.7 (18 Feb 2025 04:42)  T(F): 98.1 (18 Feb 2025 12:30), Max: 98.1 (18 Feb 2025 12:30)  HR: 65 (18 Feb 2025 12:30) (65 - 71)  BP: 108/72 (18 Feb 2025 12:30) (106/71 - 110/73)  BP(mean): --  RR: 18 (18 Feb 2025 12:30) (18 - 18)  SpO2: 96% (18 Feb 2025 12:30) (96% - 98%)    Parameters below as of 18 Feb 2025 12:30  Patient On (Oxygen Delivery Method): room air        CONSTITUTIONAL: NAD, well-developed  RESPIRATORY: Normal respiratory effort; lungs are clear to auscultation bilaterally  CARDIOVASCULAR: Regular rate and rhythm, normal S1 and S2, no murmur/rub/gallop; No lower extremity edema  ABDOMEN: Nontender to palpation, normoactive bowel sounds, no rebound/guarding  MUSCLOSKELETAL: no clubbing or cyanosis of digits; no joint swelling or tenderness to palpation, spinal tenderness  PSYCH: A+O to person, place, and time; affect appropriate    LABS:                        12.7   9.31  )-----------( 240      ( 17 Feb 2025 07:03 )             41.6     02-17    139  |  103  |  16  ----------------------------<  175[H]  3.9   |  22  |  0.89    Ca    9.5      17 Feb 2025 07:01  Phos  3.4     02-17  Mg     1.8     02-17            Urinalysis Basic - ( 17 Feb 2025 07:01 )    Color: x / Appearance: x / SG: x / pH: x  Gluc: 175 mg/dL / Ketone: x  / Bili: x / Urobili: x   Blood: x / Protein: x / Nitrite: x   Leuk Esterase: x / RBC: x / WBC x   Sq Epi: x / Non Sq Epi: x / Bacteria: x          RADIOLOGY & ADDITIONAL TESTS:  Results Reviewed:   Imaging Personally Reviewed:  Electrocardiogram Personally Reviewed:    COORDINATION OF CARE:  Care Discussed with Consultants/Other Providers [Y/N]:  Prior or Outpatient Records Reviewed [Y/N]:

## 2025-02-18 NOTE — CONSULT NOTE ADULT - SUBJECTIVE AND OBJECTIVE BOX
Patient is a 49y old  Female who presents with a chief complaint of LBP (2025 09:24)    HPI:  Pt is a 49F w/ PMH of SS s/p 2 stage unilateral L4-S1 decompression/fusion (ALIF, PSF), exploration of spine w/ removal of hardware/R L4-S1 w/ in situ fusion 10/'24 pw acute on chronic LBP.     Reports hx of LBP w/ reported 80% numbness of right gluteal region w/ radiation down right leg since last procedure. Over the past day she reports worsening of the lower back pain mainly in the right lower back w/ radiation down b/l legs. Rated as a 10/10 at worst and described as arrows shooting at her back. Otherwise denies trauma, saddle anesthesia, or urinary/fecal incontinence.    In the ED VSS w/ CT L spine w/ out acute process. Evaluated by ortho w/ no reported acute surgical intervention. Administered Tylenol, Motrin, Oxy 5mg x2 w/ reported improvement in radiating pain, but persistent right LBP now a 9/10.    (15 Feb 2025 23:00)      49yF was admitted on 02-15, seen earlier today, patient continues to have pain.       REVIEW OF SYSTEMS  Denies chest pain, SOB, N/V, F/C, abdominal pain     VITALS  T(C): 36.7 (25 @ 12:30), Max: 36.8 (25 @ 17:03)  HR: 65 (25 @ 12:30) (65 - 83)  BP: 108/72 (25 @ 12:30) (106/71 - 114/74)  RR: 18 (25 @ 12:30) (18 - 18)  SpO2: 96% (25 @ 12:30) (96% - 98%)  Wt(kg): --    PAST MEDICAL & SURGICAL HISTORY  HLD (hyperlipidemia)    Spinal stenosis, lumbosacral region    Former smoker    Current smoker    S/P         FUNCTIONAL HISTORY  Lives with spouse, 6 steps to enter   Independent ADLs, used cane     CURRENT FUNCTIONAL STATUS  PT    bed mobility CG  transfers CG with SC  gait CG with SC, supervision with RW x 50 feet     RECENT LABS/IMAGING  CBC Full  -  ( 2025 07:03 )  WBC Count : 9.31 K/uL  RBC Count : 4.82 M/uL  Hemoglobin : 12.7 g/dL  Hematocrit : 41.6 %  Platelet Count - Automated : 240 K/uL  Mean Cell Volume : 86.3 fl  Mean Cell Hemoglobin : 26.3 pg  Mean Cell Hemoglobin Concentration : 30.5 g/dL  Auto Neutrophil # : x  Auto Lymphocyte # : x  Auto Monocyte # : x  Auto Eosinophil # : x  Auto Basophil # : x  Auto Neutrophil % : x  Auto Lymphocyte % : x  Auto Monocyte % : x  Auto Eosinophil % : x  Auto Basophil % : x        139  |  103  |  16  ----------------------------<  175[H]  3.9   |  22  |  0.89    Ca    9.5      2025 07:01  Phos  3.4       Mg     1.8           Urinalysis Basic - ( 2025 07:01 )    Color: x / Appearance: x / SG: x / pH: x  Gluc: 175 mg/dL / Ketone: x  / Bili: x / Urobili: x   Blood: x / Protein: x / Nitrite: x   Leuk Esterase: x / RBC: x / WBC x   Sq Epi: x / Non Sq Epi: x / Bacteria: x    < from: MR Thoracic Spine w/wo IV Cont (25 @ 13:40) >    IMPRESSION:  Multiple degenerative changes in the cervical spine including right C3-C4   facet joint arthrosis contributing to severe right neuroforaminal   stenosis.    Postsurgical changes spanning L4-S1 as described above. Prior removal of   posterior fusion hardware at these levels with enhancement in the right   posterior paraspinal soft tissues. This could represent postsurgical   changes but superimposed infectious process cannot be ruled out. No   abscess identified. No significant thoracolumbar spinal canal stenosis.      < end of copied text >      ALLERGIES  penicillin (Pruritus; Rash)      MEDICATIONS   acetaminophen     Tablet .. 650 milliGRAM(s) Oral every 6 hours PRN  bisacodyl 5 milliGRAM(s) Oral daily PRN  dextrose 5%. 1000 milliLiter(s) IV Continuous <Continuous>  dextrose 5%. 1000 milliLiter(s) IV Continuous <Continuous>  dextrose 50% Injectable 25 Gram(s) IV Push once  dextrose 50% Injectable 12.5 Gram(s) IV Push once  dextrose 50% Injectable 25 Gram(s) IV Push once  dextrose Oral Gel 15 Gram(s) Oral once PRN  DULoxetine 30 milliGRAM(s) Oral daily  enoxaparin Injectable 40 milliGRAM(s) SubCutaneous every 24 hours  glucagon  Injectable 1 milliGRAM(s) IntraMuscular once  ibuprofen  Tablet. 400 milliGRAM(s) Oral every 6 hours  influenza   Vaccine 0.5 milliLiter(s) IntraMuscular once  insulin lispro (ADMELOG) corrective regimen sliding scale   SubCutaneous three times a day before meals  insulin lispro (ADMELOG) corrective regimen sliding scale   SubCutaneous at bedtime  lidocaine   4% Patch 1 Patch Transdermal daily  melatonin 5 milliGRAM(s) Oral at bedtime  metoprolol tartrate 25 milliGRAM(s) Oral two times a day  naloxone Injectable 0.4 milliGRAM(s) IV Push once  oxyCODONE    IR 5 milliGRAM(s) Oral every 4 hours PRN  oxyCODONE    IR 10 milliGRAM(s) Oral every 4 hours PRN  pantoprazole    Tablet 40 milliGRAM(s) Oral before breakfast  polyethylene glycol 3350 17 Gram(s) Oral daily  rosuvastatin 40 milliGRAM(s) Oral at bedtime  senna 2 Tablet(s) Oral at bedtime      ----------------------------------------------------------------------------------------  PHYSICAL EXAM  Constitutional - NAD, Comfortable, in bed   Chest - Breathing comfortably on room air   Cardiovascular - S1S2   Extremities - No C/C/E, No calf tenderness   Neurologic Exam -    follows commands                 Cognitive - Awake, Alert, AAO to self, place, date, year, situation     Communication - Fluent, No dysarthria        Motor - No focal deficits                    LEFT    UE - ShAB 5/5, EF 5/5, EE 5/5, WE 5/5,  5/5                    RIGHT UE - ShAB 5/5, EF 5/5, EE 5/5, WE 5/5,  5/5                    LEFT    LE - HF 5/5, KE 5/5, DF 5/5, PF 5/5                    RIGHT LE - HF 5/5, KE 5/5, DF 5/5, PF         Sensory - Intact to LT     Psychiatric - Mood stable, Affect WNL  ----------------------------------------------------------------------------------------  ASSESSMENT/PLAN  49yFemale h/o spinal stenosis, s/p fusion in , removal of hardware  with functional deficits due to lower back pain  MRI lumbar spine as above, no sig stenosis   Pain - Tylenol oxycodone, ibuprofen, add muscle relaxant? methocarbamol, tizanidine, hot pack  DVT PPX - SCDs lovenox   Rehab -    patient requires CG with transfers, supervision with gait using RW   continue bedside therapy while admitted to prevent secondary complications of immobility, bed mobility, transfer training, progressive ambulation, equipment evaluation, ADLs   OOB throughout the day with staff, OOB to chair with meals/3 hours daily         Rehab recommendations are dependent on functional progress and participation with bedside therapy    patient is not a candidate for acute rehab, main issue is pain control, can be discharged home when medically stable with outpatient follow up    Will continue to follow for ongoing rehab needs and recommendations.       55 minutes spent, reviewing hospital course, therapy notes, relevant imaging, exam, education about inpatient rehabilitation, documentation, and discussion with  and rehabilitation team

## 2025-02-19 LAB
GLUCOSE BLDC GLUCOMTR-MCNC: 167 MG/DL — HIGH (ref 70–99)
GLUCOSE BLDC GLUCOMTR-MCNC: 190 MG/DL — HIGH (ref 70–99)
GLUCOSE BLDC GLUCOMTR-MCNC: 216 MG/DL — HIGH (ref 70–99)
GLUCOSE BLDC GLUCOMTR-MCNC: 247 MG/DL — HIGH (ref 70–99)

## 2025-02-19 PROCEDURE — 99233 SBSQ HOSP IP/OBS HIGH 50: CPT

## 2025-02-19 RX ORDER — METHOCARBAMOL 500 MG/1
750 TABLET, FILM COATED ORAL THREE TIMES A DAY
Refills: 0 | Status: DISCONTINUED | OUTPATIENT
Start: 2025-02-19 | End: 2025-02-24

## 2025-02-19 RX ORDER — GABAPENTIN 400 MG/1
300 CAPSULE ORAL THREE TIMES A DAY
Refills: 0 | Status: DISCONTINUED | OUTPATIENT
Start: 2025-02-19 | End: 2025-02-20

## 2025-02-19 RX ORDER — BISACODYL 5 MG
10 TABLET, DELAYED RELEASE (ENTERIC COATED) ORAL DAILY
Refills: 0 | Status: DISCONTINUED | OUTPATIENT
Start: 2025-02-19 | End: 2025-02-24

## 2025-02-19 RX ADMIN — Medication 5 MILLIGRAM(S): at 23:27

## 2025-02-19 RX ADMIN — Medication 975 MILLIGRAM(S): at 13:35

## 2025-02-19 RX ADMIN — INSULIN LISPRO 1: 100 INJECTION, SOLUTION INTRAVENOUS; SUBCUTANEOUS at 17:36

## 2025-02-19 RX ADMIN — DULOXETINE 30 MILLIGRAM(S): 20 CAPSULE, DELAYED RELEASE ORAL at 12:50

## 2025-02-19 RX ADMIN — Medication 975 MILLIGRAM(S): at 05:42

## 2025-02-19 RX ADMIN — METHOCARBAMOL 750 MILLIGRAM(S): 500 TABLET, FILM COATED ORAL at 22:28

## 2025-02-19 RX ADMIN — OXYCODONE HYDROCHLORIDE 5 MILLIGRAM(S): 30 TABLET ORAL at 05:41

## 2025-02-19 RX ADMIN — LIDOCAINE HYDROCHLORIDE 1 PATCH: 20 JELLY TOPICAL at 07:10

## 2025-02-19 RX ADMIN — Medication 2 TABLET(S): at 21:36

## 2025-02-19 RX ADMIN — ENOXAPARIN SODIUM 40 MILLIGRAM(S): 100 INJECTION SUBCUTANEOUS at 05:42

## 2025-02-19 RX ADMIN — Medication 400 MILLIGRAM(S): at 05:41

## 2025-02-19 RX ADMIN — Medication 10 MILLIGRAM(S): at 05:45

## 2025-02-19 RX ADMIN — Medication 400 MILLIGRAM(S): at 12:50

## 2025-02-19 RX ADMIN — Medication 400 MILLIGRAM(S): at 17:18

## 2025-02-19 RX ADMIN — INSULIN LISPRO 1: 100 INJECTION, SOLUTION INTRAVENOUS; SUBCUTANEOUS at 08:37

## 2025-02-19 RX ADMIN — Medication 400 MILLIGRAM(S): at 07:07

## 2025-02-19 RX ADMIN — POLYETHYLENE GLYCOL 3350 17 GRAM(S): 17 POWDER, FOR SOLUTION ORAL at 05:45

## 2025-02-19 RX ADMIN — OXYCODONE HYDROCHLORIDE 5 MILLIGRAM(S): 30 TABLET ORAL at 06:15

## 2025-02-19 RX ADMIN — Medication 400 MILLIGRAM(S): at 17:36

## 2025-02-19 RX ADMIN — Medication 400 MILLIGRAM(S): at 17:19

## 2025-02-19 RX ADMIN — Medication 975 MILLIGRAM(S): at 22:00

## 2025-02-19 RX ADMIN — Medication 40 MILLIGRAM(S): at 05:54

## 2025-02-19 RX ADMIN — ROSUVASTATIN CALCIUM 40 MILLIGRAM(S): 20 TABLET, FILM COATED ORAL at 21:35

## 2025-02-19 RX ADMIN — GABAPENTIN 300 MILLIGRAM(S): 400 CAPSULE ORAL at 21:35

## 2025-02-19 RX ADMIN — LIDOCAINE HYDROCHLORIDE 1 PATCH: 20 JELLY TOPICAL at 07:09

## 2025-02-19 RX ADMIN — INSULIN LISPRO 2: 100 INJECTION, SOLUTION INTRAVENOUS; SUBCUTANEOUS at 12:47

## 2025-02-19 RX ADMIN — Medication 975 MILLIGRAM(S): at 17:18

## 2025-02-19 RX ADMIN — METOPROLOL SUCCINATE 25 MILLIGRAM(S): 50 TABLET, EXTENDED RELEASE ORAL at 17:37

## 2025-02-19 RX ADMIN — LIDOCAINE HYDROCHLORIDE 1 PATCH: 20 JELLY TOPICAL at 12:50

## 2025-02-19 RX ADMIN — Medication 400 MILLIGRAM(S): at 23:27

## 2025-02-19 RX ADMIN — LIDOCAINE HYDROCHLORIDE 1 PATCH: 20 JELLY TOPICAL at 19:24

## 2025-02-19 RX ADMIN — Medication 400 MILLIGRAM(S): at 23:57

## 2025-02-19 RX ADMIN — Medication 975 MILLIGRAM(S): at 07:06

## 2025-02-19 RX ADMIN — Medication 975 MILLIGRAM(S): at 21:35

## 2025-02-19 NOTE — CONSULT NOTE ADULT - ASSESSMENT
ASSESSMENT & PLAN  49yFemale w/ chronic lower back pain that is worsening, s/p 2 stage unilateral L4-S1 ALIF and PSF in 2023 and DUNG R L4-S1 in Oct 2024 w Dr. Pena.    Plan:  -rec MRI CTLsp w and w/out contrast  -WBAT  -f/u ESR/CRP  -pain control  -incentive spirometry  -PT/OT  -ortho to follow      Nazanin Riddle, PGY-2  Orthopedic Surgery    [facility] [id number]  [facility]: [id number]  [facility]:  [id number]/ [phone]  
HPI:  49F w/ PMH of SS s/p 2 stage unilateral L4-S1 decompression/fusion (ALIF, PSF), exploration of spine w/ removal of hardware/R L4-S1 w/ in situ fusion 10/'24 pw acute on chronic LBP. Reports hx of LBP w/ reported 80% numbness of right gluteal region w/ radiation down right leg since last procedure. Over the past day she reports worsening of the lower back pain mainly in the right lower back w/ radiation down b/l legs. Rated as a 10/10 at worst and described as arrows shooting at her back. Otherwise denies trauma, saddle anesthesia, or urinary/fecal incontinence.    Current out- patient pain regimen: short RXs for Tramadol 50mg QD and QID; Oxy IR 10mg TID From hospital and PCP; however patient reports receiving longer RXs from her outpt pain doctor [not on iSTOP; tried different last name (Ryan) w/ only 1 short RX for OXY 5mg]  Out Patient Pain Management provider: Ezra Leavitt MD  [facility] Prescription Monitoring Program: Reference #[id number]  Opioid Risk Tool (ORT-OUD) Score: Low    Pain Score: 9/10    MR findings consistent w/ possible strain, low suspicion for infection per primary team.    Patient reports a shooting pain in her lower back for the past week, not improved w/ home meds. Pain radiates down her legs as well and worsened w/ movement. Alternates between Tramadol and Oxy at home. Recently started Flexeril but did not find it helpful. States she sees Dr. Leavitt for pain mgmt and he has prescribed her Tramadol many times in the past.     Plan discussed with primary team:  Continue Oxy IR 5mg PRN for moderate pain  Continue Oxy ER 10mg PRN for severe pain  Add methocarbamol 750mg TID PRN for spasms  Add gabapentin 300mg TID (pt has taken in past, does not recall dose), CrCl 88.1  Continue standing Tylenol 975 q8h  Continue standing Ibuprofen 400mg q6h as patient can tolerate; daily BMPs for Cr  Continue Cymbalta 30mg QD which can help w/ neuropathic pain; consider inc to 60mg QD if no improvement.    Continue Lidoderm  Warm/cool packs for comfort  Bowel Regimen per primary team  PT per primary team  Monitor for sedation, respiratory depression  Follow up with  Ezra Leavitt MD for continued pain management after discharge  Narcan Rescue Kit on discharge  (Naloxone 4 mg/0.1 ml nasal spray - 1 spray q 2-3 minutes alternating between nostrils)      Time spent on encounter:   50  Minutes    Chronic Pain Service  [phone]
ASSESSMENT   Patient NINA HINTON is a 49y (1975) woman with a PMHx significant for HTN, DM , spinal stenosis s/p L4-S1 decompression/fusion ALIF and PSF (L4-S1) in 2023 and removal of hardware - 2024 (residual numbness of of the RLE distal> proximal). Here for worsening back pain radiating down the legs. Seen by ortho spine service, MRI Spine repeated with paraspinal soft tissue hyperintensity reported as post surgical changes vs infection. Neurology consulted by spine surgery. Reports improvement in pain, neurology exam with intact motor strength, chronic sensory loss with superimposed axonal neuropathy pattern 2/2 diabetes. Reflexes normal except ankle. MRI with cord or nerve root compression.     IMPRESSION  Acute on chronic lower back pain without any new neurological symptoms or red flag features. Likely musculoskeletal.   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 paraspinal tenderness and MRI hyper intensity as per primary team and orthopedics   - PT/OT  - Fall precautions  - Strict diabetes control  - No further inpatient neurological workup  - Neurology will sign off. Please call [phone] if there are any questions    Patient seen and discussed with Dr. petty, neurology attending

## 2025-02-19 NOTE — PROGRESS NOTE ADULT - SUBJECTIVE AND OBJECTIVE BOX
Patient seen and examined at bedside. Pain well controlled with medication. Patient denies any numbness, tingling, weakness, or any other orthopaedic complaint. Denies N/V/CP/SOB.    LABS:                        12.7   9.31  )-----------( 240      ( 17 Feb 2025 07:03 )             41.6     02-17    139  |  103  |  16  ----------------------------<  175[H]  3.9   |  22  |  0.89    Ca    9.5      17 Feb 2025 07:01  Phos  3.4     02-17  Mg     1.8     02-17        Urinalysis Basic - ( 17 Feb 2025 07:01 )    Color: x / Appearance: x / SG: x / pH: x  Gluc: 175 mg/dL / Ketone: x  / Bili: x / Urobili: x   Blood: x / Protein: x / Nitrite: x   Leuk Esterase: x / RBC: x / WBC x   Sq Epi: x / Non Sq Epi: x / Bacteria: x        VITAL SIGNS:  T(C): 37.1 (02-19-25 @ 04:46), Max: 37.1 (02-19-25 @ 04:46)  HR: 77 (02-19-25 @ 04:46) (65 - 87)  BP: 101/62 (02-19-25 @ 04:46) (101/62 - 118/76)  RR: 18 (02-19-25 @ 04:46) (18 - 18)  SpO2: 95% (02-19-25 @ 04:46) (94% - 96%)    Physical Exam:  Gen: NAD    Spine Exam:  +Ruthie test on the Right hip  Skin intact, surgical incisions well healed  Negative clonus  Negative babinski  Negative briseno  No calf TTP    Motor:               C5           C6            C7               C8           T1   R         5/5          5/5            5/5             5/5          5/5  L          5/5          5/5            5/5             5/5          5/5                L2             L3             L4               L5            S1  R         5/5           5/5          5/5             5/5           5/5  L          5/5          5/5           5/5             5/5           5/5    Sensory:            C5         C6         C7      C8       T1        (0=absent, 1=impaired, 2=normal, NT=not testable)  R         2            2           2        2         2  L          2            2           2        2         2               L2          L3         L4      L5       S1         (0=absent, 1=impaired, 2=normal, NT=not testable)  R         1            1            0        0        0  L          2            2           2        2         2    IMAGING:  MR T Spine-  IMPRESSION:  Multiple degenerative changes in the cervical spine including right C3-C4   facet joint arthrosis contributing to severe right neuroforaminal   stenosis.    Postsurgical changes spanning L4-S1 as described above. Prior removal of   posterior fusion hardware at these levels with enhancement in the right   posterior paraspinal soft tissues. This could represent postsurgical   changes but superimposed infectious process cannot be ruled out. No   abscess identified. No significant thoracolumbar spinal canal stenosis.        49yFemale w/ chronic lower back pain that is worsening, s/p 2 stage unilateral L4-S1 ALIF and PSF in 2023 and DUNG R L4-S1 in Oct 2024 w Dr. Pena.    Plan:  -MRI pelvis shows no evidence of sacroiliitis   -WBAT  -f/u ESR/CRP  -pain control  -incentive spirometry  -PT/OT  -ortho to follow    -Will discuss with Dr. Pena and update plan accordingly   Patient seen and examined at bedside. Pain well controlled with medication. Patient denies any numbness, tingling, weakness, or any other orthopaedic complaint. Denies N/V/CP/SOB.    LABS:                        12.7   9.31  )-----------( 240      ( 17 Feb 2025 07:03 )             41.6     02-17    139  |  103  |  16  ----------------------------<  175[H]  3.9   |  22  |  0.89    Ca    9.5      17 Feb 2025 07:01  Phos  3.4     02-17  Mg     1.8     02-17        Urinalysis Basic - ( 17 Feb 2025 07:01 )    Color: x / Appearance: x / SG: x / pH: x  Gluc: 175 mg/dL / Ketone: x  / Bili: x / Urobili: x   Blood: x / Protein: x / Nitrite: x   Leuk Esterase: x / RBC: x / WBC x   Sq Epi: x / Non Sq Epi: x / Bacteria: x        VITAL SIGNS:  T(C): 37.1 (02-19-25 @ 04:46), Max: 37.1 (02-19-25 @ 04:46)  HR: 77 (02-19-25 @ 04:46) (65 - 87)  BP: 101/62 (02-19-25 @ 04:46) (101/62 - 118/76)  RR: 18 (02-19-25 @ 04:46) (18 - 18)  SpO2: 95% (02-19-25 @ 04:46) (94% - 96%)    Physical Exam:  Gen: NAD    Spine Exam:  +Ruthie test on the Right hip  Skin intact, surgical incisions well healed  Negative clonus  Negative babinski  Negative briseno  No calf TTP    Motor:               C5           C6            C7               C8           T1   R         5/5          5/5            5/5             5/5          5/5  L          5/5          5/5            5/5             5/5          5/5                L2             L3             L4               L5            S1  R         5/5           5/5          5/5             5/5           5/5  L          5/5          5/5           5/5             5/5           5/5    Sensory:            C5         C6         C7      C8       T1        (0=absent, 1=impaired, 2=normal, NT=not testable)  R         2            2           2        2         2  L          2            2           2        2         2               L2          L3         L4      L5       S1         (0=absent, 1=impaired, 2=normal, NT=not testable)  R         1            1            0        0        0  L          2            2           2        2         2    IMAGING:  MR T Spine-  IMPRESSION:  Multiple degenerative changes in the cervical spine including right C3-C4   facet joint arthrosis contributing to severe right neuroforaminal   stenosis.    Postsurgical changes spanning L4-S1 as described above. Prior removal of   posterior fusion hardware at these levels with enhancement in the right   posterior paraspinal soft tissues. This could represent postsurgical   changes but superimposed infectious process cannot be ruled out. No   abscess identified. No significant thoracolumbar spinal canal stenosis.        49yFemale w/ chronic lower back pain that is worsening, s/p 2 stage unilateral L4-S1 ALIF and PSF in 2023 and DUNG R L4-S1 in Oct 2024 w Dr. Pena.    Plan:  -MRI pelvis shows no evidence of sacroiliitis   -WBAT  -f/u ESR/CRP  -pain control  -incentive spirometry  -PT/OT  -No further orthopedic surgical interventions indicated at this time. Reconsult as needed.  -Follow up with Dr. Pena in the office within 2 weeks, call for an appointment.  -Discussed above with Dr. Pena, who agrees with plan

## 2025-02-19 NOTE — CONSULT NOTE ADULT - SUBJECTIVE AND OBJECTIVE BOX
Chief Complaint:  Patient is a 49y old  Female who presents with a chief complaint of LBP (2025 06:56)    HPI:  49F w/ PMH of SS s/p 2 stage unilateral L4-S1 decompression/fusion (ALIF, PSF), exploration of spine w/ removal of hardware/R L4-S1 w/ in situ fusion 10/'24 pw acute on chronic LBP. Reports hx of LBP w/ reported 80% numbness of right gluteal region w/ radiation down right leg since last procedure. Over the past day she reports worsening of the lower back pain mainly in the right lower back w/ radiation down b/l legs. Rated as a 10/10 at worst and described as arrows shooting at her back. Otherwise denies trauma, saddle anesthesia, or urinary/fecal incontinence.    Current out- patient pain regimen: short RXs for Tramadol 50mg QD and QID; Oxy IR 10mg TID From hospital and PCP; however patient reports receiving longer RXs from her outpt pain doctor [not on iSTOP; tried different last name (Ryan) w/ only 1 short RX for OXY 5mg]  Out Patient Pain Management provider: Ezra Leavitt MD  [facility] Prescription Monitoring Program: Reference #[id number]  Opioid Risk Tool (ORT-OUD) Score: Low    Pain Score: 9/10    Patient reports a shooting pain in her lower back for the past week, not improved w/ home meds. Pain radiates down her legs as well and worsened w/ movement. Alternates between Tramadol and Oxy at home. Recently started Flexeril but did not find it helpful. States she sees Dr. Leavitt for pain mgmt and he has prescribed her Tramadol many times in the past.     REVIEW OF SYSTEMS:  CONSTITUTIONAL: No fever, weight loss, fatigue, falls  NEURO: No headaches, memory loss, loss of strength, tremors, dizziness or blurred vision  RESP: No shortness of breath, cough  CV: No chest pain, palpitations  GI: No abdominal pain, nausea, vomiting, diarrhea, constipation, incontinence, (+)flatus  : No urinary incontinence/retention  MSK: +back pain; +muscles spasms in back and calves. No joint pain or swelling; No upper or lower motor strength weakness, no saddle anesthesia   SKIN: No itching, burning, rashes, or lesions   PSYCHIATRIC: No depression, anxiety, mood swings, or difficulty sleeping      PHYSICAL EXAM  GENERAL: Seen at bedside, NAD, well-groomed, well-developed, appears stated age, no signs of toxicity  NEURO: Alert & Oriented X3, Good concentration; Follows commands  HEENT: Head atraumatic, normocephalic; speech clear and fluent  RESP: Lungs Chest expansion equal  CV: No extremity cyanosis  GI: Appetite good, (+)BM, last BM this AM  : Voiding without issue  EXTREMITIES: 2+ Peripheral Pulses, No cyanosis or edema; moving all extremities  MSK: Motor Strength 5/5 B/L upper and lower extremities; ROM intact; +paravertebral tenderness and lumbar ttp; did not check gait  SKIN: No rashes or lesions  PSYCH: affect normal; good eye contact; no signs of depression or anxiety    PAST MEDICAL & SURGICAL HISTORY:  HLD (hyperlipidemia)      Spinal stenosis, lumbosacral region      Current smoker      S/P           FAMILY HISTORY:  FH: heart disease (Mother)        Allergies    penicillin (Pruritus; Rash)    Intolerances        MEDICATIONS  (STANDING):  acetaminophen     Tablet .. 975 milliGRAM(s) Oral every 8 hours  dextrose 5%. 1000 milliLiter(s) (50 mL/Hr) IV Continuous <Continuous>  dextrose 5%. 1000 milliLiter(s) (100 mL/Hr) IV Continuous <Continuous>  dextrose 50% Injectable 25 Gram(s) IV Push once  dextrose 50% Injectable 12.5 Gram(s) IV Push once  dextrose 50% Injectable 25 Gram(s) IV Push once  DULoxetine 30 milliGRAM(s) Oral daily  enoxaparin Injectable 40 milliGRAM(s) SubCutaneous every 24 hours  gabapentin 300 milliGRAM(s) Oral three times a day  glucagon  Injectable 1 milliGRAM(s) IntraMuscular once  ibuprofen  Tablet. 400 milliGRAM(s) Oral every 6 hours  influenza   Vaccine 0.5 milliLiter(s) IntraMuscular once  insulin lispro (ADMELOG) corrective regimen sliding scale   SubCutaneous three times a day before meals  insulin lispro (ADMELOG) corrective regimen sliding scale   SubCutaneous at bedtime  lidocaine   4% Patch 1 Patch Transdermal daily  melatonin 5 milliGRAM(s) Oral at bedtime  metoprolol tartrate 25 milliGRAM(s) Oral two times a day  naloxone Injectable 0.4 milliGRAM(s) IV Push once  pantoprazole    Tablet 40 milliGRAM(s) Oral before breakfast  polyethylene glycol 3350 17 Gram(s) Oral two times a day  rosuvastatin 40 milliGRAM(s) Oral at bedtime  senna 2 Tablet(s) Oral at bedtime    MEDICATIONS  (PRN):  bisacodyl Suppository 10 milliGRAM(s) Rectal daily PRN Constipation  dextrose Oral Gel 15 Gram(s) Oral once PRN Blood Glucose LESS THAN 70 milliGRAM(s)/deciliter  methocarbamol 750 milliGRAM(s) Oral three times a day PRN Muscle Spasm  oxyCODONE    IR 5 milliGRAM(s) Oral every 4 hours PRN Moderate Pain (4 - 6)  oxyCODONE    IR 10 milliGRAM(s) Oral every 4 hours PRN Severe Pain (7 - 10)      Vital Signs:  T(C): 36.8 (25 @ 11:54)  HR: 73 (25 @ 11:54)  BP: 109/72 (25 @ 11:54)  RR: 18 (25 @ 11:54)  SpO2: 99% (25 @ 11:54)    Pertinent labs/radiology:  Reviewed      MR Pelvis (25):    Impression:    Partially evaluated edema within the right posterior paraspinal   musculature within the visualized lower lumbosacral spine, which may be   postoperative and/or be related to low-grade strain. However,   superimposed infection of the right posterior paraspinal musculature   within this region cannot be excluded and is also a differential   consideration.    No convincing evidence of sacroiliitis.

## 2025-02-19 NOTE — PROGRESS NOTE ADULT - SUBJECTIVE AND OBJECTIVE BOX
Mya Garcia MD  Division of Hospital Medicine  Reachable on MS Teams    PROGRESS NOTE:     Patient is a 49y old  Female who presents with a chief complaint of LBP (19 Feb 2025 15:52)      SUBJECTIVE / OVERNIGHT EVENTS: No acute events overnight, seen this AM. Pain unchanged from yesterday, discussed MRI results. Pt states she regualrly follows up with pain MD as outpatient     ADDITIONAL REVIEW OF SYSTEMS:    MEDICATIONS  (STANDING):  acetaminophen     Tablet .. 975 milliGRAM(s) Oral every 8 hours  dextrose 5%. 1000 milliLiter(s) (50 mL/Hr) IV Continuous <Continuous>  dextrose 5%. 1000 milliLiter(s) (100 mL/Hr) IV Continuous <Continuous>  dextrose 50% Injectable 25 Gram(s) IV Push once  dextrose 50% Injectable 12.5 Gram(s) IV Push once  dextrose 50% Injectable 25 Gram(s) IV Push once  DULoxetine 30 milliGRAM(s) Oral daily  enoxaparin Injectable 40 milliGRAM(s) SubCutaneous every 24 hours  gabapentin 300 milliGRAM(s) Oral three times a day  glucagon  Injectable 1 milliGRAM(s) IntraMuscular once  ibuprofen  Tablet. 400 milliGRAM(s) Oral every 6 hours  influenza   Vaccine 0.5 milliLiter(s) IntraMuscular once  insulin lispro (ADMELOG) corrective regimen sliding scale   SubCutaneous three times a day before meals  insulin lispro (ADMELOG) corrective regimen sliding scale   SubCutaneous at bedtime  lidocaine   4% Patch 1 Patch Transdermal daily  melatonin 5 milliGRAM(s) Oral at bedtime  metoprolol tartrate 25 milliGRAM(s) Oral two times a day  naloxone Injectable 0.4 milliGRAM(s) IV Push once  pantoprazole    Tablet 40 milliGRAM(s) Oral before breakfast  polyethylene glycol 3350 17 Gram(s) Oral two times a day  rosuvastatin 40 milliGRAM(s) Oral at bedtime  senna 2 Tablet(s) Oral at bedtime    MEDICATIONS  (PRN):  bisacodyl Suppository 10 milliGRAM(s) Rectal daily PRN Constipation  dextrose Oral Gel 15 Gram(s) Oral once PRN Blood Glucose LESS THAN 70 milliGRAM(s)/deciliter  methocarbamol 750 milliGRAM(s) Oral three times a day PRN Muscle Spasm  oxyCODONE    IR 5 milliGRAM(s) Oral every 4 hours PRN Moderate Pain (4 - 6)  oxyCODONE    IR 10 milliGRAM(s) Oral every 4 hours PRN Severe Pain (7 - 10)      CAPILLARY BLOOD GLUCOSE      POCT Blood Glucose.: 167 mg/dL (19 Feb 2025 17:22)  POCT Blood Glucose.: 216 mg/dL (19 Feb 2025 12:21)  POCT Blood Glucose.: 190 mg/dL (19 Feb 2025 08:14)  POCT Blood Glucose.: 134 mg/dL (18 Feb 2025 21:39)    I&O's Summary    18 Feb 2025 07:01  -  19 Feb 2025 07:00  --------------------------------------------------------  IN: 440 mL / OUT: 0 mL / NET: 440 mL        PHYSICAL EXAM:  Vital Signs Last 24 Hrs  T(C): 36.8 (19 Feb 2025 11:54), Max: 37.1 (19 Feb 2025 04:46)  T(F): 98.2 (19 Feb 2025 11:54), Max: 98.7 (19 Feb 2025 04:46)  HR: 73 (19 Feb 2025 11:54) (72 - 87)  BP: 109/72 (19 Feb 2025 11:54) (101/62 - 118/76)  BP(mean): --  RR: 18 (19 Feb 2025 11:54) (18 - 18)  SpO2: 99% (19 Feb 2025 11:54) (94% - 99%)    Parameters below as of 19 Feb 2025 11:54  Patient On (Oxygen Delivery Method): room air      CONSTITUTIONAL: NAD, well-developed  RESPIRATORY: Normal respiratory effort; lungs are clear to auscultation bilaterally  CARDIOVASCULAR: Regular rate and rhythm, normal S1 and S2, no murmur/rub/gallop; No lower extremity edema  ABDOMEN: Nontender to palpation, normoactive bowel sounds, no rebound/guarding  MUSCLOSKELETAL: no clubbing or cyanosis of digits; no joint swelling or tenderness to palpation, spinal tenderness  PSYCH: A+O to person, place, and time; affect appropriate    LABS:                      RADIOLOGY & ADDITIONAL TESTS:  Results Reviewed:   Imaging Personally Reviewed:  Electrocardiogram Personally Reviewed:    COORDINATION OF CARE:  Care Discussed with Consultants/Other Providers [Y/N]: Chronic pain  Prior or Outpatient Records Reviewed [Y/N]:

## 2025-02-20 LAB
ANION GAP SERPL CALC-SCNC: 12 MMOL/L — SIGNIFICANT CHANGE UP (ref 5–17)
BUN SERPL-MCNC: 17 MG/DL — SIGNIFICANT CHANGE UP (ref 7–23)
CALCIUM SERPL-MCNC: 9.6 MG/DL — SIGNIFICANT CHANGE UP (ref 8.4–10.5)
CHLORIDE SERPL-SCNC: 104 MMOL/L — SIGNIFICANT CHANGE UP (ref 96–108)
CO2 SERPL-SCNC: 24 MMOL/L — SIGNIFICANT CHANGE UP (ref 22–31)
CREAT SERPL-MCNC: 0.58 MG/DL — SIGNIFICANT CHANGE UP (ref 0.5–1.3)
EGFR: 110 ML/MIN/1.73M2 — SIGNIFICANT CHANGE UP
GLUCOSE BLDC GLUCOMTR-MCNC: 160 MG/DL — HIGH (ref 70–99)
GLUCOSE BLDC GLUCOMTR-MCNC: 184 MG/DL — HIGH (ref 70–99)
GLUCOSE BLDC GLUCOMTR-MCNC: 205 MG/DL — HIGH (ref 70–99)
GLUCOSE BLDC GLUCOMTR-MCNC: 282 MG/DL — HIGH (ref 70–99)
GLUCOSE SERPL-MCNC: 204 MG/DL — HIGH (ref 70–99)
POTASSIUM SERPL-MCNC: 3.6 MMOL/L — SIGNIFICANT CHANGE UP (ref 3.5–5.3)
POTASSIUM SERPL-SCNC: 3.6 MMOL/L — SIGNIFICANT CHANGE UP (ref 3.5–5.3)
SODIUM SERPL-SCNC: 140 MMOL/L — SIGNIFICANT CHANGE UP (ref 135–145)

## 2025-02-20 PROCEDURE — 99231 SBSQ HOSP IP/OBS SF/LOW 25: CPT

## 2025-02-20 PROCEDURE — 99232 SBSQ HOSP IP/OBS MODERATE 35: CPT

## 2025-02-20 RX ORDER — GABAPENTIN 400 MG/1
400 CAPSULE ORAL EVERY 8 HOURS
Refills: 0 | Status: DISCONTINUED | OUTPATIENT
Start: 2025-02-20 | End: 2025-02-24

## 2025-02-20 RX ORDER — OXYCODONE HYDROCHLORIDE 30 MG/1
7.5 TABLET ORAL EVERY 4 HOURS
Refills: 0 | Status: DISCONTINUED | OUTPATIENT
Start: 2025-02-20 | End: 2025-02-21

## 2025-02-20 RX ADMIN — Medication 400 MILLIGRAM(S): at 06:12

## 2025-02-20 RX ADMIN — OXYCODONE HYDROCHLORIDE 10 MILLIGRAM(S): 30 TABLET ORAL at 11:54

## 2025-02-20 RX ADMIN — LIDOCAINE HYDROCHLORIDE 1 PATCH: 20 JELLY TOPICAL at 00:50

## 2025-02-20 RX ADMIN — DULOXETINE 30 MILLIGRAM(S): 20 CAPSULE, DELAYED RELEASE ORAL at 10:53

## 2025-02-20 RX ADMIN — Medication 400 MILLIGRAM(S): at 06:42

## 2025-02-20 RX ADMIN — Medication 5 MILLIGRAM(S): at 22:24

## 2025-02-20 RX ADMIN — ROSUVASTATIN CALCIUM 40 MILLIGRAM(S): 20 TABLET, FILM COATED ORAL at 22:24

## 2025-02-20 RX ADMIN — GABAPENTIN 300 MILLIGRAM(S): 400 CAPSULE ORAL at 13:04

## 2025-02-20 RX ADMIN — Medication 975 MILLIGRAM(S): at 23:00

## 2025-02-20 RX ADMIN — METOPROLOL SUCCINATE 25 MILLIGRAM(S): 50 TABLET, EXTENDED RELEASE ORAL at 17:10

## 2025-02-20 RX ADMIN — Medication 40 MILLIGRAM(S): at 06:11

## 2025-02-20 RX ADMIN — Medication 2 TABLET(S): at 22:24

## 2025-02-20 RX ADMIN — Medication 400 MILLIGRAM(S): at 10:53

## 2025-02-20 RX ADMIN — LIDOCAINE HYDROCHLORIDE 1 PATCH: 20 JELLY TOPICAL at 10:54

## 2025-02-20 RX ADMIN — LIDOCAINE HYDROCHLORIDE 1 PATCH: 20 JELLY TOPICAL at 22:35

## 2025-02-20 RX ADMIN — Medication 975 MILLIGRAM(S): at 06:42

## 2025-02-20 RX ADMIN — Medication 975 MILLIGRAM(S): at 22:24

## 2025-02-20 RX ADMIN — POLYETHYLENE GLYCOL 3350 17 GRAM(S): 17 POWDER, FOR SOLUTION ORAL at 06:12

## 2025-02-20 RX ADMIN — GABAPENTIN 400 MILLIGRAM(S): 400 CAPSULE ORAL at 22:24

## 2025-02-20 RX ADMIN — INSULIN LISPRO 1: 100 INJECTION, SOLUTION INTRAVENOUS; SUBCUTANEOUS at 22:23

## 2025-02-20 RX ADMIN — Medication 400 MILLIGRAM(S): at 17:10

## 2025-02-20 RX ADMIN — Medication 975 MILLIGRAM(S): at 06:11

## 2025-02-20 RX ADMIN — GABAPENTIN 300 MILLIGRAM(S): 400 CAPSULE ORAL at 06:11

## 2025-02-20 RX ADMIN — Medication 400 MILLIGRAM(S): at 11:53

## 2025-02-20 RX ADMIN — METOPROLOL SUCCINATE 25 MILLIGRAM(S): 50 TABLET, EXTENDED RELEASE ORAL at 06:12

## 2025-02-20 RX ADMIN — Medication 975 MILLIGRAM(S): at 14:04

## 2025-02-20 RX ADMIN — OXYCODONE HYDROCHLORIDE 10 MILLIGRAM(S): 30 TABLET ORAL at 10:54

## 2025-02-20 RX ADMIN — Medication 975 MILLIGRAM(S): at 13:04

## 2025-02-20 RX ADMIN — LIDOCAINE HYDROCHLORIDE 1 PATCH: 20 JELLY TOPICAL at 19:45

## 2025-02-20 RX ADMIN — INSULIN LISPRO 1: 100 INJECTION, SOLUTION INTRAVENOUS; SUBCUTANEOUS at 17:16

## 2025-02-20 RX ADMIN — INSULIN LISPRO 1: 100 INJECTION, SOLUTION INTRAVENOUS; SUBCUTANEOUS at 08:52

## 2025-02-20 RX ADMIN — ENOXAPARIN SODIUM 40 MILLIGRAM(S): 100 INJECTION SUBCUTANEOUS at 06:12

## 2025-02-20 RX ADMIN — INSULIN LISPRO 2: 100 INJECTION, SOLUTION INTRAVENOUS; SUBCUTANEOUS at 12:22

## 2025-02-20 NOTE — PROGRESS NOTE ADULT - SUBJECTIVE AND OBJECTIVE BOX
Mya Garcia MD  Division of Hospital Medicine  Reachable on MS Teams    PROGRESS NOTE:     Patient is a 49y old  Female who presents with a chief complaint of LBP (20 Feb 2025 15:07)      SUBJECTIVE / OVERNIGHT EVENTS: No acute events overnight, seen this AM. Patient reports that her pain has worsened since yesterday, is frustrated with degree of pain. States that she completed steroid course 2 weeks ago as outpatient.     ADDITIONAL REVIEW OF SYSTEMS:    MEDICATIONS  (STANDING):  acetaminophen     Tablet .. 975 milliGRAM(s) Oral every 8 hours  dextrose 5%. 1000 milliLiter(s) (100 mL/Hr) IV Continuous <Continuous>  dextrose 5%. 1000 milliLiter(s) (50 mL/Hr) IV Continuous <Continuous>  dextrose 50% Injectable 25 Gram(s) IV Push once  dextrose 50% Injectable 12.5 Gram(s) IV Push once  dextrose 50% Injectable 25 Gram(s) IV Push once  DULoxetine 30 milliGRAM(s) Oral daily  enoxaparin Injectable 40 milliGRAM(s) SubCutaneous every 24 hours  gabapentin 400 milliGRAM(s) Oral every 8 hours  glucagon  Injectable 1 milliGRAM(s) IntraMuscular once  ibuprofen  Tablet. 400 milliGRAM(s) Oral every 6 hours  influenza   Vaccine 0.5 milliLiter(s) IntraMuscular once  insulin lispro (ADMELOG) corrective regimen sliding scale   SubCutaneous three times a day before meals  insulin lispro (ADMELOG) corrective regimen sliding scale   SubCutaneous at bedtime  lidocaine   4% Patch 1 Patch Transdermal daily  melatonin 5 milliGRAM(s) Oral at bedtime  metoprolol tartrate 25 milliGRAM(s) Oral two times a day  naloxone Injectable 0.4 milliGRAM(s) IV Push once  pantoprazole    Tablet 40 milliGRAM(s) Oral before breakfast  polyethylene glycol 3350 17 Gram(s) Oral two times a day  rosuvastatin 40 milliGRAM(s) Oral at bedtime  senna 2 Tablet(s) Oral at bedtime    MEDICATIONS  (PRN):  bisacodyl Suppository 10 milliGRAM(s) Rectal daily PRN Constipation  dextrose Oral Gel 15 Gram(s) Oral once PRN Blood Glucose LESS THAN 70 milliGRAM(s)/deciliter  methocarbamol 750 milliGRAM(s) Oral three times a day PRN Muscle Spasm  oxyCODONE    IR 5 milliGRAM(s) Oral every 4 hours PRN Moderate Pain (4 - 6)  oxyCODONE    IR 7.5 milliGRAM(s) Oral every 4 hours PRN Severe Pain (7 - 10)      CAPILLARY BLOOD GLUCOSE      POCT Blood Glucose.: 205 mg/dL (20 Feb 2025 12:11)  POCT Blood Glucose.: 184 mg/dL (20 Feb 2025 08:38)  POCT Blood Glucose.: 247 mg/dL (19 Feb 2025 21:30)  POCT Blood Glucose.: 167 mg/dL (19 Feb 2025 17:22)    I&O's Summary    19 Feb 2025 07:01  -  20 Feb 2025 07:00  --------------------------------------------------------  IN: 350 mL / OUT: 0 mL / NET: 350 mL    20 Feb 2025 07:01  -  20 Feb 2025 16:56  --------------------------------------------------------  IN: 240 mL / OUT: 0 mL / NET: 240 mL        PHYSICAL EXAM:  Vital Signs Last 24 Hrs  T(C): 36.4 (20 Feb 2025 12:45), Max: 36.8 (19 Feb 2025 20:53)  T(F): 97.6 (20 Feb 2025 12:45), Max: 98.2 (19 Feb 2025 20:53)  HR: 80 (20 Feb 2025 12:45) (72 - 80)  BP: 99/67 (20 Feb 2025 12:45) (99/67 - 125/76)  BP(mean): --  RR: 18 (20 Feb 2025 12:45) (18 - 18)  SpO2: 97% (20 Feb 2025 12:45) (97% - 98%)    Parameters below as of 20 Feb 2025 12:45  Patient On (Oxygen Delivery Method): room air    CONSTITUTIONAL: NAD, well-developed  RESPIRATORY: Normal respiratory effort; lungs are clear to auscultation bilaterally  CARDIOVASCULAR: Regular rate and rhythm, normal S1 and S2, no murmur/rub/gallop; No lower extremity edema  ABDOMEN: Nontender to palpation, normoactive bowel sounds, no rebound/guarding  MUSCLOSKELETAL: no clubbing or cyanosis of digits; no joint swelling or tenderness to palpation, spinal tenderness  PSYCH: A+O to person, place, and time; affect appropriate    LABS:    02-20    140  |  104  |  17  ----------------------------<  204[H]  3.6   |  24  |  0.58    Ca    9.6      20 Feb 2025 06:38            Urinalysis Basic - ( 20 Feb 2025 06:38 )    Color: x / Appearance: x / SG: x / pH: x  Gluc: 204 mg/dL / Ketone: x  / Bili: x / Urobili: x   Blood: x / Protein: x / Nitrite: x   Leuk Esterase: x / RBC: x / WBC x   Sq Epi: x / Non Sq Epi: x / Bacteria: x          RADIOLOGY & ADDITIONAL TESTS:  Results Reviewed:   Imaging Personally Reviewed:  Electrocardiogram Personally Reviewed:    COORDINATION OF CARE:  Care Discussed with Consultants/Other Providers [Y/N]: Chronic Pain  Prior or Outpatient Records Reviewed [Y/N]:

## 2025-02-20 NOTE — PROGRESS NOTE ADULT - SUBJECTIVE AND OBJECTIVE BOX
Chief Complaint:  Patient is a 49y old  Female who presents with a chief complaint of LBP (19 Feb 2025 06:56)    HPI:  49F w/ PMH of SS s/p 2 stage unilateral L4-S1 decompression/fusion (ALIF, PSF), exploration of spine w/ removal of hardware/R L4-S1 w/ in situ fusion 10/'24 pw acute on chronic LBP. Reports hx of LBP w/ reported 80% numbness of right gluteal region w/ radiation down right leg since last procedure. Over the past day she reports worsening of the lower back pain mainly in the right lower back w/ radiation down b/l legs. Rated as a 10/10 at worst and described as arrows shooting at her back. Otherwise denies trauma, saddle anesthesia, or urinary/fecal incontinence.    Current out- patient pain regimen: short RXs for Tramadol 50mg QD and QID; Oxy IR 10mg TID From hospital and PCP; however patient reports receiving longer RXs from her outpt pain doctor [not on iSTOP; tried different last name (Ryan) w/ only 1 short RX for OXY 5mg]  Out Patient Pain Management provider: Ezra Leavitt MD  [facility] Prescription Monitoring Program: Reference #[id number]    Pain Score: 5/10 down to 0/10.    Patient reports ongoing low back pain with radiation down her legs as well and worsened w/ movement.   States tramadol did nothing for her pain, and the higher dose oxy IR makes her "feel high".      Current in-patient pain therapy:  MEDICATIONS  (STANDING):  acetaminophen     Tablet .. 975 milliGRAM(s) Oral every 8 hours  dextrose 5%. 1000 milliLiter(s) (50 mL/Hr) IV Continuous <Continuous>  dextrose 5%. 1000 milliLiter(s) (100 mL/Hr) IV Continuous <Continuous>  dextrose 50% Injectable 25 Gram(s) IV Push once  dextrose 50% Injectable 12.5 Gram(s) IV Push once  dextrose 50% Injectable 25 Gram(s) IV Push once  DULoxetine 30 milliGRAM(s) Oral daily  enoxaparin Injectable 40 milliGRAM(s) SubCutaneous every 24 hours  gabapentin 400 milliGRAM(s) Oral every 8 hours  glucagon  Injectable 1 milliGRAM(s) IntraMuscular once  ibuprofen  Tablet. 400 milliGRAM(s) Oral every 6 hours  influenza   Vaccine 0.5 milliLiter(s) IntraMuscular once  insulin lispro (ADMELOG) corrective regimen sliding scale   SubCutaneous three times a day before meals  insulin lispro (ADMELOG) corrective regimen sliding scale   SubCutaneous at bedtime  lidocaine   4% Patch 1 Patch Transdermal daily  melatonin 5 milliGRAM(s) Oral at bedtime  metoprolol tartrate 25 milliGRAM(s) Oral two times a day  naloxone Injectable 0.4 milliGRAM(s) IV Push once  pantoprazole    Tablet 40 milliGRAM(s) Oral before breakfast  polyethylene glycol 3350 17 Gram(s) Oral two times a day  rosuvastatin 40 milliGRAM(s) Oral at bedtime  senna 2 Tablet(s) Oral at bedtime    MEDICATIONS  (PRN):  bisacodyl Suppository 10 milliGRAM(s) Rectal daily PRN Constipation  dextrose Oral Gel 15 Gram(s) Oral once PRN Blood Glucose LESS THAN 70 milliGRAM(s)/deciliter  methocarbamol 750 milliGRAM(s) Oral three times a day PRN Muscle Spasm  oxyCODONE    IR 5 milliGRAM(s) Oral every 4 hours PRN Moderate Pain (4 - 6)  oxyCODONE    IR 7.5 milliGRAM(s) Oral every 4 hours PRN Severe Pain (7 - 10)      PHYSICAL EXAM  GENERAL: Seen at bedside; NAD;   speech clear and fluent  NEURO: Alert & Oriented X3, Good concentration; Follows commands   GI: Appetite good; no BM today  : Voiding  EXTREMITIES: PANDA; 2+ Peripheral Pulses; no cyanosis, clubbing or edema   MSK: Motor Strength 5/5 B/L UE and LE; ROM intact; + paralumbar TTP    PSYCH: affect normal; good eye contact; no signs of depression or anxiety  T(C): 36.4 (02-20-25 @ 12:45)  HR: 80 (02-20-25 @ 12:45)  BP: 99/67 (02-20-25 @ 12:45)  RR: 18 (02-20-25 @ 12:45)  SpO2: 97% (02-20-25 @ 12:45)    Pertinent labs, radiology, additional studies:  02-20    140  |  104  |  17  ----------------------------<  204[H]  3.6   |  24  |  0.58    Ca    9.6      20 Feb 2025 06:38    < from: MR Lumbar Spine w/wo IV Cont (02.16.25 @ 13:40) >  IMPRESSION:  Multiple degenerative changes in the cervical spine including right C3-C4   facet joint arthrosis contributing to severe right neuroforaminal   stenosis.  Postsurgical changes spanning L4-S1 as described above. Prior removal of   posterior fusion hardware at these levels with enhancement in the right   posterior paraspinal soft tissues. This could represent postsurgical   changes but superimposed infectious process cannot be ruled out. No   abscess identified. No significant thoracolumbar spinal canal stenosis.    < from: MR Pelvis Bony Only No Cont (02.18.25 @ 19:24) >  Impression:  Partially evaluated edema within the right posterior paraspinal   musculature withinthe visualized lower lumbosacral spine, which may be   postoperative and/or be related to low-grade strain. However,   superimposed infection of the right posterior paraspinal musculature   within this region cannot be excluded and is also a differential   consideration.  No convincing evidence of sacroiliitis.

## 2025-02-20 NOTE — ED ADULT NURSE NOTE - CHIEF COMPLAINT QUOTE
Received request via: Patient    Was the patient seen in the last year in this department? Yes    Does the patient have an active prescription (recently filled or refills available) for medication(s) requested? No    Pharmacy Name:   [facility] [address] [place] [id number]  Phone: [phone]  Fax: [phone]            Does the patient have FCI Plus and need 100-day supply? (This applies to ALL medications) Patient does not have SCP  
" I have a severe back pain since yesterday " ,pt is c/o left sided back pain radiating to her leg , denies any recent fall or injuries

## 2025-02-21 ENCOUNTER — TRANSCRIPTION ENCOUNTER (OUTPATIENT)
Age: 50
End: 2025-02-21

## 2025-02-21 LAB
GLUCOSE BLDC GLUCOMTR-MCNC: 156 MG/DL — HIGH (ref 70–99)
GLUCOSE BLDC GLUCOMTR-MCNC: 159 MG/DL — HIGH (ref 70–99)
GLUCOSE BLDC GLUCOMTR-MCNC: 165 MG/DL — HIGH (ref 70–99)
GLUCOSE BLDC GLUCOMTR-MCNC: 222 MG/DL — HIGH (ref 70–99)

## 2025-02-21 PROCEDURE — 99232 SBSQ HOSP IP/OBS MODERATE 35: CPT

## 2025-02-21 RX ORDER — HYDROMORPHONE/SOD CHLOR,ISO/PF 2 MG/10 ML
0.2 SYRINGE (ML) INJECTION EVERY 6 HOURS
Refills: 0 | Status: DISCONTINUED | OUTPATIENT
Start: 2025-02-21 | End: 2025-02-24

## 2025-02-21 RX ORDER — HYDROMORPHONE/SOD CHLOR,ISO/PF 2 MG/10 ML
4 SYRINGE (ML) INJECTION EVERY 4 HOURS
Refills: 0 | Status: DISCONTINUED | OUTPATIENT
Start: 2025-02-21 | End: 2025-02-24

## 2025-02-21 RX ORDER — HYDROMORPHONE/SOD CHLOR,ISO/PF 2 MG/10 ML
2 SYRINGE (ML) INJECTION EVERY 4 HOURS
Refills: 0 | Status: DISCONTINUED | OUTPATIENT
Start: 2025-02-21 | End: 2025-02-24

## 2025-02-21 RX ADMIN — Medication 2 TABLET(S): at 23:01

## 2025-02-21 RX ADMIN — METOPROLOL SUCCINATE 25 MILLIGRAM(S): 50 TABLET, EXTENDED RELEASE ORAL at 17:29

## 2025-02-21 RX ADMIN — Medication 975 MILLIGRAM(S): at 13:03

## 2025-02-21 RX ADMIN — INSULIN LISPRO 1: 100 INJECTION, SOLUTION INTRAVENOUS; SUBCUTANEOUS at 08:51

## 2025-02-21 RX ADMIN — Medication 400 MILLIGRAM(S): at 11:01

## 2025-02-21 RX ADMIN — INSULIN LISPRO 1: 100 INJECTION, SOLUTION INTRAVENOUS; SUBCUTANEOUS at 17:29

## 2025-02-21 RX ADMIN — OXYCODONE HYDROCHLORIDE 5 MILLIGRAM(S): 30 TABLET ORAL at 12:00

## 2025-02-21 RX ADMIN — Medication 400 MILLIGRAM(S): at 06:56

## 2025-02-21 RX ADMIN — GABAPENTIN 400 MILLIGRAM(S): 400 CAPSULE ORAL at 05:56

## 2025-02-21 RX ADMIN — Medication 975 MILLIGRAM(S): at 23:30

## 2025-02-21 RX ADMIN — METHOCARBAMOL 750 MILLIGRAM(S): 500 TABLET, FILM COATED ORAL at 00:16

## 2025-02-21 RX ADMIN — METOPROLOL SUCCINATE 25 MILLIGRAM(S): 50 TABLET, EXTENDED RELEASE ORAL at 05:56

## 2025-02-21 RX ADMIN — GABAPENTIN 400 MILLIGRAM(S): 400 CAPSULE ORAL at 23:01

## 2025-02-21 RX ADMIN — LIDOCAINE HYDROCHLORIDE 1 PATCH: 20 JELLY TOPICAL at 19:34

## 2025-02-21 RX ADMIN — Medication 400 MILLIGRAM(S): at 11:48

## 2025-02-21 RX ADMIN — ROSUVASTATIN CALCIUM 40 MILLIGRAM(S): 20 TABLET, FILM COATED ORAL at 23:00

## 2025-02-21 RX ADMIN — GABAPENTIN 400 MILLIGRAM(S): 400 CAPSULE ORAL at 13:03

## 2025-02-21 RX ADMIN — INSULIN LISPRO 1: 100 INJECTION, SOLUTION INTRAVENOUS; SUBCUTANEOUS at 13:02

## 2025-02-21 RX ADMIN — Medication 975 MILLIGRAM(S): at 05:57

## 2025-02-21 RX ADMIN — Medication 400 MILLIGRAM(S): at 17:29

## 2025-02-21 RX ADMIN — LIDOCAINE HYDROCHLORIDE 1 PATCH: 20 JELLY TOPICAL at 23:03

## 2025-02-21 RX ADMIN — Medication 40 MILLIGRAM(S): at 05:57

## 2025-02-21 RX ADMIN — ENOXAPARIN SODIUM 40 MILLIGRAM(S): 100 INJECTION SUBCUTANEOUS at 05:56

## 2025-02-21 RX ADMIN — Medication 975 MILLIGRAM(S): at 23:01

## 2025-02-21 RX ADMIN — OXYCODONE HYDROCHLORIDE 5 MILLIGRAM(S): 30 TABLET ORAL at 11:05

## 2025-02-21 RX ADMIN — Medication 975 MILLIGRAM(S): at 14:00

## 2025-02-21 RX ADMIN — Medication 975 MILLIGRAM(S): at 06:56

## 2025-02-21 RX ADMIN — Medication 5 MILLIGRAM(S): at 23:02

## 2025-02-21 RX ADMIN — METHOCARBAMOL 750 MILLIGRAM(S): 500 TABLET, FILM COATED ORAL at 19:53

## 2025-02-21 RX ADMIN — Medication 400 MILLIGRAM(S): at 01:15

## 2025-02-21 RX ADMIN — DULOXETINE 30 MILLIGRAM(S): 20 CAPSULE, DELAYED RELEASE ORAL at 11:01

## 2025-02-21 RX ADMIN — Medication 400 MILLIGRAM(S): at 00:16

## 2025-02-21 RX ADMIN — LIDOCAINE HYDROCHLORIDE 1 PATCH: 20 JELLY TOPICAL at 11:03

## 2025-02-21 RX ADMIN — Medication 400 MILLIGRAM(S): at 05:56

## 2025-02-21 NOTE — PROGRESS NOTE ADULT - SUBJECTIVE AND OBJECTIVE BOX
Mya Garcia MD  Division of Hospital Medicine  Reachable on MS Teams    PROGRESS NOTE:     Patient is a 49y old  Female who presents with a chief complaint of LBP (21 Feb 2025 12:52)      SUBJECTIVE / OVERNIGHT EVENTS: No acute events overnight, seen this AM with chronic pain. Pain still uncontrolled, pt with leg buckling as well. Otherwise no complaints. No BM in 2 days, made aware of PRN suppository    ADDITIONAL REVIEW OF SYSTEMS:    MEDICATIONS  (STANDING):  acetaminophen     Tablet .. 975 milliGRAM(s) Oral every 8 hours  dextrose 5%. 1000 milliLiter(s) (50 mL/Hr) IV Continuous <Continuous>  dextrose 5%. 1000 milliLiter(s) (100 mL/Hr) IV Continuous <Continuous>  dextrose 50% Injectable 25 Gram(s) IV Push once  dextrose 50% Injectable 12.5 Gram(s) IV Push once  dextrose 50% Injectable 25 Gram(s) IV Push once  DULoxetine 30 milliGRAM(s) Oral daily  enoxaparin Injectable 40 milliGRAM(s) SubCutaneous every 24 hours  gabapentin 400 milliGRAM(s) Oral every 8 hours  glucagon  Injectable 1 milliGRAM(s) IntraMuscular once  ibuprofen  Tablet. 400 milliGRAM(s) Oral every 6 hours  influenza   Vaccine 0.5 milliLiter(s) IntraMuscular once  insulin lispro (ADMELOG) corrective regimen sliding scale   SubCutaneous three times a day before meals  insulin lispro (ADMELOG) corrective regimen sliding scale   SubCutaneous at bedtime  lidocaine   4% Patch 1 Patch Transdermal daily  melatonin 5 milliGRAM(s) Oral at bedtime  metoprolol tartrate 25 milliGRAM(s) Oral two times a day  naloxone Injectable 0.4 milliGRAM(s) IV Push once  pantoprazole    Tablet 40 milliGRAM(s) Oral before breakfast  polyethylene glycol 3350 17 Gram(s) Oral two times a day  rosuvastatin 40 milliGRAM(s) Oral at bedtime  senna 2 Tablet(s) Oral at bedtime    MEDICATIONS  (PRN):  bisacodyl Suppository 10 milliGRAM(s) Rectal daily PRN Constipation  dextrose Oral Gel 15 Gram(s) Oral once PRN Blood Glucose LESS THAN 70 milliGRAM(s)/deciliter  HYDROmorphone   Tablet 2 milliGRAM(s) Oral every 4 hours PRN Moderate Pain (4 - 6)  HYDROmorphone   Tablet 4 milliGRAM(s) Oral every 4 hours PRN Severe Pain (7 - 10)  HYDROmorphone  Injectable 0.2 milliGRAM(s) IV Push every 6 hours PRN Severe Breakthrough Pain  methocarbamol 750 milliGRAM(s) Oral three times a day PRN Muscle Spasm      CAPILLARY BLOOD GLUCOSE      POCT Blood Glucose.: 159 mg/dL (21 Feb 2025 12:52)  POCT Blood Glucose.: 165 mg/dL (21 Feb 2025 08:39)  POCT Blood Glucose.: 282 mg/dL (20 Feb 2025 21:36)  POCT Blood Glucose.: 160 mg/dL (20 Feb 2025 17:12)    I&O's Summary    20 Feb 2025 07:01  -  21 Feb 2025 07:00  --------------------------------------------------------  IN: 720 mL / OUT: 0 mL / NET: 720 mL        PHYSICAL EXAM:  Vital Signs Last 24 Hrs  T(C): 36.9 (21 Feb 2025 12:04), Max: 36.9 (21 Feb 2025 12:04)  T(F): 98.5 (21 Feb 2025 12:04), Max: 98.5 (21 Feb 2025 12:04)  HR: 62 (21 Feb 2025 12:04) (62 - 73)  BP: 118/78 (21 Feb 2025 12:04) (101/65 - 121/69)  BP(mean): --  RR: 18 (21 Feb 2025 12:04) (18 - 18)  SpO2: 97% (21 Feb 2025 12:04) (95% - 97%)    Parameters below as of 21 Feb 2025 12:04  Patient On (Oxygen Delivery Method): room air      CONSTITUTIONAL: NAD, well-developed  RESPIRATORY: Normal respiratory effort; lungs are clear to auscultation bilaterally  CARDIOVASCULAR: Regular rate and rhythm, normal S1 and S2, no murmur/rub/gallop; No lower extremity edema  ABDOMEN: Nontender to palpation, normoactive bowel sounds, no rebound/guarding  MUSCLOSKELETAL: no clubbing or cyanosis of digits; no joint swelling or tenderness to palpation, spinal tenderness  PSYCH: A+O to person, place, and time; affect appropriate      LABS:    02-20    140  |  104  |  17  ----------------------------<  204[H]  3.6   |  24  |  0.58    Ca    9.6      20 Feb 2025 06:38            Urinalysis Basic - ( 20 Feb 2025 06:38 )    Color: x / Appearance: x / SG: x / pH: x  Gluc: 204 mg/dL / Ketone: x  / Bili: x / Urobili: x   Blood: x / Protein: x / Nitrite: x   Leuk Esterase: x / RBC: x / WBC x   Sq Epi: x / Non Sq Epi: x / Bacteria: x          RADIOLOGY & ADDITIONAL TESTS:  Results Reviewed:   Imaging Personally Reviewed:  Electrocardiogram Personally Reviewed:    COORDINATION OF CARE:  Care Discussed with Consultants/Other Providers [Y/N]: Chronic pain  Prior or Outpatient Records Reviewed [Y/N]:

## 2025-02-21 NOTE — DISCHARGE NOTE PROVIDER - NSDCHC_MEDRECSTATUS_GEN_ALL_CORE
Detail Level: Detailed Quality 130: Documentation Of Current Medications In The Medical Record: Current Medications Documented Admission Reconciliation is Not Complete  Discharge Reconciliation is Not Complete Admission Reconciliation is Not Complete  Discharge Reconciliation is Completed

## 2025-02-21 NOTE — DISCHARGE NOTE PROVIDER - NSFOLLOWUPCLINICS_GEN_ALL_ED_FT
[facility] - Primary Care  Primary Care  [address] [place] [address]  Phone: [phone]  Fax:   Follow Up Time: 1-3 days

## 2025-02-21 NOTE — PROGRESS NOTE ADULT - SUBJECTIVE AND OBJECTIVE BOX
Chief Complaint:  Patient is a 49y old  Female who presents with a chief complaint of LBP (19 Feb 2025 06:56)    HPI:  49F w/ PMH of SS s/p 2 stage unilateral L4-S1 decompression/fusion (ALIF, PSF), exploration of spine w/ removal of hardware/R L4-S1 w/ in situ fusion 10/'24 pw acute on chronic LBP. Reports hx of LBP w/ reported 80% numbness of right gluteal region w/ radiation down right leg since last procedure. Over the past day she reports worsening of the lower back pain mainly in the right lower back w/ radiation down b/l legs. Rated as a 10/10 at worst and described as arrows shooting at her back. Otherwise denies trauma, saddle anesthesia, or urinary/fecal incontinence.    Current out- patient pain regimen: short RXs for Tramadol 50mg QD and QID; Oxy IR 10mg TID From hospital and PCP; however patient reports receiving longer RXs from her outpt pain doctor [not on iSTOP; tried different last name (Ryan) w/ only 1 short RX for OXY 5mg]  Out Patient Pain Management provider: Ezra Leavitt MD  [facility] Prescription Monitoring Program: Reference #[id number]    Pain Score: 6/10    States tramadol did nothing for her pain, and the higher dose oxy IR makes her "feel high".    Pt reports continued low back pain. No relief w/ Oxycodone after these past few days. +Difficulty ambulating as well.    PHYSICAL EXAM  GENERAL: Seen at bedside; NAD;   speech clear and fluent  NEURO: Alert & Oriented X3, Good concentration; Follows commands   GI: Appetite good; no BM today  : Voiding  EXTREMITIES: PANDA; 2+ Peripheral Pulses; no cyanosis, clubbing or edema   MSK: Motor Strength 5/5 B/L UE and LE; ROM intact; + paralumbar TTP    PSYCH: affect normal; good eye contact; no signs of depression or anxiety    T(C): 36.9 (02-21-25 @ 12:04)  HR: 62 (02-21-25 @ 12:04)  BP: 118/78 (02-21-25 @ 12:04)  RR: 18 (02-21-25 @ 12:04)  SpO2: 97% (02-21-25 @ 12:04)    MEDICATIONS  (STANDING):  acetaminophen     Tablet .. 975 milliGRAM(s) Oral every 8 hours  dextrose 5%. 1000 milliLiter(s) (50 mL/Hr) IV Continuous <Continuous>  dextrose 5%. 1000 milliLiter(s) (100 mL/Hr) IV Continuous <Continuous>  dextrose 50% Injectable 25 Gram(s) IV Push once  dextrose 50% Injectable 12.5 Gram(s) IV Push once  dextrose 50% Injectable 25 Gram(s) IV Push once  DULoxetine 30 milliGRAM(s) Oral daily  enoxaparin Injectable 40 milliGRAM(s) SubCutaneous every 24 hours  gabapentin 400 milliGRAM(s) Oral every 8 hours  glucagon  Injectable 1 milliGRAM(s) IntraMuscular once  ibuprofen  Tablet. 400 milliGRAM(s) Oral every 6 hours  influenza   Vaccine 0.5 milliLiter(s) IntraMuscular once  insulin lispro (ADMELOG) corrective regimen sliding scale   SubCutaneous three times a day before meals  insulin lispro (ADMELOG) corrective regimen sliding scale   SubCutaneous at bedtime  lidocaine   4% Patch 1 Patch Transdermal daily  melatonin 5 milliGRAM(s) Oral at bedtime  metoprolol tartrate 25 milliGRAM(s) Oral two times a day  naloxone Injectable 0.4 milliGRAM(s) IV Push once  pantoprazole    Tablet 40 milliGRAM(s) Oral before breakfast  polyethylene glycol 3350 17 Gram(s) Oral two times a day  rosuvastatin 40 milliGRAM(s) Oral at bedtime  senna 2 Tablet(s) Oral at bedtime    MEDICATIONS  (PRN):  bisacodyl Suppository 10 milliGRAM(s) Rectal daily PRN Constipation  dextrose Oral Gel 15 Gram(s) Oral once PRN Blood Glucose LESS THAN 70 milliGRAM(s)/deciliter  HYDROmorphone   Tablet 2 milliGRAM(s) Oral every 4 hours PRN Moderate Pain (4 - 6)  HYDROmorphone   Tablet 4 milliGRAM(s) Oral every 4 hours PRN Severe Pain (7 - 10)  HYDROmorphone  Injectable 0.2 milliGRAM(s) IV Push every 6 hours PRN Severe Breakthrough Pain  methocarbamol 750 milliGRAM(s) Oral three times a day PRN Muscle Spasm      Allergies    penicillin (Pruritus; Rash)    Intolerances        Pertinent labs, radiology, additional studies:  Reviewed        02-20    140  |  104  |  17  ----------------------------<  204[H]  3.6   |  24  |  0.58    Ca    9.6      20 Feb 2025 06:38      < from: MR Lumbar Spine w/wo IV Cont (02.16.25 @ 13:40) >  IMPRESSION:  Multiple degenerative changes in the cervical spine including right C3-C4   facet joint arthrosis contributing to severe right neuroforaminal   stenosis.  Postsurgical changes spanning L4-S1 as described above. Prior removal of   posterior fusion hardware at these levels with enhancement in the right   posterior paraspinal soft tissues. This could represent postsurgical   changes but superimposed infectious process cannot be ruled out. No   abscess identified. No significant thoracolumbar spinal canal stenosis.    < from: MR Pelvis Bony Only No Cont (02.18.25 @ 19:24) >  Impression:  Partially evaluated edema within the right posterior paraspinal   musculature withinthe visualized lower lumbosacral spine, which may be   postoperative and/or be related to low-grade strain. However,   superimposed infection of the right posterior paraspinal musculature   within this region cannot be excluded and is also a differential   consideration.  No convincing evidence of sacroiliitis.

## 2025-02-21 NOTE — DISCHARGE NOTE PROVIDER - NSDCFUADDAPPT_GEN_ALL_CORE_FT
You must follow up with your primary medical doctor within 2-3 days of discharge - please call to make an appointment.    You must follow up with your pain management doctor, Dr. Leavitt, within one week of discharge - please call to make an appointment. You must follow up with your primary medical doctor within 2-3 days of discharge - please call to make an appointment.    You must follow up with your pain management doctor, Dr. Leavitt, within one week of discharge - please call to make an appointment.    You must follow up with your orthopedic surgeon, Dr. Pena, within one week of discharge - please call to make an appointment.        APPTS ARE READY TO BE MADE: [X] YES    Best Family or Patient Contact (if needed):    Additional Information about above appointments (if needed):    1: Primary medical doctor  2: Pain management doctor  3: Orthopedic surgeon    Other comments or requests:    You must follow up with your primary medical doctor within 2-3 days of discharge - please call to make an appointment.    You must follow up with your pain management doctor, Dr. Leavitt, within one week of discharge - please call to make an appointment.    You must follow up with your orthopedic surgeon, Dr. Pena, within one week of discharge - please call to make an appointment.        APPTS ARE READY TO BE MADE: [X] YES    Best Family or Patient Contact (if needed):    Additional Information about above appointments (if needed):    1: Primary medical doctor  2: Pain management doctor  3: Orthopedic surgeon    Other comments or requests:   Prior to outreaching the patient, it was visible that the patient has secured a follow up appointment which was not scheduled by our team. Patient is scheduled 3/11 at 3:15PM at [address] [place] [address] with Dr. Jacob Pena     Patient informed us they already have secured a follow up appointment which is not visible on [facility] and declined to provide appointment details. Patient advised they already secured a follow up with their PCP and Dr. Leavitt for rehab medicine post discharge but did not provide further details.

## 2025-02-21 NOTE — DISCHARGE NOTE PROVIDER - PROVIDER TOKENS
PROVIDER:[TOKEN:[[id number]]] PROVIDER:[TOKEN:[[id number]],FOLLOWUP:[1 week]],PROVIDER:[TOKEN:[[id number]],FOLLOWUP:[1 week]]

## 2025-02-21 NOTE — DISCHARGE NOTE PROVIDER - NSDCCPCAREPLAN_GEN_ALL_CORE_FT
PRINCIPAL DISCHARGE DIAGNOSIS  Diagnosis: Acute on chronic back pain  Assessment and Plan of Treatment:      PRINCIPAL DISCHARGE DIAGNOSIS  Diagnosis: Acute on chronic back pain  Assessment and Plan of Treatment: You must follow up with your primary medical doctor within 2-3 days of discharge.  You must follow up with your pain managment doctor, Dr. Leavitt, within 4-5 days of discharge - please call to make an appointment.        SECONDARY DISCHARGE DIAGNOSES  Diagnosis: Type 2 diabetes mellitus  Assessment and Plan of Treatment: Make sure you get your HgA1c checked every three months.  Check your blood glucose before meals and at bedtime.  It's important not to skip any meals.  Keep a log of your blood glucose results and always take it with you to your doctor appointments.  Keep a list of your current medications including injectables and over the counter medications and bring this medication list with you to all your doctor appointments.  If you have not seen your ophthalmologist this year call for appointment.  Check your feet daily for redness, sores, or openings. Do not self treat. If no improvement in two days call your primary care physician for an appointment.  Low blood sugar (hypoglycemia) is a blood sugar below 70mg/dl. Check your blood sugar if you feel signs/symptoms of hypoglycemia. If your blood sugar is below 70 take 15 grams of carbohydrates (ex 4 oz of apple juice, 3-4 glucose tablets, or 4-6 oz of regular soda) wait 15 minutes and repeat blood sugar to make sure it comes up above 70.  If your blood sugar is above 70 and you are due for a meal, have a meal.  If you are not due for a meal have a snack.  This snack helps keeps your blood sugar at a safe range.      Diagnosis: HLD (hyperlipidemia)  Assessment and Plan of Treatment:      PRINCIPAL DISCHARGE DIAGNOSIS  Diagnosis: Acute on chronic back pain  Assessment and Plan of Treatment: You must follow up with your primary medical doctor within 2-3 days of discharge.  You must follow up with your pain managment doctor, Dr. Leavitt, within 4-5 days of discharge - please call to make an appointment.  At this appointment, you will need new prescriptions for your pain medications, as determined by your pain managment doctor.  You must follow up with your orthopedic surgeon, Dr. Pena, within one week of discharge - please call to make an appointment.  Weight bearing as tolerated        SECONDARY DISCHARGE DIAGNOSES  Diagnosis: Type 2 diabetes mellitus  Assessment and Plan of Treatment: Make sure you get your HgA1c checked every three months.  Check your blood glucose before meals and at bedtime.  It's important not to skip any meals.  Keep a log of your blood glucose results and always take it with you to your doctor appointments.  Keep a list of your current medications including injectables and over the counter medications and bring this medication list with you to all your doctor appointments.  If you have not seen your ophthalmologist this year call for appointment.  Check your feet daily for redness, sores, or openings. Do not self treat. If no improvement in two days call your primary care physician for an appointment.  Low blood sugar (hypoglycemia) is a blood sugar below 70mg/dl. Check your blood sugar if you feel signs/symptoms of hypoglycemia. If your blood sugar is below 70 take 15 grams of carbohydrates (ex 4 oz of apple juice, 3-4 glucose tablets, or 4-6 oz of regular soda) wait 15 minutes and repeat blood sugar to make sure it comes up above 70.  If your blood sugar is above 70 and you are due for a meal, have a meal.  If you are not due for a meal have a snack.  This snack helps keeps your blood sugar at a safe range.      Diagnosis: HLD (hyperlipidemia)  Assessment and Plan of Treatment: Follow up with your primary medical doctor within 2-3 days     PRINCIPAL DISCHARGE DIAGNOSIS  Diagnosis: Acute on chronic back pain  Assessment and Plan of Treatment: You are being discharged on a pain managment regimen.  Monitor for lethargy/excessive sleepiness on this regimen.  ***Do not take robaxin at the same time as gabapentin, to avoid excessive sleepiness.***    You must follow up with your primary medical doctor within 2-3 days of discharge.  You must follow up with your pain managment doctor, Dr. Leavitt, within 4-5 days of discharge - please call to make an appointment.  At this appointment, you will need new prescriptions for your pain medications, as determined by your pain managment doctor.  You must follow up with your orthopedic surgeon, Dr. Pena, within one week of discharge - please call to make an appointment.  Weight bearing as tolerated        SECONDARY DISCHARGE DIAGNOSES  Diagnosis: Type 2 diabetes mellitus  Assessment and Plan of Treatment: Make sure you get your HgA1c checked every three months.  Check your blood glucose before meals and at bedtime.  It's important not to skip any meals.  Keep a log of your blood glucose results and always take it with you to your doctor appointments.  Keep a list of your current medications including injectables and over the counter medications and bring this medication list with you to all your doctor appointments.  If you have not seen your ophthalmologist this year call for appointment.  Check your feet daily for redness, sores, or openings. Do not self treat. If no improvement in two days call your primary care physician for an appointment.  Low blood sugar (hypoglycemia) is a blood sugar below 70mg/dl. Check your blood sugar if you feel signs/symptoms of hypoglycemia. If your blood sugar is below 70 take 15 grams of carbohydrates (ex 4 oz of apple juice, 3-4 glucose tablets, or 4-6 oz of regular soda) wait 15 minutes and repeat blood sugar to make sure it comes up above 70.  If your blood sugar is above 70 and you are due for a meal, have a meal.  If you are not due for a meal have a snack.  This snack helps keeps your blood sugar at a safe range.      Diagnosis: HLD (hyperlipidemia)  Assessment and Plan of Treatment: Follow up with your primary medical doctor within 2-3 days

## 2025-02-21 NOTE — DISCHARGE NOTE PROVIDER - NSDCFUSCHEDAPPT_GEN_ALL_CORE_FT
Jacob Pena  [facility]  ORTHOSURG [address] [facility]  Scheduled Appointment: 04/22/2025

## 2025-02-21 NOTE — DISCHARGE NOTE PROVIDER - CARE PROVIDER_API CALL
Ezra Leavitt  Physical/Rehab Medicine  [address] [place] [address], [address]  [place] [address]  Phone: [phone]  Fax: [phone]  Follow Up Time:    Ezra Leavitt  Physical/Rehab Medicine  [address] [facility], [address]  [place] [address]  Phone: [phone]  Fax: [phone]  Follow Up Time: 1 week    Jacob Pena  Spine Surgery  [address] [facility], [address]  [place] [address]  Phone: [phone]  Fax: [phone]  Follow Up Time: 1 week

## 2025-02-21 NOTE — DISCHARGE NOTE PROVIDER - NSDCMRMEDTOKEN_GEN_ALL_CORE_FT
DULoxetine 30 mg oral delayed release capsule: 1 cap(s) orally once a day  Farxiga 10 mg oral tablet: 1 tab(s) orally once a day  Januvia 100 mg oral tablet: 1 tab(s) orally once a day  metoprolol tartrate 25 mg oral tablet: 1 tab(s) orally 2 times a day  Rehab at home: Diagnosis: Acute on chronic back pain (ICD10 code: M54.9). ZANE: lifetime. Ht: 172.7 cm. Wt: 74.2 kg  rosuvastatin 40 mg oral tablet: 1 tab(s) orally once a day  traMADol 50 mg oral tablet: 1 tab(s) orally every 6 hours as needed for  mild pain MDD: 004   DULoxetine 30 mg oral delayed release capsule: 1 cap(s) orally once a day  Farxiga 10 mg oral tablet: 1 tab(s) orally once a day  Januvia 100 mg oral tablet: 1 tab(s) orally once a day  metoprolol tartrate 25 mg oral tablet: 1 tab(s) orally 2 times a day  Rehab at home: Diagnosis: Acute on chronic back pain (ICD10 code: M54.9). ZANE: lifetime. Ht: 172.7 cm. Wt: 74.2 kg  Rollator: AZNE 99  Wt - 74.20kg  Ht - 172.7cm  rosuvastatin 40 mg oral tablet: 1 tab(s) orally once a day   bisacodyl 10 mg rectal suppository: 1 suppository(ies) rectal once a day As needed Constipation  DULoxetine 30 mg oral delayed release capsule: 1 cap(s) orally once a day  Farxiga 10 mg oral tablet: 1 tab(s) orally once a day  gabapentin 600 mg oral tablet: 1 tab(s) orally every 8 hours  HYDROmorphone 4 mg oral tablet: 1 tab(s) orally every 6 hours as needed for Severe Pain (7 - 10) MDD: 4 tabs  ibuprofen 400 mg oral tablet: 1 tab(s) orally every 8 hours as needed for  moderate pain  Januvia 100 mg oral tablet: 1 tab(s) orally once a day  lidocaine 4% topical film: Apply topically to affected area every 24 hours 12 hours on and 12 hours off  methocarbamol 750 mg oral tablet: 1 tab(s) orally 3 times a day as needed for  muscle spasm  metoprolol tartrate 25 mg oral tablet: 1 tab(s) orally 2 times a day  Narcan 4 mg/0.1 mL nasal spray: 1 spray(s) intranasally every 2 to 3 minutes as needed for  opiod overdose (alternating nostrils)  Outpatient Physical Therapy: evaluate and treat  polyethylene glycol 3350 oral powder for reconstitution: 17 gram(s) orally 2 times a day Monitor BMs - hold for diarrhea  rosuvastatin 40 mg oral tablet: 1 tab(s) orally once a day  senna leaf extract oral tablet: 2 tab(s) orally once a day (at bedtime) Monitor BMs - hold for diarrhea  Tylenol 325 mg oral tablet: 2 tab(s) orally every 6 hours as needed for  mild pain   bisacodyl 10 mg rectal suppository: 1 suppository(ies) rectal once a day As needed Constipation  DULoxetine 30 mg oral delayed release capsule: 1 cap(s) orally once a day  Farxiga 10 mg oral tablet: 1 tab(s) orally once a day  gabapentin 600 mg oral tablet: 1 tab(s) orally every 8 hours  HYDROmorphone 4 mg oral tablet: 1 tab(s) orally every 6 hours as needed for Severe Pain (7 - 10) MDD: 4 tabs  ibuprofen 400 mg oral tablet: 1 tab(s) orally every 8 hours as needed for  moderate pain  Januvia 100 mg oral tablet: 1 tab(s) orally once a day  lidocaine 4% topical film: Apply topically to affected area every 24 hours 12 hours on and 12 hours off  methocarbamol 750 mg oral tablet: 1 tab(s) orally 3 times a day as needed for  muscle spasm  metoprolol tartrate 25 mg oral tablet: 1 tab(s) orally 2 times a day  Narcan 4 mg/0.1 mL nasal spray: 1 spray(s) intranasally every 2 to 3 minutes as needed for  opiod overdose (alternating nostrils)  polyethylene glycol 3350 oral powder for reconstitution: 17 gram(s) orally 2 times a day Monitor BMs - hold for diarrhea  Rollator: ZANE - 99  WT - 74.20kg  HT - 172.7cm  ICD 10 - M54.4  rosuvastatin 40 mg oral tablet: 1 tab(s) orally once a day  senna leaf extract oral tablet: 2 tab(s) orally once a day (at bedtime) Monitor BMs - hold for diarrhea  Tylenol 325 mg oral tablet: 2 tab(s) orally every 6 hours as needed for  mild pain

## 2025-02-21 NOTE — DISCHARGE NOTE PROVIDER - CARE PROVIDERS DIRECT ADDRESSES
,DirectAddress_Unknown ,DirectAddress_Unknown,[email or web address]

## 2025-02-21 NOTE — DISCHARGE NOTE PROVIDER - HOSPITAL COURSE
HPI:  Pt is a 49F w/ PMH of SS s/p 2 stage unilateral L4-S1 decompression/fusion (ALIF, PSF), exploration of spine w/ removal of hardware/R L4-S1 w/ in situ fusion 10/'24 pw acute on chronic LBP.     Reports hx of LBP w/ reported 80% numbness of right gluteal region w/ radiation down right leg since last procedure. Over the past day she reports worsening of the lower back pain mainly in the right lower back w/ radiation down b/l legs. Rated as a 10/10 at worst and described as arrows shooting at her back. Otherwise denies trauma, saddle anesthesia, or urinary/fecal incontinence.    In the ED VSS w/ CT L spine w/ out acute process. Evaluated by ortho w/ no reported acute surgical intervention. Administered Tylenol, Motrin, Oxy 5mg x2 w/ reported improvement in radiating pain, but persistent right LBP now a 9/10.    (15 Feb 2025 23:00)    Hospital Course:  49F w/ PMH of SS s/p 2 stage unilateral L4-S1 decompression/fusion (ALIF, PSF), exploration of spine w/ removal of hardware/R L4-S1 w/ in situ fusion 10/'24 pw acute on chronic LBP. CT L-spine w/out acute process.  MRI of spine done, "multiple degenerative changes in the cervical spine including right C3-C4 facet joint arthrosis contributing to severe right neuroforaminal stenosis. Postsurgical changes spanning L4-S1. Prior removal of posterior fusion hardware at these levels with enhancement in the right posterior paraspinal soft tissues. This could represent postsurgical changes but superimposed infectious process cannot be ruled out. No abscess identified. No significant thoracolumbar spinal canal stenosis."MR pelvis neg for sacroiliitis. Low concern for infectious process, monitored off abx. Ortho consulted, no further orthopedic surgical interventions required. Neurology followed. No further inpatient neurological workup needed. Chronic pain management consulted given persistent pain, continued PO dilaudid w/ IV for breakthrough, standing ibuprofen, standing tylenol, duloxetine, gabapentin 400mg, robaxin.     Pending pain control       Important Medication Changes and Reason:    Active or Pending Issues Requiring Follow-up:    Advanced Directives:   [ ] Full code  [ ] DNR  [ ] Hospice    Discharge Diagnoses:         HPI:  Pt is a 49F w/ PMH of SS s/p 2 stage unilateral L4-S1 decompression/fusion (ALIF, PSF), exploration of spine w/ removal of hardware/R L4-S1 w/ in situ fusion 10/'24 pw acute on chronic LBP.     Reports hx of LBP w/ reported 80% numbness of right gluteal region w/ radiation down right leg since last procedure. Over the past day she reports worsening of the lower back pain mainly in the right lower back w/ radiation down b/l legs. Rated as a 10/10 at worst and described as arrows shooting at her back. Otherwise denies trauma, saddle anesthesia, or urinary/fecal incontinence.    In the ED VSS w/ CT L spine w/ out acute process. Evaluated by ortho w/ no reported acute surgical intervention. Administered Tylenol, Motrin, Oxy 5mg x2 w/ reported improvement in radiating pain, but persistent right LBP now a 9/10.    (15 Feb 2025 23:00)    Hospital Course:  49F w/ PMH of SS s/p 2 stage unilateral L4-S1 decompression/fusion (ALIF, PSF), exploration of spine w/ removal of hardware/R L4-S1 w/ in situ fusion 10/'24 pw acute on chronic LBP. CT L-spine w/out acute process.  MRI of spine done, "multiple degenerative changes in the cervical spine including right C3-C4 facet joint arthrosis contributing to severe right neuroforaminal stenosis. Postsurgical changes spanning L4-S1. Prior removal of posterior fusion hardware at these levels with enhancement in the right posterior paraspinal soft tissues. This could represent postsurgical changes but superimposed infectious process cannot be ruled out. No abscess identified. No significant thoracolumbar spinal canal stenosis."MR pelvis neg for sacroiliitis. Low concern for infectious process, monitored off abx. Ortho consulted, no further orthopedic surgical interventions required. Neurology followed. No further inpatient neurological workup needed. Chronic pain management consulted given persistent pain, continued PO dilaudid w/ IV for breakthrough, standing ibuprofen, standing tylenol, duloxetine, gabapentin 400mg, robaxin.  Pain is improved on current regimen.  Patient medically cleared for discharge.       Important Medication Changes and Reason:  Oxycodone prn, robaxin prn, gabapentin    Active or Pending Issues Requiring Follow-up:  f/u with your PCP within 2-3 days of discharge  f/u with pain management within 4-5 days of discharge    Advanced Directives:   [ ] Full code  [ ] DNR  [ ] Hospice    Discharge Diagnoses:  Acute on chronic back pain           HPI:  Pt is a 49F w/ PMH of SS s/p 2 stage unilateral L4-S1 decompression/fusion (ALIF, PSF), exploration of spine w/ removal of hardware/R L4-S1 w/ in situ fusion 10/'24 pw acute on chronic LBP.     Reports hx of LBP w/ reported 80% numbness of right gluteal region w/ radiation down right leg since last procedure. Over the past day she reports worsening of the lower back pain mainly in the right lower back w/ radiation down b/l legs. Rated as a 10/10 at worst and described as arrows shooting at her back. Otherwise denies trauma, saddle anesthesia, or urinary/fecal incontinence.    In the ED VSS w/ CT L spine w/ out acute process. Evaluated by ortho w/ no reported acute surgical intervention. Administered Tylenol, Motrin, Oxy 5mg x2 w/ reported improvement in radiating pain, but persistent right LBP now a 9/10.    (15 Feb 2025 23:00)    Hospital Course:  49F w/ PMH of SS s/p 2 stage unilateral L4-S1 decompression/fusion (ALIF, PSF), exploration of spine w/ removal of hardware/R L4-S1 w/ in situ fusion 10/'24 pw acute on chronic LBP. CT L-spine w/out acute process.  MRI of spine done, "multiple degenerative changes in the cervical spine including right C3-C4 facet joint arthrosis contributing to severe right neuroforaminal stenosis. Postsurgical changes spanning L4-S1. Prior removal of posterior fusion hardware at these levels with enhancement in the right posterior paraspinal soft tissues. This could represent postsurgical changes but superimposed infectious process cannot be ruled out. No abscess identified. No significant thoracolumbar spinal canal stenosis."MR pelvis neg for sacroiliitis. Low concern for infectious process, monitored off abx. Ortho consulted, no further orthopedic surgical interventions required. Neurology followed. No further inpatient neurological workup needed. Chronic pain management consulted given persistent pain, continued PO dilaudid w/ IV for breakthrough, standing ibuprofen, standing tylenol, duloxetine, gabapentin 400mg, robaxin.  Pain is improved on current regimen.  Patient medically cleared for discharge.       Important Medication Changes and Reason:  Oxycodone prn, robaxin prn, gabapentin    Active or Pending Issues Requiring Follow-up:  f/u with your PCP within 2-3 days of discharge  f/u with pain management within 4-5 days of discharge  f/u with orthopedic surgeon within one week    Advanced Directives:   [ ] Full code  [ ] DNR  [ ] Hospice    Discharge Diagnoses:  Acute on chronic back pain

## 2025-02-22 LAB
GLUCOSE BLDC GLUCOMTR-MCNC: 136 MG/DL — HIGH (ref 70–99)
GLUCOSE BLDC GLUCOMTR-MCNC: 180 MG/DL — HIGH (ref 70–99)
GLUCOSE BLDC GLUCOMTR-MCNC: 217 MG/DL — HIGH (ref 70–99)
GLUCOSE BLDC GLUCOMTR-MCNC: 219 MG/DL — HIGH (ref 70–99)

## 2025-02-22 PROCEDURE — 99232 SBSQ HOSP IP/OBS MODERATE 35: CPT

## 2025-02-22 RX ADMIN — Medication 400 MILLIGRAM(S): at 11:11

## 2025-02-22 RX ADMIN — Medication 5 MILLIGRAM(S): at 21:18

## 2025-02-22 RX ADMIN — ENOXAPARIN SODIUM 40 MILLIGRAM(S): 100 INJECTION SUBCUTANEOUS at 06:02

## 2025-02-22 RX ADMIN — LIDOCAINE HYDROCHLORIDE 1 PATCH: 20 JELLY TOPICAL at 23:28

## 2025-02-22 RX ADMIN — Medication 975 MILLIGRAM(S): at 21:17

## 2025-02-22 RX ADMIN — Medication 400 MILLIGRAM(S): at 17:18

## 2025-02-22 RX ADMIN — Medication 400 MILLIGRAM(S): at 00:30

## 2025-02-22 RX ADMIN — Medication 2 TABLET(S): at 21:17

## 2025-02-22 RX ADMIN — LIDOCAINE HYDROCHLORIDE 1 PATCH: 20 JELLY TOPICAL at 11:10

## 2025-02-22 RX ADMIN — Medication 4 MILLIGRAM(S): at 11:30

## 2025-02-22 RX ADMIN — Medication 400 MILLIGRAM(S): at 06:57

## 2025-02-22 RX ADMIN — Medication 975 MILLIGRAM(S): at 14:15

## 2025-02-22 RX ADMIN — Medication 4 MILLIGRAM(S): at 17:17

## 2025-02-22 RX ADMIN — METOPROLOL SUCCINATE 25 MILLIGRAM(S): 50 TABLET, EXTENDED RELEASE ORAL at 06:01

## 2025-02-22 RX ADMIN — Medication 975 MILLIGRAM(S): at 21:50

## 2025-02-22 RX ADMIN — Medication 40 MILLIGRAM(S): at 06:02

## 2025-02-22 RX ADMIN — METHOCARBAMOL 750 MILLIGRAM(S): 500 TABLET, FILM COATED ORAL at 21:23

## 2025-02-22 RX ADMIN — Medication 400 MILLIGRAM(S): at 08:00

## 2025-02-22 RX ADMIN — GABAPENTIN 400 MILLIGRAM(S): 400 CAPSULE ORAL at 06:00

## 2025-02-22 RX ADMIN — GABAPENTIN 400 MILLIGRAM(S): 400 CAPSULE ORAL at 21:20

## 2025-02-22 RX ADMIN — LIDOCAINE HYDROCHLORIDE 1 PATCH: 20 JELLY TOPICAL at 18:51

## 2025-02-22 RX ADMIN — Medication 4 MILLIGRAM(S): at 10:40

## 2025-02-22 RX ADMIN — INSULIN LISPRO 1: 100 INJECTION, SOLUTION INTRAVENOUS; SUBCUTANEOUS at 08:44

## 2025-02-22 RX ADMIN — Medication 400 MILLIGRAM(S): at 17:53

## 2025-02-22 RX ADMIN — Medication 975 MILLIGRAM(S): at 06:01

## 2025-02-22 RX ADMIN — Medication 400 MILLIGRAM(S): at 00:09

## 2025-02-22 RX ADMIN — METOPROLOL SUCCINATE 25 MILLIGRAM(S): 50 TABLET, EXTENDED RELEASE ORAL at 17:18

## 2025-02-22 RX ADMIN — Medication 975 MILLIGRAM(S): at 15:00

## 2025-02-22 RX ADMIN — ROSUVASTATIN CALCIUM 40 MILLIGRAM(S): 20 TABLET, FILM COATED ORAL at 21:18

## 2025-02-22 RX ADMIN — GABAPENTIN 400 MILLIGRAM(S): 400 CAPSULE ORAL at 14:14

## 2025-02-22 RX ADMIN — INSULIN LISPRO 2: 100 INJECTION, SOLUTION INTRAVENOUS; SUBCUTANEOUS at 12:23

## 2025-02-22 RX ADMIN — Medication 975 MILLIGRAM(S): at 06:29

## 2025-02-22 RX ADMIN — DULOXETINE 30 MILLIGRAM(S): 20 CAPSULE, DELAYED RELEASE ORAL at 11:12

## 2025-02-22 RX ADMIN — Medication 4 MILLIGRAM(S): at 16:47

## 2025-02-22 RX ADMIN — Medication 400 MILLIGRAM(S): at 11:45

## 2025-02-22 NOTE — PROGRESS NOTE ADULT - SUBJECTIVE AND OBJECTIVE BOX
Mya Garcia MD  Division of Hospital Medicine  Reachable on MS Teams    PROGRESS NOTE:     Patient is a 49y old  Female who presents with a chief complaint of LBP (21 Feb 2025 17:55)      SUBJECTIVE / OVERNIGHT EVENTS: No acute events overnight, seen this AM. Patient complaining of significant LBP, however no dilaudid PRNs were used. Explained to patient that she needs to ask for pain medications, pt expressed understanding and requesting PO dilaudid 4mg, RN aware.     ADDITIONAL REVIEW OF SYSTEMS:    MEDICATIONS  (STANDING):  acetaminophen     Tablet .. 975 milliGRAM(s) Oral every 8 hours  dextrose 5%. 1000 milliLiter(s) (50 mL/Hr) IV Continuous <Continuous>  dextrose 5%. 1000 milliLiter(s) (100 mL/Hr) IV Continuous <Continuous>  dextrose 50% Injectable 25 Gram(s) IV Push once  dextrose 50% Injectable 12.5 Gram(s) IV Push once  dextrose 50% Injectable 25 Gram(s) IV Push once  DULoxetine 30 milliGRAM(s) Oral daily  enoxaparin Injectable 40 milliGRAM(s) SubCutaneous every 24 hours  gabapentin 400 milliGRAM(s) Oral every 8 hours  glucagon  Injectable 1 milliGRAM(s) IntraMuscular once  ibuprofen  Tablet. 400 milliGRAM(s) Oral every 6 hours  influenza   Vaccine 0.5 milliLiter(s) IntraMuscular once  insulin lispro (ADMELOG) corrective regimen sliding scale   SubCutaneous three times a day before meals  insulin lispro (ADMELOG) corrective regimen sliding scale   SubCutaneous at bedtime  lidocaine   4% Patch 1 Patch Transdermal daily  melatonin 5 milliGRAM(s) Oral at bedtime  metoprolol tartrate 25 milliGRAM(s) Oral two times a day  naloxone Injectable 0.4 milliGRAM(s) IV Push once  pantoprazole    Tablet 40 milliGRAM(s) Oral before breakfast  polyethylene glycol 3350 17 Gram(s) Oral two times a day  rosuvastatin 40 milliGRAM(s) Oral at bedtime  senna 2 Tablet(s) Oral at bedtime    MEDICATIONS  (PRN):  bisacodyl Suppository 10 milliGRAM(s) Rectal daily PRN Constipation  dextrose Oral Gel 15 Gram(s) Oral once PRN Blood Glucose LESS THAN 70 milliGRAM(s)/deciliter  HYDROmorphone   Tablet 2 milliGRAM(s) Oral every 4 hours PRN Moderate Pain (4 - 6)  HYDROmorphone   Tablet 4 milliGRAM(s) Oral every 4 hours PRN Severe Pain (7 - 10)  HYDROmorphone  Injectable 0.2 milliGRAM(s) IV Push every 6 hours PRN Severe Breakthrough Pain  methocarbamol 750 milliGRAM(s) Oral three times a day PRN Muscle Spasm      CAPILLARY BLOOD GLUCOSE      POCT Blood Glucose.: 217 mg/dL (22 Feb 2025 12:11)  POCT Blood Glucose.: 180 mg/dL (22 Feb 2025 08:14)  POCT Blood Glucose.: 222 mg/dL (21 Feb 2025 21:03)  POCT Blood Glucose.: 156 mg/dL (21 Feb 2025 17:15)    I&O's Summary    21 Feb 2025 07:01  -  22 Feb 2025 07:00  --------------------------------------------------------  IN: 485 mL / OUT: 0 mL / NET: 485 mL    22 Feb 2025 07:01  -  22 Feb 2025 14:21  --------------------------------------------------------  IN: 360 mL / OUT: 0 mL / NET: 360 mL        PHYSICAL EXAM:  Vital Signs Last 24 Hrs  T(C): 37.1 (22 Feb 2025 11:46), Max: 37.1 (22 Feb 2025 11:46)  T(F): 98.7 (22 Feb 2025 11:46), Max: 98.7 (22 Feb 2025 11:46)  HR: 65 (22 Feb 2025 11:46) (64 - 85)  BP: 118/75 (22 Feb 2025 11:46) (103/65 - 118/75)  BP(mean): --  RR: 18 (22 Feb 2025 11:46) (18 - 18)  SpO2: 97% (22 Feb 2025 11:46) (95% - 97%)    Parameters below as of 22 Feb 2025 11:46  Patient On (Oxygen Delivery Method): room air      CONSTITUTIONAL: NAD, well-developed  RESPIRATORY: Normal respiratory effort; lungs are clear to auscultation bilaterally  CARDIOVASCULAR: Regular rate and rhythm, normal S1 and S2, no murmur/rub/gallop; No lower extremity edema  ABDOMEN: Nontender to palpation, normoactive bowel sounds, no rebound/guarding  MUSCLOSKELETAL: no clubbing or cyanosis of digits; no joint swelling or tenderness to palpation, spinal tenderness  PSYCH: A+O to person, place, and time; affect appropriate    LABS:                      RADIOLOGY & ADDITIONAL TESTS:  Results Reviewed:   Imaging Personally Reviewed:  Electrocardiogram Personally Reviewed:    COORDINATION OF CARE:  Care Discussed with Consultants/Other Providers [Y/N]:  Prior or Outpatient Records Reviewed [Y/N]:

## 2025-02-22 NOTE — PROGRESS NOTE ADULT - TIME BILLING
time spent reviewing prior charts, meds, discussing plan with patient, chronic pain= 38 minutes
time spent reviewing prior charts, meds, discussing plan with patient, chronic pain, 44 minutes
time spent reviewing prior charts, meds, discussing plan with patient, chronic pain= 52 minutes
time spent reviewing prior charts, meds, discussing plan with patient= 44 minutes
time spent reviewing prior charts, meds, discussing plan with patient= 45 minutes

## 2025-02-23 LAB
GLUCOSE BLDC GLUCOMTR-MCNC: 154 MG/DL — HIGH (ref 70–99)
GLUCOSE BLDC GLUCOMTR-MCNC: 158 MG/DL — HIGH (ref 70–99)
GLUCOSE BLDC GLUCOMTR-MCNC: 167 MG/DL — HIGH (ref 70–99)
GLUCOSE BLDC GLUCOMTR-MCNC: 175 MG/DL — HIGH (ref 70–99)

## 2025-02-23 PROCEDURE — 99231 SBSQ HOSP IP/OBS SF/LOW 25: CPT

## 2025-02-23 RX ADMIN — ROSUVASTATIN CALCIUM 40 MILLIGRAM(S): 20 TABLET, FILM COATED ORAL at 21:04

## 2025-02-23 RX ADMIN — Medication 975 MILLIGRAM(S): at 21:35

## 2025-02-23 RX ADMIN — GABAPENTIN 400 MILLIGRAM(S): 400 CAPSULE ORAL at 05:25

## 2025-02-23 RX ADMIN — GABAPENTIN 400 MILLIGRAM(S): 400 CAPSULE ORAL at 13:51

## 2025-02-23 RX ADMIN — INSULIN LISPRO 1: 100 INJECTION, SOLUTION INTRAVENOUS; SUBCUTANEOUS at 17:26

## 2025-02-23 RX ADMIN — Medication 400 MILLIGRAM(S): at 12:41

## 2025-02-23 RX ADMIN — Medication 400 MILLIGRAM(S): at 08:32

## 2025-02-23 RX ADMIN — Medication 400 MILLIGRAM(S): at 00:59

## 2025-02-23 RX ADMIN — METOPROLOL SUCCINATE 25 MILLIGRAM(S): 50 TABLET, EXTENDED RELEASE ORAL at 05:26

## 2025-02-23 RX ADMIN — Medication 2 TABLET(S): at 21:05

## 2025-02-23 RX ADMIN — METHOCARBAMOL 750 MILLIGRAM(S): 500 TABLET, FILM COATED ORAL at 21:27

## 2025-02-23 RX ADMIN — DULOXETINE 30 MILLIGRAM(S): 20 CAPSULE, DELAYED RELEASE ORAL at 12:41

## 2025-02-23 RX ADMIN — Medication 975 MILLIGRAM(S): at 05:26

## 2025-02-23 RX ADMIN — INSULIN LISPRO 1: 100 INJECTION, SOLUTION INTRAVENOUS; SUBCUTANEOUS at 08:32

## 2025-02-23 RX ADMIN — Medication 400 MILLIGRAM(S): at 23:42

## 2025-02-23 RX ADMIN — Medication 4 MILLIGRAM(S): at 10:55

## 2025-02-23 RX ADMIN — Medication 400 MILLIGRAM(S): at 02:11

## 2025-02-23 RX ADMIN — LIDOCAINE HYDROCHLORIDE 1 PATCH: 20 JELLY TOPICAL at 12:40

## 2025-02-23 RX ADMIN — INSULIN LISPRO 1: 100 INJECTION, SOLUTION INTRAVENOUS; SUBCUTANEOUS at 12:42

## 2025-02-23 RX ADMIN — Medication 40 MILLIGRAM(S): at 05:26

## 2025-02-23 RX ADMIN — POLYETHYLENE GLYCOL 3350 17 GRAM(S): 17 POWDER, FOR SOLUTION ORAL at 08:32

## 2025-02-23 RX ADMIN — METOPROLOL SUCCINATE 25 MILLIGRAM(S): 50 TABLET, EXTENDED RELEASE ORAL at 17:25

## 2025-02-23 RX ADMIN — ENOXAPARIN SODIUM 40 MILLIGRAM(S): 100 INJECTION SUBCUTANEOUS at 05:26

## 2025-02-23 RX ADMIN — LIDOCAINE HYDROCHLORIDE 1 PATCH: 20 JELLY TOPICAL at 19:33

## 2025-02-23 RX ADMIN — Medication 975 MILLIGRAM(S): at 21:05

## 2025-02-23 RX ADMIN — Medication 5 MILLIGRAM(S): at 21:04

## 2025-02-23 RX ADMIN — GABAPENTIN 400 MILLIGRAM(S): 400 CAPSULE ORAL at 21:04

## 2025-02-23 RX ADMIN — Medication 4 MILLIGRAM(S): at 17:25

## 2025-02-23 RX ADMIN — Medication 400 MILLIGRAM(S): at 17:25

## 2025-02-23 RX ADMIN — Medication 975 MILLIGRAM(S): at 13:50

## 2025-02-23 NOTE — PROGRESS NOTE ADULT - SUBJECTIVE AND OBJECTIVE BOX
Mya Garcia MD  Division of Hospital Medicine  Reachable on MS Teams    PROGRESS NOTE:     Patient is a 49y old  Female who presents with a chief complaint of LBP (22 Feb 2025 14:21)      SUBJECTIVE / OVERNIGHT EVENTS: No acute events overnight, seen this AM. Pain is unchanged even with dilaudid    ADDITIONAL REVIEW OF SYSTEMS:    MEDICATIONS  (STANDING):  acetaminophen     Tablet .. 975 milliGRAM(s) Oral every 8 hours  dextrose 5%. 1000 milliLiter(s) (50 mL/Hr) IV Continuous <Continuous>  dextrose 5%. 1000 milliLiter(s) (100 mL/Hr) IV Continuous <Continuous>  dextrose 50% Injectable 25 Gram(s) IV Push once  dextrose 50% Injectable 12.5 Gram(s) IV Push once  dextrose 50% Injectable 25 Gram(s) IV Push once  DULoxetine 30 milliGRAM(s) Oral daily  enoxaparin Injectable 40 milliGRAM(s) SubCutaneous every 24 hours  gabapentin 400 milliGRAM(s) Oral every 8 hours  glucagon  Injectable 1 milliGRAM(s) IntraMuscular once  ibuprofen  Tablet. 400 milliGRAM(s) Oral every 6 hours  influenza   Vaccine 0.5 milliLiter(s) IntraMuscular once  insulin lispro (ADMELOG) corrective regimen sliding scale   SubCutaneous three times a day before meals  insulin lispro (ADMELOG) corrective regimen sliding scale   SubCutaneous at bedtime  lidocaine   4% Patch 1 Patch Transdermal daily  melatonin 5 milliGRAM(s) Oral at bedtime  metoprolol tartrate 25 milliGRAM(s) Oral two times a day  naloxone Injectable 0.4 milliGRAM(s) IV Push once  pantoprazole    Tablet 40 milliGRAM(s) Oral before breakfast  polyethylene glycol 3350 17 Gram(s) Oral two times a day  rosuvastatin 40 milliGRAM(s) Oral at bedtime  senna 2 Tablet(s) Oral at bedtime    MEDICATIONS  (PRN):  bisacodyl Suppository 10 milliGRAM(s) Rectal daily PRN Constipation  dextrose Oral Gel 15 Gram(s) Oral once PRN Blood Glucose LESS THAN 70 milliGRAM(s)/deciliter  HYDROmorphone   Tablet 2 milliGRAM(s) Oral every 4 hours PRN Moderate Pain (4 - 6)  HYDROmorphone   Tablet 4 milliGRAM(s) Oral every 4 hours PRN Severe Pain (7 - 10)  HYDROmorphone  Injectable 0.2 milliGRAM(s) IV Push every 6 hours PRN Severe Breakthrough Pain  methocarbamol 750 milliGRAM(s) Oral three times a day PRN Muscle Spasm      CAPILLARY BLOOD GLUCOSE      POCT Blood Glucose.: 175 mg/dL (23 Feb 2025 12:12)  POCT Blood Glucose.: 154 mg/dL (23 Feb 2025 08:12)  POCT Blood Glucose.: 219 mg/dL (22 Feb 2025 21:49)  POCT Blood Glucose.: 136 mg/dL (22 Feb 2025 17:15)    I&O's Summary    22 Feb 2025 07:01  -  23 Feb 2025 07:00  --------------------------------------------------------  IN: 360 mL / OUT: 0 mL / NET: 360 mL    23 Feb 2025 07:01  -  23 Feb 2025 15:25  --------------------------------------------------------  IN: 600 mL / OUT: 0 mL / NET: 600 mL        PHYSICAL EXAM:  Vital Signs Last 24 Hrs  T(C): 36.2 (23 Feb 2025 13:47), Max: 36.8 (22 Feb 2025 21:39)  T(F): 97.1 (23 Feb 2025 13:47), Max: 98.2 (22 Feb 2025 21:39)  HR: 72 (23 Feb 2025 13:47) (70 - 99)  BP: 101/67 (23 Feb 2025 13:47) (101/67 - 127/80)  BP(mean): --  RR: 18 (23 Feb 2025 13:47) (17 - 18)  SpO2: 97% (23 Feb 2025 13:47) (97% - 97%)    Parameters below as of 23 Feb 2025 13:47  Patient On (Oxygen Delivery Method): room air      CONSTITUTIONAL: NAD, well-developed  RESPIRATORY: Normal respiratory effort; lungs are clear to auscultation bilaterally  CARDIOVASCULAR: Regular rate and rhythm, normal S1 and S2, no murmur/rub/gallop; No lower extremity edema  ABDOMEN: Nontender to palpation, normoactive bowel sounds, no rebound/guarding  MUSCLOSKELETAL: no clubbing or cyanosis of digits; no joint swelling or tenderness to palpation, spinal tenderness  PSYCH: A+O to person, place, and time; affect appropriate    LABS:                      RADIOLOGY & ADDITIONAL TESTS:  Results Reviewed:   Imaging Personally Reviewed:  Electrocardiogram Personally Reviewed:    COORDINATION OF CARE:  Care Discussed with Consultants/Other Providers [Y/N]:  Prior or Outpatient Records Reviewed [Y/N]:

## 2025-02-24 ENCOUNTER — TRANSCRIPTION ENCOUNTER (OUTPATIENT)
Age: 50
End: 2025-02-24

## 2025-02-24 VITALS
RESPIRATION RATE: 18 BRPM | DIASTOLIC BLOOD PRESSURE: 74 MMHG | SYSTOLIC BLOOD PRESSURE: 114 MMHG | OXYGEN SATURATION: 97 % | HEART RATE: 74 BPM | TEMPERATURE: 98 F

## 2025-02-24 LAB
GLUCOSE BLDC GLUCOMTR-MCNC: 195 MG/DL — HIGH (ref 70–99)
GLUCOSE BLDC GLUCOMTR-MCNC: 234 MG/DL — HIGH (ref 70–99)

## 2025-02-24 PROCEDURE — 97110 THERAPEUTIC EXERCISES: CPT

## 2025-02-24 PROCEDURE — 85027 COMPLETE CBC AUTOMATED: CPT

## 2025-02-24 PROCEDURE — 72156 MRI NECK SPINE W/O & W/DYE: CPT | Mod: MC

## 2025-02-24 PROCEDURE — 72157 MRI CHEST SPINE W/O & W/DYE: CPT | Mod: MC

## 2025-02-24 PROCEDURE — 99232 SBSQ HOSP IP/OBS MODERATE 35: CPT

## 2025-02-24 PROCEDURE — A9585: CPT

## 2025-02-24 PROCEDURE — 36415 COLL VENOUS BLD VENIPUNCTURE: CPT

## 2025-02-24 PROCEDURE — 82962 GLUCOSE BLOOD TEST: CPT

## 2025-02-24 PROCEDURE — 72131 CT LUMBAR SPINE W/O DYE: CPT | Mod: MC

## 2025-02-24 PROCEDURE — 85025 COMPLETE CBC W/AUTO DIFF WBC: CPT

## 2025-02-24 PROCEDURE — 99239 HOSP IP/OBS DSCHRG MGMT >30: CPT

## 2025-02-24 PROCEDURE — 80048 BASIC METABOLIC PNL TOTAL CA: CPT

## 2025-02-24 PROCEDURE — 72195 MRI PELVIS W/O DYE: CPT | Mod: MC

## 2025-02-24 PROCEDURE — 97161 PT EVAL LOW COMPLEX 20 MIN: CPT

## 2025-02-24 PROCEDURE — 72158 MRI LUMBAR SPINE W/O & W/DYE: CPT | Mod: MC

## 2025-02-24 PROCEDURE — 85652 RBC SED RATE AUTOMATED: CPT

## 2025-02-24 PROCEDURE — 80053 COMPREHEN METABOLIC PANEL: CPT

## 2025-02-24 PROCEDURE — 83036 HEMOGLOBIN GLYCOSYLATED A1C: CPT

## 2025-02-24 PROCEDURE — 84100 ASSAY OF PHOSPHORUS: CPT

## 2025-02-24 PROCEDURE — 99285 EMERGENCY DEPT VISIT HI MDM: CPT | Mod: 25

## 2025-02-24 PROCEDURE — 97116 GAIT TRAINING THERAPY: CPT

## 2025-02-24 PROCEDURE — 86140 C-REACTIVE PROTEIN: CPT

## 2025-02-24 PROCEDURE — 83735 ASSAY OF MAGNESIUM: CPT

## 2025-02-24 RX ORDER — METHOCARBAMOL 500 MG/1
1 TABLET, FILM COATED ORAL
Qty: 15 | Refills: 0
Start: 2025-02-24 | End: 2025-02-28

## 2025-02-24 RX ORDER — ACETAMINOPHEN 500 MG/5ML
2 LIQUID (ML) ORAL
Qty: 0 | Refills: 0 | DISCHARGE

## 2025-02-24 RX ORDER — GABAPENTIN 400 MG/1
600 CAPSULE ORAL EVERY 8 HOURS
Refills: 0 | Status: DISCONTINUED | OUTPATIENT
Start: 2025-02-24 | End: 2025-02-24

## 2025-02-24 RX ORDER — LIDOCAINE HYDROCHLORIDE 20 MG/ML
1 JELLY TOPICAL
Qty: 30 | Refills: 0
Start: 2025-02-24 | End: 2025-03-25

## 2025-02-24 RX ORDER — HYDROMORPHONE/SOD CHLOR,ISO/PF 2 MG/10 ML
1 SYRINGE (ML) INJECTION
Qty: 12 | Refills: 0
Start: 2025-02-24 | End: 2025-02-26

## 2025-02-24 RX ORDER — NALOXONE HYDROCHLORIDE 0.4 MG/ML
1 INJECTION, SOLUTION INTRAMUSCULAR; INTRAVENOUS; SUBCUTANEOUS
Qty: 1 | Refills: 0
Start: 2025-02-24 | End: 2025-02-24

## 2025-02-24 RX ORDER — GABAPENTIN 400 MG/1
1 CAPSULE ORAL
Qty: 21 | Refills: 0
Start: 2025-02-24 | End: 2025-03-02

## 2025-02-24 RX ORDER — BISACODYL 5 MG
1 TABLET, DELAYED RELEASE (ENTERIC COATED) ORAL
Qty: 0 | Refills: 0 | DISCHARGE
Start: 2025-02-24

## 2025-02-24 RX ORDER — SENNA 187 MG
2 TABLET ORAL
Qty: 28 | Refills: 0
Start: 2025-02-24 | End: 2025-03-09

## 2025-02-24 RX ORDER — POLYETHYLENE GLYCOL 3350 17 G/17G
17 POWDER, FOR SOLUTION ORAL
Qty: 238 | Refills: 0
Start: 2025-02-24 | End: 2025-03-02

## 2025-02-24 RX ORDER — IBUPROFEN 200 MG
1 TABLET ORAL
Qty: 0 | Refills: 0 | DISCHARGE
Start: 2025-02-24

## 2025-02-24 RX ORDER — METHOCARBAMOL 500 MG/1
750 TABLET, FILM COATED ORAL THREE TIMES A DAY
Refills: 0 | Status: DISCONTINUED | OUTPATIENT
Start: 2025-02-24 | End: 2025-02-24

## 2025-02-24 RX ADMIN — INSULIN LISPRO 2: 100 INJECTION, SOLUTION INTRAVENOUS; SUBCUTANEOUS at 08:32

## 2025-02-24 RX ADMIN — GABAPENTIN 600 MILLIGRAM(S): 400 CAPSULE ORAL at 12:26

## 2025-02-24 RX ADMIN — METHOCARBAMOL 750 MILLIGRAM(S): 500 TABLET, FILM COATED ORAL at 13:28

## 2025-02-24 RX ADMIN — DULOXETINE 30 MILLIGRAM(S): 20 CAPSULE, DELAYED RELEASE ORAL at 12:25

## 2025-02-24 RX ADMIN — METOPROLOL SUCCINATE 25 MILLIGRAM(S): 50 TABLET, EXTENDED RELEASE ORAL at 05:24

## 2025-02-24 RX ADMIN — ENOXAPARIN SODIUM 40 MILLIGRAM(S): 100 INJECTION SUBCUTANEOUS at 05:24

## 2025-02-24 RX ADMIN — Medication 400 MILLIGRAM(S): at 09:32

## 2025-02-24 RX ADMIN — Medication 400 MILLIGRAM(S): at 12:25

## 2025-02-24 RX ADMIN — Medication 975 MILLIGRAM(S): at 13:29

## 2025-02-24 RX ADMIN — Medication 4 MILLIGRAM(S): at 09:32

## 2025-02-24 RX ADMIN — INSULIN LISPRO 1: 100 INJECTION, SOLUTION INTRAVENOUS; SUBCUTANEOUS at 12:38

## 2025-02-24 RX ADMIN — Medication 400 MILLIGRAM(S): at 08:32

## 2025-02-24 RX ADMIN — Medication 40 MILLIGRAM(S): at 08:18

## 2025-02-24 RX ADMIN — GABAPENTIN 400 MILLIGRAM(S): 400 CAPSULE ORAL at 05:24

## 2025-02-24 RX ADMIN — Medication 4 MILLIGRAM(S): at 08:34

## 2025-02-24 RX ADMIN — Medication 400 MILLIGRAM(S): at 00:19

## 2025-02-24 RX ADMIN — Medication 975 MILLIGRAM(S): at 05:24

## 2025-02-24 NOTE — PROGRESS NOTE ADULT - ASSESSMENT
49F w/ PMH of SS s/p 2 stage unilateral L4-S1 decompression/fusion (ALIF, PSF), exploration of spine w/ removal of hardware/R L4-S1 w/ in situ fusion 10/2024    Presented w/ acute on chronic LBP w/ 80% numbness of right gluteal region w/ radiation down right leg since last procedure. Over the past day she reports worsening of the lower back pain mainly in the right lower back w/ radiation down b/l legs. Rated as a 10/10 at worst and described as arrows shooting at her back. Otherwise denies trauma, saddle anesthesia, or urinary/fecal incontinence.    States tramadol did nothing for her pain, higher dose oxy IR makes her "feel high" w/o relief, +Difficulty ambulating.    Current out- patient pain regimen: short RXs for Tramadol 50mg QD and QID; Oxy IR 10mg TID From hospital and PCP; however patient reports receiving longer RXs from her outpt pain doctor [not on iSTOP; tried different last name (Ryan) w/ only 1 short RX for OXY 5mg]  Out Patient Pain Management provider: Ezra Leavitt MD    Pt reports improvement in pain since admission. Where it was down B/l LE R>L to soles, she now reports it is in right hip and buttock and right perineum. Was not on gabapentin prior to admission. Pt is on Cymbalta for depression. Dilaudid PO takes the edge off, minimal use. Pain is neuropathic in nature and also endorses muscle spasms but avoids Robaxin 2/2 makes her sleepy but understands she needs it.    Plan discussed w/ primary team:  Increase Gabapentin to 600 mg Q8h (CrCl = 135.1)  Change Robaxin 750mg to TID (standing, pt may refuse)  Continue PO Dilaudid 4mg q4h PRN for severe pain while admitted, change to Q6h PRN x couple of days on discharge  Discontinue IV Dilaudid and 2 mg PO dose (not using)  Continue standing Tylenol 975 q8h for couple of days  Continue standing Ibuprofen 400mg q6h for couple of days  Continue Cymbalta 30mg QD (home regimen for depression and helps w/ pain)    Monitor for sedation, respiratory depression.  Bowel Regimen PRN.  OOB/ PT per primary team    Follow up with  Ezra Leavitt MD for continued pain management after discharge.  Narcan Rescue Kit on discharge (Naloxone 4 mg/0.1 ml nasal spray - 1 spray q 2-3 minutes alternating between nostrils).    Time spent on encounter:  40 minutes    Chronic Pain Service  [phone]      
49F w/ PMH of SS s/p 2 stage unilateral L4-S1 decompression/fusion (ALIF, PSF), exploration of spine w/ removal of hardware/R L4-S1 w/ in situ fusion 10/'24 pw acute on chronic LBP. Reports hx of LBP w/ reported 80% numbness of right gluteal region w/ radiation down right leg since last procedure. Over the past day she reports worsening of the lower back pain mainly in the right lower back w/ radiation down b/l legs. Rated as a 10/10 at worst and described as arrows shooting at her back. Otherwise denies trauma, saddle anesthesia, or urinary/fecal incontinence.    Current out- patient pain regimen: short RXs for Tramadol 50mg QD and QID; Oxy IR 10mg TID From hospital and PCP; however patient reports receiving longer RXs from her outpt pain doctor [not on iSTOP; tried different last name (Ryan) w/ only 1 short RX for OXY 5mg]  Out Patient Pain Management provider: Ezra Leavitt MD  [facility] Prescription Monitoring Program: Reference #[id number]    Pain Score: 5/10 down to 0/10.    Patient reports ongoing low back pain with radiation down her legs as well and worsened w/ movement.   States tramadol did nothing for her pain, and the higher dose oxy IR makes her "feel high".    Continue Oxy IR 5mg Q 4 hours PRN moderate pain.  Change Oxy IR 10mg to 7.5 mg Q 4 hours PRN severe pain.  Continue methocarbamol 750mg TID PRN for spasms.  Increase gabapentin to 400mg TID- current CrCl is 135.2.  Continue standing Tylenol 975 q8h.  Continue standing Ibuprofen 400mg q6h.  Continue Cymbalta 30mg QD.    Monitor for sedation, respiratory depression.  Bowel Regimen PRN.  OOB/ PT per primary team    Follow up with  Ezra Leavitt MD for continued pain management after discharge.  Narcan Rescue Kit on discharge (Naloxone 4 mg/0.1 ml nasal spray - 1 spray q 2-3 minutes alternating between nostrils).      Time spent on encounter:  30 minutes      Chronic Pain Service  [phone]
HPI:  49F w/ PMH of SS s/p 2 stage unilateral L4-S1 decompression/fusion (ALIF, PSF), exploration of spine w/ removal of hardware/R L4-S1 w/ in situ fusion 10/'24 pw acute on chronic LBP. Reports hx of LBP w/ reported 80% numbness of right gluteal region w/ radiation down right leg since last procedure. Over the past day she reports worsening of the lower back pain mainly in the right lower back w/ radiation down b/l legs. Rated as a 10/10 at worst and described as arrows shooting at her back. Otherwise denies trauma, saddle anesthesia, or urinary/fecal incontinence.    Current out- patient pain regimen: short RXs for Tramadol 50mg QD and QID; Oxy IR 10mg TID From hospital and PCP; however patient reports receiving longer RXs from her outpt pain doctor [not on iSTOP; tried different last name (Ryan) w/ only 1 short RX for OXY 5mg]  Out Patient Pain Management provider: Ezra Leavitt MD  [facility] Prescription Monitoring Program: Reference #[id number]    Pain Score: 6/10    States tramadol did nothing for her pain, and the higher dose oxy IR makes her "feel high".    Pt reports continued low back pain. No relief w/ Oxycodone after these past few days. +Difficulty ambulating as well.    Plan discussed w/ primary team:  STOP Oxy 5mg and 10mg; start PO Dilaudid 2mg q4h PRN for moderate pain, 4mg q4h PRN for severe pain.  Start IV Dilaudid 0.2mg for severe breakthrough pain.  Continue methocarbamol 750mg TID PRN for spasms.  Continue gabapentin to 400mg TID- current CrCl is 135.2.  Continue standing Tylenol 975 q8h.  Continue standing Ibuprofen 400mg q6h.  Continue Cymbalta 30mg QD.    Monitor for sedation, respiratory depression.  Bowel Regimen PRN.  OOB/ PT per primary team    Follow up with  Ezra Leavitt MD for continued pain management after discharge.  Narcan Rescue Kit on discharge (Naloxone 4 mg/0.1 ml nasal spray - 1 spray q 2-3 minutes alternating between nostrils).      Time spent on encounter:  40 minutes    Chronic Pain Service  [phone]      
49F w/ PMH of SS s/p 2 stage unilateral L4-S1 decompression/fusion (ALIF, PSF), exploration of spine w/ removal of hardware/R L4-S1 w/ in situ fusion 10/'24 pw acute on chronic LBP

## 2025-02-24 NOTE — PROGRESS NOTE ADULT - PROBLEM SELECTOR PLAN 4
DVT ppx: Lovenox  Diet: DASH/CC  PT: HPT -pt requesting rolling walker with seat     Dispo: Pending pain control
DVT ppx: Lovenox  Diet: DASH/CC  PT: HPT     Dispo: Pending clinical improvement; PMR eval and final ortho recs
DVT ppx: Lovenox  Diet: DASH/CC  PT: HPT     Dispo: Pending pain control
DVT ppx: Lovenox  Diet: DASH/CC  PT: HPT -pt requesting rolling walker with seat, f/u with CM if pt qualifies    Dispo: Pending pain control
DVT ppx: Lovenox  Diet: DASH/CC  PT: HPT     Dispo: Pending clinical improvement; MRI spine; ortho recs
DVT ppx: Lovenox  Diet: DASH/CC  PT: HPT -pt requesting rollator    Dispo: Pending pain control
DVT ppx: Lovenox  Diet: DASH/CC  PT: HPT -pt requesting rollator    Dispo: Pending pain control
DVT ppx: Lovenox  Diet: DASH/CC  PT: HPT     Dispo: Pending clinical improvement; MRI and pain control, final ortho recs
DVT ppx: Lovenox  Diet: DASH/CC  PT: HPT -pt requesting rolling walker with seat     Dispo: Pending pain control

## 2025-02-24 NOTE — PROGRESS NOTE ADULT - NSPROGADDITIONALINFOA_GEN_ALL_CORE
Discussed with ACPSana
Patient medically stable for discharge. Discussed with patient, 4 DSU ACP. Total time spent discharge planning 45 minutes.
Plan d/w RHONDA Hurd
Discussed with ACPSana
Plan d/w HECTOR Arceo
d/w RHONDA Tong and GAMAL
Plan d/w HECTOR [place] [facility]
Plan d/w HECTOR [place] [facility]
Plan d/w NP Brenda, chronic pain

## 2025-02-24 NOTE — PROGRESS NOTE ADULT - PROVIDER SPECIALTY LIST ADULT
Hospitalist
Hospitalist
Orthopedics
Hospitalist
Pain Medicine
Pain Medicine
Hospitalist
Pain Medicine
Hospitalist

## 2025-02-24 NOTE — PROGRESS NOTE ADULT - PROBLEM SELECTOR PLAN 3
- c/w home Rosuvastatin 40mg qhs

## 2025-02-24 NOTE — PROGRESS NOTE ADULT - SUBJECTIVE AND OBJECTIVE BOX
[facility] Division of Hospital Medicine  Ankur Porter DO  Reachable on [employer] Teams    Patient is a 49y old  Female who presents with a chief complaint of LBP (24 Feb 2025 11:49)    SUBJECTIVE / OVERNIGHT EVENTS: No acute events overnight. Patient seen and examined at bedside this morning, endorses R hip and buttock pain.    REVIEW OF SYSTEMS:    CONSTITUTIONAL: No weakness, fevers or chills  EYES/ENT: No visual changes;  No vertigo or throat pain   NECK: No pain or stiffness  RESPIRATORY: No cough, wheezing, hemoptysis; No shortness of breath  CARDIOVASCULAR: No chest pain or palpitations  GASTROINTESTINAL: No abdominal or epigastric pain. No nausea, vomiting, or hematemesis; No diarrhea or constipation. No melena or hematochezia.  GENITOURINARY: No dysuria, frequency or hematuria  NEUROLOGICAL: No numbness or weakness  SKIN: No itching, burning, rashes, or lesions  MSK: Endorses R hip and buttock pain  HEME: No easy bleeding, no easy bruising  All other review of systems is negative unless indicated above.    MEDICATIONS  (STANDING):  acetaminophen     Tablet .. 975 milliGRAM(s) Oral every 8 hours  dextrose 5%. 1000 milliLiter(s) (50 mL/Hr) IV Continuous <Continuous>  dextrose 5%. 1000 milliLiter(s) (100 mL/Hr) IV Continuous <Continuous>  dextrose 50% Injectable 25 Gram(s) IV Push once  dextrose 50% Injectable 12.5 Gram(s) IV Push once  dextrose 50% Injectable 25 Gram(s) IV Push once  DULoxetine 30 milliGRAM(s) Oral daily  enoxaparin Injectable 40 milliGRAM(s) SubCutaneous every 24 hours  gabapentin 600 milliGRAM(s) Oral every 8 hours  glucagon  Injectable 1 milliGRAM(s) IntraMuscular once  ibuprofen  Tablet. 400 milliGRAM(s) Oral every 6 hours  influenza   Vaccine 0.5 milliLiter(s) IntraMuscular once  insulin lispro (ADMELOG) corrective regimen sliding scale   SubCutaneous three times a day before meals  insulin lispro (ADMELOG) corrective regimen sliding scale   SubCutaneous at bedtime  lidocaine   4% Patch 1 Patch Transdermal daily  melatonin 5 milliGRAM(s) Oral at bedtime  methocarbamol 750 milliGRAM(s) Oral three times a day  metoprolol tartrate 25 milliGRAM(s) Oral two times a day  naloxone Injectable 0.4 milliGRAM(s) IV Push once  pantoprazole    Tablet 40 milliGRAM(s) Oral before breakfast  polyethylene glycol 3350 17 Gram(s) Oral two times a day  rosuvastatin 40 milliGRAM(s) Oral at bedtime  senna 2 Tablet(s) Oral at bedtime    MEDICATIONS  (PRN):  bisacodyl Suppository 10 milliGRAM(s) Rectal daily PRN Constipation  dextrose Oral Gel 15 Gram(s) Oral once PRN Blood Glucose LESS THAN 70 milliGRAM(s)/deciliter  HYDROmorphone   Tablet 4 milliGRAM(s) Oral every 4 hours PRN Severe Pain (7 - 10)      CAPILLARY BLOOD GLUCOSE      POCT Blood Glucose.: 195 mg/dL (24 Feb 2025 12:35)  POCT Blood Glucose.: 234 mg/dL (24 Feb 2025 08:22)  POCT Blood Glucose.: 167 mg/dL (23 Feb 2025 21:09)  POCT Blood Glucose.: 158 mg/dL (23 Feb 2025 17:04)    I&O's Summary    23 Feb 2025 07:01  -  24 Feb 2025 07:00  --------------------------------------------------------  IN: 840 mL / OUT: 0 mL / NET: 840 mL        PHYSICAL EXAM:  Vital Signs Last 24 Hrs  T(C): 36.7 (24 Feb 2025 12:06), Max: 36.7 (24 Feb 2025 05:38)  T(F): 98.1 (24 Feb 2025 12:06), Max: 98.1 (24 Feb 2025 05:38)  HR: 74 (24 Feb 2025 12:06) (70 - 78)  BP: 114/74 (24 Feb 2025 12:06) (110/71 - 114/74)  BP(mean): --  RR: 18 (24 Feb 2025 12:06) (18 - 18)  SpO2: 97% (24 Feb 2025 12:06) (96% - 97%)    Parameters below as of 24 Feb 2025 12:06  Patient On (Oxygen Delivery Method): room air      CONSTITUTIONAL: NAD, well-developed, well-groomed  RESPIRATORY: Normal respiratory effort; lungs are clear to auscultation bilaterally  CARDIOVASCULAR: Regular rate and rhythm, normal S1 and S2, no murmur/rub/gallop  ABDOMEN: Nontender to palpation, nondistended, soft  PSYCH: A+O to person, place, and time; affect appropriate

## 2025-02-24 NOTE — DISCHARGE NOTE NURSING/CASE MANAGEMENT/SOCIAL WORK - FINANCIAL ASSISTANCE
[facility] provides services at a reduced cost to those who are determined to be eligible through [facility]’s financial assistance program. Information regarding [facility]’s financial assistance program can be found by going to [email or web address] or by calling [phone].

## 2025-02-24 NOTE — PROGRESS NOTE ADULT - PROBLEM SELECTOR PROBLEM 1
Acute on chronic low back pain

## 2025-02-24 NOTE — DISCHARGE NOTE NURSING/CASE MANAGEMENT/SOCIAL WORK - PATIENT PORTAL LINK FT
You can access the FollowMyHealth Patient Portal offered by [facility] by registering at the following website: [email or web address]. By joining [facility]’s FollowMyHealth portal, you will also be able to view your health information using other applications (apps) compatible with our system.

## 2025-02-24 NOTE — DISCHARGE NOTE NURSING/CASE MANAGEMENT/SOCIAL WORK - NSDCPEFALRISK_GEN_ALL_CORE
For information on Fall & Injury Prevention, visit: [email or web address] OR  [email or web address] OR  https://www.cdc.gov/steadi/patient.html

## 2025-02-24 NOTE — PROGRESS NOTE ADULT - PROBLEM SELECTOR PLAN 2
- A1c 7.8   - Hold home oral hypoglycemics  - continue JUNIOR  - Diabetic education  - CC diet
- f/u A1c  - Hold home oral hypoglycemics  - continue JUNIOR  - Diabetic education  - CC diet
- A1c 7.8   - Hold home oral hypoglycemics  - continue JUNIOR  - Diabetic education  - CC diet

## 2025-02-24 NOTE — CHART NOTE - NSCHARTNOTEFT_GEN_A_CORE
Reference #: [id number]      PDI	Current Rx	Drug Type	Rx Written	Rx Dispensed	Drug	Quantity	Days Supply	Prescriber Name	Prescriber ANGELINE #	Payment Method	Dispenser  A	N	O	01/27/2025	01/30/2025	tramadol hcl 50 mg tablet	15	15	Tomer Drake	[id number]	Medicaid	[id number]  B	N	O	10/29/2024	10/30/2024	oxycodone hcl (ir) 10 mg tab	90	30	Clarissa Coelho	[id number]	Medicaid	[facility]	N	O	10/04/2024	10/04/2024	oxycodone hcl (ir) 5 mg tablet	42	7	Viky Johnson A, [facility]	[id number]	Medicaid	[facility]	N	O	10/04/2024	10/04/2024	tramadol hcl 50 mg tablet	28	7	Viky Johnson A, [facility]	[id number]	Medicaid	[facility]      Mya Garcia MD  Division of Hospital Medicine  Reachable on MS Teams
Request from Dr. Porter  to facilitate patient discharge.  Medication reconciliation reviewed, revised, and resolved with Dr. Porter, who has medically cleared patient for discharge with follow up as advised.  Please refer to discharge note for detailed hospital course.
Patient will require a rollator at home, due to their diagnosis of history of spinal surgery, with acute on chronic back pain, to help complete their MRADLs.

## 2025-02-24 NOTE — PROGRESS NOTE ADULT - PROBLEM SELECTOR PLAN 1
[facility] APPOINTMENT CONFIRMATION      Date: 7/29/2024    Time: 3:00PM    Provider name: Dr. Zhang Evans    Location: Neurology Locations: [address] [place] [address]    Zoom link sent (if applicable): N/A    Will patient be in [place] for the virtual visit? N/A    Is patient currently in a facility? No    Alternative contact information: N/A    Appointment confirmed: No and left phone message    \"Please ensure you bring the medication bottles, including the dates and times you take each medication.\"  New Patients: \"Please bring any outside records or images.\"    \"We do have free parking available in the main hospital parking lot. However, parking can be difficult to find so we ask that you arrive 40 minutes prior to your appointment.\"      
- w/out red flag symptoms  - CT L-spine w/out acute process  - Bowel regimen w/ pain regimen  - PT eval: HPT, pt ref ANGELA, requesting rollator   - MRI of spine done; multiple degenerative changes in the cervical spine including right C3-C4 facet joint arthrosis contributing to severe right neuroforaminal stenosis. Postsurgical changes spanning L4-S1. Prior removal of posterior fusion hardware at these levels with enhancement in the right posterior paraspinal soft tissues. This could represent postsurgical changes but superimposed infectious process cannot be ruled out. No abscess identified. No significant thoracolumbar spinal canal stenosis.  - MR pelvis neg for sacroiliitis  - low concern for infectious process at this time so will monitor off abx; ortho recs neuro eval and PMR eval; neuro recs appreciated, no interventions at this time  -pain regimen: PO dilaudid w/ IV for breakthrough, standing ibuprofen, standing tylenol, duloxetine, gabapentin 400mg, robaxin, chronic pain following, pt not using dilaudid often, encouraged consistent use to determine actual pain needs
- w/out red flag symptoms  - CT L-spine w/out acute process  - Pain control as ordered   - Bowel regimen w/ pain regimen  - PT eval: HPT   - MRI of spine done; multiple degenerative changes in the cervical spine including right C3-C4 facet joint arthrosis contributing to severe right neuroforaminal stenosis. Postsurgical changes spanning L4-S1. Prior removal of   posterior fusion hardware at these levels with enhancement in the right posterior paraspinal soft tissues. This could represent postsurgical changes but superimposed infectious process cannot be ruled out. No abscess identified. No significant thoracolumbar spinal canal stenosis.  - low concern for infectious process at this time so will monitor off abx; ortho recs neuro eval and PMR eval; neuro recs appreciated, no interventions at this time; ortho attending to see patient still; f/u final ortho recs
- w/out red flag symptoms  - CT L-spine w/out acute process  - Pain control as ordered   - Bowel regimen w/ pain regimen  - PT eval: HPT   - MRI of spine done; multiple degenerative changes in the cervical spine including right C3-C4 facet joint arthrosis contributing to severe right neuroforaminal stenosis. Postsurgical changes spanning L4-S1. Prior removal of posterior fusion hardware at these levels with enhancement in the right posterior paraspinal soft tissues. This could represent postsurgical changes but superimposed infectious process cannot be ruled out. No abscess identified. No significant thoracolumbar spinal canal stenosis.  - MR pelvis neg for sacroiliitis  - low concern for infectious process at this time so will monitor off abx; ortho recs neuro eval and PMR eval; neuro recs appreciated, no interventions at this time  -pain regimen: oxy 10 q4 PRN severe pain, 5mg PRN q4h for moderate pain, standing ibuprofen, standing tylenol, duloxetine. Still no improvement in pain, chronic pain consulted, appreciate recs, gabapentin and robaxin added, follow up in AM to reassess pain
- w/out red flag symptoms  - CT L-spine w/out acute process  - Pain control as ordered   - Bowel regimen w/ pain regimen  - PT eval: HPT   - ortho recs appreciated; MRI of spine ordered to be done
- w/out red flag symptoms  - CT L-spine w/out acute process  - Bowel regimen w/ pain regimen  - PT eval: HPT, pt ref ANGELA, requesting rollator   - MRI of spine done; multiple degenerative changes in the cervical spine including right C3-C4 facet joint arthrosis contributing to severe right neuroforaminal stenosis. Postsurgical changes spanning L4-S1. Prior removal of posterior fusion hardware at these levels with enhancement in the right posterior paraspinal soft tissues. This could represent postsurgical changes but superimposed infectious process cannot be ruled out. No abscess identified. No significant thoracolumbar spinal canal stenosis.  - MR pelvis neg for sacroiliitis  - low concern for infectious process at this time so will monitor off abx; ortho recs neuro eval and PMR eval; neuro recs appreciated, no interventions at this time  -pain regimen: PO dilaudid w/ IV for breakthrough, standing ibuprofen, standing tylenol, duloxetine, gabapentin 400mg, robaxin, chronic pain following, pt not using dilaudid often, encouraged consistent use to determine actual pain needs--chronic pain follow up 2/24
- w/out red flag symptoms  - CT L-spine w/out acute process  - Pain control as ordered   - Bowel regimen w/ pain regimen  - PT eval: HPT   - MRI of spine done; multiple degenerative changes in the cervical spine including right C3-C4 facet joint arthrosis contributing to severe right neuroforaminal stenosis. Postsurgical changes spanning L4-S1. Prior removal of posterior fusion hardware at these levels with enhancement in the right posterior paraspinal soft tissues. This could represent postsurgical changes but superimposed infectious process cannot be ruled out. No abscess identified. No significant thoracolumbar spinal canal stenosis.  - MR pelvis neg for sacroiliitis  - low concern for infectious process at this time so will monitor off abx; ortho recs neuro eval and PMR eval; neuro recs appreciated, no interventions at this time  -pain regimen: oxy 7.5 q4 PRN severe pain, 5mg PRN q4h for moderate pain, standing ibuprofen, standing tylenol, duloxetine. Still no improvement in pain, chronic pain consulted, appreciate recs, gabapentin increased to 400mg and robaxin added, follow up in AM to reassess pain
- w/out red flag symptoms  - CT L-spine w/out acute process  - Pain control as ordered   - Bowel regimen w/ pain regimen  - PT eval: HPT   - MRI of spine done; multiple degenerative changes in the cervical spine including right C3-C4 facet joint arthrosis contributing to severe right neuroforaminal stenosis. Postsurgical changes spanning L4-S1. Prior removal of posterior fusion hardware at these levels with enhancement in the right posterior paraspinal soft tissues. This could represent postsurgical changes but superimposed infectious process cannot be ruled out. No abscess identified. No significant thoracolumbar spinal canal stenosis.  - MR pelvis neg for sacroiliitis  - low concern for infectious process at this time so will monitor off abx; ortho recs neuro eval and PMR eval; neuro recs appreciated, no interventions at this time  -pain regimen: PO dilaudid w/ IV for breakthrough, standing ibuprofen, standing tylenol, duloxetine, gabapentin 400mg, robaxin, chronic pain following, pt did not use PRNs overnight however in pain, explained to patient she needs to ask for pain medications when she is in pain, pt expressed understanding and will ask for dilaudid.
- w/out red flag symptoms  - CT L-spine w/out acute process  - Pain control as ordered   - Bowel regimen w/ pain regimen  - PT eval: HPT   - MRI of spine done; multiple degenerative changes in the cervical spine including right C3-C4 facet joint arthrosis contributing to severe right neuroforaminal stenosis. Postsurgical changes spanning L4-S1. Prior removal of posterior fusion hardware at these levels with enhancement in the right posterior paraspinal soft tissues. This could represent postsurgical changes but superimposed infectious process cannot be ruled out. No abscess identified. No significant thoracolumbar spinal canal stenosis.  - MR pelvis pending to r/o sacroiliitis  - low concern for infectious process at this time so will monitor off abx; ortho recs neuro eval and PMR eval; neuro recs appreciated, no interventions at this time  -pain regimen: oxy 10 q4 PRN severe pain, 5mg PRN q4h for moderate pain, standing ibuprofen, standing tylenol, duloxetine
- w/out red flag symptoms  - CT L-spine w/out acute process  - Pain control as ordered   - Bowel regimen w/ pain regimen  - PT eval: HPT   - MRI of spine done; multiple degenerative changes in the cervical spine including right C3-C4 facet joint arthrosis contributing to severe right neuroforaminal stenosis. Postsurgical changes spanning L4-S1. Prior removal of posterior fusion hardware at these levels with enhancement in the right posterior paraspinal soft tissues. This could represent postsurgical changes but superimposed infectious process cannot be ruled out. No abscess identified. No significant thoracolumbar spinal canal stenosis.  - MR pelvis neg for sacroiliitis  - low concern for infectious process at this time so will monitor off abx; ortho recs neuro eval and PMR eval; neuro recs appreciated, no interventions at this time  -pain regimen: PO dilaudid w/ IV for breakthrough, standing ibuprofen, standing tylenol, duloxetine, gabapentin 400mg, robaxin, chronic pain following, follow up in AM to reassess pain

## 2025-02-24 NOTE — PROGRESS NOTE ADULT - SUBJECTIVE AND OBJECTIVE BOX
Chief Complaint:  Patient is a 49y old  Female who presents with a chief complaint of LBP (19 Feb 2025 06:56)    HPI:  49F w/ PMH of SS s/p 2 stage unilateral L4-S1 decompression/fusion (ALIF, PSF), exploration of spine w/ removal of hardware/R L4-S1 w/ in situ fusion 10/2024    Presented w/ acute on chronic LBP w/ 80% numbness of right gluteal region w/ radiation down right leg since last procedure. Over the past day she reports worsening of the lower back pain mainly in the right lower back w/ radiation down b/l legs. Rated as a 10/10 at worst and described as arrows shooting at her back. Otherwise denies trauma, saddle anesthesia, or urinary/fecal incontinence.    States tramadol did nothing for her pain, higher dose oxy IR makes her "feel high" w/o relief, +Difficulty ambulating.    Current out- patient pain regimen: short RXs for Tramadol 50mg QD and QID; Oxy IR 10mg TID From hospital and PCP; however patient reports receiving longer RXs from her outpt pain doctor [not on iSTOP; tried different last name (Ryan) w/ only 1 short RX for OXY 5mg]  Out Patient Pain Management provider: Ezra Leavitt MD    Pt seen lying in bed, reports improvement in pain since admission. Where it was down B/l LE R>L to soles, she now reports it is in right hip and buttock and right perineum. Was not on gabapentin prior to admission. Pt is on Cymbalta for depression. Dilaudid PO takes the edge off. Pain is neuropathic in nature and also endorses muscle spasms but avoids Robaxin 2/2 makes her sleepy but understands she needs it. Pt educated re: need to find an effective oral regimen in preparation for discharge; goal of pain management to find effective oral regimen so that pain is tolerable and patient can function.      PHYSICAL EXAM  GENERAL: Seen at bedside, NAD, well-groomed, well-developed, appears stated age, no signs of toxicity  NEURO: Alert & Oriented X3, Good concentration; Follows commands; sensory exam decreased RLE, (-)proprioception RLE, intact LLE; denies saddle anesthesia  HEENT: Head atraumatic, normocephalic; speech clear and fluent  GI: Appetite good, (+)BM, last BM today  : Voiding without issue  EXTREMITIES: No cyanosis or edema; moving all extremities  MSK: Motor Strength 4/5 B/L lower extremities  SKIN: No rashes or lesions, (+)tattoo RUE  PSYCH: affect normal; good eye contact; no signs of depression or anxiety, (+)hx depression    T(C): 36.7 (02-24-25 @ 05:38)  HR: 70 (02-24-25 @ 05:38)  BP: 110/71 (02-24-25 @ 05:38)  RR: 18 (02-24-25 @ 05:38)  SpO2: 97% (02-24-25 @ 05:38)      MEDICATIONS  (STANDING):  acetaminophen     Tablet .. 975 milliGRAM(s) Oral every 8 hours  dextrose 5%. 1000 milliLiter(s) (50 mL/Hr) IV Continuous <Continuous>  dextrose 5%. 1000 milliLiter(s) (100 mL/Hr) IV Continuous <Continuous>  dextrose 50% Injectable 25 Gram(s) IV Push once  dextrose 50% Injectable 12.5 Gram(s) IV Push once  dextrose 50% Injectable 25 Gram(s) IV Push once  DULoxetine 30 milliGRAM(s) Oral daily  enoxaparin Injectable 40 milliGRAM(s) SubCutaneous every 24 hours  gabapentin 600 milliGRAM(s) Oral every 8 hours  glucagon  Injectable 1 milliGRAM(s) IntraMuscular once  ibuprofen  Tablet. 400 milliGRAM(s) Oral every 6 hours  influenza   Vaccine 0.5 milliLiter(s) IntraMuscular once  insulin lispro (ADMELOG) corrective regimen sliding scale   SubCutaneous three times a day before meals  insulin lispro (ADMELOG) corrective regimen sliding scale   SubCutaneous at bedtime  lidocaine   4% Patch 1 Patch Transdermal daily  melatonin 5 milliGRAM(s) Oral at bedtime  methocarbamol 750 milliGRAM(s) Oral three times a day  metoprolol tartrate 25 milliGRAM(s) Oral two times a day  naloxone Injectable 0.4 milliGRAM(s) IV Push once  pantoprazole    Tablet 40 milliGRAM(s) Oral before breakfast  polyethylene glycol 3350 17 Gram(s) Oral two times a day  rosuvastatin 40 milliGRAM(s) Oral at bedtime  senna 2 Tablet(s) Oral at bedtime    MEDICATIONS  (PRN):  bisacodyl Suppository 10 milliGRAM(s) Rectal daily PRN Constipation  dextrose Oral Gel 15 Gram(s) Oral once PRN Blood Glucose LESS THAN 70 milliGRAM(s)/deciliter  HYDROmorphone   Tablet 4 milliGRAM(s) Oral every 4 hours PRN Severe Pain (7 - 10)      Allergies    penicillin (Pruritus; Rash)        Pertinent labs, radiology, additional studies:  Reviewed        < from: MR Lumbar Spine w/wo IV Cont (02.16.25 @ 13:40) >  IMPRESSION:  Multiple degenerative changes in the cervical spine including right C3-C4   facet joint arthrosis contributing to severe right neuroforaminal   stenosis.  Postsurgical changes spanning L4-S1 as described above. Prior removal of   posterior fusion hardware at these levels with enhancement in the right   posterior paraspinal soft tissues. This could represent postsurgical   changes but superimposed infectious process cannot be ruled out. No   abscess identified. No significant thoracolumbar spinal canal stenosis.    < from: MR Pelvis Bony Only No Cont (02.18.25 @ 19:24) >  Impression:  Partially evaluated edema within the right posterior paraspinal   musculature withinthe visualized lower lumbosacral spine, which may be   postoperative and/or be related to low-grade strain. However,   superimposed infection of the right posterior paraspinal musculature   within this region cannot be excluded and is also a differential   consideration.  No convincing evidence of sacroiliitis.

## 2025-02-24 NOTE — DISCHARGE NOTE NURSING/CASE MANAGEMENT/SOCIAL WORK - NSDCFUADDAPPT_GEN_ALL_CORE_FT
You must follow up with your primary medical doctor within 2-3 days of discharge - please call to make an appointment.    You must follow up with your pain management doctor, Dr. Leavitt, within one week of discharge - please call to make an appointment.    You must follow up with your orthopedic surgeon, Dr. Pena, within one week of discharge - please call to make an appointment.        APPTS ARE READY TO BE MADE: [X] YES    Best Family or Patient Contact (if needed):    Additional Information about above appointments (if needed):    1: Primary medical doctor  2: Pain management doctor  3: Orthopedic surgeon    Other comments or requests:

## 2025-03-11 ENCOUNTER — APPOINTMENT (OUTPATIENT)
Dept: ORTHOPEDIC SURGERY | Facility: CLINIC | Age: 50
End: 2025-03-11
Payer: OTHER MISCELLANEOUS

## 2025-03-11 VITALS — HEIGHT: 67 IN | WEIGHT: 149 LBS | BODY MASS INDEX: 23.39 KG/M2

## 2025-03-11 PROCEDURE — 99214 OFFICE O/P EST MOD 30 MIN: CPT

## 2025-03-11 PROCEDURE — 72100 X-RAY EXAM L-S SPINE 2/3 VWS: CPT

## 2025-03-13 ENCOUNTER — NON-APPOINTMENT (OUTPATIENT)
Age: 50
End: 2025-03-13

## 2025-03-21 ENCOUNTER — NON-APPOINTMENT (OUTPATIENT)
Age: 50
End: 2025-03-21

## 2025-04-15 ENCOUNTER — NON-APPOINTMENT (OUTPATIENT)
Age: 50
End: 2025-04-15

## 2025-05-02 ENCOUNTER — APPOINTMENT (OUTPATIENT)
Dept: ORTHOPEDIC SURGERY | Facility: CLINIC | Age: 50
End: 2025-05-02
Payer: OTHER MISCELLANEOUS

## 2025-05-02 VITALS — HEIGHT: 67 IN | BODY MASS INDEX: 25.11 KG/M2 | WEIGHT: 160 LBS

## 2025-05-02 PROCEDURE — 72100 X-RAY EXAM L-S SPINE 2/3 VWS: CPT

## 2025-05-02 PROCEDURE — 99214 OFFICE O/P EST MOD 30 MIN: CPT

## (undated) DEVICE — GLV 8 PROTEXIS (WHITE)

## (undated) DEVICE — VESSEL LOOP ASPEN SUPERMAXI BLUE

## (undated) DEVICE — DRSG DERMABOND 0.7ML

## (undated) DEVICE — MAZOR X SCAN & PLAN DISP KIT

## (undated) DEVICE — DRAPE IOBAN 23" X 23"

## (undated) DEVICE — VISITEC 4X4

## (undated) DEVICE — ELCTR BOVIE TIP BLADE INSULATED 2.75" EDGE

## (undated) DEVICE — DRAPE TOWEL BLUE 17" X 24"

## (undated) DEVICE — GLV 7.5 PROTEXIS (WHITE)

## (undated) DEVICE — NDL HYPO SAFE 18G X 1.5" (PINK)

## (undated) DEVICE — BIPOLAR FORCEP SYMMETRY BAYONET 7" X 1.5MM SMOOTH (SILVER)

## (undated) DEVICE — DRSG TAPE TRANSPORE 3"

## (undated) DEVICE — SPECIMEN CONTAINER 100ML

## (undated) DEVICE — DRSG TEGADERM 6"X8"

## (undated) DEVICE — POSITIONER CUSHION INSERT PRONE VIEW LG

## (undated) DEVICE — Device

## (undated) DEVICE — GLV 7 PROTEXIS (WHITE)

## (undated) DEVICE — FOLEY TRAY 16FR 5CC LTX UMETER CLOSED

## (undated) DEVICE — WARMING BLANKET LOWER ADULT

## (undated) DEVICE — NDL SPINAL 18G X 3.5" (PINK)

## (undated) DEVICE — POSITIONER FOAM EGG CRATE ULNAR 2PCS (PINK)

## (undated) DEVICE — DRAPE 1/2 SHEET 40X57"

## (undated) DEVICE — GOWN TRIMAX LG

## (undated) DEVICE — SUT BIOSYN 3-0 18" P-12

## (undated) DEVICE — VENODYNE/SCD SLEEVE CALF LARGE

## (undated) DEVICE — SOL IRR POUR NS 0.9% 500ML

## (undated) DEVICE — DRAPE LIGHT HANDLE COVER (GREEN)

## (undated) DEVICE — ELCTR BOVIE TIP CLEANER SCRATCH PAD

## (undated) DEVICE — SOL IRR POUR H2O 250ML

## (undated) DEVICE — PREP CHLORAPREP HI-LITE ORANGE 26ML

## (undated) DEVICE — POSITIONER FOAM HEADREST (PINK)

## (undated) DEVICE — SUT BIOSYN 4-0 18" P-12

## (undated) DEVICE — ELCTR BOVIE TIP BLADE INSULATED 6.5" EDGE

## (undated) DEVICE — MIDAS REX MR7 LUBRICANT DIFFUSER CARTRIDGE

## (undated) DEVICE — GLV 8.5 PROTEXIS (WHITE)

## (undated) DEVICE — SUT POLYSORB 2-0 30" GS-21 UNDYED

## (undated) DEVICE — SUT SOFSILK 2-0 30" TIES

## (undated) DEVICE — MIDAS REX LEGEND BALL FLUTED LG BORE 6.0MM X 21CM

## (undated) DEVICE — SUT POLYSORB 2-0 36" GS-24 UNDYED

## (undated) DEVICE — DRAPE LIGHT HANDLE COVER (BLUE)

## (undated) DEVICE — GLV 8 DERMAPRENE ULTRA

## (undated) DEVICE — MAZOR MIDAS REX MR8 3.0MM X 30MM X 31CM

## (undated) DEVICE — DRAPE C ARM UNIVERSAL

## (undated) DEVICE — DRSG STERISTRIPS 0.5 X 4"

## (undated) DEVICE — MAZOR X SPINE DISP KIT

## (undated) DEVICE — VESSEL LOOP MAXI-RED  0.120" X 16"

## (undated) DEVICE — SUT POLYSORB 2-0 30" V-20 UNDYED

## (undated) DEVICE — DRAPE EQUIPMENT BANDED BAG 30 X 30" (SHOWER CAP)

## (undated) DEVICE — POSITIONER JACKSON TABLE HEADREST 7", CHEST, HIP, THIGH PADS

## (undated) DEVICE — STRYKER INTERPULSE HANDPIECE W IRR SUCTION TUBE

## (undated) DEVICE — SUT VICRYL PLUS 2-0 18" CP-2 UNDYED (POP-OFF)

## (undated) DEVICE — SYR LUER LOK 10CC

## (undated) DEVICE — DRSG TELFA 3 X 8

## (undated) DEVICE — SUT VICRYL PLUS 0 18" OS-6 (POP-OFF)

## (undated) DEVICE — MARKING PEN W RULER

## (undated) DEVICE — PACK LUMBAR LAMI

## (undated) DEVICE — NDL HYPO SAFE 22G X 1.5" (BLACK)

## (undated) DEVICE — SYR LUER LOK 20CC

## (undated) DEVICE — SUT POLYSORB 3-0 30" V-20 UNDYED

## (undated) DEVICE — PACK FEM/POP

## (undated) DEVICE — SUT MONOCRYL 4-0 27" PS-2 UNDYED

## (undated) DEVICE — WARMING BLANKET UPPER ADULT

## (undated) DEVICE — SUCTION YANKAUER NO CONTROL VENT

## (undated) DEVICE — GLV 6.5 PROTEXIS (WHITE)

## (undated) DEVICE — DRAPE MAYO STAND 30"

## (undated) DEVICE — SUT PDS II 1 54" TP-1

## (undated) DEVICE — MIDAS REX LEGEND MATCH HEAD FLUTED LG BORE 3.0MM X 14CM

## (undated) DEVICE — GLV 7.5 DERMAPRENE ULTRA

## (undated) DEVICE — SUT PROLENE 3-0 36" SH

## (undated) DEVICE — SOL IRR BAG NS 0.9% 3000ML

## (undated) DEVICE — DRAPE 3/4 SHEET W REINFORCEMENT 56X77"

## (undated) DEVICE — MIDAS REX LEGEND LUBRICANT DIFFUSER CARTRIDGE

## (undated) DEVICE — ELCTR BOVIE TIP BLADE VALLEYLAB 6.5"

## (undated) DEVICE — MEDICATION LABELS W MARKER

## (undated) DEVICE — MIDAS REX MR8 MATCH HEAD FLUTED LG BORE 3MM X 21CM

## (undated) DEVICE — TUBING ATS SUCTION LINE

## (undated) DEVICE — BLADE SCALPEL SAFETYLOCK #10

## (undated) DEVICE — SUT PROLENE 4-0 36" SH

## (undated) DEVICE — LAP PAD 18 X 18"

## (undated) DEVICE — ELCTR BOVIE PENCIL SMOKE EVACUATION

## (undated) DEVICE — VAGINAL PACKING 2 X 6"

## (undated) DEVICE — SUT SILK 0 30" TIES

## (undated) DEVICE — DRAIN JACKSON PRATT 3 SPRING RESERVOIR W 10FR PVC DRAIN

## (undated) DEVICE — BLADE SCALPEL SAFETYLOCK #15

## (undated) DEVICE — DRAIN RESERVOIR FOR JACKSON PRATT 100CC CARDINAL